# Patient Record
Sex: MALE | Race: WHITE | NOT HISPANIC OR LATINO | Employment: OTHER | URBAN - METROPOLITAN AREA
[De-identification: names, ages, dates, MRNs, and addresses within clinical notes are randomized per-mention and may not be internally consistent; named-entity substitution may affect disease eponyms.]

---

## 2017-04-06 ENCOUNTER — ALLSCRIPTS OFFICE VISIT (OUTPATIENT)
Dept: OTHER | Facility: OTHER | Age: 72
End: 2017-04-06

## 2017-04-06 ENCOUNTER — LAB REQUISITION (OUTPATIENT)
Dept: LAB | Facility: HOSPITAL | Age: 72
End: 2017-04-06
Payer: MEDICARE

## 2017-04-06 DIAGNOSIS — E78.5 HYPERLIPIDEMIA: ICD-10-CM

## 2017-04-06 DIAGNOSIS — I10 ESSENTIAL (PRIMARY) HYPERTENSION: ICD-10-CM

## 2017-04-06 DIAGNOSIS — I48.0 PAROXYSMAL ATRIAL FIBRILLATION (HCC): ICD-10-CM

## 2017-04-06 LAB
ALBUMIN SERPL BCP-MCNC: 3.8 G/DL (ref 3.5–5)
ALP SERPL-CCNC: 92 U/L (ref 46–116)
ALT SERPL W P-5'-P-CCNC: 43 U/L (ref 12–78)
ANION GAP SERPL CALCULATED.3IONS-SCNC: 5 MMOL/L (ref 4–13)
AST SERPL W P-5'-P-CCNC: 24 U/L (ref 5–45)
BILIRUB SERPL-MCNC: 0.8 MG/DL (ref 0.2–1)
BUN SERPL-MCNC: 19 MG/DL (ref 5–25)
CALCIUM SERPL-MCNC: 8.8 MG/DL (ref 8.3–10.1)
CHLORIDE SERPL-SCNC: 106 MMOL/L (ref 100–108)
CHOLEST SERPL-MCNC: 174 MG/DL (ref 50–200)
CO2 SERPL-SCNC: 28 MMOL/L (ref 21–32)
CREAT SERPL-MCNC: 1.12 MG/DL (ref 0.6–1.3)
GFR SERPL CREATININE-BSD FRML MDRD: >60 ML/MIN/1.73SQ M
GLUCOSE P FAST SERPL-MCNC: 91 MG/DL (ref 65–99)
HDLC SERPL-MCNC: 58 MG/DL (ref 40–60)
LDLC SERPL CALC-MCNC: 99 MG/DL (ref 0–100)
POTASSIUM SERPL-SCNC: 4.6 MMOL/L (ref 3.5–5.3)
PROT SERPL-MCNC: 7.1 G/DL (ref 6.4–8.2)
SODIUM SERPL-SCNC: 139 MMOL/L (ref 136–145)
TRIGL SERPL-MCNC: 86 MG/DL
TSH SERPL DL<=0.05 MIU/L-ACNC: 2.49 UIU/ML (ref 0.36–3.74)

## 2017-04-06 PROCEDURE — 84443 ASSAY THYROID STIM HORMONE: CPT | Performed by: FAMILY MEDICINE

## 2017-04-06 PROCEDURE — 80053 COMPREHEN METABOLIC PANEL: CPT | Performed by: FAMILY MEDICINE

## 2017-04-06 PROCEDURE — 80061 LIPID PANEL: CPT | Performed by: FAMILY MEDICINE

## 2017-04-07 ENCOUNTER — GENERIC CONVERSION - ENCOUNTER (OUTPATIENT)
Dept: OTHER | Facility: OTHER | Age: 72
End: 2017-04-07

## 2017-04-28 ENCOUNTER — ALLSCRIPTS OFFICE VISIT (OUTPATIENT)
Dept: OTHER | Facility: OTHER | Age: 72
End: 2017-04-28

## 2017-05-18 ENCOUNTER — ALLSCRIPTS OFFICE VISIT (OUTPATIENT)
Dept: OTHER | Facility: OTHER | Age: 72
End: 2017-05-18

## 2017-05-22 ENCOUNTER — ANESTHESIA EVENT (OUTPATIENT)
Dept: PERIOP | Facility: HOSPITAL | Age: 72
End: 2017-05-22
Payer: MEDICARE

## 2017-05-22 RX ORDER — BUPIVACAINE HYDROCHLORIDE AND EPINEPHRINE 5; 5 MG/ML; UG/ML
25 INJECTION, SOLUTION PERINEURAL ONCE
Status: CANCELLED | OUTPATIENT
Start: 2017-05-23

## 2017-05-23 ENCOUNTER — ANESTHESIA (OUTPATIENT)
Dept: PERIOP | Facility: HOSPITAL | Age: 72
End: 2017-05-23
Payer: MEDICARE

## 2017-05-23 ENCOUNTER — HOSPITAL ENCOUNTER (OUTPATIENT)
Facility: HOSPITAL | Age: 72
Setting detail: OUTPATIENT SURGERY
Discharge: HOME | End: 2017-05-23
Attending: ORTHOPAEDIC SURGERY | Admitting: ORTHOPAEDIC SURGERY
Payer: MEDICARE

## 2017-05-23 VITALS
RESPIRATION RATE: 20 BRPM | HEIGHT: 72 IN | TEMPERATURE: 99.1 F | WEIGHT: 178 LBS | HEART RATE: 69 BPM | BODY MASS INDEX: 24.11 KG/M2 | DIASTOLIC BLOOD PRESSURE: 69 MMHG | OXYGEN SATURATION: 95 % | SYSTOLIC BLOOD PRESSURE: 139 MMHG

## 2017-05-23 PROBLEM — M72.0 DUPUYTREN CONTRACTURE: Status: ACTIVE | Noted: 2017-05-23

## 2017-05-23 RX ORDER — SODIUM CHLORIDE, SODIUM LACTATE, POTASSIUM CHLORIDE, CALCIUM CHLORIDE 600; 310; 30; 20 MG/100ML; MG/100ML; MG/100ML; MG/100ML
125 INJECTION, SOLUTION INTRAVENOUS CONTINUOUS
Status: DISCONTINUED | OUTPATIENT
Start: 2017-05-23 | End: 2017-05-23 | Stop reason: HOSPADM

## 2017-05-23 RX ORDER — HYDROCODONE BITARTRATE AND ACETAMINOPHEN 5; 325 MG/1; MG/1
1 TABLET ORAL EVERY 6 HOURS PRN
Status: DISCONTINUED | OUTPATIENT
Start: 2017-05-23 | End: 2017-05-23 | Stop reason: HOSPADM

## 2017-05-23 RX ORDER — ONDANSETRON 2 MG/ML
4 INJECTION INTRAMUSCULAR; INTRAVENOUS ONCE AS NEEDED
Status: DISCONTINUED | OUTPATIENT
Start: 2017-05-23 | End: 2017-05-23 | Stop reason: HOSPADM

## 2017-05-23 RX ORDER — MIDAZOLAM HYDROCHLORIDE 1 MG/ML
INJECTION INTRAMUSCULAR; INTRAVENOUS AS NEEDED
Status: DISCONTINUED | OUTPATIENT
Start: 2017-05-23 | End: 2017-05-23 | Stop reason: SURG

## 2017-05-23 RX ORDER — LIDOCAINE HYDROCHLORIDE 10 MG/ML
INJECTION, SOLUTION INFILTRATION; PERINEURAL AS NEEDED
Status: DISCONTINUED | OUTPATIENT
Start: 2017-05-23 | End: 2017-05-23 | Stop reason: SURG

## 2017-05-23 RX ORDER — LANOLIN ALCOHOL/MO/W.PET/CERES
1000 CREAM (GRAM) TOPICAL DAILY
COMMUNITY
End: 2022-01-01 | Stop reason: CLARIF

## 2017-05-23 RX ORDER — METOCLOPRAMIDE HYDROCHLORIDE 5 MG/ML
10 INJECTION INTRAMUSCULAR; INTRAVENOUS EVERY 6 HOURS PRN
Status: DISCONTINUED | OUTPATIENT
Start: 2017-05-23 | End: 2017-05-23 | Stop reason: HOSPADM

## 2017-05-23 RX ORDER — CHOLECALCIFEROL (VITAMIN D3) 125 MCG
2000 CAPSULE ORAL DAILY
COMMUNITY
End: 2022-01-01 | Stop reason: CLARIF

## 2017-05-23 RX ORDER — FENTANYL CITRATE 50 UG/ML
INJECTION, SOLUTION INTRAMUSCULAR; INTRAVENOUS AS NEEDED
Status: DISCONTINUED | OUTPATIENT
Start: 2017-05-23 | End: 2017-05-23 | Stop reason: SURG

## 2017-05-23 RX ORDER — PROPOFOL 10 MG/ML
INJECTION, EMULSION INTRAVENOUS AS NEEDED
Status: DISCONTINUED | OUTPATIENT
Start: 2017-05-23 | End: 2017-05-23 | Stop reason: SURG

## 2017-05-23 RX ORDER — CHLORAL HYDRATE 500 MG
1000 CAPSULE ORAL DAILY
COMMUNITY
End: 2022-01-01 | Stop reason: CLARIF

## 2017-05-23 RX ORDER — DUTASTERIDE 0.5 MG/1
0.5 CAPSULE, LIQUID FILLED ORAL
COMMUNITY
End: 2018-04-13 | Stop reason: SDUPTHER

## 2017-05-23 RX ORDER — ONDANSETRON 2 MG/ML
INJECTION INTRAMUSCULAR; INTRAVENOUS AS NEEDED
Status: DISCONTINUED | OUTPATIENT
Start: 2017-05-23 | End: 2017-05-23 | Stop reason: SURG

## 2017-05-23 RX ORDER — EPHEDRINE SULFATE 50 MG/ML
INJECTION, SOLUTION INTRAVENOUS AS NEEDED
Status: DISCONTINUED | OUTPATIENT
Start: 2017-05-23 | End: 2017-05-23 | Stop reason: SURG

## 2017-05-23 RX ORDER — MAGNESIUM HYDROXIDE 1200 MG/15ML
LIQUID ORAL AS NEEDED
Status: DISCONTINUED | OUTPATIENT
Start: 2017-05-23 | End: 2017-05-23 | Stop reason: HOSPADM

## 2017-05-23 RX ORDER — ASPIRIN 81 MG/1
81 TABLET ORAL DAILY
COMMUNITY
End: 2021-01-01 | Stop reason: ALTCHOICE

## 2017-05-23 RX ORDER — HYDROCODONE BITARTRATE AND ACETAMINOPHEN 5; 325 MG/1; MG/1
1 TABLET ORAL EVERY 6 HOURS PRN
Qty: 10 TABLET | Refills: 0 | Status: SHIPPED | OUTPATIENT
Start: 2017-05-23 | End: 2017-06-02

## 2017-05-23 RX ORDER — PROPOFOL 10 MG/ML
INJECTION, EMULSION INTRAVENOUS CONTINUOUS PRN
Status: DISCONTINUED | OUTPATIENT
Start: 2017-05-23 | End: 2017-05-23 | Stop reason: SURG

## 2017-05-23 RX ORDER — LOSARTAN POTASSIUM 25 MG/1
50 TABLET ORAL
COMMUNITY
End: 2018-04-13 | Stop reason: SDUPTHER

## 2017-05-23 RX ORDER — FENTANYL CITRATE/PF 50 MCG/ML
25 SYRINGE (ML) INJECTION
Status: DISCONTINUED | OUTPATIENT
Start: 2017-05-23 | End: 2017-05-23 | Stop reason: HOSPADM

## 2017-05-23 RX ORDER — MULTIVITAMIN
1 TABLET ORAL DAILY
COMMUNITY
End: 2022-01-01 | Stop reason: HOSPADM

## 2017-05-23 RX ORDER — EZETIMIBE AND SIMVASTATIN 10; 20 MG/1; MG/1
1 TABLET ORAL
COMMUNITY
End: 2018-04-15 | Stop reason: ALTCHOICE

## 2017-05-23 RX ADMIN — SODIUM CHLORIDE, SODIUM LACTATE, POTASSIUM CHLORIDE, AND CALCIUM CHLORIDE: .6; .31; .03; .02 INJECTION, SOLUTION INTRAVENOUS at 14:00

## 2017-05-23 RX ADMIN — SODIUM CHLORIDE, SODIUM LACTATE, POTASSIUM CHLORIDE, AND CALCIUM CHLORIDE 125 ML/HR: .6; .31; .03; .02 INJECTION, SOLUTION INTRAVENOUS at 11:52

## 2017-05-23 RX ADMIN — PROPOFOL 100 MCG/KG/MIN: 10 INJECTION, EMULSION INTRAVENOUS at 14:10

## 2017-05-23 RX ADMIN — FENTANYL CITRATE 50 MCG: 50 INJECTION, SOLUTION INTRAMUSCULAR; INTRAVENOUS at 14:29

## 2017-05-23 RX ADMIN — LIDOCAINE HYDROCHLORIDE 50 MG: 10 INJECTION, SOLUTION INFILTRATION; PERINEURAL at 14:10

## 2017-05-23 RX ADMIN — SODIUM CHLORIDE, SODIUM LACTATE, POTASSIUM CHLORIDE, AND CALCIUM CHLORIDE 125 ML/HR: .6; .31; .03; .02 INJECTION, SOLUTION INTRAVENOUS at 16:15

## 2017-05-23 RX ADMIN — PROPOFOL 100 MG: 10 INJECTION, EMULSION INTRAVENOUS at 14:16

## 2017-05-23 RX ADMIN — EPHEDRINE SULFATE 5 MG: 50 INJECTION, SOLUTION INTRAMUSCULAR; INTRAVENOUS; SUBCUTANEOUS at 15:39

## 2017-05-23 RX ADMIN — MIDAZOLAM HYDROCHLORIDE 1 MG: 1 INJECTION, SOLUTION INTRAMUSCULAR; INTRAVENOUS at 14:01

## 2017-05-23 RX ADMIN — MIDAZOLAM HYDROCHLORIDE 1 MG: 1 INJECTION, SOLUTION INTRAMUSCULAR; INTRAVENOUS at 14:09

## 2017-05-23 RX ADMIN — EPHEDRINE SULFATE 10 MG: 50 INJECTION, SOLUTION INTRAMUSCULAR; INTRAVENOUS; SUBCUTANEOUS at 15:08

## 2017-05-23 RX ADMIN — ONDANSETRON 4 MG: 2 INJECTION INTRAMUSCULAR; INTRAVENOUS at 15:05

## 2017-05-23 RX ADMIN — EPHEDRINE SULFATE 5 MG: 50 INJECTION, SOLUTION INTRAMUSCULAR; INTRAVENOUS; SUBCUTANEOUS at 15:28

## 2017-05-23 RX ADMIN — FENTANYL CITRATE 25 MCG: 50 INJECTION, SOLUTION INTRAMUSCULAR; INTRAVENOUS at 15:21

## 2017-05-23 RX ADMIN — FENTANYL CITRATE 25 MCG: 50 INJECTION, SOLUTION INTRAMUSCULAR; INTRAVENOUS at 14:49

## 2017-05-23 RX ADMIN — CEFAZOLIN SODIUM 2000 MG: 2 SOLUTION INTRAVENOUS at 14:10

## 2017-06-02 ENCOUNTER — APPOINTMENT (OUTPATIENT)
Dept: OCCUPATIONAL THERAPY | Facility: HOSPITAL | Age: 72
End: 2017-06-02
Payer: MEDICARE

## 2017-06-02 ENCOUNTER — ALLSCRIPTS OFFICE VISIT (OUTPATIENT)
Dept: OTHER | Facility: OTHER | Age: 72
End: 2017-06-02

## 2017-06-02 DIAGNOSIS — M72.0 PALMAR FASCIAL FIBROMATOSIS: ICD-10-CM

## 2017-06-02 PROCEDURE — L3913 HFO W/O JOINTS CF: HCPCS

## 2017-06-09 ENCOUNTER — APPOINTMENT (OUTPATIENT)
Dept: PHYSICAL THERAPY | Age: 72
End: 2017-06-09
Payer: MEDICARE

## 2017-06-09 ENCOUNTER — GENERIC CONVERSION - ENCOUNTER (OUTPATIENT)
Dept: OTHER | Facility: OTHER | Age: 72
End: 2017-06-09

## 2017-06-09 PROCEDURE — 97140 MANUAL THERAPY 1/> REGIONS: CPT

## 2017-06-09 PROCEDURE — G8990 OTHER PT/OT CURRENT STATUS: HCPCS | Performed by: PHYSICAL THERAPIST

## 2017-06-09 PROCEDURE — G8991 OTHER PT/OT GOAL STATUS: HCPCS | Performed by: PHYSICAL THERAPIST

## 2017-06-09 PROCEDURE — 97162 PT EVAL MOD COMPLEX 30 MIN: CPT

## 2017-06-12 ENCOUNTER — APPOINTMENT (OUTPATIENT)
Dept: PHYSICAL THERAPY | Age: 72
End: 2017-06-12
Payer: MEDICARE

## 2017-06-12 PROCEDURE — 97110 THERAPEUTIC EXERCISES: CPT

## 2017-06-12 PROCEDURE — 97140 MANUAL THERAPY 1/> REGIONS: CPT

## 2017-06-14 ENCOUNTER — APPOINTMENT (OUTPATIENT)
Dept: PHYSICAL THERAPY | Age: 72
End: 2017-06-14
Payer: MEDICARE

## 2017-06-14 PROCEDURE — 97140 MANUAL THERAPY 1/> REGIONS: CPT

## 2017-06-14 PROCEDURE — 97110 THERAPEUTIC EXERCISES: CPT

## 2017-06-16 ENCOUNTER — APPOINTMENT (OUTPATIENT)
Dept: PHYSICAL THERAPY | Age: 72
End: 2017-06-16
Payer: MEDICARE

## 2017-06-16 PROCEDURE — 97140 MANUAL THERAPY 1/> REGIONS: CPT

## 2017-06-16 PROCEDURE — 97110 THERAPEUTIC EXERCISES: CPT

## 2017-06-19 ENCOUNTER — APPOINTMENT (OUTPATIENT)
Dept: PHYSICAL THERAPY | Age: 72
End: 2017-06-19
Payer: MEDICARE

## 2017-06-21 ENCOUNTER — APPOINTMENT (OUTPATIENT)
Dept: PHYSICAL THERAPY | Age: 72
End: 2017-06-21
Payer: MEDICARE

## 2017-06-21 PROCEDURE — 97140 MANUAL THERAPY 1/> REGIONS: CPT

## 2017-06-21 PROCEDURE — 97110 THERAPEUTIC EXERCISES: CPT

## 2017-06-23 ENCOUNTER — APPOINTMENT (OUTPATIENT)
Dept: PHYSICAL THERAPY | Age: 72
End: 2017-06-23
Payer: MEDICARE

## 2017-06-23 PROCEDURE — 97110 THERAPEUTIC EXERCISES: CPT

## 2017-06-23 PROCEDURE — 97140 MANUAL THERAPY 1/> REGIONS: CPT

## 2017-06-26 ENCOUNTER — APPOINTMENT (OUTPATIENT)
Dept: PHYSICAL THERAPY | Age: 72
End: 2017-06-26
Payer: MEDICARE

## 2017-06-26 PROCEDURE — 97140 MANUAL THERAPY 1/> REGIONS: CPT

## 2017-06-26 PROCEDURE — 97110 THERAPEUTIC EXERCISES: CPT

## 2017-06-30 ENCOUNTER — APPOINTMENT (OUTPATIENT)
Dept: PHYSICAL THERAPY | Age: 72
End: 2017-06-30
Payer: MEDICARE

## 2017-06-30 PROCEDURE — 97110 THERAPEUTIC EXERCISES: CPT

## 2017-06-30 PROCEDURE — 97140 MANUAL THERAPY 1/> REGIONS: CPT

## 2017-07-03 ENCOUNTER — APPOINTMENT (OUTPATIENT)
Dept: PHYSICAL THERAPY | Age: 72
End: 2017-07-03
Payer: MEDICARE

## 2017-07-07 ENCOUNTER — APPOINTMENT (OUTPATIENT)
Dept: PHYSICAL THERAPY | Age: 72
End: 2017-07-07
Payer: MEDICARE

## 2017-07-07 PROCEDURE — 97110 THERAPEUTIC EXERCISES: CPT

## 2017-07-07 PROCEDURE — 97140 MANUAL THERAPY 1/> REGIONS: CPT

## 2017-07-07 PROCEDURE — 97140 MANUAL THERAPY 1/> REGIONS: CPT | Performed by: PHYSICAL THERAPIST

## 2017-07-07 PROCEDURE — 97110 THERAPEUTIC EXERCISES: CPT | Performed by: PHYSICAL THERAPIST

## 2017-07-10 ENCOUNTER — APPOINTMENT (OUTPATIENT)
Dept: OCCUPATIONAL THERAPY | Age: 72
End: 2017-07-10
Payer: MEDICARE

## 2017-07-14 ENCOUNTER — APPOINTMENT (OUTPATIENT)
Dept: PHYSICAL THERAPY | Age: 72
End: 2017-07-14
Payer: MEDICARE

## 2017-07-17 ENCOUNTER — APPOINTMENT (OUTPATIENT)
Dept: PHYSICAL THERAPY | Age: 72
End: 2017-07-17
Payer: MEDICARE

## 2017-07-17 PROCEDURE — 97110 THERAPEUTIC EXERCISES: CPT

## 2017-07-17 PROCEDURE — 97140 MANUAL THERAPY 1/> REGIONS: CPT

## 2017-07-21 ENCOUNTER — APPOINTMENT (OUTPATIENT)
Dept: PHYSICAL THERAPY | Age: 72
End: 2017-07-21
Payer: MEDICARE

## 2017-08-01 ENCOUNTER — GENERIC CONVERSION - ENCOUNTER (OUTPATIENT)
Dept: OTHER | Facility: OTHER | Age: 72
End: 2017-08-01

## 2017-08-17 ENCOUNTER — APPOINTMENT (EMERGENCY)
Dept: RADIOLOGY | Facility: HOSPITAL | Age: 72
End: 2017-08-17
Payer: MEDICARE

## 2017-08-17 ENCOUNTER — APPOINTMENT (EMERGENCY)
Dept: CT IMAGING | Facility: HOSPITAL | Age: 72
End: 2017-08-17
Payer: MEDICARE

## 2017-08-17 ENCOUNTER — HOSPITAL ENCOUNTER (EMERGENCY)
Facility: HOSPITAL | Age: 72
Discharge: HOME | End: 2017-08-17
Attending: EMERGENCY MEDICINE | Admitting: EMERGENCY MEDICINE
Payer: MEDICARE

## 2017-08-17 VITALS
WEIGHT: 175 LBS | HEART RATE: 60 BPM | BODY MASS INDEX: 23.73 KG/M2 | SYSTOLIC BLOOD PRESSURE: 138 MMHG | TEMPERATURE: 97.7 F | DIASTOLIC BLOOD PRESSURE: 74 MMHG | RESPIRATION RATE: 16 BRPM | OXYGEN SATURATION: 97 %

## 2017-08-17 DIAGNOSIS — S40.211A: ICD-10-CM

## 2017-08-17 DIAGNOSIS — R55 NEAR SYNCOPE: Primary | ICD-10-CM

## 2017-08-17 DIAGNOSIS — S00.83XA FACIAL CONTUSION, INITIAL ENCOUNTER: ICD-10-CM

## 2017-08-17 DIAGNOSIS — T67.5XXA HEAT EXHAUSTION: ICD-10-CM

## 2017-08-17 DIAGNOSIS — Z23 NEED FOR TDAP VACCINATION: ICD-10-CM

## 2017-08-17 LAB
ANION GAP SERPL CALCULATED.3IONS-SCNC: 6 MMOL/L (ref 4–13)
BASOPHILS # BLD AUTO: 0.02 THOUSANDS/ΜL (ref 0–0.1)
BASOPHILS NFR BLD AUTO: 0 % (ref 0–1)
BUN SERPL-MCNC: 14 MG/DL (ref 5–25)
CALCIUM SERPL-MCNC: 8.7 MG/DL (ref 8.3–10.1)
CHLORIDE SERPL-SCNC: 103 MMOL/L (ref 100–108)
CO2 SERPL-SCNC: 27 MMOL/L (ref 21–32)
CREAT SERPL-MCNC: 1.18 MG/DL (ref 0.6–1.3)
EOSINOPHIL # BLD AUTO: 0.04 THOUSAND/ΜL (ref 0–0.61)
EOSINOPHIL NFR BLD AUTO: 1 % (ref 0–6)
ERYTHROCYTE [DISTWIDTH] IN BLOOD BY AUTOMATED COUNT: 14.4 % (ref 11.6–15.1)
GFR SERPL CREATININE-BSD FRML MDRD: 61 ML/MIN/1.73SQ M
GLUCOSE SERPL-MCNC: 96 MG/DL (ref 65–140)
HCT VFR BLD AUTO: 40.2 % (ref 36.5–49.3)
HGB BLD-MCNC: 14.1 G/DL (ref 12–17)
LYMPHOCYTES # BLD AUTO: 0.93 THOUSANDS/ΜL (ref 0.6–4.47)
LYMPHOCYTES NFR BLD AUTO: 11 % (ref 14–44)
MCH RBC QN AUTO: 30.5 PG (ref 26.8–34.3)
MCHC RBC AUTO-ENTMCNC: 35.1 G/DL (ref 31.4–37.4)
MCV RBC AUTO: 87 FL (ref 82–98)
MONOCYTES # BLD AUTO: 0.77 THOUSAND/ΜL (ref 0.17–1.22)
MONOCYTES NFR BLD AUTO: 9 % (ref 4–12)
NEUTROPHILS # BLD AUTO: 6.57 THOUSANDS/ΜL (ref 1.85–7.62)
NEUTS SEG NFR BLD AUTO: 79 % (ref 43–75)
NRBC BLD AUTO-RTO: 0 /100 WBCS
PLATELET # BLD AUTO: 187 THOUSANDS/UL (ref 149–390)
PMV BLD AUTO: 10.3 FL (ref 8.9–12.7)
POTASSIUM SERPL-SCNC: 4.3 MMOL/L (ref 3.5–5.3)
RBC # BLD AUTO: 4.63 MILLION/UL (ref 3.88–5.62)
SODIUM SERPL-SCNC: 136 MMOL/L (ref 136–145)
SPECIMEN SOURCE: NORMAL
TROPONIN I BLD-MCNC: 0 NG/ML (ref 0–0.08)
WBC # BLD AUTO: 8.33 THOUSAND/UL (ref 4.31–10.16)

## 2017-08-17 PROCEDURE — 36415 COLL VENOUS BLD VENIPUNCTURE: CPT | Performed by: EMERGENCY MEDICINE

## 2017-08-17 PROCEDURE — 84484 ASSAY OF TROPONIN QUANT: CPT

## 2017-08-17 PROCEDURE — 99284 EMERGENCY DEPT VISIT MOD MDM: CPT

## 2017-08-17 PROCEDURE — 80048 BASIC METABOLIC PNL TOTAL CA: CPT | Performed by: EMERGENCY MEDICINE

## 2017-08-17 PROCEDURE — 85025 COMPLETE CBC W/AUTO DIFF WBC: CPT | Performed by: EMERGENCY MEDICINE

## 2017-08-17 PROCEDURE — 90471 IMMUNIZATION ADMIN: CPT

## 2017-08-17 PROCEDURE — 93005 ELECTROCARDIOGRAM TRACING: CPT | Performed by: EMERGENCY MEDICINE

## 2017-08-17 PROCEDURE — 90715 TDAP VACCINE 7 YRS/> IM: CPT | Performed by: EMERGENCY MEDICINE

## 2017-08-17 PROCEDURE — 70450 CT HEAD/BRAIN W/O DYE: CPT

## 2017-08-17 PROCEDURE — 71020 HB CHEST X-RAY 2VW FRONTAL&LATL: CPT

## 2017-08-17 RX ADMIN — TETANUS TOXOID, REDUCED DIPHTHERIA TOXOID AND ACELLULAR PERTUSSIS VACCINE, ADSORBED 0.5 ML: 5; 2.5; 8; 8; 2.5 SUSPENSION INTRAMUSCULAR at 17:35

## 2017-08-18 LAB
ATRIAL RATE: 61 BPM
P AXIS: 67 DEGREES
PR INTERVAL: 196 MS
QRS AXIS: 61 DEGREES
QRSD INTERVAL: 102 MS
QT INTERVAL: 422 MS
QTC INTERVAL: 424 MS
T WAVE AXIS: 59 DEGREES
VENTRICULAR RATE: 61 BPM

## 2017-08-25 ENCOUNTER — ALLSCRIPTS OFFICE VISIT (OUTPATIENT)
Dept: OTHER | Facility: OTHER | Age: 72
End: 2017-08-25

## 2017-10-12 ENCOUNTER — GENERIC CONVERSION - ENCOUNTER (OUTPATIENT)
Dept: OTHER | Facility: OTHER | Age: 72
End: 2017-10-12

## 2017-10-12 ENCOUNTER — LAB REQUISITION (OUTPATIENT)
Dept: LAB | Facility: HOSPITAL | Age: 72
End: 2017-10-12
Payer: MEDICARE

## 2017-10-12 ENCOUNTER — ALLSCRIPTS OFFICE VISIT (OUTPATIENT)
Dept: OTHER | Facility: OTHER | Age: 72
End: 2017-10-12

## 2017-10-12 DIAGNOSIS — E78.5 HYPERLIPIDEMIA: ICD-10-CM

## 2017-10-12 DIAGNOSIS — D64.9 ANEMIA: ICD-10-CM

## 2017-10-12 DIAGNOSIS — Z12.5 ENCOUNTER FOR SCREENING FOR MALIGNANT NEOPLASM OF PROSTATE: ICD-10-CM

## 2017-10-12 DIAGNOSIS — I10 ESSENTIAL (PRIMARY) HYPERTENSION: ICD-10-CM

## 2017-10-12 LAB
ALBUMIN SERPL BCP-MCNC: 3.8 G/DL (ref 3.5–5)
ALP SERPL-CCNC: 76 U/L (ref 46–116)
ALT SERPL W P-5'-P-CCNC: 37 U/L (ref 12–78)
ANION GAP SERPL CALCULATED.3IONS-SCNC: 8 MMOL/L (ref 4–13)
AST SERPL W P-5'-P-CCNC: 24 U/L (ref 5–45)
BASOPHILS # BLD AUTO: 0.05 THOUSANDS/ΜL (ref 0–0.1)
BASOPHILS NFR BLD AUTO: 1 % (ref 0–1)
BILIRUB SERPL-MCNC: 0.76 MG/DL (ref 0.2–1)
BUN SERPL-MCNC: 17 MG/DL (ref 5–25)
CALCIUM SERPL-MCNC: 8.6 MG/DL (ref 8.3–10.1)
CHLORIDE SERPL-SCNC: 106 MMOL/L (ref 100–108)
CHOLEST SERPL-MCNC: 162 MG/DL (ref 50–200)
CO2 SERPL-SCNC: 26 MMOL/L (ref 21–32)
CREAT SERPL-MCNC: 1.05 MG/DL (ref 0.6–1.3)
EOSINOPHIL # BLD AUTO: 0.32 THOUSAND/ΜL (ref 0–0.61)
EOSINOPHIL NFR BLD AUTO: 7 % (ref 0–6)
ERYTHROCYTE [DISTWIDTH] IN BLOOD BY AUTOMATED COUNT: 14.4 % (ref 11.6–15.1)
GFR SERPL CREATININE-BSD FRML MDRD: 70 ML/MIN/1.73SQ M
GLUCOSE P FAST SERPL-MCNC: 87 MG/DL (ref 65–99)
HCT VFR BLD AUTO: 44.2 % (ref 36.5–49.3)
HDLC SERPL-MCNC: 56 MG/DL (ref 40–60)
HGB BLD-MCNC: 15.2 G/DL (ref 12–17)
LDLC SERPL CALC-MCNC: 86 MG/DL (ref 0–100)
LYMPHOCYTES # BLD AUTO: 1.75 THOUSANDS/ΜL (ref 0.6–4.47)
LYMPHOCYTES NFR BLD AUTO: 35 % (ref 14–44)
MCH RBC QN AUTO: 30.2 PG (ref 26.8–34.3)
MCHC RBC AUTO-ENTMCNC: 34.4 G/DL (ref 31.4–37.4)
MCV RBC AUTO: 88 FL (ref 82–98)
MONOCYTES # BLD AUTO: 0.76 THOUSAND/ΜL (ref 0.17–1.22)
MONOCYTES NFR BLD AUTO: 15 % (ref 4–12)
NEUTROPHILS # BLD AUTO: 2.05 THOUSANDS/ΜL (ref 1.85–7.62)
NEUTS SEG NFR BLD AUTO: 42 % (ref 43–75)
NRBC BLD AUTO-RTO: 0 /100 WBCS
PLATELET # BLD AUTO: 221 THOUSANDS/UL (ref 149–390)
PMV BLD AUTO: 11 FL (ref 8.9–12.7)
POTASSIUM SERPL-SCNC: 4.5 MMOL/L (ref 3.5–5.3)
PROT SERPL-MCNC: 7 G/DL (ref 6.4–8.2)
PSA SERPL-MCNC: 0.7 NG/ML (ref 0–4)
RBC # BLD AUTO: 5.03 MILLION/UL (ref 3.88–5.62)
SODIUM SERPL-SCNC: 140 MMOL/L (ref 136–145)
TRIGL SERPL-MCNC: 98 MG/DL
TSH SERPL DL<=0.05 MIU/L-ACNC: 3.13 UIU/ML (ref 0.36–3.74)
WBC # BLD AUTO: 4.94 THOUSAND/UL (ref 4.31–10.16)

## 2017-10-12 PROCEDURE — 80053 COMPREHEN METABOLIC PANEL: CPT | Performed by: FAMILY MEDICINE

## 2017-10-12 PROCEDURE — 85025 COMPLETE CBC W/AUTO DIFF WBC: CPT | Performed by: FAMILY MEDICINE

## 2017-10-12 PROCEDURE — 84443 ASSAY THYROID STIM HORMONE: CPT | Performed by: FAMILY MEDICINE

## 2017-10-12 PROCEDURE — 80061 LIPID PANEL: CPT | Performed by: FAMILY MEDICINE

## 2017-10-12 PROCEDURE — G0103 PSA SCREENING: HCPCS | Performed by: FAMILY MEDICINE

## 2017-10-13 NOTE — PROGRESS NOTES
Assessment  1  Benign essential hypertension (401 1) (I10)   2  Hyperlipidemia (272 4) (E78 5)   3  Dupuytren's disease (728 6) (M72 0)    Plan  Anemia, Benign essential hypertension, Encounter for prostate cancer screening,  Hyperlipidemia    · (1) PSA (SCREEN) (Dx V76 44 Screen for Prostate Cancer); Status:Active; Requested  for:12Oct2017; Anemia, Benign essential hypertension, Hyperlipidemia    · (1) CBC/PLT/DIFF; Status:Active; Requested for:12Oct2017;    · (1) COMPREHENSIVE METABOLIC PANEL; Status:Active; Requested for:12Oct2017;    · (1) LIPID PANEL FASTING W DIRECT LDL REFLEX; Status:Active; Requested  for:12Oct2017;    · (1) TSH; Status:Active; Requested for:12Oct2017;   Need for influenza vaccination    · Fluzone High-Dose 0 5 ML Intramuscular Suspension Prefilled Syringe  PMH: Abdominal pain    · Chlordiazepoxide-Clidinium 5-2 5 MG Oral Capsule; TAKE 1 CAPSULE 3 TIMES  DAILY AS NEEDED    Discussion/Summary    Hyperlipidemia stable well-controlled  Continue losartan and metoprolol  Continue atorvastatin 40 mg  Due for fasting labs today  contractures  Continue follow-up with orthopedic/hand surgeon as needed  vaccine today  Fasting lab work today to include CBC, chem panel, PSA and thyroidup-to-date  Recheck 6 months  Chief Complaint  Patient presents for a med check  Patient is fasting today for blood work  Patient is here today for follow up of chronic conditions described in HPI  History of Present Illness  Patient was seen for routine follow-up of chronic medical problems he has hypertension, hyperlipidemia, and he's being treated by hand surgeon for chronic Dupuytren's contractures in his hands  He's had multiple surgeries with partial improvement he takes atorvastatin for his lipids, losartan and metoprolol for his blood pressure  He's compliant with both those      Review of Systems    Constitutional: No fever or chills, feels well, no tiredness, no recent weight gain or weight loss  Eyes: No complaints of eye pain, no red eyes, no discharge from eyes, no itchy eyes  ENT: no complaints of earache, no hearing loss, no nosebleeds, no nasal discharge, no sore throat, no hoarseness  Cardiovascular: No complaints of slow heart rate, no fast heart rate, no chest pain, no palpitations, no leg claudication, no lower extremity  Respiratory: No complaints of shortness of breath, no wheezing, no cough, no SOB on exertion, no orthopnea or PND  Gastrointestinal: No complaints of abdominal pain, no constipation, no nausea or vomiting, no diarrhea or bloody stools  Genitourinary: No complaints of dysuria, no incontinence, no hesitancy, no nocturia, no genital lesion, no testicular pain  Musculoskeletal: No complaints of arthralgia, no myalgias, no joint swelling or stiffness, no limb pain or swelling-and-as noted in HPI  Integumentary: No complaints of skin rash or skin lesions, no itching, no skin wound, no dry skin  Neurological: No compliants of headache, no confusion, no convulsions, no numbness or tingling, no dizziness or fainting, no limb weakness, no difficulty walking  Psychiatric: Is not suicidal, no sleep disturbances, no anxiety or depression, no change in personality, no emotional problems  Endocrine: No complaints of proptosis, no hot flashes, no muscle weakness, no erectile dysfunction, no deepening of the voice, no feelings of weakness  Hematologic/Lymphatic: No complaints of swollen glands, no swollen glands in the neck, does not bleed easily, no easy bruising  Active Problems  1  Anemia (285 9) (D64 9)   2  Benign essential hypertension (401 1) (I10)   3  Benign hypertrophy of prostate (600 00) (N40 0)   4  Dupuytren's disease (728 6) (M72 0)   5  Hyperlipidemia (272 4) (E78 5)    Past Medical History  1  History of Abdominal pain (789 00) (R10 9)   2  History of Abnormal CT of liver (793 3) (R93 2)   3   History of Acute upper respiratory infection (465 9) (J06 9)   4  History of Anxiety (300 00) (F41 9)   5  History of Depression screen (V79 0) (Z13 89)   6  History of Encounter for screening for other nervous system disorder (V80 09) (Z13 858)   7  History of Encounter for special screening examination for genitourinary disorder (V81 6)   (Z13 89)   8  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)   9  History of Esophagitis   10  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)   11  History of acute gastritis (V12 70) (Z87 19)   12  History of acute sinusitis (V12 69) (Z87 09)   13  History of dehydration (V12 29) (Z86 39)   14  History of esophageal reflux (V12 79) (Z87 19)   15  History of Need for immunization against influenza (V04 81) (Z23)   16  History of Need for immunization against influenza (V04 81) (Z23)   17  History of Need for vaccination for pneumococcus (V03 82) (Z23)   18  History of Paroxysmal atrial fibrillation (427 31) (I48 0)   19  History of Prostate cancer screening (V76 44) (Z12 5)   20  History of Screening for colon cancer (V76 51) (Z12 11)   21  History of Screening for genitourinary condition (V81 6) (Z13 89)   22  History of Screening for other and unspecified genitourinary condition (V81 6) (Z13 89)    The active problems and past medical history were reviewed and updated today  Surgical History  1  History of Lower Back Surgery    Social History   · Never A Smoker    Current Meds   1  Aspirin 81 MG Oral Tablet Chewable; qd;   Therapy: 05QOZ2544 to (Last Rx:13Mar2012)  Requested for: 47ZNY6411 Ordered   2  Atorvastatin Calcium 40 MG Oral Tablet; Take 1 tablet daily; Therapy: 33GZL3344 to (Last Rx:06Apr2017)  Requested for: 06Apr2017 Ordered   3  Dutasteride 0 5 MG Oral Capsule; TAKE 1 CAPSULE Daily; Therapy: 23HVT6313 to (Evaluate:01Apr2018)  Requested for: 62FZM3131; Last   Rx:06Apr2017 Ordered   4  Losartan Potassium 50 MG Oral Tablet; take 0 5 tablet daily;    Therapy: 77Udv1297 to ([phone])  Requested for: 67NDV8799; Last   Rx:06Apr2017 Ordered   5  Metoprolol Tartrate 25 MG Oral Tablet; TAKE 0 5 TABLET Twice daily; Therapy: 38GKX3351 to (Evaluate:01Apr2018)  Requested for: 79OQN4729; Last   Rx:06Apr2017 Ordered   6  Viagra 50 MG Oral Tablet; PRN; Therapy: 71AIV0770 to  Requested for: 75Lkm6720 Recorded    The medication list was reviewed and updated today  Allergies  1  No Known Drug Allergies    Vitals  Vital Signs    Recorded: 41NGU4027 08:24AM   Temperature 96 F, Tympanic   Heart Rate 78   Pulse Quality Normal   Systolic 604, RUE, Sitting   Diastolic 70, RUE, Sitting   BP CUFF SIZE Large   Height 5 ft 10 in   Weight 175 lb 2 oz   BMI Calculated 25 13   BSA Calculated 1 97   O2 Saturation 97, RA     Physical Exam    Constitutional   General appearance: No acute distress, well appearing and well nourished  Pulmonary   Respiratory effort: No increased work of breathing or signs of respiratory distress  Auscultation of lungs: Clear to auscultation, equal breath sounds bilaterally, no wheezes, no rales, no rhonci  Cardiovascular   Auscultation of heart: Normal rate and rhythm, normal S1 and S2, without murmurs  Examination of extremities for edema and/or varicosities: Normal     Lymphatic   Palpation of lymph nodes in neck: No lymphadenopathy      Musculoskeletal   Gait and station: Normal     Psychiatric   Orientation to person, place and time: Normal     Mood and affect: Normal          Signatures   Electronically signed by : Iván Hutson DO; Oct 12 2017  8:57AM EST                       (Author)

## 2018-01-11 NOTE — RESULT NOTES
Verified Results  (1) CBC/PLT/DIFF 12Oct2017 09:02AM Pablito Ruiz Order Number: [id number]     Test Name Result Flag Reference   WBC COUNT 4 94 Thousand/uL  4 31-10 16   RBC COUNT 5 03 Million/uL  3 88-5 62   HEMOGLOBIN 15 2 g/dL  12 0-17 0   HEMATOCRIT 44 2 %  36 5-49 3   MCV 88 fL  82-98   MCH 30 2 pg  26 8-34 3   MCHC 34 4 g/dL  31 4-37 4   RDW 14 4 %  11 6-15 1   MPV 11 0 fL  8 9-12 7   PLATELET COUNT 963 Thousands/uL  149-390   nRBC AUTOMATED 0 /100 WBCs     NEUTROPHILS RELATIVE PERCENT 42 % L 43-75   LYMPHOCYTES RELATIVE PERCENT 35 %  14-44   MONOCYTES RELATIVE PERCENT 15 % H 4-12   EOSINOPHILS RELATIVE PERCENT 7 % H 0-6   BASOPHILS RELATIVE PERCENT 1 %  0-1   NEUTROPHILS ABSOLUTE COUNT 2 05 Thousands/? ??L  1 85-7 62   LYMPHOCYTES ABSOLUTE COUNT 1 75 Thousands/? ??L  0 60-4 47   MONOCYTES ABSOLUTE COUNT 0 76 Thousand/? ??L  0 17-1 22   EOSINOPHILS ABSOLUTE COUNT 0 32 Thousand/? ??L  0 00-0 61   BASOPHILS ABSOLUTE COUNT 0 05 Thousands/? ??L  0 00-0 10     (1) COMPREHENSIVE METABOLIC PANEL 58JCH1545 61:22LV Pablito Ruiz Order Number: [id number]     Test Name Result Flag Reference   SODIUM 140 mmol/L  136-145   POTASSIUM 4 5 mmol/L  3 5-5 3   CHLORIDE 106 mmol/L  100-108   CARBON DIOXIDE 26 mmol/L  21-32   ANION GAP (CALC) 8 mmol/L  4-13   BLOOD UREA NITROGEN 17 mg/dL  5-25   CREATININE 1 05 mg/dL  0 60-1 30   Standardized to IDMS reference method   CALCIUM 8 6 mg/dL  8 3-10 1   BILI, TOTAL 0 76 mg/dL  0 20-1 00   ALK PHOSPHATAS 76 U/L     ALT (SGPT) 37 U/L  12-78   Specimen collection should occur prior to Sulfasalazine and/or Sulfapyridine administration due to the potential for falsely depressed results  AST(SGOT) 24 U/L  5-45   Specimen collection should occur prior to Sulfasalazine administration due to the potential for falsely depressed results     ALBUMIN 3 8 g/dL  3 5-5 0   TOTAL PROTEIN 7 0 g/dL  6 4-8 2   eGFR 70 ml/min/1 73sq m     National Kidney Disease Education Program recommendations are as follows:  GFR calculation is accurate only with a steady state creatinine  Chronic Kidney disease less than 60 ml/min/1 73 sq  meters  Kidney failure less than 15 ml/min/1 73 sq  meters  GLUCOSE FASTING 87 mg/dL  65-99   Specimen collection should occur prior to Sulfasalazine administration due to the potential for falsely depressed results  Specimen collection should occur prior to Sulfapyridine administration due to the potential for falsely elevated results  (1) LIPID PANEL FASTING W DIRECT LDL REFLEX 12Oct2017 09:02AM [place] [place] Order Number: [id number]     Test Name Result Flag Reference   CHOLESTEROL 162 mg/dL     LDL CHOLESTEROL CALCULATED 86 mg/dL  0-100   Triglyceride:        Normal <150 mg/dl   Borderline High 150-199 mg/dl   High 200-499 mg/dl   Very High >499 mg/dl      Cholesterol:       Desirable <200 mg/dl    Borderline High 200-239 mg/dl    High >239 mg/dl      HDL Cholesterol:       High>59 mg/dL    Low <41 mg/dL      HDL Cholesterol:       High>59 mg/dL    Low <41 mg/dL      This screening LDL is a calculated result  It does not have the accuracy of the Direct Measured LDL in the monitoring of patients with hyperlipidemia and/or statin therapy  Direct Measure LDL (SQY312) must be ordered separately in these patients  TRIGLYCERIDES 98 mg/dL  <=150   Specimen collection should occur prior to N-Acetylcysteine or Metamizole administration due to the potential for falsely depressed results  HDL,DIRECT 56 mg/dL  40-60   Specimen collection should occur prior to Metamizole administration due to the potential for falsley depressed results  (1) TSH 12Oct2017 09:02AM [facility] [place] Order Number: [id number]     Test Name Result Flag Reference   TSH 3 130 uIU/mL  0 358-3 740   Patients undergoing fluorescein dye angiography may retain small amounts of fluorescein in the body for 48-72 hours post procedure  Samples containing fluorescein can produce falsely depressed TSH values  If the patient had this procedure,a specimen should be resubmitted post fluorescein clearance  (1) PSA (SCREEN) (Dx V76 44 Screen for Prostate Cancer) 16AUH3541 09:02AM Marv Calvo Order Number: [id number]     Test Name Result Flag Reference   PROSTATE SPECIFIC ANTIGEN 0 7 ng/mL  0 0-4 0   American U[employer] Association Guidelines define biochemical recurrence of prostate cancer as a detectable or rising PSA value post-radical prostatectomy that is greater than or equal to 0 2 ng/mL with a second confirmatory level of greater than or equal to 0 2 ng/mL

## 2018-01-12 VITALS
WEIGHT: 181.13 LBS | DIASTOLIC BLOOD PRESSURE: 78 MMHG | HEART RATE: 78 BPM | SYSTOLIC BLOOD PRESSURE: 156 MMHG | BODY MASS INDEX: 25.93 KG/M2 | HEIGHT: 70 IN

## 2018-01-12 VITALS
HEART RATE: 83 BPM | DIASTOLIC BLOOD PRESSURE: 80 MMHG | HEIGHT: 70 IN | SYSTOLIC BLOOD PRESSURE: 147 MMHG | BODY MASS INDEX: 25.41 KG/M2 | WEIGHT: 177.5 LBS

## 2018-01-13 VITALS
OXYGEN SATURATION: 96 % | TEMPERATURE: 98.4 F | BODY MASS INDEX: 25.54 KG/M2 | SYSTOLIC BLOOD PRESSURE: 118 MMHG | HEART RATE: 66 BPM | HEIGHT: 70 IN | RESPIRATION RATE: 16 BRPM | WEIGHT: 178.38 LBS | DIASTOLIC BLOOD PRESSURE: 78 MMHG

## 2018-01-13 NOTE — RESULT NOTES
Verified Results  (1) OCCULT BLOOD, FECAL IMMUNOCHEMICAL TEST 60ZRA1824 09:10PM Mario Push     Test Name Result Flag Reference   OCCULT BLD, FECAL IMMUNOLOGICAL Negative  Negative   Performed by Fecal Immunochemical Test

## 2018-01-13 NOTE — RESULT NOTES
Verified Results  (1) COMPREHENSIVE METABOLIC PANEL 25SZC4615 19:02KI John Lexie Order Number: [id number]     Test Name Result Flag Reference   GLUCOSE,RANDM 89 mg/dL     If the patient is fasting, the ADA then defines impaired fasting glucose as > 100 mg/dL and diabetes as > or equal to 123 mg/dL  SODIUM 139 mmol/L  136-145   POTASSIUM 4 5 mmol/L  3 5-5 3   CHLORIDE 106 mmol/L  100-108   CARBON DIOXIDE 27 mmol/L  21-32   ANION GAP (CALC) 6 mmol/L  4-13   BLOOD UREA NITROGEN 19 mg/dL  5-25   CREATININE 1 14 mg/dL  0 60-1 30   Standardized to IDMS reference method   CALCIUM 8 8 mg/dL  8 3-10 1   BILI, TOTAL 0 69 mg/dL  0 20-1 00   ALK PHOSPHATAS 64 U/L     ALT (SGPT) 36 U/L  12-78   AST(SGOT) 22 U/L  5-45   ALBUMIN 3 8 g/dL  3 5-5 0   TOTAL PROTEIN 7 0 g/dL  6 4-8 2   eGFR Non-African American      >60 0 ml/min/1 73sq [facility] Disease Education Program recommendations are as follows:  GFR calculation is accurate only with a steady state creatinine  Chronic Kidney disease less than 60 ml/min/1 73 sq  meters  Kidney failure less than 15 ml/min/1 73 sq  meters       (1) CBC/PLT/DIFF 05Zxy4510 11:32AM John Lexie Order Number: [id number]     Test Name Result Flag Reference   WBC COUNT 5 04 Thousand/uL  4 31-10 16   RBC COUNT 5 02 Million/uL  3 88-5 62   HEMOGLOBIN 15 0 g/dL  12 0-17 0   HEMATOCRIT 44 1 %  36 5-49 3   MCV 88 fL  82-98   MCH 29 9 pg  26 8-34 3   MCHC 34 0 g/dL  31 4-37 4   RDW 14 3 %  11 6-15 1   MPV 10 8 fL  8 9-12 7   PLATELET COUNT 931 Thousands/uL  149-390   nRBC AUTOMATED 0 /100 WBCs     NEUTROPHILS RELATIVE PERCENT 47 %  43-75   LYMPHOCYTES RELATIVE PERCENT 29 %  14-44   MONOCYTES RELATIVE PERCENT 18 % H 4-12   EOSINOPHILS RELATIVE PERCENT 5 %  0-6   BASOPHILS RELATIVE PERCENT 1 %  0-1   NEUTROPHILS ABSOLUTE COUNT 2 32 Thousands/?L  1 85-7 62   LYMPHOCYTES ABSOLUTE COUNT 1 47 Thousands/?L  0 60-4 47   MONOCYTES ABSOLUTE COUNT 0 91 Thousand/?L  0 17-1 22   EOSINOPHILS ABSOLUTE COUNT 0 27 Thousand/?L  0 00-0 61   BASOPHILS ABSOLUTE COUNT 0 06 Thousands/?L  0 00-0 10   - Patient Instructions: This bloodwork is non-fasting  Please drink two glasses of water morning of bloodwork  (1) LIPID PANEL FASTING W DIRECT LDL REFLEX 29Sep2016 11:32AM UN[facility] Order Number: [id number]     Test Name Result Flag Reference   CHOLESTEROL 173 mg/dL     LDL CHOLESTEROL CALCULATED 97 mg/dL  0-100   - Patient Instructions: This is a fasting blood test  Water, black tea or black coffee only after 9:00pm the night before test   Drink 2 glasses of water the morning of test       Triglyceride:         Normal              <150 mg/dl       Borderline High    150-199 mg/dl       High               200-499 mg/dl       Very High          >499 mg/dl  Cholesterol:         Desirable        <200 mg/dl      Borderline High  200-239 mg/dl      High             >239 mg/dl  HDL Cholesterol:        High    >59 mg/dL      Low     <41 mg/dL  LDL Cholesterol:        Optimal          <100 mg/dl        Near Optimal     100-129 mg/dl        Above Optimal          Borderline High   130-159 mg/dl          High              160-189 mg/dl          Very High        >189 mg/dl  LDL CALCULATED:    This screening LDL is a calculated result  It does not have the accuracy of the Direct Measured LDL in the monitoring of patients with hyperlipidemia and/or statin therapy  Direct Measure LDL (XOI436) must be ordered separately in these patients  TRIGLYCERIDES 98 mg/dL  <=150   Specimen collection should occur prior to N-Acetylcysteine or Metamizole administration due to the potential for falsely depressed results  HDL,DIRECT 56 mg/dL  40-60   Specimen collection should occur prior to Metamizole administration due to the potential for falsely depressed results       (1) TSH 29Sep2016 11:32AM [facility] Order Number: [id number]     Test Name Result Flag Reference   TSH 1 870 uIU/mL  0 358-3 740   - Patient Instructions: This bloodwork is non-fasting  Please drink two glasses of water morning of bloodwork  Patients undergoing fluorescein dye angiography may retain small amounts of fluorescein in the body for 48-72 hours post procedure  Samples containing fluorescein can produce falsely depressed TSH values  If the patient had this procedure,a specimen should be resubmitted post fluorescein clearance

## 2018-01-14 VITALS
TEMPERATURE: 98.5 F | DIASTOLIC BLOOD PRESSURE: 70 MMHG | HEIGHT: 70 IN | OXYGEN SATURATION: 96 % | HEART RATE: 69 BPM | RESPIRATION RATE: 16 BRPM | BODY MASS INDEX: 25.23 KG/M2 | SYSTOLIC BLOOD PRESSURE: 104 MMHG | WEIGHT: 176.25 LBS

## 2018-01-14 VITALS
DIASTOLIC BLOOD PRESSURE: 79 MMHG | WEIGHT: 177 LBS | BODY MASS INDEX: 25.34 KG/M2 | HEART RATE: 66 BPM | SYSTOLIC BLOOD PRESSURE: 127 MMHG | HEIGHT: 70 IN

## 2018-01-14 VITALS
WEIGHT: 175.13 LBS | DIASTOLIC BLOOD PRESSURE: 70 MMHG | OXYGEN SATURATION: 97 % | HEIGHT: 70 IN | SYSTOLIC BLOOD PRESSURE: 124 MMHG | BODY MASS INDEX: 25.07 KG/M2 | TEMPERATURE: 96 F | HEART RATE: 78 BPM

## 2018-01-15 NOTE — RESULT NOTES
Verified Results  (1) COMPREHENSIVE METABOLIC PANEL [id number] 34:54WJ Johnathan Spencer     Test Name Result Flag Reference   SODIUM 139 mmol/L  136-145   POTASSIUM 4 6 mmol/L  3 5-5 3   CHLORIDE 106 mmol/L  100-108   CARBON DIOXIDE 28 mmol/L  21-32   ANION GAP (CALC) 5 mmol/L  4-13   BLOOD UREA NITROGEN 19 mg/dL  5-25   CREATININE 1 12 mg/dL  0 60-1 30   Standardized to IDMS reference method   CALCIUM 8 8 mg/dL  8 3-10 1   BILI, TOTAL 0 80 mg/dL  0 20-1 00   ALK PHOSPHATAS 92 U/L     ALT (SGPT) 43 U/L  12-78   AST(SGOT) 24 U/L  5-45   ALBUMIN 3 8 g/dL  3 5-5 0   TOTAL PROTEIN 7 1 g/dL  6 4-8 2   eGFR Non-African American      >60 0 ml/min/1 73sq m   This is a fasting blood test  Water,black tea or black  coffee only after 9:00pm the night before test  Drink 2 glasses of water the morning of test This bloodwork is non-fasting  Please drink two glasses of water morning of bloodwork  National Kidney Disease Education Program recommendations are as follows:  GFR calculation is accurate only with a steady state creatinine  Chronic Kidney disease less than 60 ml/min/1 73 sq  meters  Kidney failure less than 15 ml/min/1 73 sq  meters  GLUCOSE FASTING 91 mg/dL  65-99     (1) TSH [id number] 03:03PM Johnathan Spencer     Test Name Result Flag Reference   TSH 2 490 uIU/mL  0 358-3 740   This bloodwork is non-fasting  Please drink two glasses of water morning of  bloodwork  This is a fasting blood test  Water,black tea or black  coffee only after 9:00pm the night before test  Drink 2 glasses of water the morning of test This bloodwork is non-fasting  Please drink two glasses of water morning of bloodwork  Patients undergoing fluorescein dye angiography may retain small amounts of fluorescein in the body for 48-72 hours post procedure  Samples containing fluorescein can produce falsely depressed TSH values  If the patient had this procedure,a specimen should be resubmitted post fluorescein clearance       (1) LIPID PANEL FASTING W DIRECT LDL REFLEX [id number] 03:03PM Julio César Mulligan     Test Name Result Flag Reference   CHOLESTEROL 174 mg/dL     LDL CHOLESTEROL CALCULATED 99 mg/dL  0-100   This is a fasting blood test  Water,black tea or black  coffee only after 9:00pm the night before test  Drink 2 glasses of water the morning of test     This is a fasting blood test  Water,black tea or black  coffee only after 9:00pm the night before test  Drink 2 glasses of water the morning of test This bloodwork is non-fasting  Please drink two glasses of water morning of bloodwork  Triglyceride:         Normal              <150 mg/dl       Borderline High    150-199 mg/dl       High               200-499 mg/dl       Very High          >499 mg/dl  Cholesterol:         Desirable        <200 mg/dl      Borderline High  200-239 mg/dl      High             >239 mg/dl  HDL Cholesterol:        High    >59 mg/dL      Low     <41 mg/dL  LDL Cholesterol:        Optimal          <100 mg/dl        Near Optimal     100-129 mg/dl        Above Optimal          Borderline High   130-159 mg/dl          High              160-189 mg/dl          Very High        >189 mg/dl  LDL CALCULATED:    This screening LDL is a calculated result  It does not have the accuracy of the Direct Measured LDL in the monitoring of patients with hyperlipidemia and/or statin therapy  Direct Measure LDL (DYA357) must be ordered separately in these patients  TRIGLYCERIDES 86 mg/dL  <=150   Specimen collection should occur prior to N-Acetylcysteine or Metamizole administration due to the potential for falsely depressed results  HDL,DIRECT 58 mg/dL  40-60   Specimen collection should occur prior to Metamizole administration due to the potential for falsely depressed results

## 2018-01-16 NOTE — RESULT NOTES
Verified Results  (1) CBC/PLT/DIFF 15Apr2016 09:42AM Ephraim Elise Order Number: [id number]    TW Order Number: [id number]     Test Name Result Flag Reference   WBC COUNT 4 87 Thousand/uL  4 31-10 16   RBC COUNT 5 20 Million/uL  3 88-5 62   HEMOGLOBIN 15 3 g/dL  12 0-17 0   HEMATOCRIT 44 6 %  36 5-49 3   MCV 86 fL  82-98   MCH 29 4 pg  26 8-34 3   MCHC 34 3 g/dL  31 4-37 4   RDW 14 5 %  11 6-15 1   MPV 10 4 fL  8 9-12 7   PLATELET COUNT 887 Thousands/uL  149-390   nRBC AUTOMATED 0 /100 WBCs     NEUTROPHILS RELATIVE PERCENT 46 %  43-75   LYMPHOCYTES RELATIVE PERCENT 32 %  14-44   MONOCYTES RELATIVE PERCENT 16 % H 4-12   EOSINOPHILS RELATIVE PERCENT 5 %  0-6   BASOPHILS RELATIVE PERCENT 1 %  0-1   NEUTROPHILS ABSOLUTE COUNT 2 24 Thousands/µL  1 85-7 62   LYMPHOCYTES ABSOLUTE COUNT 1 56 Thousands/µL  0 60-4 47   MONOCYTES ABSOLUTE COUNT 0 77 Thousand/µL  0 17-1 22   EOSINOPHILS ABSOLUTE COUNT 0 26 Thousand/µL  0 00-0 61   BASOPHILS ABSOLUTE COUNT 0 04 Thousands/µL  0 00-0 10     (1) COMPREHENSIVE METABOLIC PANEL [id number] 25:69BC Ephraim Elise Order Number: [id number]    TW Order Number: [id number]  National Kidney Disease Education Program recommendations are as follows:  GFR calculation is accurate only with a steady state creatinine  Chronic Kidney disease less than 60 ml/min/1 73 sq  meters  Kidney failure less than 15 ml/min/1 73 sq  meters  Test Name Result Flag Reference   GLUCOSE,RANDM 93 mg/dL     If the patient is fasting, the ADA then defines impaired fasting glucose as > 100 mg/dL and diabetes as > or equal to 123 mg/dL     SODIUM 141 mmol/L  136-145   POTASSIUM 4 4 mmol/L  3 5-5 3   CHLORIDE 107 mmol/L  100-108   CARBON DIOXIDE 28 mmol/L  21-32   ANION GAP (CALC) 6 mmol/L  4-13   BLOOD UREA NITROGEN 17 mg/dL  5-25   CREATININE 1 16 mg/dL  0 60-1 30   Standardized to IDMS reference method   CALCIUM 8 9 mg/dL  8 3-10 1   BILI, TOTAL 0 75 mg/dL  0 20-1 00   ALK PHOSPHATAS 63 U/L    ALT (SGPT) 35 U/L  12-78   AST(SGOT) 21 U/L  5-45   ALBUMIN 3 9 g/dL  3 5-5 0   TOTAL PROTEIN 7 4 g/dL  6 4-8 2   eGFR Non-African American      >60 0 ml/min/1 73sq m     (1) LIPID PANEL, FASTING 15Apr2016 09:42AM Bernadette Jaimes Order Number: [id number]     Order Number: [id number]  Triglyceride:         Normal              <150 mg/dl       Borderline High    150-199 mg/dl       High               200-499 mg/dl       Very High          >499 mg/dl  Cholesterol:         Desirable        <200 mg/dl      Borderline High  200-239 mg/dl      High             >239 mg/dl  HDL Cholesterol:        High    >59 mg/dL      Low     <41 mg/dL  LDL CALCULATED:    This screening LDL is a calculated result  It does not have the accuracy of the Direct Measured LDL in the monitoring of patients with hyperlipidemia and/or statin therapy  Direct Measure LDL (TEO146) must be ordered separately in these patients  Test Name Result Flag Reference   CHOLESTEROL 169 mg/dL     HDL,DIRECT 60 mg/dL  40-60   E550   LDL CHOLESTEROL CALCULATED 88 mg/dL  0-100   TRIGLYCERIDES 107 mg/dL  <=150   Specimen collection should occur prior to N-Acetylcysteine or Metamizole administration due to the potential for falsely depressed results       (1) PSA (SCREEN) (Dx V76 44 Screen for Prostate Cancer) 15Apr2016 09:42AM Bernadette Jaimes Order Number: [id number]    TW Order Number: [id number]     Test Name Result Flag Reference   PROSTATE SPECIFIC ANTIGEN 0 9 ng/mL  0 0-4 0     (1) TSH WITH FT4 REFLEX 15Apr2016 09:42AM Bernadette Jaimes Order Number: [id number]    TW Order Number: [id number]     Test Name Result Flag Reference   TSH 3 010 uIU/mL  0 358-3 740

## 2018-04-10 RX ORDER — SILDENAFIL 50 MG/1
TABLET, FILM COATED ORAL AS NEEDED
COMMUNITY
Start: 2012-03-13 | End: 2020-12-31 | Stop reason: HOSPADM

## 2018-04-10 RX ORDER — ATORVASTATIN CALCIUM 40 MG/1
1 TABLET, FILM COATED ORAL DAILY
COMMUNITY
Start: 2016-10-15 | End: 2018-04-13 | Stop reason: SDUPTHER

## 2018-04-10 RX ORDER — CHLORDIAZEPOXIDE HYDROCHLORIDE AND CLIDINIUM BROMIDE 5; 2.5 MG/1; MG/1
1 CAPSULE ORAL 3 TIMES DAILY PRN
COMMUNITY
Start: 2014-08-14 | End: 2019-03-20 | Stop reason: SDUPTHER

## 2018-04-13 ENCOUNTER — OFFICE VISIT (OUTPATIENT)
Dept: FAMILY MEDICINE CLINIC | Facility: CLINIC | Age: 73
End: 2018-04-13
Payer: MEDICARE

## 2018-04-13 VITALS
BODY MASS INDEX: 23.73 KG/M2 | TEMPERATURE: 97.8 F | HEIGHT: 72 IN | RESPIRATION RATE: 18 BRPM | OXYGEN SATURATION: 97 % | HEART RATE: 69 BPM | DIASTOLIC BLOOD PRESSURE: 82 MMHG | SYSTOLIC BLOOD PRESSURE: 132 MMHG | WEIGHT: 175.2 LBS

## 2018-04-13 DIAGNOSIS — I10 BENIGN ESSENTIAL HYPERTENSION: ICD-10-CM

## 2018-04-13 DIAGNOSIS — E78.2 MIXED HYPERLIPIDEMIA: ICD-10-CM

## 2018-04-13 DIAGNOSIS — Z00.00 MEDICARE ANNUAL WELLNESS VISIT, SUBSEQUENT: Primary | ICD-10-CM

## 2018-04-13 DIAGNOSIS — M72.0 DUPUYTREN'S DISEASE: ICD-10-CM

## 2018-04-13 DIAGNOSIS — N40.1 BENIGN PROSTATIC HYPERPLASIA WITH LOWER URINARY TRACT SYMPTOMS, SYMPTOM DETAILS UNSPECIFIED: ICD-10-CM

## 2018-04-13 LAB
ALBUMIN SERPL BCP-MCNC: 3.9 G/DL (ref 3.5–5)
ALP SERPL-CCNC: 74 U/L (ref 46–116)
ALT SERPL W P-5'-P-CCNC: 30 U/L (ref 12–78)
ANION GAP SERPL CALCULATED.3IONS-SCNC: 5 MMOL/L (ref 4–13)
AST SERPL W P-5'-P-CCNC: 22 U/L (ref 5–45)
BILIRUB SERPL-MCNC: 0.96 MG/DL (ref 0.2–1)
BUN SERPL-MCNC: 18 MG/DL (ref 5–25)
CALCIUM SERPL-MCNC: 9 MG/DL
CHLORIDE SERPL-SCNC: 107 MMOL/L (ref 100–108)
CHOLEST SERPL-MCNC: 153 MG/DL (ref 50–200)
CO2 SERPL-SCNC: 29 MMOL/L (ref 21–32)
CREAT SERPL-MCNC: 1.09 MG/DL (ref 0.6–1.3)
GFR SERPL CREATININE-BSD FRML MDRD: 67 ML/MIN/1.73SQ M
GLUCOSE P FAST SERPL-MCNC: 83 MG/DL (ref 65–99)
HDLC SERPL-MCNC: 53 MG/DL (ref 40–60)
LDLC SERPL CALC-MCNC: 81 MG/DL (ref 0–100)
POTASSIUM SERPL-SCNC: 4.4 MMOL/L (ref 3.5–5.3)
PROT SERPL-MCNC: 6.9 G/DL (ref 6.4–8.2)
SODIUM SERPL-SCNC: 141 MMOL/L (ref 136–145)
TRIGL SERPL-MCNC: 96 MG/DL
TSH SERPL DL<=0.05 MIU/L-ACNC: 2.25 UIU/ML (ref 0.36–3.74)

## 2018-04-13 PROCEDURE — 80053 COMPREHEN METABOLIC PANEL: CPT | Performed by: FAMILY MEDICINE

## 2018-04-13 PROCEDURE — 36415 COLL VENOUS BLD VENIPUNCTURE: CPT | Performed by: FAMILY MEDICINE

## 2018-04-13 PROCEDURE — 80061 LIPID PANEL: CPT | Performed by: FAMILY MEDICINE

## 2018-04-13 PROCEDURE — G0439 PPPS, SUBSEQ VISIT: HCPCS | Performed by: FAMILY MEDICINE

## 2018-04-13 PROCEDURE — 99214 OFFICE O/P EST MOD 30 MIN: CPT | Performed by: FAMILY MEDICINE

## 2018-04-13 PROCEDURE — 84443 ASSAY THYROID STIM HORMONE: CPT | Performed by: FAMILY MEDICINE

## 2018-04-13 RX ORDER — ATORVASTATIN CALCIUM 40 MG/1
40 TABLET, FILM COATED ORAL DAILY
Qty: 90 TABLET | Refills: 1 | Status: SHIPPED | OUTPATIENT
Start: 2018-04-13 | End: 2018-05-05 | Stop reason: SDUPTHER

## 2018-04-13 RX ORDER — LOSARTAN POTASSIUM 25 MG/1
50 TABLET ORAL
Qty: 90 TABLET | Refills: 1 | Status: SHIPPED | OUTPATIENT
Start: 2018-04-13 | End: 2018-05-05 | Stop reason: SDUPTHER

## 2018-04-13 RX ORDER — DUTASTERIDE 0.5 MG/1
0.5 CAPSULE, LIQUID FILLED ORAL
Qty: 90 CAPSULE | Refills: 1 | Status: SHIPPED | OUTPATIENT
Start: 2018-04-13 | End: 2018-05-05 | Stop reason: SDUPTHER

## 2018-04-15 NOTE — ASSESSMENT & PLAN NOTE
Stable with some disability from contracture deformity  Patient has seen Orthopedics and will see as needed in the future  Has been told that there is limited options at this point for improving his functionality

## 2018-04-15 NOTE — PROGRESS NOTES
Assessment/Plan:    Hyperlipidemia  Hyperlipidemia stable on atorvastatin  Continue current dosage and recheck fasting lab work today  Benign prostatic hyperplasia  Symptoms fairly well controlled Avodart  Minimal nocturia and frequency  Continue with current dosage    Dupuytren's disease  Stable with some disability from contracture deformity  Patient has seen Orthopedics and will see as needed in the future  Has been told that there is limited options at this point for improving his functionality  Benign essential hypertension  Well controlled on current antihypertensive regimen  Continue losartan and metoprolol  Diagnoses and all orders for this visit:    Medicare annual wellness visit, subsequent    Benign essential hypertension  -     losartan (COZAAR) 25 mg tablet; Take 2 tablets (50 mg total) by mouth daily at bedtime  -     metoprolol tartrate (LOPRESSOR) 25 mg tablet; Take 0 5 tablets (12 5 mg total) by mouth 2 (two) times a day  -     Comprehensive metabolic panel    Benign prostatic hyperplasia with lower urinary tract symptoms, symptom details unspecified  -     dutasteride (AVODART) 0 5 mg capsule; Take 1 capsule (0 5 mg total) by mouth daily at bedtime    Mixed hyperlipidemia  -     atorvastatin (LIPITOR) 40 mg tablet; Take 1 tablet (40 mg total) by mouth daily  -     Lipid Panel with Direct LDL reflex  -     TSH, 3rd generation    Other orders  -     sildenafil (VIAGRA) 50 MG tablet; Take by mouth  -     chlordiazepoxide-clidinium (LIBRAX) 5-2 5 mg per capsule; Take 1 capsule by mouth 3 (three) times a day as needed  -     Discontinue: atorvastatin (LIPITOR) 40 mg tablet; Take 1 tablet by mouth daily          Subjective:      Patient ID: Kristy Bradford is a 74 y o  male  Patient was seen for routine follow-up of chronic medical problems  He is followed for treatment of hypertension, BPH, hyperlipidemia    He also has chronic do Prince contractures of his hands for which she has been treated by Orthopedics/Hand surgery  He takes atorvastatin for his lipids along with fish oil  He takes Avodart for his BPH, losartan and metoprolol for his blood pressure  He also does take Librax for occasional IBS symptoms which are mild intermittent  He has no new complaints today and feels generally well  The following portions of the patient's history were reviewed and updated as appropriate: allergies, current medications, past family history, past medical history, past social history, past surgical history and problem list     Review of Systems   Constitutional: Negative  Respiratory: Negative  Cardiovascular: Negative  Gastrointestinal: Negative  Genitourinary: Negative  Musculoskeletal: Negative  Psychiatric/Behavioral: Negative  Objective:      /82   Pulse 69   Temp 97 8 °F (36 6 °C) (Tympanic)   Resp 18   Ht 5' 11 65" (1 82 m)   Wt 79 5 kg (175 lb 3 2 oz)   SpO2 97%   BMI 23 99 kg/m²          Physical Exam   Constitutional: He is oriented to person, place, and time  He appears well-developed and well-nourished  No distress  HENT:   Head: Normocephalic and atraumatic  Eyes: Conjunctivae are normal  Pupils are equal, round, and reactive to light  Right eye exhibits no discharge  Neck: Normal range of motion  No thyromegaly present  Cardiovascular: Normal rate and regular rhythm  Pulmonary/Chest: Effort normal and breath sounds normal  No respiratory distress  Lymphadenopathy:     He has no cervical adenopathy  Neurological: He is alert and oriented to person, place, and time  Skin: Skin is warm and dry  He is not diaphoretic  Psychiatric: He has a normal mood and affect  His behavior is normal  Judgment and thought content normal    Nursing note and vitals reviewed

## 2018-04-15 NOTE — ASSESSMENT & PLAN NOTE
Symptoms fairly well controlled Avodart  Minimal nocturia and frequency    Continue with current dosage

## 2018-04-15 NOTE — PROGRESS NOTES
HPI:  Saige Briscoe is a 74 y o  male here for his Subsequent Wellness Visit  Patient Active Problem List   Diagnosis    Dupuytren's disease    Anemia    Benign essential hypertension    Benign prostatic hyperplasia    Hyperlipidemia     Past Medical History:   Diagnosis Date    Abnormal CT of liver     last assessed: 07/21/14    Anxiety     resolved: 07/30/15    BPH (benign prostatic hypertrophy)     Dupuytren contracture     both hands    Erectile dysfunction of non-organic origin     last assessed: 11/27/12    Esophageal reflux     last assessed: 11/11/14    Esophagitis 03/13/2012    Familial hypercholesteremia     last assessed: 06/11/13    Hyperlipidemia     Hypertension     Paroxysmal atrial fibrillation ([facility] [address] )     last assessed: 04/06/17     Past Surgical History:   Procedure Laterality Date    BACK SURGERY      Lower    HAND CONTRACTURE RELEASE Left 5/23/2017    Procedure: Left hand percutaneous fasciotomy of palm adductor space x 2; index finger PIP joint; ring finger PIP joint; small finger MCP joint and PIP joint  ; splint application;  Surgeon: Debby Nino MD;  Location: [facility];  Service:     HAND SURGERY Bilateral      History reviewed  No pertinent family history    History   Smoking Status    Never Smoker   Smokeless Tobacco    Never Used     History   Alcohol Use No      History   Drug Use No     /82   Pulse 69   Temp 97 8 °F (36 6 °C) (Tympanic)   Resp 18   Ht 5' 11 65" (1 82 m)   Wt 79 5 kg (175 lb 3 2 oz)   SpO2 97%   BMI 23 99 kg/m²       Current Outpatient Prescriptions   Medication Sig Dispense Refill    aspirin (ECOTRIN LOW STRENGTH) 81 mg EC tablet Take 81 mg by mouth daily      atorvastatin (LIPITOR) 40 mg tablet Take 1 tablet (40 mg total) by mouth daily 90 tablet 1    chlordiazepoxide-clidinium (LIBRAX) 5-2 5 mg per capsule Take 1 capsule by mouth 3 (three) times a day as needed      Cholecalciferol (VITAMIN D3) 2000 units TABS Take 2,000 Units by mouth daily      cyanocobalamin (VITAMIN B-12) 1,000 mcg tablet Take 1,000 mcg by mouth daily      dutasteride (AVODART) 0 5 mg capsule Take 1 capsule (0 5 mg total) by mouth daily at bedtime 90 capsule 1    losartan (COZAAR) 25 mg tablet Take 2 tablets (50 mg total) by mouth daily at bedtime 90 tablet 1    metoprolol tartrate (LOPRESSOR) 25 mg tablet Take 0 5 tablets (12 5 mg total) by mouth 2 (two) times a day 180 tablet 1    Multiple Vitamin (MULTIVITAMIN) tablet Take 1 tablet by mouth daily      Omega-3 Fatty Acids (FISH OIL) 1,000 mg Take 1,000 mg by mouth daily      ezetimibe-simvastatin (VYTORIN) 10-20 mg per tablet Take 1 tablet by mouth daily at bedtime      sildenafil (VIAGRA) 50 MG tablet Take by mouth       No current facility-administered medications for this visit        No Known Allergies  Immunization History   Administered Date(s) Administered    Influenza 09/16/2012, 10/17/2015, 10/12/2017    Influenza Split High Dose Preservative Free IM 10/17/2015, 09/29/2016, 10/12/2017    Influenza TIV (IM) 01/01/2011, 09/11/2012, 10/12/2013, 09/04/2014    Pneumococcal Conjugate 13-Valent 07/30/2015    Pneumococcal Polysaccharide PPV23 01/01/2008    Tdap 08/17/2017       Patient Care Team:  Ashley Jean DO as PCP - General      Medicare Screening Tests and Risk Assessments:  AWV Clinical     ISAR:   Previous hospitalizations?:  No       Once in a Lifetime Medicare Screening:   EKG performed?:  No    AAA screening performed? (if performed, please add date to Health Maintenance):  No       Medicare Screening Tests and Risk Assessment:   AAA Risk Assessment    None Indicated:  Yes    Osteoporosis Risk Assessment    None indicated:  Yes    HIV Risk Assessment    None indicated:  Yes        Drug and Alcohol Use:   Tobacco use    Cigarettes:  never smoker    Tobacco use duration    Tobacco Cessation Readiness    Alcohol use    Alcohol use:  occasional use    Alcohol Treatment Readiness   Illicit Drug Use    Drug use:  never    Drug type:  no sedative use       Diet & Exercise:   Diet   What is your diet?:  Regular   How many servings a day of the following:   Exercise    Do you currently exercise?:  yes    Frequency:  daily       Cognitive Impairment Screening:   Anxiety screenings preformed:  Yes    Depression screening preformed:  Yes Depression screen score:  0    PHQ-9 Depression scale score:  0   Depression screening results:  negative for symptoms   Cognitive Impairment Screening    Do you have difficulty learning or retaining new information?:  No Do you have difficulty handling new tasks?:  No   Do you have difficulty with reasoning?:  No Do you have difficulty with spatial ability and orientation?:  No   Do you have difficulty with language?:  No Do you have difficulty with behavior?:  No       Functional Ability/Level of Safety:   Hearing    Hearing difficulties:  No Bilateral:  normal   Left:  normal Right:  normal   Hearing Impairment Assessment    Hearing status:  No impairment   Do your family members ever complain that you turn on the radio or T V  too loudly?:  No Do you find that other people have to repeat themselves when talking to you?:  No   Do you have difficulty hearing while talking on the phone?:  No Has anyone ever told you that you are speaking too loudly when talking with them?:  No   Do you have trouble hearing the doorbell or phone ringing?:  No Do you have difficulty hearing such that you feel frustrated talking to people?:  No   Do you feel sad because you cannot hear well?:  No Do you feel inconvienced due to your hearing problem?:  No   Do you think you would be a happier person if you could hear better?:  No Would you be willing to go for a hearing aid fitting if suggested?:  No   Current Activities    Status:  unlimited ADL's, unlimited driving, unlimited IADL's, unlimited social activities   Help needed with the folllowing:    Using the phone:  No Transportation:  No   Shopping:  No Preparing Meals:  No   Doing Housework:  No Doing Laundry:  No   Managing Medications:  No Managing Money:  No   ADL    Feeding:  Independant   Oral hygiene and Facial grooming:  Independant   Bathing:  Independant   Upper Body Dressing:  Independant   Lower Body Dressing:  Independant   Fall Risk   Have you fallen in the last 12 months?:  No Are you unsteady on your feet?:  No   Injury History   Polypharmacy:  No Antidepressant Use:  No   Sedative Use:  No Antihypertensive Use:  No   Previous Fall:  No Alcohol Use:  No   Deconditioning:  No Visual Impairment:  No   Cogitive Impairment:  No Mmobility Impairment:  No   Postural Hypotension:  No Urinary Incontinence:  No       Home Safety:   Are there hazards in your environment?:  No   If you fell, would you need help to get back up from the ground?:  No    Do you feel unsteady when walking?:  No    Do you have handrails and grab-bars in the home?:  No    Home Safety Risk Factors   Unfamilar with surroundings:  No Uneven floors:  No   Stairs or handrail saftey risk:  No Loose rugs:  No   Household clutter:  No Poor household lighting:  No   No grab bars in bathroom:  No Further evaluation needed:  No       Advanced Directives:   Advanced Directives    Living Will:  No Durable POA for healthcare:  No   Advanced directive:  No    Patient's End of Life Decisions        Urinary Incontinence:       Glaucoma:            Provider Screening    No data filed        No exam data present  Reviewed Updated [facility] Prior Wellness Visits:   Last Medicare wellness visit information was reviewed, patient interviewed , no change since last AWVyes  Last Medicare wellness visit information was reviewed, patient interviewed and updates made to the record today yes    Assessment and Plan:  1  Benign essential hypertension  losartan (COZAAR) 25 mg tablet    metoprolol tartrate (LOPRESSOR) 25 mg tablet    Comprehensive metabolic panel   2   Benign prostatic hyperplasia with lower urinary tract symptoms, symptom details unspecified  dutasteride (AVODART) 0 5 mg capsule   3   Mixed hyperlipidemia  atorvastatin (LIPITOR) 40 mg tablet    Lipid Panel with Direct LDL reflex    TSH, 3rd generation       Health Maintenance Due   Topic Date Due    COLONOSCOPY  1945

## 2018-05-04 DIAGNOSIS — E78.2 MIXED HYPERLIPIDEMIA: ICD-10-CM

## 2018-05-04 DIAGNOSIS — N40.1 BENIGN PROSTATIC HYPERPLASIA WITH LOWER URINARY TRACT SYMPTOMS, SYMPTOM DETAILS UNSPECIFIED: ICD-10-CM

## 2018-05-04 DIAGNOSIS — I10 BENIGN ESSENTIAL HYPERTENSION: ICD-10-CM

## 2018-05-05 RX ORDER — ATORVASTATIN CALCIUM 40 MG/1
40 TABLET, FILM COATED ORAL DAILY
Qty: 90 TABLET | Refills: 1 | Status: SHIPPED | OUTPATIENT
Start: 2018-05-05 | End: 2018-08-24 | Stop reason: SDUPTHER

## 2018-05-05 RX ORDER — LOSARTAN POTASSIUM 25 MG/1
TABLET ORAL
Qty: 90 TABLET | Refills: 1 | Status: SHIPPED | OUTPATIENT
Start: 2018-05-05 | End: 2018-08-24 | Stop reason: SDUPTHER

## 2018-05-05 RX ORDER — DUTASTERIDE 0.5 MG/1
0.5 CAPSULE, LIQUID FILLED ORAL
Qty: 90 CAPSULE | Refills: 1 | Status: SHIPPED | OUTPATIENT
Start: 2018-05-05 | End: 2018-08-24 | Stop reason: SDUPTHER

## 2018-08-24 ENCOUNTER — OFFICE VISIT (OUTPATIENT)
Dept: FAMILY MEDICINE CLINIC | Facility: CLINIC | Age: 73
End: 2018-08-24
Payer: MEDICARE

## 2018-08-24 VITALS
WEIGHT: 177 LBS | BODY MASS INDEX: 26.22 KG/M2 | HEIGHT: 69 IN | DIASTOLIC BLOOD PRESSURE: 70 MMHG | HEART RATE: 75 BPM | RESPIRATION RATE: 16 BRPM | OXYGEN SATURATION: 97 % | TEMPERATURE: 97.9 F | SYSTOLIC BLOOD PRESSURE: 120 MMHG

## 2018-08-24 DIAGNOSIS — N40.1 BENIGN PROSTATIC HYPERPLASIA WITH LOWER URINARY TRACT SYMPTOMS, SYMPTOM DETAILS UNSPECIFIED: ICD-10-CM

## 2018-08-24 DIAGNOSIS — E78.2 MIXED HYPERLIPIDEMIA: ICD-10-CM

## 2018-08-24 DIAGNOSIS — I10 BENIGN ESSENTIAL HYPERTENSION: ICD-10-CM

## 2018-08-24 DIAGNOSIS — Z01.818 PREOPERATIVE CLEARANCE: Primary | ICD-10-CM

## 2018-08-24 DIAGNOSIS — H53.9 VISUAL DISTURBANCE: ICD-10-CM

## 2018-08-24 PROCEDURE — 93000 ELECTROCARDIOGRAM COMPLETE: CPT | Performed by: FAMILY MEDICINE

## 2018-08-24 PROCEDURE — 99213 OFFICE O/P EST LOW 20 MIN: CPT | Performed by: FAMILY MEDICINE

## 2018-08-24 RX ORDER — DUTASTERIDE 0.5 MG/1
0.5 CAPSULE, LIQUID FILLED ORAL
Qty: 90 CAPSULE | Refills: 1 | Status: SHIPPED | OUTPATIENT
Start: 2018-08-24 | End: 2019-03-20 | Stop reason: SDUPTHER

## 2018-08-24 RX ORDER — ATORVASTATIN CALCIUM 40 MG/1
40 TABLET, FILM COATED ORAL DAILY
Qty: 90 TABLET | Refills: 1 | Status: SHIPPED | OUTPATIENT
Start: 2018-08-24 | End: 2019-03-20 | Stop reason: SDUPTHER

## 2018-08-24 RX ORDER — LOSARTAN POTASSIUM 25 MG/1
TABLET ORAL
Qty: 90 TABLET | Refills: 1 | Status: SHIPPED | OUTPATIENT
Start: 2018-08-24 | End: 2019-03-20 | Stop reason: SDUPTHER

## 2018-08-24 NOTE — PROGRESS NOTES
Assessment/Plan:     patient presents for preop clearance for surgery to PET repair a "wrinkle" in his eye which is causing a visual disturbance  His chronic medical problems are stable including blood pressure and hyperlipidemia  EKG done in the office today shows PVCs which patient has had chronically for years  This does not represent an acute change  He is medically stable and at low risk for the proposed procedure  He is medically cleared to proceed  Diagnoses and all orders for this visit:    Preoperative clearance  -     POCT ECG    Benign essential hypertension  -     losartan (COZAAR) 25 mg tablet; TAKE ONE TABLET DAILY  -     metoprolol tartrate (LOPRESSOR) 25 mg tablet; Take 0 5 tablets (12 5 mg total) by mouth 2 (two) times a day    Mixed hyperlipidemia  -     atorvastatin (LIPITOR) 40 mg tablet; Take 1 tablet (40 mg total) by mouth daily    Benign prostatic hyperplasia with lower urinary tract symptoms, symptom details unspecified  -     dutasteride (AVODART) 0 5 mg capsule; Take 1 capsule (0 5 mg total) by mouth daily at bedtime          Subjective:      Patient ID: Mary Lou Stallworth is a 74 y o  male  Mary Lou Stallworth  74 y o   male    SURGEON:  Zen    SURGERY/PROCEDURE:  Repair of "wrinkle" in eye  DATE OF SURGERY: 9/6    PRIOR ANESTHESIA:yes    COMPLICATION: no    BLEEDING PROBLEM: no    PERTINENT PMH:  Hyperlipidemia, hypertension    EXERCISE CAPACITY:   CAN WALK 4 BLOCKS AND OR CLIMB 2 FLIGHTS: Yes    HOME LIVING SITUATION SAFE AND SECURE: Yes      TOBACCO: no     ETOH: yes     ILLEGAL DRUGS: no     patient presents for preop clearance for an eye surgery to repair a "wrinkle" in his eye which is causing distorted vision  His surgery scheduled for 9/6  He feels well  He is active physically  He has no complaints  He is compliant with his medications for his chronic medical problems which are stable    Most recent blood work approximately 4 months ago including chemistry panel cholesterol and thyroid was normal         The following portions of the patient's history were reviewed and updated as appropriate: allergies, current medications, past family history, past medical history, past social history, past surgical history and problem list     Review of Systems   Constitutional: Negative  Eyes: Positive for visual disturbance  Respiratory: Negative  Cardiovascular: Negative  Gastrointestinal: Negative  Genitourinary: Negative  Musculoskeletal: Negative  Psychiatric/Behavioral: Negative  Objective:      /70 (BP Location: Left arm, Patient Position: Sitting, Cuff Size: Adult)   Pulse 75   Temp 97 9 °F (36 6 °C)   Resp 16   Ht 5' 8 9" (1 75 m)   Wt 80 3 kg (177 lb)   SpO2 97%   BMI 26 22 kg/m²          Physical Exam   Constitutional: He is oriented to person, place, and time  He appears well-developed and well-nourished  No distress  HENT:   Head: Normocephalic and atraumatic  Eyes: Conjunctivae are normal  Pupils are equal, round, and reactive to light  Right eye exhibits no discharge  Neck: Normal range of motion  No thyromegaly present  Cardiovascular: Normal rate and regular rhythm  Pulmonary/Chest: Effort normal and breath sounds normal  No respiratory distress  Lymphadenopathy:     He has no cervical adenopathy  Neurological: He is alert and oriented to person, place, and time  Skin: Skin is warm and dry  He is not diaphoretic  Psychiatric: He has a normal mood and affect  His behavior is normal  Judgment and thought content normal    Nursing note and vitals reviewed

## 2018-09-24 DIAGNOSIS — R11.0 NAUSEA: Primary | ICD-10-CM

## 2018-09-26 RX ORDER — ONDANSETRON 4 MG/1
4 TABLET, FILM COATED ORAL EVERY 8 HOURS PRN
Qty: 20 TABLET | Refills: 0 | Status: SHIPPED | OUTPATIENT
Start: 2018-09-26 | End: 2019-03-20 | Stop reason: SDUPTHER

## 2019-03-20 ENCOUNTER — OFFICE VISIT (OUTPATIENT)
Dept: FAMILY MEDICINE CLINIC | Facility: CLINIC | Age: 74
End: 2019-03-20
Payer: MEDICARE

## 2019-03-20 VITALS
RESPIRATION RATE: 16 BRPM | DIASTOLIC BLOOD PRESSURE: 68 MMHG | TEMPERATURE: 102 F | SYSTOLIC BLOOD PRESSURE: 118 MMHG | HEART RATE: 86 BPM | OXYGEN SATURATION: 91 % | BODY MASS INDEX: 26.52 KG/M2 | WEIGHT: 179.06 LBS

## 2019-03-20 DIAGNOSIS — J06.9 UPPER RESPIRATORY TRACT INFECTION, UNSPECIFIED TYPE: ICD-10-CM

## 2019-03-20 DIAGNOSIS — R11.0 NAUSEA: ICD-10-CM

## 2019-03-20 DIAGNOSIS — E78.2 MIXED HYPERLIPIDEMIA: ICD-10-CM

## 2019-03-20 DIAGNOSIS — R10.9 ABDOMINAL PAIN, UNSPECIFIED ABDOMINAL LOCATION: ICD-10-CM

## 2019-03-20 DIAGNOSIS — J11.1 INFLUENZA: Primary | ICD-10-CM

## 2019-03-20 DIAGNOSIS — I10 BENIGN ESSENTIAL HYPERTENSION: ICD-10-CM

## 2019-03-20 DIAGNOSIS — N40.1 BENIGN PROSTATIC HYPERPLASIA WITH LOWER URINARY TRACT SYMPTOMS, SYMPTOM DETAILS UNSPECIFIED: ICD-10-CM

## 2019-03-20 PROCEDURE — 99213 OFFICE O/P EST LOW 20 MIN: CPT | Performed by: FAMILY MEDICINE

## 2019-03-20 RX ORDER — AMOXICILLIN AND CLAVULANATE POTASSIUM 875; 125 MG/1; MG/1
1 TABLET, FILM COATED ORAL EVERY 12 HOURS SCHEDULED
Qty: 20 TABLET | Refills: 0 | Status: SHIPPED | OUTPATIENT
Start: 2019-03-20 | End: 2019-03-30

## 2019-03-20 RX ORDER — OSELTAMIVIR PHOSPHATE 75 MG/1
75 CAPSULE ORAL EVERY 12 HOURS SCHEDULED
Qty: 10 CAPSULE | Refills: 0 | Status: SHIPPED | OUTPATIENT
Start: 2019-03-20 | End: 2019-03-25

## 2019-03-20 RX ORDER — ONDANSETRON 4 MG/1
4 TABLET, FILM COATED ORAL EVERY 8 HOURS PRN
Qty: 20 TABLET | Refills: 2 | Status: SHIPPED | OUTPATIENT
Start: 2019-03-20 | End: 2019-10-25 | Stop reason: SDUPTHER

## 2019-03-20 RX ORDER — ATORVASTATIN CALCIUM 40 MG/1
40 TABLET, FILM COATED ORAL DAILY
Qty: 90 TABLET | Refills: 1 | Status: SHIPPED | OUTPATIENT
Start: 2019-03-20 | End: 2019-10-25 | Stop reason: SDUPTHER

## 2019-03-20 RX ORDER — CHLORDIAZEPOXIDE HYDROCHLORIDE AND CLIDINIUM BROMIDE 5; 2.5 MG/1; MG/1
1 CAPSULE ORAL 3 TIMES DAILY PRN
Qty: 90 CAPSULE | Refills: 1 | Status: SHIPPED | OUTPATIENT
Start: 2019-03-20 | End: 2020-05-20 | Stop reason: SDUPTHER

## 2019-03-20 RX ORDER — DUTASTERIDE 0.5 MG/1
0.5 CAPSULE, LIQUID FILLED ORAL
Qty: 90 CAPSULE | Refills: 1 | Status: SHIPPED | OUTPATIENT
Start: 2019-03-20 | End: 2019-10-25 | Stop reason: SDUPTHER

## 2019-03-20 RX ORDER — LOSARTAN POTASSIUM 25 MG/1
TABLET ORAL
Qty: 90 TABLET | Refills: 1 | Status: SHIPPED | OUTPATIENT
Start: 2019-03-20 | End: 2019-10-25 | Stop reason: SDUPTHER

## 2019-03-20 RX ORDER — ONDANSETRON 4 MG/1
4 TABLET, FILM COATED ORAL EVERY 8 HOURS PRN
Qty: 20 TABLET | Refills: 0 | Status: SHIPPED | OUTPATIENT
Start: 2019-03-20 | End: 2019-03-20 | Stop reason: SDUPTHER

## 2019-03-20 RX ORDER — PROMETHAZINE HYDROCHLORIDE AND CODEINE PHOSPHATE 6.25; 1 MG/5ML; MG/5ML
5 SYRUP ORAL EVERY 4 HOURS PRN
Qty: 120 ML | Refills: 0 | Status: SHIPPED | OUTPATIENT
Start: 2019-03-20 | End: 2020-04-10 | Stop reason: ALTCHOICE

## 2019-03-20 NOTE — PROGRESS NOTES
[facility]      NAME: Nelida Stein  AGE: 74 y o  SEX: male  : 1945   MRN: [id number]    DATE: 3/20/2019  TIME: 2:09 PM    Assessment and Plan     Problem List Items Addressed This Visit     Benign essential hypertension    Relevant Medications    losartan (COZAAR) 25 mg tablet    metoprolol tartrate (LOPRESSOR) 25 mg tablet    Benign prostatic hyperplasia    Relevant Medications    dutasteride (AVODART) 0 5 mg capsule    Hyperlipidemia    Relevant Medications    atorvastatin (LIPITOR) 40 mg tablet      Other Visit Diagnoses     Influenza    -  Primary    Relevant Medications    oseltamivir (TAMIFLU) 75 mg capsule    Nausea        Relevant Medications    ondansetron (ZOFRAN) 4 mg tablet    Abdominal pain, unspecified abdominal location        Relevant Medications    chlordiazepoxide-clidinium (LIBRAX) 5-2 5 mg per capsule    Upper respiratory tract infection, unspecified type        Relevant Medications    amoxicillin-clavulanate (AUGMENTIN) 875-125 mg per tablet    promethazine-codeine (PHENERGAN WITH CODEINE) 6 25-10 mg/5 mL syrup        Patient with upper respiratory symptoms consistent with influenza or bronchitis  Will treat empirically for both with Tamiflu and antibiotic  Cough suppressant given for symptom management  Return to office in:  P r n  Chief Complaint     Chief Complaint   Patient presents with    Cold Like Symptoms     fever(100 6),cough,congestion,chills,bodyache and PND x yesterday  Has taken OTC medication with some relief       History of Present Illness     24 hour history of cough congestion fever and aches  Cough is productive of dark sputum  Mild shortness of breath  T-max of 102° in the office today  Patient did have a flu vaccine this year  Taking over-the-counter medications without improvement in symptoms        The following portions of the patient's history were reviewed and updated as appropriate: allergies, current medications, past family history, past medical history, past social history, past surgical history and problem list     Review of Systems   Review of Systems   Constitutional: Positive for chills, fatigue and fever  HENT: Positive for congestion, postnasal drip, rhinorrhea, sinus pressure, sneezing and sore throat  Respiratory: Positive for cough  Active Problem List     Patient Active Problem List   Diagnosis    Dupuytren's disease    Anemia    Benign essential hypertension    Benign prostatic hyperplasia    Hyperlipidemia       Objective   /68 (BP Location: Left arm, Patient Position: Sitting, Cuff Size: Adult)   Pulse 86   Temp (!) 102 °F (38 9 °C) (Tympanic)   Resp 16   Wt 81 2 kg (179 lb 1 oz)   SpO2 91%   BMI 26 52 kg/m²     Physical Exam   Constitutional: He is oriented to person, place, and time  He appears well-developed and well-nourished  No distress  HENT:   Head: Normocephalic and atraumatic  Injected pharynx with postnasal drainage   Eyes: Pupils are equal, round, and reactive to light  Conjunctivae are normal  Right eye exhibits no discharge  Neck: Normal range of motion  No thyromegaly present  Cardiovascular: Normal rate and regular rhythm  Pulmonary/Chest: Effort normal  No respiratory distress  Course breath sounds   Lymphadenopathy:     He has no cervical adenopathy  Neurological: He is alert and oriented to person, place, and time  Skin: Skin is warm and dry  He is not diaphoretic  Psychiatric: He has a normal mood and affect  His behavior is normal  Judgment and thought content normal    Nursing note and vitals reviewed          Current Medications     Current Outpatient Medications:     aspirin (ECOTRIN LOW STRENGTH) 81 mg EC tablet, Take 81 mg by mouth daily, Disp: , Rfl:     atorvastatin (LIPITOR) 40 mg tablet, Take 1 tablet (40 mg total) by mouth daily, Disp: 90 tablet, Rfl: 1    chlordiazepoxide-clidinium (LIBRAX) 5-2 5 mg per capsule, Take 1 capsule by mouth 3 (three) times a day as needed (Abdominal Pain), Disp: 90 capsule, Rfl: 1    Cholecalciferol (VITAMIN D3) 2000 units TABS, Take 2,000 Units by mouth daily, Disp: , Rfl:     cyanocobalamin (VITAMIN B-12) 1,000 mcg tablet, Take 1,000 mcg by mouth daily, Disp: , Rfl:     dutasteride (AVODART) 0 5 mg capsule, Take 1 capsule (0 5 mg total) by mouth daily at bedtime, Disp: 90 capsule, Rfl: 1    losartan (COZAAR) 25 mg tablet, TAKE ONE TABLET DAILY, Disp: 90 tablet, Rfl: 1    metoprolol tartrate (LOPRESSOR) 25 mg tablet, Take 0 5 tablets (12 5 mg total) by mouth 2 (two) times a day, Disp: 180 tablet, Rfl: 1    Multiple Vitamin (MULTIVITAMIN) tablet, Take 1 tablet by mouth daily, Disp: , Rfl:     Omega-3 Fatty Acids (FISH OIL) 1,000 mg, Take 1,000 mg by mouth daily, Disp: , Rfl:     ondansetron (ZOFRAN) 4 mg tablet, Take 1 tablet (4 mg total) by mouth every 8 (eight) hours as needed for nausea or vomiting, Disp: 20 tablet, Rfl: 2    sildenafil (VIAGRA) 50 MG tablet, Take by mouth as needed , Disp: , Rfl:     amoxicillin-clavulanate (AUGMENTIN) 875-125 mg per tablet, Take 1 tablet by mouth every 12 (twelve) hours for 10 days, Disp: 20 tablet, Rfl: 0    oseltamivir (TAMIFLU) 75 mg capsule, Take 1 capsule (75 mg total) by mouth every 12 (twelve) hours for 5 days, Disp: 10 capsule, Rfl: 0    promethazine-codeine (PHENERGAN WITH CODEINE) 6 25-10 mg/5 mL syrup, Take 5 mL by mouth every 4 (four) hours as needed for cough, Disp: 120 mL, Rfl: 0    Health Maintenance     Health Maintenance   Topic Date Due    CRC Screening: Colonoscopy  1945    BMI: Followup Plan  08/25/1961    Pneumococcal PPSV23/PCV13 65+ Years / Low and Medium Risk (2 of 2 - PPSV23) 07/30/2016    Fall Risk  04/13/2019    Depression Screening PHQ  04/13/2019    Medicare Annual Wellness Visit (AWV)  04/13/2019    BMI: Adult  03/20/2020    DTaP,Tdap,and Td Vaccines (2 - Td) 08/17/2027    INFLUENZA VACCINE  Completed    HEPATITIS B VACCINES  Aged Out     Immunization History   Administered Date(s) Administered    INFLUENZA 09/16/2012, 10/17/2015, 10/12/2017    Influenza Split High Dose Preservative Free IM 10/17/2015, 09/29/2016, 10/12/2017, 10/01/2018    Influenza TIV (IM) 01/01/2011, 09/11/2012, 10/12/2013, 09/04/2014    Pneumococcal Conjugate 13-Valent 07/30/2015    Pneumococcal Polysaccharide PPV23 01/01/2008    Tdap 08/17/2017       Celsa Bryant DO  [facility]

## 2019-08-06 ENCOUNTER — TELEPHONE (OUTPATIENT)
Dept: FAMILY MEDICINE CLINIC | Facility: CLINIC | Age: 74
End: 2019-08-06

## 2019-08-06 NOTE — TELEPHONE ENCOUNTER
We can just pull tell the patient that it was denied by his insurance  There are other medications that might be covered but they are a different type of medication than this 1

## 2019-08-06 NOTE — TELEPHONE ENCOUNTER
I spoke to pts wife, pt has already paid for the medication  He may call back when he needs another refill if he still has issues with it

## 2019-08-06 NOTE — TELEPHONE ENCOUNTER
THIS MED WAS DENIED  LETTER IS SCANNED INTO MEDIA    I'M % SURE WHAT IT IS SAYING OTHER IT IS PRT OF THE [address]   I DID NOT NOTIFY PT     IS THERE ANYTHING YOU WANT ME TO TELL PT?

## 2019-10-25 ENCOUNTER — APPOINTMENT (OUTPATIENT)
Dept: LAB | Facility: CLINIC | Age: 74
End: 2019-10-25
Payer: MEDICARE

## 2019-10-25 ENCOUNTER — OFFICE VISIT (OUTPATIENT)
Dept: FAMILY MEDICINE CLINIC | Facility: CLINIC | Age: 74
End: 2019-10-25
Payer: MEDICARE

## 2019-10-25 VITALS
OXYGEN SATURATION: 98 % | HEIGHT: 72 IN | TEMPERATURE: 98 F | HEART RATE: 66 BPM | RESPIRATION RATE: 16 BRPM | BODY MASS INDEX: 23.84 KG/M2 | SYSTOLIC BLOOD PRESSURE: 120 MMHG | DIASTOLIC BLOOD PRESSURE: 60 MMHG | WEIGHT: 176 LBS

## 2019-10-25 DIAGNOSIS — Z12.5 SCREENING FOR PROSTATE CANCER: ICD-10-CM

## 2019-10-25 DIAGNOSIS — I48.0 PAROXYSMAL ATRIAL FIBRILLATION (HCC): ICD-10-CM

## 2019-10-25 DIAGNOSIS — E78.2 MIXED HYPERLIPIDEMIA: ICD-10-CM

## 2019-10-25 DIAGNOSIS — I10 BENIGN ESSENTIAL HYPERTENSION: ICD-10-CM

## 2019-10-25 DIAGNOSIS — Z23 NEED FOR VACCINATION: Primary | ICD-10-CM

## 2019-10-25 DIAGNOSIS — R11.0 NAUSEA: ICD-10-CM

## 2019-10-25 DIAGNOSIS — R10.9 ABDOMINAL PAIN, UNSPECIFIED ABDOMINAL LOCATION: ICD-10-CM

## 2019-10-25 DIAGNOSIS — N40.1 BENIGN PROSTATIC HYPERPLASIA WITH LOWER URINARY TRACT SYMPTOMS, SYMPTOM DETAILS UNSPECIFIED: ICD-10-CM

## 2019-10-25 LAB
ALBUMIN SERPL BCP-MCNC: 3.8 G/DL (ref 3.5–5)
ALP SERPL-CCNC: 88 U/L (ref 46–116)
ALT SERPL W P-5'-P-CCNC: 22 U/L (ref 12–78)
ANION GAP SERPL CALCULATED.3IONS-SCNC: 4 MMOL/L (ref 4–13)
AST SERPL W P-5'-P-CCNC: 17 U/L (ref 5–45)
BILIRUB SERPL-MCNC: 0.43 MG/DL (ref 0.2–1)
BUN SERPL-MCNC: 17 MG/DL (ref 5–25)
CALCIUM SERPL-MCNC: 8.8 MG/DL (ref 8.3–10.1)
CHLORIDE SERPL-SCNC: 110 MMOL/L (ref 100–108)
CO2 SERPL-SCNC: 26 MMOL/L (ref 21–32)
CREAT SERPL-MCNC: 1.12 MG/DL (ref 0.6–1.3)
GFR SERPL CREATININE-BSD FRML MDRD: 63 ML/MIN/1.73SQ M
GLUCOSE P FAST SERPL-MCNC: 88 MG/DL (ref 65–99)
POTASSIUM SERPL-SCNC: 4.1 MMOL/L (ref 3.5–5.3)
PROT SERPL-MCNC: 7.2 G/DL (ref 6.4–8.2)
PSA SERPL-MCNC: 1.1 NG/ML (ref 0–4)
SODIUM SERPL-SCNC: 140 MMOL/L (ref 136–145)
TSH SERPL DL<=0.05 MIU/L-ACNC: 2.55 UIU/ML (ref 0.36–3.74)

## 2019-10-25 PROCEDURE — 80053 COMPREHEN METABOLIC PANEL: CPT

## 2019-10-25 PROCEDURE — 84443 ASSAY THYROID STIM HORMONE: CPT

## 2019-10-25 PROCEDURE — 90662 IIV NO PRSV INCREASED AG IM: CPT | Performed by: FAMILY MEDICINE

## 2019-10-25 PROCEDURE — 36415 COLL VENOUS BLD VENIPUNCTURE: CPT

## 2019-10-25 PROCEDURE — 99214 OFFICE O/P EST MOD 30 MIN: CPT | Performed by: FAMILY MEDICINE

## 2019-10-25 PROCEDURE — G0008 ADMIN INFLUENZA VIRUS VAC: HCPCS | Performed by: FAMILY MEDICINE

## 2019-10-25 PROCEDURE — G0103 PSA SCREENING: HCPCS

## 2019-10-25 RX ORDER — ATORVASTATIN CALCIUM 40 MG/1
40 TABLET, FILM COATED ORAL DAILY
Qty: 90 TABLET | Refills: 1 | Status: SHIPPED | OUTPATIENT
Start: 2019-10-25 | End: 2020-04-10 | Stop reason: SDUPTHER

## 2019-10-25 RX ORDER — DUTASTERIDE 0.5 MG/1
0.5 CAPSULE, LIQUID FILLED ORAL
Qty: 90 CAPSULE | Refills: 1 | Status: SHIPPED | OUTPATIENT
Start: 2019-10-25 | End: 2020-04-10 | Stop reason: SDUPTHER

## 2019-10-25 RX ORDER — ONDANSETRON 4 MG/1
4 TABLET, FILM COATED ORAL EVERY 8 HOURS PRN
Qty: 20 TABLET | Refills: 2 | Status: SHIPPED | OUTPATIENT
Start: 2019-10-25 | End: 2020-10-16 | Stop reason: SDUPTHER

## 2019-10-25 RX ORDER — LOSARTAN POTASSIUM 25 MG/1
TABLET ORAL
Qty: 90 TABLET | Refills: 1 | Status: SHIPPED | OUTPATIENT
Start: 2019-10-25 | End: 2020-04-10 | Stop reason: SDUPTHER

## 2019-10-25 RX ORDER — CHLORDIAZEPOXIDE HYDROCHLORIDE AND CLIDINIUM BROMIDE 5; 2.5 MG/1; MG/1
1 CAPSULE ORAL 3 TIMES DAILY PRN
Qty: 90 CAPSULE | Refills: 1 | Status: CANCELLED | OUTPATIENT
Start: 2019-10-25

## 2019-10-25 NOTE — PROGRESS NOTES
[facility]      NAME: Mala Chapman  AGE: 66 y o  SEX: male  : 1945   MRN: [id number]    DATE: 10/25/2019  TIME: 10:05 AM    Assessment and Plan     Problem List Items Addressed This Visit     Benign essential hypertension     Blood pressure well controlled  Continue losartan and metoprolol  Relevant Medications    losartan (COZAAR) 25 mg tablet    metoprolol tartrate (LOPRESSOR) 25 mg tablet    Benign prostatic hyperplasia     Symptoms stable on Avodart  Continue current dosage  Relevant Medications    dutasteride (AVODART) 0 5 mg capsule    Hyperlipidemia     Lipids controlled on atorvastatin  LDL less than 100  Relevant Medications    atorvastatin (LIPITOR) 40 mg tablet    Other Relevant Orders    Comprehensive metabolic panel    TSH, 3rd generation with Free T4 reflex    Paroxysmal atrial fibrillation (HCC)    Relevant Medications    metoprolol tartrate (LOPRESSOR) 25 mg tablet      Other Visit Diagnoses     Need for vaccination    -  Primary    Relevant Orders    influenza vaccine, [id number], high-dose, PF 0 5 mL (FLUZONE HIGH-DOSE) (Completed)    Abdominal pain, unspecified abdominal location        Nausea        Relevant Medications    ondansetron (ZOFRAN) 4 mg tablet    Screening for prostate cancer        Relevant Orders    PSA, Total Screen              Return to office in:  6 months, annual wellness visit    Chief Complaint     Chief Complaint   Patient presents with    Follow-up     6 months check up       History of Present Illness     Patient was seen for routine follow-up chronic medical problems  He is being treated for hypertension, BPH, hyperlipidemia  He takes atorvastatin for his lipids, Avodart for his prostate, losartan and metoprolol for his blood pressure  He does have some GI issues from time to time for which she takes Librax and Zofran  He has no new concerns at this time is feeling well        The following portions of the patient's history were reviewed and updated as appropriate: allergies, current medications, past family history, past medical history, past social history, past surgical history and problem list     Review of Systems   Review of Systems   Constitutional: Negative  Respiratory: Negative  Cardiovascular: Negative  Gastrointestinal: Negative  Genitourinary: Negative  Musculoskeletal: Negative  Psychiatric/Behavioral: Negative  Active Problem List     Patient Active Problem List   Diagnosis    Dupuytren's disease    Anemia    Benign essential hypertension    Benign prostatic hyperplasia    Hyperlipidemia    Paroxysmal atrial fibrillation (HCC)       Objective   /60 (BP Location: Left arm, Patient Position: Sitting, Cuff Size: Adult)   Pulse 66   Temp 98 °F (36 7 °C) (Tympanic)   Resp 16   Ht 6' (1 829 m)   Wt 79 8 kg (176 lb)   SpO2 98%   BMI 23 87 kg/m²     Physical Exam   Constitutional: He is oriented to person, place, and time  He appears well-developed and well-nourished  No distress  HENT:   Head: Normocephalic and atraumatic  Eyes: Pupils are equal, round, and reactive to light  Conjunctivae are normal  Right eye exhibits no discharge  Neck: Normal range of motion  No thyromegaly present  Cardiovascular: Normal rate and regular rhythm  Pulmonary/Chest: Effort normal and breath sounds normal  No respiratory distress  Lymphadenopathy:     He has no cervical adenopathy  Neurological: He is alert and oriented to person, place, and time  Skin: Skin is warm and dry  He is not diaphoretic  Psychiatric: He has a normal mood and affect  His behavior is normal  Judgment and thought content normal    Nursing note and vitals reviewed          Current Medications     Current Outpatient Medications:     aspirin (ECOTRIN LOW STRENGTH) 81 mg EC tablet, Take 81 mg by mouth daily, Disp: , Rfl:     atorvastatin (LIPITOR) 40 mg tablet, Take 1 tablet (40 mg total) by mouth daily, Disp: 90 tablet, Rfl: 1    chlordiazepoxide-clidinium (LIBRAX) 5-2 5 mg per capsule, Take 1 capsule by mouth 3 (three) times a day as needed (Abdominal Pain), Disp: 90 capsule, Rfl: 1    Cholecalciferol (VITAMIN D3) 2000 units TABS, Take 2,000 Units by mouth daily, Disp: , Rfl:     cyanocobalamin (VITAMIN B-12) 1,000 mcg tablet, Take 1,000 mcg by mouth daily, Disp: , Rfl:     dutasteride (AVODART) 0 5 mg capsule, Take 1 capsule (0 5 mg total) by mouth daily at bedtime, Disp: 90 capsule, Rfl: 1    losartan (COZAAR) 25 mg tablet, TAKE ONE TABLET DAILY, Disp: 90 tablet, Rfl: 1    metoprolol tartrate (LOPRESSOR) 25 mg tablet, Take 0 5 tablets (12 5 mg total) by mouth 2 (two) times a day, Disp: 180 tablet, Rfl: 1    Multiple Vitamin (MULTIVITAMIN) tablet, Take 1 tablet by mouth daily, Disp: , Rfl:     Omega-3 Fatty Acids (FISH OIL) 1,000 mg, Take 1,000 mg by mouth daily, Disp: , Rfl:     ondansetron (ZOFRAN) 4 mg tablet, Take 1 tablet (4 mg total) by mouth every 8 (eight) hours as needed for nausea or vomiting, Disp: 20 tablet, Rfl: 2    sildenafil (VIAGRA) 50 MG tablet, Take by mouth as needed , Disp: , Rfl:     promethazine-codeine (PHENERGAN WITH CODEINE) 6 25-10 mg/5 mL syrup, Take 5 mL by mouth every 4 (four) hours as needed for cough (Patient not taking: Reported on 10/25/2019), Disp: 120 mL, Rfl: 0    Health Maintenance     Health Maintenance   Topic Date Due    CRC Screening: Colonoscopy  1945    Pneumococcal Vaccine: 65+ Years (2 of 2 - PPSV23) 07/30/2016    Medicare Annual Wellness Visit (AWV)  04/13/2019    Fall Risk  10/25/2020    Depression Screening PHQ  10/25/2020    BMI: Adult  10/25/2020    DTaP,Tdap,and Td Vaccines (2 - Td) 08/17/2027    INFLUENZA VACCINE  Completed    Pneumococcal Vaccine: Pediatrics (0 to 5 Years) and At-Risk Patients (6 to 59 Years)  Aged Out    HEPATITIS B VACCINES  Aged [employer] History   Administered Date(s) Administered   [facility] INFLUENZA 09/16/2012, 10/17/2015, 10/12/2017    Influenza Split High Dose Preservative Free IM 10/17/2015, 09/29/2016, 10/12/2017, 10/01/2018    Influenza TIV (IM) 01/01/2011, 09/11/2012, 10/12/2013, 09/04/2014    Influenza, high dose seasonal 0 5 mL 10/25/2019    Pneumococcal Conjugate 13-Valent 07/30/2015    Pneumococcal Polysaccharide PPV23 01/01/2008    Tdap 08/17/2017       Rosemarie Hart DO  [facility]

## 2020-04-10 ENCOUNTER — TELEMEDICINE (OUTPATIENT)
Dept: FAMILY MEDICINE CLINIC | Facility: CLINIC | Age: 75
End: 2020-04-10
Payer: MEDICARE

## 2020-04-10 ENCOUNTER — TELEPHONE (OUTPATIENT)
Dept: FAMILY MEDICINE CLINIC | Facility: CLINIC | Age: 75
End: 2020-04-10

## 2020-04-10 VITALS — DIASTOLIC BLOOD PRESSURE: 66 MMHG | SYSTOLIC BLOOD PRESSURE: 112 MMHG | TEMPERATURE: 97.6 F | HEART RATE: 76 BPM

## 2020-04-10 DIAGNOSIS — E78.2 MIXED HYPERLIPIDEMIA: ICD-10-CM

## 2020-04-10 DIAGNOSIS — Z12.5 SCREENING FOR PROSTATE CANCER: ICD-10-CM

## 2020-04-10 DIAGNOSIS — N40.1 BENIGN PROSTATIC HYPERPLASIA WITH LOWER URINARY TRACT SYMPTOMS, SYMPTOM DETAILS UNSPECIFIED: ICD-10-CM

## 2020-04-10 DIAGNOSIS — E78.2 MIXED HYPERLIPIDEMIA: Primary | ICD-10-CM

## 2020-04-10 DIAGNOSIS — R06.00 DYSPNEA ON EXERTION: Primary | ICD-10-CM

## 2020-04-10 DIAGNOSIS — R07.9 CHEST PAIN, UNSPECIFIED TYPE: ICD-10-CM

## 2020-04-10 DIAGNOSIS — I10 BENIGN ESSENTIAL HYPERTENSION: ICD-10-CM

## 2020-04-10 PROCEDURE — 99214 OFFICE O/P EST MOD 30 MIN: CPT | Performed by: FAMILY MEDICINE

## 2020-04-10 RX ORDER — LOSARTAN POTASSIUM 25 MG/1
TABLET ORAL
Qty: 90 TABLET | Refills: 1 | Status: SHIPPED | OUTPATIENT
Start: 2020-04-10 | End: 2020-10-16 | Stop reason: SDUPTHER

## 2020-04-10 RX ORDER — ATORVASTATIN CALCIUM 40 MG/1
40 TABLET, FILM COATED ORAL DAILY
Qty: 90 TABLET | Refills: 1 | Status: SHIPPED | OUTPATIENT
Start: 2020-04-10 | End: 2020-10-16 | Stop reason: SDUPTHER

## 2020-04-10 RX ORDER — DUTASTERIDE 0.5 MG/1
0.5 CAPSULE, LIQUID FILLED ORAL
Qty: 90 CAPSULE | Refills: 1 | Status: SHIPPED | OUTPATIENT
Start: 2020-04-10 | End: 2020-10-16 | Stop reason: SDUPTHER

## 2020-05-20 ENCOUNTER — HOSPITAL ENCOUNTER (OUTPATIENT)
Dept: NON INVASIVE DIAGNOSTICS | Facility: CLINIC | Age: 75
Discharge: HOME | End: 2020-05-20
Payer: MEDICARE

## 2020-05-20 ENCOUNTER — APPOINTMENT (OUTPATIENT)
Dept: NON INVASIVE DIAGNOSTICS | Facility: CLINIC | Age: 75
End: 2020-05-20
Payer: MEDICARE

## 2020-05-20 DIAGNOSIS — R06.00 DYSPNEA ON EXERTION: ICD-10-CM

## 2020-05-20 DIAGNOSIS — R10.9 ABDOMINAL PAIN, UNSPECIFIED ABDOMINAL LOCATION: ICD-10-CM

## 2020-05-20 DIAGNOSIS — R07.9 CHEST PAIN, UNSPECIFIED TYPE: ICD-10-CM

## 2020-05-20 PROCEDURE — 93306 TTE W/DOPPLER COMPLETE: CPT

## 2020-05-20 PROCEDURE — 93017 CV STRESS TEST TRACING ONLY: CPT

## 2020-05-20 RX ORDER — CHLORDIAZEPOXIDE HYDROCHLORIDE AND CLIDINIUM BROMIDE 5; 2.5 MG/1; MG/1
1 CAPSULE ORAL 3 TIMES DAILY PRN
Qty: 90 CAPSULE | Refills: 1 | Status: SHIPPED | OUTPATIENT
Start: 2020-05-20 | End: 2021-04-23 | Stop reason: SDUPTHER

## 2020-05-21 LAB
CHEST PAIN STATEMENT: NORMAL
MAX DIASTOLIC BP: 64 MMHG
MAX HEART RATE: 141 BPM
MAX PREDICTED HEART RATE: 144 BPM
MAX. SYSTOLIC BP: 130 MMHG
PROTOCOL NAME: NORMAL
TARGET HR FORMULA: NORMAL
TEST INDICATION: NORMAL
TIME IN EXERCISE PHASE: NORMAL

## 2020-05-23 ENCOUNTER — HOSPITAL ENCOUNTER (OUTPATIENT)
Dept: CT IMAGING | Facility: HOSPITAL | Age: 75
Discharge: HOME | End: 2020-05-23
Payer: MEDICARE

## 2020-05-23 DIAGNOSIS — R10.9 ABDOMINAL PAIN, UNSPECIFIED ABDOMINAL LOCATION: ICD-10-CM

## 2020-05-23 PROCEDURE — 71250 CT THORAX DX C-: CPT

## 2020-05-26 DIAGNOSIS — R06.00 DYSPNEA ON EXERTION: Primary | ICD-10-CM

## 2020-05-26 DIAGNOSIS — R91.8 LUNG NODULES: ICD-10-CM

## 2020-05-28 ENCOUNTER — TELEPHONE (OUTPATIENT)
Dept: PULMONOLOGY | Facility: CLINIC | Age: 75
End: 2020-05-28

## 2020-05-29 ENCOUNTER — OFFICE VISIT (OUTPATIENT)
Dept: PULMONOLOGY | Facility: CLINIC | Age: 75
End: 2020-05-29
Payer: MEDICARE

## 2020-05-29 VITALS
TEMPERATURE: 97.9 F | HEIGHT: 72 IN | WEIGHT: 161.6 LBS | SYSTOLIC BLOOD PRESSURE: 110 MMHG | HEART RATE: 89 BPM | BODY MASS INDEX: 21.89 KG/M2 | OXYGEN SATURATION: 97 % | DIASTOLIC BLOOD PRESSURE: 60 MMHG

## 2020-05-29 DIAGNOSIS — R06.00 DYSPNEA ON EXERTION: ICD-10-CM

## 2020-05-29 DIAGNOSIS — R91.8 LUNG NODULES: ICD-10-CM

## 2020-05-29 PROCEDURE — 99204 OFFICE O/P NEW MOD 45 MIN: CPT | Performed by: INTERNAL MEDICINE

## 2020-05-29 RX ORDER — ALBUTEROL SULFATE 90 UG/1
2 AEROSOL, METERED RESPIRATORY (INHALATION) EVERY 4 HOURS PRN
Qty: 1 INHALER | Refills: 6 | Status: SHIPPED | OUTPATIENT
Start: 2020-05-29 | End: 2020-12-09 | Stop reason: ALTCHOICE

## 2020-06-16 ENCOUNTER — TELEPHONE (OUTPATIENT)
Dept: FAMILY MEDICINE CLINIC | Facility: CLINIC | Age: 75
End: 2020-06-16

## 2020-06-17 DIAGNOSIS — Z13.0 SCREENING FOR IRON DEFICIENCY ANEMIA: Primary | ICD-10-CM

## 2020-06-19 ENCOUNTER — APPOINTMENT (OUTPATIENT)
Dept: LAB | Facility: CLINIC | Age: 75
End: 2020-06-19
Payer: MEDICARE

## 2020-06-19 DIAGNOSIS — Z12.5 SCREENING FOR PROSTATE CANCER: ICD-10-CM

## 2020-06-19 DIAGNOSIS — E78.2 MIXED HYPERLIPIDEMIA: ICD-10-CM

## 2020-06-19 DIAGNOSIS — Z13.0 SCREENING FOR IRON DEFICIENCY ANEMIA: ICD-10-CM

## 2020-06-19 LAB
ALBUMIN SERPL BCP-MCNC: 2.6 G/DL (ref 3.5–5)
ALP SERPL-CCNC: 340 U/L (ref 46–116)
ALT SERPL W P-5'-P-CCNC: 74 U/L (ref 12–78)
ANION GAP SERPL CALCULATED.3IONS-SCNC: 8 MMOL/L (ref 4–13)
AST SERPL W P-5'-P-CCNC: 45 U/L (ref 5–45)
BASOPHILS # BLD AUTO: 0.05 THOUSANDS/ΜL (ref 0–0.1)
BASOPHILS NFR BLD AUTO: 1 % (ref 0–1)
BILIRUB SERPL-MCNC: 0.49 MG/DL (ref 0.2–1)
BUN SERPL-MCNC: 14 MG/DL (ref 5–25)
CALCIUM SERPL-MCNC: 8.7 MG/DL (ref 8.3–10.1)
CHLORIDE SERPL-SCNC: 107 MMOL/L (ref 100–108)
CO2 SERPL-SCNC: 25 MMOL/L (ref 21–32)
CREAT SERPL-MCNC: 1.02 MG/DL (ref 0.6–1.3)
EOSINOPHIL # BLD AUTO: 0.18 THOUSAND/ΜL (ref 0–0.61)
EOSINOPHIL NFR BLD AUTO: 3 % (ref 0–6)
ERYTHROCYTE [DISTWIDTH] IN BLOOD BY AUTOMATED COUNT: 21.6 % (ref 11.6–15.1)
GFR SERPL CREATININE-BSD FRML MDRD: 71 ML/MIN/1.73SQ M
GLUCOSE P FAST SERPL-MCNC: 83 MG/DL (ref 65–99)
HCT VFR BLD AUTO: 28.2 % (ref 36.5–49.3)
HGB BLD-MCNC: 8.1 G/DL (ref 12–17)
IMM GRANULOCYTES # BLD AUTO: 0.05 THOUSAND/UL (ref 0–0.2)
IMM GRANULOCYTES NFR BLD AUTO: 1 % (ref 0–2)
LYMPHOCYTES # BLD AUTO: 1.64 THOUSANDS/ΜL (ref 0.6–4.47)
LYMPHOCYTES NFR BLD AUTO: 24 % (ref 14–44)
MCH RBC QN AUTO: 21.3 PG (ref 26.8–34.3)
MCHC RBC AUTO-ENTMCNC: 28.7 G/DL (ref 31.4–37.4)
MCV RBC AUTO: 74 FL (ref 82–98)
MONOCYTES # BLD AUTO: 0.74 THOUSAND/ΜL (ref 0.17–1.22)
MONOCYTES NFR BLD AUTO: 11 % (ref 4–12)
NEUTROPHILS # BLD AUTO: 4.21 THOUSANDS/ΜL (ref 1.85–7.62)
NEUTS SEG NFR BLD AUTO: 60 % (ref 43–75)
NRBC BLD AUTO-RTO: 0 /100 WBCS
PLATELET # BLD AUTO: 317 THOUSANDS/UL (ref 149–390)
PMV BLD AUTO: 10.5 FL (ref 8.9–12.7)
POTASSIUM SERPL-SCNC: 4.1 MMOL/L (ref 3.5–5.3)
PROT SERPL-MCNC: 7.1 G/DL (ref 6.4–8.2)
PSA SERPL-MCNC: 1.2 NG/ML (ref 0–4)
RBC # BLD AUTO: 3.8 MILLION/UL (ref 3.88–5.62)
SODIUM SERPL-SCNC: 140 MMOL/L (ref 136–145)
TSH SERPL DL<=0.05 MIU/L-ACNC: 3.27 UIU/ML (ref 0.36–3.74)
WBC # BLD AUTO: 6.87 THOUSAND/UL (ref 4.31–10.16)

## 2020-06-19 PROCEDURE — 84443 ASSAY THYROID STIM HORMONE: CPT

## 2020-06-19 PROCEDURE — 80053 COMPREHEN METABOLIC PANEL: CPT

## 2020-06-19 PROCEDURE — G0103 PSA SCREENING: HCPCS

## 2020-06-19 PROCEDURE — 80061 LIPID PANEL: CPT

## 2020-06-19 PROCEDURE — 36415 COLL VENOUS BLD VENIPUNCTURE: CPT

## 2020-06-19 PROCEDURE — 85025 COMPLETE CBC W/AUTO DIFF WBC: CPT

## 2020-06-21 DIAGNOSIS — D50.9 IRON DEFICIENCY ANEMIA, UNSPECIFIED IRON DEFICIENCY ANEMIA TYPE: Primary | ICD-10-CM

## 2020-06-21 DIAGNOSIS — R74.8 ELEVATED ALKALINE PHOSPHATASE LEVEL: ICD-10-CM

## 2020-06-23 LAB
CHOLEST SERPL-MCNC: 110 MG/DL (ref 50–200)
HDLC SERPL-MCNC: 35 MG/DL
LDLC SERPL CALC-MCNC: 60 MG/DL (ref 0–100)
TRIGL SERPL-MCNC: 76 MG/DL

## 2020-06-24 ENCOUNTER — TELEMEDICINE (OUTPATIENT)
Dept: GASTROENTEROLOGY | Facility: MEDICAL CENTER | Age: 75
End: 2020-06-24
Payer: MEDICARE

## 2020-06-24 DIAGNOSIS — D50.9 IRON DEFICIENCY ANEMIA, UNSPECIFIED IRON DEFICIENCY ANEMIA TYPE: ICD-10-CM

## 2020-06-24 DIAGNOSIS — R74.8 ELEVATED ALKALINE PHOSPHATASE LEVEL: ICD-10-CM

## 2020-06-24 DIAGNOSIS — R11.0 NAUSEA: ICD-10-CM

## 2020-06-24 DIAGNOSIS — K76.9 LIVER LESION: ICD-10-CM

## 2020-06-24 DIAGNOSIS — K21.9 GASTROESOPHAGEAL REFLUX DISEASE WITHOUT ESOPHAGITIS: Primary | ICD-10-CM

## 2020-06-24 PROCEDURE — 99204 OFFICE O/P NEW MOD 45 MIN: CPT | Performed by: INTERNAL MEDICINE

## 2020-06-24 NOTE — H&P (VIEW-ONLY)
Virtual Regular Visit  1  Iron deficiency anemia, unspecified iron deficiency anemia type  - Iron Panel (Includes Ferritin, Iron Sat%, Iron, and TIBC); Future  - Vitamin B12; Future  - Folate; Future  - CBC and differential; Future  - Haptoglobin; Future  - Protime-INR; Future  - Colonoscopy; Future  - EGD; Future  - PAT Covid Screening; Future  - polyethylene glycol (GOLYTELY) 4000 mL solution; Take 4,000 mL by mouth once for 1 dose  Dispense: 4000 mL; Refill: 0    2  Elevated alkaline phosphatase level    - Alkaline phosphatase, isoenzymes; Future  - Gamma GT; Future    3  Gastroesophageal reflux disease without esophagitis  4  Nausea  5  Liver lesion      Patient is a 75-year-old male with new onset of microcytic anemia with hemoglobin approximately 8  He reports no overt signs of bleeding  He has had progressive symptomatic anemia over the last 6-8 months  Hemoglobins within normal limits in 2017  Reports no NSAID use  He is taking daily aspirin  He was recently also started on iron  Review of lab showed elevation of alk-phos  He also has history of longstanding acid reflux disease, worsening nausea and previous history of subcentimeter liver lesion   -agree with right upper quadrant ultrasound  -further evaluation for elevated alk-phos with fractionation of alk-phos and GGT  -EGD and colonoscopy planned urgently  -discuss taking appropriate amount of hydration to avoid arrhythmia which happened in the past in 2014  -colonoscopy was over 15 years ago  -last EGD was 6 years ago within normal limits with hiatal hernia and gastritis    I am concern for colon lesion since it has been several years since his last colonoscopy and with microcytic indices          Assessment/Plan:    Problem List Items Addressed This Visit        Digestive    Esophageal reflux - Primary       Other    Anemia    Relevant Medications    polyethylene glycol (GOLYTELY) 4000 mL solution    Other Relevant Orders    Iron Panel (Includes Ferritin, Iron Sat%, Iron, and TIBC)    Vitamin B12    Folate    CBC and differential    Haptoglobin    Protime-INR    Colonoscopy    EGD    PAT Covid Screening    Nausea    Elevated alkaline phosphatase level    Relevant Orders    Alkaline phosphatase, isoenzymes    Gamma GT    Liver lesion            Reason for visit is   Chief Complaint   Patient presents with    Virtual Regular Visit        Encounter provider Mary Lou Landry MD    Provider located at [address] [address]      Recent Visits  No visits were found meeting these conditions  Showing recent visits within past 7 days and meeting all other requirements     Today's Visits  Date Type Provider Dept   06/24/20 Caroline Bryant MD [facility]   Showing today's visits and meeting all other requirements     Future Appointments  No visits were found meeting these conditions  Showing future appointments within next 150 days and meeting all other requirements        The patient was identified by name and date of birth  Malka Schmidt was informed that this is a telemedicine visit and that the visit is being conducted through [employer] and patient was informed that this is not a secure, HIPAA-complaint platform  He agrees to proceed     My office door was closed  No one else was in the room  He acknowledged consent and understanding of privacy and security of the video platform  The patient has agreed to participate and understands they can discontinue the visit at any time  Patient is aware this is a billable service  Subjective  Malka Schmidt is a 66 y o  male with presentation of new onset of iron deficiency anemia associated with decreased exercise tolerance, elevated alk phos of approximately 300, history of acid reflux disease, worsening nausea and previous history of liver lesion    He had extensive evaluation and rule out cardiac cause with echocardiogram and recent stresses were all within normal limits  Hemoglobins approximately 8 from baseline of normal from 2017  He reports no signs of overt bleeding  Denies any NSAID use  Last colonoscopy was over 15 years ago  Last endoscopy was 6 years ago which was reported to be within normal limits with mild gastritis and small hiatal hernia  Previous imaging study  in 2014 identified 2 small indeterminate hepatic lesions  MRI was recommended at that time unclear patient never had this performed  He had 1 episode of nonbilious vomiting last week which has not resolved  He also states she has decreased appetite on review of system  HPI     Past Medical History:   Diagnosis Date    Abnormal CT of liver     last assessed: 07/21/14    Anxiety     resolved: 07/30/15    BPH (benign prostatic hypertrophy)     Dupuytren contracture     both hands    Erectile dysfunction of non-organic origin     last assessed: 11/27/12    Esophageal reflux     last assessed: 11/11/14    Esophagitis 03/13/2012    Familial hypercholesteremia     last assessed: 06/11/13    Hyperlipidemia     Hypertension     Paroxysmal atrial fibrillation ([address] )     last assessed: 04/06/17       Past Surgical History:   Procedure Laterality Date    BACK SURGERY      Lower    HAND CONTRACTURE RELEASE Left 5/23/2017    Procedure: Left hand percutaneous fasciotomy of palm adductor space x 2; index finger PIP joint; ring finger PIP joint; small finger MCP joint and PIP joint  ; splint application;  Surgeon: Elba Bruner MD;  Location: [facility];  Service:     HAND SURGERY Bilateral        Current Outpatient Medications   Medication Sig Dispense Refill    albuterol (Ventolin HFA) 90 mcg/act inhaler Inhale 2 puffs every 4 (four) hours as needed for wheezing 1 Inhaler 6    aspirin (ECOTRIN LOW STRENGTH) 81 mg EC tablet Take 81 mg by mouth daily      atorvastatin (LIPITOR) 40 mg tablet Take 1 tablet (40 mg total) by mouth daily 90 tablet 1    chlordiazepoxide-clidinium (LIBRAX) 5-2 5 mg per capsule Take 1 capsule by mouth 3 (three) times a day as needed (Abdominal Pain) 90 capsule 1    Cholecalciferol (VITAMIN D3) 2000 units TABS Take 2,000 Units by mouth daily      cyanocobalamin (VITAMIN B-12) 1,000 mcg tablet Take 1,000 mcg by mouth daily      dutasteride (AVODART) 0 5 mg capsule Take 1 capsule (0 5 mg total) by mouth daily at bedtime (Patient not taking: Reported on 5/29/2020) 90 capsule 1    losartan (COZAAR) 25 mg tablet TAKE ONE TABLET DAILY 90 tablet 1    metoprolol tartrate (LOPRESSOR) 25 mg tablet Take 0 5 tablets (12 5 mg total) by mouth 2 (two) times a day 180 tablet 1    Multiple Vitamin (MULTIVITAMIN) tablet Take 1 tablet by mouth daily      Omega-3 Fatty Acids (FISH OIL) 1,000 mg Take 1,000 mg by mouth daily      ondansetron (ZOFRAN) 4 mg tablet Take 1 tablet (4 mg total) by mouth every 8 (eight) hours as needed for nausea or vomiting 20 tablet 2    polyethylene glycol (GOLYTELY) 4000 mL solution Take 4,000 mL by mouth once for 1 dose 4000 mL 0    sildenafil (VIAGRA) 50 MG tablet Take by mouth as needed        No current facility-administered medications for this visit  No Known Allergies    Review of Systems    Video Exam    There were no vitals filed for this visit  REVIEW OF SYSTEMS:    CONSTITUTIONAL: Denies any fever, chills, rigors, and weight loss  HEENT: No earache or tinnitus  Denies hearing loss or visual disturbances  CARDIOVASCULAR: No chest pain or palpitations  RESPIRATORY: Denies any cough, hemoptysis, shortness of breath or dyspnea on exertion  GASTROINTESTINAL: As noted in the History of Present Illness  GENITOURINARY: No problems with urination  Denies any hematuria or dysuria  NEUROLOGIC: No dizziness or vertigo, denies headaches  MUSCULOSKELETAL: Denies any muscle or joint pain  SKIN: Denies skin rashes or itching     ENDOCRINE: Denies excessive thirst  Denies intolerance to heat or cold  PSYCHOSOCIAL: Denies depression or anxiety  Denies any recent memory loss  PHYSICAL EXAMINATION:  Appearance and vitals taken from home devices    General Appearance:   Alert, cooperative, no distress   HEENT:  Normocephalic, atraumatic, anicteric  Neck supple, symmetrical, trachea midline  Lungs:   Equal chest rise and unlabored breathing, normal effort, no coughing  Cardiovascular:   No visualized JVD  Abdomen:   No abdominal distension  Skin:   No jaundice, rashes, or lesions  Musculoskeletal:   Normal range of motion visualized  Psych:  Normal affect and normal insight  Neuro:  Alert and appropriate  As a result of this visit, I have not referred the patient for further respiratory evaluation  I spent 40 minutes directly with the patient during this visit      Kasey Lefty acknowledges that he has consented to an online visit or consultation  He understands that the online visit is based solely on information provided by him, and that, in the absence of a face-to-face physical evaluation by the physician, the diagnosis he receives is both limited and provisional in terms of accuracy and completeness  This is not intended to replace a full medical face-to-face evaluation by the physician  Jameson Carrion understands and accepts these terms

## 2020-06-25 ENCOUNTER — HOSPITAL ENCOUNTER (OUTPATIENT)
Dept: ULTRASOUND IMAGING | Facility: HOSPITAL | Age: 75
Discharge: HOME | End: 2020-06-25
Payer: MEDICARE

## 2020-06-25 ENCOUNTER — APPOINTMENT (OUTPATIENT)
Dept: LAB | Facility: HOSPITAL | Age: 75
End: 2020-06-25
Attending: INTERNAL MEDICINE
Payer: MEDICARE

## 2020-06-25 ENCOUNTER — PREP FOR PROCEDURE (OUTPATIENT)
Dept: GASTROENTEROLOGY | Facility: MEDICAL CENTER | Age: 75
End: 2020-06-25

## 2020-06-25 DIAGNOSIS — D50.9 IRON DEFICIENCY ANEMIA, UNSPECIFIED IRON DEFICIENCY ANEMIA TYPE: ICD-10-CM

## 2020-06-25 DIAGNOSIS — R74.8 ELEVATED ALKALINE PHOSPHATASE LEVEL: ICD-10-CM

## 2020-06-25 DIAGNOSIS — Z20.822 ENCOUNTER FOR LABORATORY TESTING FOR COVID-19 VIRUS: Primary | ICD-10-CM

## 2020-06-25 LAB
BASOPHILS # BLD AUTO: 0.03 THOUSANDS/ΜL (ref 0–0.1)
BASOPHILS NFR BLD AUTO: 0 % (ref 0–1)
EOSINOPHIL # BLD AUTO: 0.13 THOUSAND/ΜL (ref 0–0.61)
EOSINOPHIL NFR BLD AUTO: 2 % (ref 0–6)
ERYTHROCYTE [DISTWIDTH] IN BLOOD BY AUTOMATED COUNT: 21.4 % (ref 11.6–15.1)
FERRITIN SERPL-MCNC: 50 NG/ML (ref 8–388)
FOLATE SERPL-MCNC: >20 NG/ML (ref 3.1–17.5)
GGT SERPL-CCNC: 166 U/L (ref 5–85)
HCT VFR BLD AUTO: 33.3 % (ref 36.5–49.3)
HGB BLD-MCNC: 9.6 G/DL (ref 12–17)
IMM GRANULOCYTES # BLD AUTO: 0.04 THOUSAND/UL (ref 0–0.2)
IMM GRANULOCYTES NFR BLD AUTO: 1 % (ref 0–2)
INR PPP: 1 (ref 0.84–1.19)
IRON SATN MFR SERPL: 43 %
IRON SERPL-MCNC: 167 UG/DL (ref 65–175)
LYMPHOCYTES # BLD AUTO: 1.32 THOUSANDS/ΜL (ref 0.6–4.47)
LYMPHOCYTES NFR BLD AUTO: 16 % (ref 14–44)
MCH RBC QN AUTO: 21.6 PG (ref 26.8–34.3)
MCHC RBC AUTO-ENTMCNC: 28.8 G/DL (ref 31.4–37.4)
MCV RBC AUTO: 75 FL (ref 82–98)
MONOCYTES # BLD AUTO: 0.85 THOUSAND/ΜL (ref 0.17–1.22)
MONOCYTES NFR BLD AUTO: 11 % (ref 4–12)
NEUTROPHILS # BLD AUTO: 5.75 THOUSANDS/ΜL (ref 1.85–7.62)
NEUTS SEG NFR BLD AUTO: 70 % (ref 43–75)
NRBC BLD AUTO-RTO: 0 /100 WBCS
PLATELET # BLD AUTO: 525 THOUSANDS/UL (ref 149–390)
PMV BLD AUTO: 10.2 FL (ref 8.9–12.7)
PROTHROMBIN TIME: 13.3 SECONDS (ref 11.6–14.5)
RBC # BLD AUTO: 4.44 MILLION/UL (ref 3.88–5.62)
TIBC SERPL-MCNC: 388 UG/DL (ref 250–450)
VIT B12 SERPL-MCNC: 2760 PG/ML (ref 100–900)
WBC # BLD AUTO: 8.12 THOUSAND/UL (ref 4.31–10.16)

## 2020-06-25 PROCEDURE — 36415 COLL VENOUS BLD VENIPUNCTURE: CPT

## 2020-06-25 PROCEDURE — 76700 US EXAM ABDOM COMPLETE: CPT

## 2020-06-25 PROCEDURE — 85610 PROTHROMBIN TIME: CPT

## 2020-06-25 PROCEDURE — 83550 IRON BINDING TEST: CPT

## 2020-06-25 PROCEDURE — 84075 ASSAY ALKALINE PHOSPHATASE: CPT

## 2020-06-25 PROCEDURE — U0003 INFECTIOUS AGENT DETECTION BY NUCLEIC ACID (DNA OR RNA); SEVERE ACUTE RESPIRATORY SYNDROME CORONAVIRUS 2 (SARS-COV-2) (CORONAVIRUS DISEASE [COVID-19]), AMPLIFIED PROBE TECHNIQUE, MAKING USE OF HIGH THROUGHPUT TECHNOLOGIES AS DESCRIBED BY CMS-2020-01-R: HCPCS

## 2020-06-25 PROCEDURE — 82728 ASSAY OF FERRITIN: CPT

## 2020-06-25 PROCEDURE — 84080 ASSAY ALKALINE PHOSPHATASES: CPT

## 2020-06-25 PROCEDURE — 82977 ASSAY OF GGT: CPT

## 2020-06-25 PROCEDURE — 82607 VITAMIN B-12: CPT

## 2020-06-25 PROCEDURE — 83010 ASSAY OF HAPTOGLOBIN QUANT: CPT

## 2020-06-25 PROCEDURE — 85025 COMPLETE CBC W/AUTO DIFF WBC: CPT

## 2020-06-25 PROCEDURE — 82746 ASSAY OF FOLIC ACID SERUM: CPT

## 2020-06-25 PROCEDURE — 83540 ASSAY OF IRON: CPT

## 2020-06-26 LAB — HAPTOGLOB SERPL-MCNC: 550 MG/DL (ref 34–355)

## 2020-06-27 LAB
ALP BONE CFR SERPL: 16 % (ref 12–68)
ALP INTEST CFR SERPL: 0 % (ref 0–18)
ALP LIVER CFR SERPL: 84 % (ref 13–88)
ALP SERPL-CCNC: 222 IU/L (ref 39–117)
SARS-COV-2 RNA SPEC QL NAA+PROBE: NOT DETECTED

## 2020-06-29 ENCOUNTER — TELEPHONE (OUTPATIENT)
Dept: FAMILY MEDICINE CLINIC | Facility: CLINIC | Age: 75
End: 2020-06-29

## 2020-06-30 RX ORDER — FERROUS SULFATE 325(65) MG
325 TABLET ORAL
COMMUNITY
End: 2022-01-01 | Stop reason: CLARIF

## 2020-06-30 NOTE — PRE-PROCEDURE INSTRUCTIONS
Pre-Surgery Instructions:   Medication Instructions    albuterol (Ventolin HFA) 90 mcg/act inhaler Instructed patient per Anesthesia Guidelines   losartan (COZAAR) 25 mg tablet Instructed patient per Anesthesia Guidelines   metoprolol tartrate (LOPRESSOR) 25 mg tablet Instructed patient per Anesthesia Guidelines

## 2020-07-02 ENCOUNTER — ANESTHESIA EVENT (OUTPATIENT)
Dept: GASTROENTEROLOGY | Facility: HOSPITAL | Age: 75
End: 2020-07-02

## 2020-07-02 DIAGNOSIS — B34.9 VIRAL DISEASE: ICD-10-CM

## 2020-07-02 PROCEDURE — U0003 INFECTIOUS AGENT DETECTION BY NUCLEIC ACID (DNA OR RNA); SEVERE ACUTE RESPIRATORY SYNDROME CORONAVIRUS 2 (SARS-COV-2) (CORONAVIRUS DISEASE [COVID-19]), AMPLIFIED PROBE TECHNIQUE, MAKING USE OF HIGH THROUGHPUT TECHNOLOGIES AS DESCRIBED BY CMS-2020-01-R: HCPCS | Performed by: RADIOLOGY

## 2020-07-02 NOTE — ANESTHESIA PREPROCEDURE EVALUATION
Review of Systems/Medical History  Patient summary reviewed  Chart reviewed  No history of anesthetic complications     Cardiovascular  EKG reviewed, Exercise tolerance (METS): >4,  Hyperlipidemia, Hypertension controlled, Dysrhythmias (PAF  no anti coag no antiplat) , atrial fibrillation,   Comment: ECHO gr1 diastolic dysfuction EF  55    No wall motion  No valve  No pulm hypertension,  Pulmonary  Not a smoker , Shortness of breath, No sleep apnea ,        GI/Hepatic  Negative GI/hepatic ROS          Prostatic disorder, benign prostatic hyperplasia       Endo/Other    No obesity    GYN       Hematology  Anemia anemia of chronic disease,     Musculoskeletal    Arthritis     Neurology  Negative neurology ROS      Psychology   Negative psychology ROS              Physical Exam    Airway    Mallampati score: II         Dental       Cardiovascular  Cardiovascular exam normal    Pulmonary  Pulmonary exam normal     Other Findings  Fixed remaining teeth      Anesthesia Plan  ASA Score- 2     Anesthesia Type- IV sedation with anesthesia with ASA Monitors  Additional Monitors:   Airway Plan:         Plan Factors-Patient not instructed to abstain from smoking on day of procedure  Patient did not smoke on day of surgery  Induction- intravenous  Postoperative Plan-     Informed Consent- Anesthetic plan and risks discussed with patient and spouse  I personally reviewed this patient with the CRNA  Discussed and agreed on the Anesthesia Plan with the CRNA  Jess Trujillo

## 2020-07-06 ENCOUNTER — ANESTHESIA (OUTPATIENT)
Dept: GASTROENTEROLOGY | Facility: HOSPITAL | Age: 75
End: 2020-07-06

## 2020-07-06 ENCOUNTER — HOSPITAL ENCOUNTER (OUTPATIENT)
Dept: GASTROENTEROLOGY | Facility: HOSPITAL | Age: 75
Setting detail: OUTPATIENT SURGERY
Discharge: HOME | End: 2020-07-06
Attending: INTERNAL MEDICINE
Payer: MEDICARE

## 2020-07-06 VITALS
OXYGEN SATURATION: 94 % | BODY MASS INDEX: 21.81 KG/M2 | HEIGHT: 72 IN | DIASTOLIC BLOOD PRESSURE: 58 MMHG | WEIGHT: 161 LBS | HEART RATE: 59 BPM | SYSTOLIC BLOOD PRESSURE: 128 MMHG | TEMPERATURE: 97.2 F | RESPIRATION RATE: 18 BRPM

## 2020-07-06 DIAGNOSIS — K21.00 GASTROESOPHAGEAL REFLUX DISEASE WITH ESOPHAGITIS: ICD-10-CM

## 2020-07-06 DIAGNOSIS — D50.9 IRON DEFICIENCY ANEMIA, UNSPECIFIED IRON DEFICIENCY ANEMIA TYPE: ICD-10-CM

## 2020-07-06 DIAGNOSIS — B34.9 VIRAL DISEASE: Primary | ICD-10-CM

## 2020-07-06 LAB — SARS-COV-2 RNA RESP QL NAA+PROBE: NEGATIVE

## 2020-07-06 PROCEDURE — 88313 SPECIAL STAINS GROUP 2: CPT | Performed by: PATHOLOGY

## 2020-07-06 PROCEDURE — 43251 EGD REMOVE LESION SNARE: CPT | Performed by: INTERNAL MEDICINE

## 2020-07-06 PROCEDURE — 87635 SARS-COV-2 COVID-19 AMP PRB: CPT | Performed by: INTERNAL MEDICINE

## 2020-07-06 PROCEDURE — 1123F ACP DISCUSS/DSCN MKR DOCD: CPT | Performed by: INTERNAL MEDICINE

## 2020-07-06 PROCEDURE — 88342 IMHCHEM/IMCYTCHM 1ST ANTB: CPT | Performed by: PATHOLOGY

## 2020-07-06 PROCEDURE — 45385 COLONOSCOPY W/LESION REMOVAL: CPT | Performed by: INTERNAL MEDICINE

## 2020-07-06 PROCEDURE — 45390 COLONOSCOPY W/RESECTION: CPT | Performed by: INTERNAL MEDICINE

## 2020-07-06 PROCEDURE — 43239 EGD BIOPSY SINGLE/MULTIPLE: CPT | Performed by: INTERNAL MEDICINE

## 2020-07-06 PROCEDURE — 81261 IGH GENE REARRANGE AMP METH: CPT | Performed by: PATHOLOGY

## 2020-07-06 PROCEDURE — 88305 TISSUE EXAM BY PATHOLOGIST: CPT | Performed by: PATHOLOGY

## 2020-07-06 PROCEDURE — NC001 PR NO CHARGE: Performed by: INTERNAL MEDICINE

## 2020-07-06 PROCEDURE — 88341 IMHCHEM/IMCYTCHM EA ADD ANTB: CPT | Performed by: PATHOLOGY

## 2020-07-06 RX ORDER — LIDOCAINE HYDROCHLORIDE 10 MG/ML
INJECTION, SOLUTION EPIDURAL; INFILTRATION; INTRACAUDAL; PERINEURAL AS NEEDED
Status: DISCONTINUED | OUTPATIENT
Start: 2020-07-06 | End: 2020-07-06

## 2020-07-06 RX ORDER — SODIUM CHLORIDE 9 MG/ML
125 INJECTION, SOLUTION INTRAVENOUS CONTINUOUS
Status: DISCONTINUED | OUTPATIENT
Start: 2020-07-06 | End: 2020-07-10 | Stop reason: HOSPADM

## 2020-07-06 RX ORDER — SODIUM CHLORIDE 9 MG/ML
INJECTION, SOLUTION INTRAVENOUS CONTINUOUS PRN
Status: DISCONTINUED | OUTPATIENT
Start: 2020-07-06 | End: 2020-07-06 | Stop reason: SURG

## 2020-07-06 RX ORDER — ESOMEPRAZOLE MAGNESIUM 40 MG/1
40 CAPSULE, DELAYED RELEASE ORAL DAILY
Qty: 30 CAPSULE | Refills: 2 | Status: SHIPPED | OUTPATIENT
Start: 2020-07-06 | End: 2020-07-06 | Stop reason: SDUPTHER

## 2020-07-06 RX ORDER — PROPOFOL 10 MG/ML
INJECTION, EMULSION INTRAVENOUS AS NEEDED
Status: DISCONTINUED | OUTPATIENT
Start: 2020-07-06 | End: 2020-07-06 | Stop reason: SURG

## 2020-07-06 RX ORDER — EPHEDRINE SULFATE 50 MG/ML
INJECTION INTRAVENOUS AS NEEDED
Status: DISCONTINUED | OUTPATIENT
Start: 2020-07-06 | End: 2020-07-06 | Stop reason: SURG

## 2020-07-06 RX ORDER — ESOMEPRAZOLE MAGNESIUM 40 MG/1
40 CAPSULE, DELAYED RELEASE ORAL DAILY
Qty: 30 CAPSULE | Refills: 2 | Status: SHIPPED | OUTPATIENT
Start: 2020-07-06 | End: 2021-01-01 | Stop reason: ALTCHOICE

## 2020-07-06 RX ADMIN — PROPOFOL 50 MG: 10 INJECTION, EMULSION INTRAVENOUS at 09:33

## 2020-07-06 RX ADMIN — EPHEDRINE SULFATE 10 MG: 50 INJECTION, SOLUTION INTRAVENOUS at 09:34

## 2020-07-06 RX ADMIN — PROPOFOL 50 MG: 10 INJECTION, EMULSION INTRAVENOUS at 09:29

## 2020-07-06 RX ADMIN — PROPOFOL 50 MG: 10 INJECTION, EMULSION INTRAVENOUS at 10:09

## 2020-07-06 RX ADMIN — PROPOFOL 50 MG: 10 INJECTION, EMULSION INTRAVENOUS at 09:41

## 2020-07-06 RX ADMIN — PROPOFOL 50 MG: 10 INJECTION, EMULSION INTRAVENOUS at 09:44

## 2020-07-06 RX ADMIN — PROPOFOL 50 MG: 10 INJECTION, EMULSION INTRAVENOUS at 09:16

## 2020-07-06 RX ADMIN — PROPOFOL 50 MG: 10 INJECTION, EMULSION INTRAVENOUS at 09:51

## 2020-07-06 RX ADMIN — SODIUM CHLORIDE 125 ML/HR: 0.9 INJECTION, SOLUTION INTRAVENOUS at 07:34

## 2020-07-06 RX ADMIN — PROPOFOL 50 MG: 10 INJECTION, EMULSION INTRAVENOUS at 10:12

## 2020-07-06 RX ADMIN — SODIUM CHLORIDE: 0.9 INJECTION, SOLUTION INTRAVENOUS at 08:18

## 2020-07-06 RX ADMIN — PROPOFOL 50 MG: 10 INJECTION, EMULSION INTRAVENOUS at 09:11

## 2020-07-06 RX ADMIN — PROPOFOL 50 MG: 10 INJECTION, EMULSION INTRAVENOUS at 10:07

## 2020-07-06 RX ADMIN — PROPOFOL 50 MG: 10 INJECTION, EMULSION INTRAVENOUS at 09:12

## 2020-07-06 RX ADMIN — EPHEDRINE SULFATE 10 MG: 50 INJECTION, SOLUTION INTRAVENOUS at 10:01

## 2020-07-06 RX ADMIN — PROPOFOL 50 MG: 10 INJECTION, EMULSION INTRAVENOUS at 09:36

## 2020-07-06 RX ADMIN — PROPOFOL 50 MG: 10 INJECTION, EMULSION INTRAVENOUS at 09:19

## 2020-07-06 RX ADMIN — SIMETHICONE 40 MG: 20 SUSPENSION/ DROPS ORAL at 09:26

## 2020-07-06 RX ADMIN — PROPOFOL 50 MG: 10 INJECTION, EMULSION INTRAVENOUS at 09:38

## 2020-07-06 RX ADMIN — PROPOFOL 50 MG: 10 INJECTION, EMULSION INTRAVENOUS at 10:16

## 2020-07-06 NOTE — DISCHARGE INSTRUCTIONS
Upper Endoscopy   WHAT YOU NEED TO KNOW:   An upper endoscopy is also called an upper gastrointestinal (GI) endoscopy, or an esophagogastroduodenoscopy (EGD)  You may feel bloated, gassy, or have some abdominal discomfort after your procedure  Your throat may be sore for 24 to 36 hours  You may burp or pass gas from air that is still inside your body  DISCHARGE INSTRUCTIONS:   Call 911 if:   · You have sudden chest pain or trouble breathing  Seek care immediately if:   · You feel dizzy or faint  · You have trouble swallowing  · You have severe throat pain  · Your bowel movements are very dark or black  · Your abdomen is hard and firm and you have severe pain  · You vomit blood  Contact your healthcare provider if:   · You feel full or bloated and cannot burp or pass gas  · You have not had a bowel movement for 3 days after your procedure  · You have neck pain  · You have a fever or chills  · You have nausea or are vomiting  · You have a rash or hives  · You have questions or concerns about your endoscopy  Relieve a sore throat:  Suck on throat lozenges or crushed ice  Gargle with a small amount of warm salt water  Mix 1 teaspoon of salt and 1 cup of warm water to make salt water  Relieve gas and discomfort from bloating:  Lie on your right side with a heating pad on your abdomen  Take short walks to help pass gas  Eat small meals until bloating is relieved  Rest after your procedure:  Do not drive or make important decisions until the day after your procedure  Return to your normal activity as directed  You can usually return to work the day after your procedure  Follow up with your healthcare provider as directed:  Write down your questions so you remember to ask them during your visits  © 2017 [employer] Information is for End User's use only and may not be sold, redistributed or otherwise used for commercial purposes   All illustrations and images included in CareNotes® are the copyrighted property of [employer]  or [employer]ah[employer] Petit  The above information is an  only  It is not intended as medical advice for individual conditions or treatments  Talk to your doctor, nurse or pharmacist before following any medical regimen to see if it is safe and effective for you  Colonoscopy   AMBULATORY CARE:   What you need to know about a colonoscopy:  A colonoscopy is a procedure to examine the inside of your colon (intestine) with a scope  A scope is a flexible tube with a small light and camera on the end  Polyps or tissue growths may be removed during your colonoscopy  What you need to do the week before your colonoscopy: You will need to stop taking medicines that contain aspirin or iron for 7 days before your colonoscopy  If you take anticoagulants, such as warfarin, ask when you should stop taking it  Make plans for someone to drive you home after your procedure  How to prepare for your colonoscopy: Your healthcare provider will have you prepare your bowels before your procedure  Your bowels will need to be empty before your procedure to allow him to clearly see your colon  You will need to do the following the day before your procedure:  · Have only clear liquids  for the entire day before your colonoscopy  Clear liquid diet includes clear fruit juices and broths, clear flavored gelatin, and hard candy  It also includes coffee, tea, carbonated beverages, and clear sports drinks  · Follow your bowel prep as directed  There are many different preparations that can be given before a colonoscopy  Some are given over 2 hours and others over 6 hours  Some are given earlier in the afternoon the day before the colonoscopy  Others are given the day before and then the morning of the colonoscopy  With any bowel prep, stay close to the bathroom  This liquid will cause your bowels to move frequently      · An enema  may be needed  Your healthcare provider may tell you to use an enema to help clean out your bowels  · Do not eat or drink anything after midnight  This will help prevent problems that can happen if you vomit while under anesthesia  What will happen during your colonoscopy:   · You will be given medicine to help you relax  You will lie on your left side and raise one or both knees toward your chest  Your healthcare provider will examine your anus and use a finger to check your rectum  You may need another enema if your bowel is not empty  The scope will be lubricated and gently placed into your anus  It will then be passed through your rectum and into your colon  Water or air will be put into your colon to help clean or expand it  This is done so your healthcare provider can see your colon clearly  · Tissue samples may be taken from the walls of your bowel and sent to a lab for tests  If you have a polyp, your healthcare provider will pass a wire loop through the scope and use it to hold the polyp  The polyp is then burned or cut off the wall of your colon  Removed polyps are sent to a lab for tests  Pictures of your colon may be taken during the procedure  The scope will be removed when the procedure is done  What will happen after your colonoscopy:   · Rest after your procedure  You may feel bloated, have some gas and abdominal discomfort  You may need to lie on your right side with a heating pad on your abdomen  You may need to take short walks to help move the gas out  Eat small meals, if you feel bloated  Do not drive or make important decisions until the day after your procedure  · You may have polyps removed  Do not take aspirin or go on long car trips for 7 days after your procedure  Ask your healthcare provider about any other limits after your procedure  Risks of a colonoscopy: You may have pain or bleeding after the scope or polyps are removed   You may also have a slow heartbeat, decreased blood pressure, or increased sweating  Your colon may tear due to the increased pressure from the scope and other instruments  This may cause bowel contents to leak out of your colon and into your abdomen  If this happens, you will need to stay in the hospital and have surgery on your colon  Seek care immediately if:   · You have a large amount of bright red blood in your bowel movements  · Your abdomen is hard and firm and you have severe pain  · You have sudden trouble breathing  Contact your healthcare provider if:   · You develop a rash or hives  · You have a fever within 24 hours of your procedure  · You have not had a bowel movement for 3 days after your procedure  · You have questions or concerns about your condition or care  Activity:   · Do not lift, strain, or run  for 3 days after your procedure  · Rest after your procedure  You have been given medicine to relax you  Do not  drive or make important decisions until the day after your procedure  Return to your normal activity as directed  · Relieve gas and discomfort from bloating  by lying on your right side with a heating pad on your abdomen  You may need to take short walks to help the gas move out  Eat small meals until bloating is relieved  If you had polyps removed: For 7 days after your procedure:  · Do not  take aspirin  · Do not  go on long car rides  Help prevent constipation:   · Eat a variety of healthy foods  Healthy foods include fruit, vegetables, whole-grain breads, low-fat dairy products, beans, lean meat, and fish  Ask if you need to be on a special diet  Your healthcare provider may recommend that you eat high-fiber foods such as cooked beans  Fiber helps you have regular bowel movements  · Drink liquids as directed  Adults should drink between 9 and 13 eight-ounce cups of liquid every day  Ask what amount is best for you  For most people, good liquids to drink are water, juice, and milk       · Exercise as directed  Talk to your healthcare provider about the best exercise plan for you  Exercise can help prevent constipation, decrease your blood pressure and improve your health  Follow up with your healthcare provider as directed:  Write down your questions so you remember to ask them during your visits  © 2017 [employer] Information is for End User's use only and may not be sold, redistributed or otherwise used for commercial purposes  All illustrations and images included in CareNotes® are the copyrighted property of [employer]  or [employer]ahid Petit  The above information is an  only  It is not intended as medical advice for individual conditions or treatments  Talk to your doctor, nurse or pharmacist before following any medical regimen to see if it is safe and effective for you

## 2020-07-06 NOTE — NURSING NOTE
Received from GI procedure at 1038  Alert  Denies pain  Passing flatus  Respirations easy and non labored  IV fluids continue  Call bell in reach

## 2020-07-06 NOTE — NURSING NOTE
Tolerated toast and drink  Denies pain  Discharge instructions given  Dr María Yepez spoke with the patient and his family

## 2020-07-06 NOTE — INTERVAL H&P NOTE
H&P reviewed  After examining the patient I find no changes in the patients condition since the H&P had been written      Vitals:    07/06/20 0729   BP: 141/73   Pulse: 74   Resp: 18   Temp: 97 5 °F (36 4 °C)   SpO2: 97%

## 2020-07-08 ENCOUNTER — TELEPHONE (OUTPATIENT)
Dept: FAMILY MEDICINE CLINIC | Facility: CLINIC | Age: 75
End: 2020-07-08

## 2020-07-08 NOTE — TELEPHONE ENCOUNTER
I reviewed the reports from the upper and lower scopes  Biopsies were taken during both procedures and Dr Yajaira Talley will follow-up when results are available on them  I would still keep appointment with Pulmonary Medicine

## 2020-07-08 NOTE — TELEPHONE ENCOUNTER
Pt called to f/u w/ CRC & EGD done 6/24/20 had virtual visit w/ Rubens Holly 7/6/20 ov in chart  Wanted us to review results as well he has upcoming Pulmonology for PFT w/ 6 min walk 7/15/20  Wanted to verify if he should keep appt still please advise

## 2020-07-09 LAB — SARS-COV-2 RNA SPEC QL NAA+PROBE: NOT DETECTED

## 2020-07-13 ENCOUNTER — TELEPHONE (OUTPATIENT)
Dept: GASTROENTEROLOGY | Facility: CLINIC | Age: 75
End: 2020-07-13

## 2020-07-13 NOTE — TELEPHONE ENCOUNTER
I spoke with the patient and reviewed his biopsy results  He is complaining of some shortness of breath and occasional dizziness, he is to see pulmonology tomorrow  I would recommend following up as his last blood count has increased  I did tell him to call us if his symptoms persisted and we can repeat his CBC  Please schedule office visit within the next 1-2 months    Charmayne Duster

## 2020-07-13 NOTE — TELEPHONE ENCOUNTER
Patients GI provider:  Dr Marlyne Paget    Number to return call: [phone]    Reason for call: Pt calling wanting to know if Dr Marlyne Paget knows what is causing his anemia since he had the egd/colon procedure completed   Pt was also looking for his results    Scheduled procedure/appointment date if applicable: NA

## 2020-07-15 ENCOUNTER — HOSPITAL ENCOUNTER (OUTPATIENT)
Dept: PULMONOLOGY | Facility: HOSPITAL | Age: 75
Discharge: HOME | End: 2020-07-15
Attending: INTERNAL MEDICINE
Payer: MEDICARE

## 2020-07-15 DIAGNOSIS — R06.00 DYSPNEA ON EXERTION: ICD-10-CM

## 2020-07-15 PROCEDURE — 94726 PLETHYSMOGRAPHY LUNG VOLUMES: CPT

## 2020-07-15 PROCEDURE — 94729 DIFFUSING CAPACITY: CPT

## 2020-07-15 PROCEDURE — 94761 N-INVAS EAR/PLS OXIMETRY MLT: CPT

## 2020-07-15 PROCEDURE — 94618 PULMONARY STRESS TESTING: CPT | Performed by: INTERNAL MEDICINE

## 2020-07-15 PROCEDURE — 94010 BREATHING CAPACITY TEST: CPT

## 2020-07-15 PROCEDURE — 94010 BREATHING CAPACITY TEST: CPT | Performed by: INTERNAL MEDICINE

## 2020-07-15 PROCEDURE — 94729 DIFFUSING CAPACITY: CPT | Performed by: INTERNAL MEDICINE

## 2020-07-15 PROCEDURE — 94726 PLETHYSMOGRAPHY LUNG VOLUMES: CPT | Performed by: INTERNAL MEDICINE

## 2020-07-29 ENCOUNTER — TELEPHONE (OUTPATIENT)
Dept: GASTROENTEROLOGY | Facility: MEDICAL CENTER | Age: 75
End: 2020-07-29

## 2020-07-29 NOTE — TELEPHONE ENCOUNTER
----- Message from Colt Juan MD sent at 7/27/2020 12:50 PM EDT -----  Please call patient :  Biopsies were unremarkable  Inflammation was seen in the small bowel along with the polyp  This was removed and was benign but precancerous  I would recommend repeating the endoscopy in 3-6 months  I will also recommend doing a endoscopic ultrasound at the same time  Colon polyps were benign but precancerous  Repeat colonoscopy in the next 3-6 months  Please schedule these together    Thank you

## 2020-07-29 NOTE — TELEPHONE ENCOUNTER
Spoke to pt aware of results and recommendations, he would like to have all 3 procedures done in 3 months (endoscopic ultrasound) and would like a different prep

## 2020-08-03 NOTE — RESULT ENCOUNTER NOTE
Result reviewed  Low hemoglobin and DLCO  Had EGD with healing ulcer and now on iron supplement  Improving dyspnea   Will follow with me after CT for nodules in November

## 2020-08-17 DIAGNOSIS — D12.6 TUBULAR ADENOMA OF COLON: ICD-10-CM

## 2020-08-17 DIAGNOSIS — K25.9 GASTRIC ULCER WITHOUT HEMORRHAGE OR PERFORATION, UNSPECIFIED CHRONICITY: ICD-10-CM

## 2020-08-17 DIAGNOSIS — R19.8 PROMINENT AMPULLA OF VATER: Primary | ICD-10-CM

## 2020-08-17 DIAGNOSIS — D13.30 TUBULAR ADENOMA OF SMALL INTESTINE: ICD-10-CM

## 2020-08-17 NOTE — TELEPHONE ENCOUNTER
So for repeat EGD - gastric ulcer & TA small intestine  Colonoscopy TA colon  EUS - prominent ampulla  All diagnoses entered    [facility]

## 2020-08-18 ENCOUNTER — TELEPHONE (OUTPATIENT)
Dept: GASTROENTEROLOGY | Facility: CLINIC | Age: 75
End: 2020-08-18

## 2020-08-18 ENCOUNTER — PREP FOR PROCEDURE (OUTPATIENT)
Dept: GASTROENTEROLOGY | Facility: CLINIC | Age: 75
End: 2020-08-18

## 2020-08-18 DIAGNOSIS — D12.6 TUBULAR ADENOMA OF COLON: ICD-10-CM

## 2020-08-18 DIAGNOSIS — R19.8 PROMINENT AMPULLA OF VATER: ICD-10-CM

## 2020-08-18 DIAGNOSIS — K25.9 GASTRIC ULCER, UNSPECIFIED CHRONICITY, UNSPECIFIED WHETHER GASTRIC ULCER HEMORRHAGE OR PERFORATION PRESENT: ICD-10-CM

## 2020-08-18 DIAGNOSIS — D13.30 TUBULAR ADENOMA OF SMALL INTESTINE: Primary | ICD-10-CM

## 2020-08-18 DIAGNOSIS — D12.6 TUBULAR ADENOMA OF COLON: Primary | ICD-10-CM

## 2020-08-18 NOTE — TELEPHONE ENCOUNTER
EGD/EUS/COLON scheduled on 10/27/20 with Dr Marroquin at [facility] gave patient verbal instructions/mailed  Prep-Suprep

## 2020-10-01 ENCOUNTER — TELEPHONE (OUTPATIENT)
Dept: FAMILY MEDICINE CLINIC | Facility: CLINIC | Age: 75
End: 2020-10-01

## 2020-10-01 DIAGNOSIS — D50.9 IRON DEFICIENCY ANEMIA, UNSPECIFIED IRON DEFICIENCY ANEMIA TYPE: Primary | ICD-10-CM

## 2020-10-01 DIAGNOSIS — R74.8 ELEVATED ALKALINE PHOSPHATASE LEVEL: ICD-10-CM

## 2020-10-05 ENCOUNTER — APPOINTMENT (OUTPATIENT)
Dept: LAB | Facility: CLINIC | Age: 75
End: 2020-10-05
Payer: MEDICARE

## 2020-10-05 DIAGNOSIS — D50.9 IRON DEFICIENCY ANEMIA, UNSPECIFIED IRON DEFICIENCY ANEMIA TYPE: ICD-10-CM

## 2020-10-05 DIAGNOSIS — R74.8 ELEVATED ALKALINE PHOSPHATASE LEVEL: ICD-10-CM

## 2020-10-05 LAB
ALBUMIN SERPL BCP-MCNC: 3.1 G/DL (ref 3.5–5)
ALP SERPL-CCNC: 108 U/L (ref 46–116)
ALT SERPL W P-5'-P-CCNC: 20 U/L (ref 12–78)
ANION GAP SERPL CALCULATED.3IONS-SCNC: 5 MMOL/L (ref 4–13)
AST SERPL W P-5'-P-CCNC: 15 U/L (ref 5–45)
BASOPHILS # BLD AUTO: 0.07 THOUSANDS/ΜL (ref 0–0.1)
BASOPHILS NFR BLD AUTO: 1 % (ref 0–1)
BILIRUB SERPL-MCNC: 0.31 MG/DL (ref 0.2–1)
BUN SERPL-MCNC: 14 MG/DL (ref 5–25)
CALCIUM ALBUM COR SERPL-MCNC: 9.8 MG/DL (ref 8.3–10.1)
CALCIUM SERPL-MCNC: 9.1 MG/DL (ref 8.3–10.1)
CHLORIDE SERPL-SCNC: 108 MMOL/L (ref 100–108)
CO2 SERPL-SCNC: 27 MMOL/L (ref 21–32)
CREAT SERPL-MCNC: 1.18 MG/DL (ref 0.6–1.3)
EOSINOPHIL # BLD AUTO: 0.36 THOUSAND/ΜL (ref 0–0.61)
EOSINOPHIL NFR BLD AUTO: 5 % (ref 0–6)
ERYTHROCYTE [DISTWIDTH] IN BLOOD BY AUTOMATED COUNT: 15.9 % (ref 11.6–15.1)
GFR SERPL CREATININE-BSD FRML MDRD: 59 ML/MIN/1.73SQ M
GLUCOSE SERPL-MCNC: 82 MG/DL (ref 65–140)
HCT VFR BLD AUTO: 38.4 % (ref 36.5–49.3)
HGB BLD-MCNC: 11.6 G/DL (ref 12–17)
IMM GRANULOCYTES # BLD AUTO: 0.02 THOUSAND/UL (ref 0–0.2)
IMM GRANULOCYTES NFR BLD AUTO: 0 % (ref 0–2)
LYMPHOCYTES # BLD AUTO: 1.37 THOUSANDS/ΜL (ref 0.6–4.47)
LYMPHOCYTES NFR BLD AUTO: 20 % (ref 14–44)
MCH RBC QN AUTO: 27.4 PG (ref 26.8–34.3)
MCHC RBC AUTO-ENTMCNC: 30.2 G/DL (ref 31.4–37.4)
MCV RBC AUTO: 91 FL (ref 82–98)
MONOCYTES # BLD AUTO: 1 THOUSAND/ΜL (ref 0.17–1.22)
MONOCYTES NFR BLD AUTO: 14 % (ref 4–12)
NEUTROPHILS # BLD AUTO: 4.11 THOUSANDS/ΜL (ref 1.85–7.62)
NEUTS SEG NFR BLD AUTO: 60 % (ref 43–75)
NRBC BLD AUTO-RTO: 0 /100 WBCS
PLATELET # BLD AUTO: 265 THOUSANDS/UL (ref 149–390)
PMV BLD AUTO: 11 FL (ref 8.9–12.7)
POTASSIUM SERPL-SCNC: 3.9 MMOL/L (ref 3.5–5.3)
PROT SERPL-MCNC: 7.1 G/DL (ref 6.4–8.2)
RBC # BLD AUTO: 4.24 MILLION/UL (ref 3.88–5.62)
SODIUM SERPL-SCNC: 140 MMOL/L (ref 136–145)
WBC # BLD AUTO: 6.93 THOUSAND/UL (ref 4.31–10.16)

## 2020-10-05 PROCEDURE — 85025 COMPLETE CBC W/AUTO DIFF WBC: CPT

## 2020-10-05 PROCEDURE — 36415 COLL VENOUS BLD VENIPUNCTURE: CPT

## 2020-10-05 PROCEDURE — 80053 COMPREHEN METABOLIC PANEL: CPT

## 2020-10-08 ENCOUNTER — TELEPHONE (OUTPATIENT)
Dept: FAMILY MEDICINE CLINIC | Facility: CLINIC | Age: 75
End: 2020-10-08

## 2020-10-16 ENCOUNTER — OFFICE VISIT (OUTPATIENT)
Dept: FAMILY MEDICINE CLINIC | Facility: CLINIC | Age: 75
End: 2020-10-16
Payer: MEDICARE

## 2020-10-16 VITALS
HEART RATE: 99 BPM | RESPIRATION RATE: 16 BRPM | DIASTOLIC BLOOD PRESSURE: 70 MMHG | TEMPERATURE: 97.6 F | SYSTOLIC BLOOD PRESSURE: 120 MMHG | WEIGHT: 158 LBS | HEIGHT: 72 IN | BODY MASS INDEX: 21.4 KG/M2

## 2020-10-16 DIAGNOSIS — Z23 NEED FOR VACCINATION: ICD-10-CM

## 2020-10-16 DIAGNOSIS — R11.0 NAUSEA: ICD-10-CM

## 2020-10-16 DIAGNOSIS — I48.0 PAROXYSMAL ATRIAL FIBRILLATION (HCC): ICD-10-CM

## 2020-10-16 DIAGNOSIS — D50.9 IRON DEFICIENCY ANEMIA, UNSPECIFIED IRON DEFICIENCY ANEMIA TYPE: ICD-10-CM

## 2020-10-16 DIAGNOSIS — D12.6 TUBULAR ADENOMA OF COLON: ICD-10-CM

## 2020-10-16 DIAGNOSIS — I10 BENIGN ESSENTIAL HYPERTENSION: ICD-10-CM

## 2020-10-16 DIAGNOSIS — Z00.00 MEDICARE ANNUAL WELLNESS VISIT, SUBSEQUENT: Primary | ICD-10-CM

## 2020-10-16 DIAGNOSIS — N40.1 BENIGN PROSTATIC HYPERPLASIA WITH LOWER URINARY TRACT SYMPTOMS, SYMPTOM DETAILS UNSPECIFIED: ICD-10-CM

## 2020-10-16 DIAGNOSIS — E78.2 MIXED HYPERLIPIDEMIA: ICD-10-CM

## 2020-10-16 PROCEDURE — 90662 IIV NO PRSV INCREASED AG IM: CPT | Performed by: FAMILY MEDICINE

## 2020-10-16 PROCEDURE — 99214 OFFICE O/P EST MOD 30 MIN: CPT | Performed by: FAMILY MEDICINE

## 2020-10-16 PROCEDURE — 90732 PPSV23 VACC 2 YRS+ SUBQ/IM: CPT | Performed by: FAMILY MEDICINE

## 2020-10-16 PROCEDURE — G0439 PPPS, SUBSEQ VISIT: HCPCS | Performed by: FAMILY MEDICINE

## 2020-10-16 PROCEDURE — G0009 ADMIN PNEUMOCOCCAL VACCINE: HCPCS | Performed by: FAMILY MEDICINE

## 2020-10-16 PROCEDURE — G0008 ADMIN INFLUENZA VIRUS VAC: HCPCS | Performed by: FAMILY MEDICINE

## 2020-10-16 RX ORDER — ONDANSETRON 4 MG/1
4 TABLET, FILM COATED ORAL EVERY 8 HOURS PRN
Qty: 20 TABLET | Refills: 2 | Status: SHIPPED | OUTPATIENT
Start: 2020-10-16 | End: 2021-04-23 | Stop reason: SDUPTHER

## 2020-10-16 RX ORDER — ATORVASTATIN CALCIUM 40 MG/1
40 TABLET, FILM COATED ORAL DAILY
Qty: 90 TABLET | Refills: 1 | Status: SHIPPED | OUTPATIENT
Start: 2020-10-16 | End: 2021-04-23 | Stop reason: SDUPTHER

## 2020-10-16 RX ORDER — LOSARTAN POTASSIUM 25 MG/1
TABLET ORAL
Qty: 90 TABLET | Refills: 1 | Status: SHIPPED | OUTPATIENT
Start: 2020-10-16 | End: 2021-04-23 | Stop reason: SDUPTHER

## 2020-10-16 RX ORDER — DUTASTERIDE 0.5 MG/1
0.5 CAPSULE, LIQUID FILLED ORAL
Qty: 90 CAPSULE | Refills: 1 | Status: SHIPPED | OUTPATIENT
Start: 2020-10-16 | End: 2021-04-23 | Stop reason: SDUPTHER

## 2020-10-26 ENCOUNTER — ANESTHESIA EVENT (OUTPATIENT)
Dept: GASTROENTEROLOGY | Facility: HOSPITAL | Age: 75
End: 2020-10-26

## 2020-10-26 RX ORDER — SODIUM CHLORIDE, SODIUM LACTATE, POTASSIUM CHLORIDE, CALCIUM CHLORIDE 600; 310; 30; 20 MG/100ML; MG/100ML; MG/100ML; MG/100ML
125 INJECTION, SOLUTION INTRAVENOUS CONTINUOUS
Status: CANCELLED | OUTPATIENT
Start: 2020-10-26

## 2020-10-26 RX ORDER — LIDOCAINE HYDROCHLORIDE 10 MG/ML
0.5 INJECTION, SOLUTION EPIDURAL; INFILTRATION; INTRACAUDAL; PERINEURAL ONCE AS NEEDED
Status: CANCELLED | OUTPATIENT
Start: 2020-10-26

## 2020-10-27 ENCOUNTER — ANESTHESIA (OUTPATIENT)
Dept: GASTROENTEROLOGY | Facility: HOSPITAL | Age: 75
End: 2020-10-27

## 2020-10-27 ENCOUNTER — HOSPITAL ENCOUNTER (OUTPATIENT)
Dept: GASTROENTEROLOGY | Facility: HOSPITAL | Age: 75
Setting detail: OUTPATIENT SURGERY
Discharge: HOME | End: 2020-10-27
Attending: INTERNAL MEDICINE
Payer: MEDICARE

## 2020-10-27 VITALS
TEMPERATURE: 98 F | BODY MASS INDEX: 20.99 KG/M2 | RESPIRATION RATE: 18 BRPM | SYSTOLIC BLOOD PRESSURE: 127 MMHG | HEIGHT: 72 IN | OXYGEN SATURATION: 95 % | DIASTOLIC BLOOD PRESSURE: 57 MMHG | WEIGHT: 155 LBS | HEART RATE: 60 BPM

## 2020-10-27 VITALS — HEART RATE: 61 BPM

## 2020-10-27 DIAGNOSIS — K25.9 GASTRIC ULCER, UNSPECIFIED CHRONICITY, UNSPECIFIED WHETHER GASTRIC ULCER HEMORRHAGE OR PERFORATION PRESENT: ICD-10-CM

## 2020-10-27 DIAGNOSIS — D13.30 TUBULAR ADENOMA OF SMALL INTESTINE: ICD-10-CM

## 2020-10-27 DIAGNOSIS — D12.6 TUBULAR ADENOMA OF COLON: ICD-10-CM

## 2020-10-27 DIAGNOSIS — R19.8 PROMINENT AMPULLA OF VATER: ICD-10-CM

## 2020-10-27 PROCEDURE — 88342 IMHCHEM/IMCYTCHM 1ST ANTB: CPT | Performed by: PATHOLOGY

## 2020-10-27 PROCEDURE — 88305 TISSUE EXAM BY PATHOLOGIST: CPT | Performed by: PATHOLOGY

## 2020-10-27 PROCEDURE — 88341 IMHCHEM/IMCYTCHM EA ADD ANTB: CPT | Performed by: PATHOLOGY

## 2020-10-27 PROCEDURE — NC001 PR NO CHARGE: Performed by: INTERNAL MEDICINE

## 2020-10-27 PROCEDURE — 43237 ENDOSCOPIC US EXAM ESOPH: CPT | Performed by: INTERNAL MEDICINE

## 2020-10-27 PROCEDURE — 88368 INSITU HYBRIDIZATION MANUAL: CPT | Performed by: PATHOLOGY

## 2020-10-27 PROCEDURE — 45385 COLONOSCOPY W/LESION REMOVAL: CPT | Performed by: INTERNAL MEDICINE

## 2020-10-27 PROCEDURE — 45390 COLONOSCOPY W/RESECTION: CPT | Performed by: INTERNAL MEDICINE

## 2020-10-27 PROCEDURE — 43239 EGD BIOPSY SINGLE/MULTIPLE: CPT | Performed by: INTERNAL MEDICINE

## 2020-10-27 RX ORDER — SODIUM CHLORIDE 9 MG/ML
INJECTION, SOLUTION INTRAVENOUS CONTINUOUS PRN
Status: DISCONTINUED | OUTPATIENT
Start: 2020-10-27 | End: 2020-10-27

## 2020-10-27 RX ORDER — PROPOFOL 10 MG/ML
INJECTION, EMULSION INTRAVENOUS AS NEEDED
Status: DISCONTINUED | OUTPATIENT
Start: 2020-10-27 | End: 2020-10-27

## 2020-10-27 RX ORDER — PROPOFOL 10 MG/ML
INJECTION, EMULSION INTRAVENOUS CONTINUOUS PRN
Status: DISCONTINUED | OUTPATIENT
Start: 2020-10-27 | End: 2020-10-27

## 2020-10-27 RX ADMIN — PROPOFOL 30 MG: 10 INJECTION, EMULSION INTRAVENOUS at 10:47

## 2020-10-27 RX ADMIN — PROPOFOL: 10 INJECTION, EMULSION INTRAVENOUS at 12:14

## 2020-10-27 RX ADMIN — PROPOFOL 40 MCG/KG/MIN: 10 INJECTION, EMULSION INTRAVENOUS at 10:44

## 2020-10-27 RX ADMIN — PROPOFOL 50 MG: 10 INJECTION, EMULSION INTRAVENOUS at 10:42

## 2020-10-27 RX ADMIN — SODIUM CHLORIDE: 0.9 INJECTION, SOLUTION INTRAVENOUS at 10:35

## 2020-10-27 RX ADMIN — PHENYLEPHRINE HYDROCHLORIDE 40 MCG/MIN: 10 INJECTION INTRAVENOUS at 10:41

## 2020-10-27 RX ADMIN — PROPOFOL: 10 INJECTION, EMULSION INTRAVENOUS at 11:02

## 2020-10-27 RX ADMIN — SODIUM CHLORIDE: 0.9 INJECTION, SOLUTION INTRAVENOUS at 11:21

## 2020-10-27 RX ADMIN — PROPOFOL: 10 INJECTION, EMULSION INTRAVENOUS at 11:27

## 2020-10-27 RX ADMIN — PROPOFOL 30 MG: 10 INJECTION, EMULSION INTRAVENOUS at 10:43

## 2020-11-16 ENCOUNTER — HOSPITAL ENCOUNTER (OUTPATIENT)
Dept: CT IMAGING | Facility: HOSPITAL | Age: 75
Discharge: HOME | End: 2020-11-16
Attending: INTERNAL MEDICINE
Payer: MEDICARE

## 2020-11-16 DIAGNOSIS — R91.8 LUNG NODULES: ICD-10-CM

## 2020-11-16 PROCEDURE — 71250 CT THORAX DX C-: CPT

## 2020-11-18 ENCOUNTER — TELEPHONE (OUTPATIENT)
Dept: PULMONOLOGY | Facility: CLINIC | Age: 75
End: 2020-11-18

## 2020-11-19 ENCOUNTER — OFFICE VISIT (OUTPATIENT)
Dept: PULMONOLOGY | Facility: CLINIC | Age: 75
End: 2020-11-19
Payer: MEDICARE

## 2020-11-19 VITALS
SYSTOLIC BLOOD PRESSURE: 112 MMHG | WEIGHT: 160 LBS | TEMPERATURE: 96.9 F | DIASTOLIC BLOOD PRESSURE: 60 MMHG | OXYGEN SATURATION: 95 % | RESPIRATION RATE: 18 BRPM | HEIGHT: 72 IN | BODY MASS INDEX: 21.67 KG/M2 | HEART RATE: 105 BPM

## 2020-11-19 DIAGNOSIS — R91.8 LUNG NODULES: Primary | ICD-10-CM

## 2020-11-19 PROBLEM — C88.4 MALT (MUCOSA ASSOCIATED LYMPHOID TISSUE) (HCC): Status: ACTIVE | Noted: 2020-11-19

## 2020-11-19 PROBLEM — R06.00 DYSPNEA ON EXERTION: Status: RESOLVED | Noted: 2020-05-29 | Resolved: 2020-11-19

## 2020-11-19 PROCEDURE — 99214 OFFICE O/P EST MOD 30 MIN: CPT | Performed by: INTERNAL MEDICINE

## 2020-11-20 ENCOUNTER — PREP FOR PROCEDURE (OUTPATIENT)
Dept: INTERVENTIONAL RADIOLOGY/VASCULAR | Facility: CLINIC | Age: 75
End: 2020-11-20

## 2020-11-20 ENCOUNTER — TELEMEDICINE (OUTPATIENT)
Dept: GASTROENTEROLOGY | Facility: MEDICAL CENTER | Age: 75
End: 2020-11-20
Payer: MEDICARE

## 2020-11-20 DIAGNOSIS — D13.30 TUBULAR ADENOMA OF SMALL INTESTINE: ICD-10-CM

## 2020-11-20 DIAGNOSIS — C88.4 MALT (MUCOSA ASSOCIATED LYMPHOID TISSUE) (HCC): Primary | ICD-10-CM

## 2020-11-20 DIAGNOSIS — R19.8 PROMINENT AMPULLA OF VATER: ICD-10-CM

## 2020-11-20 DIAGNOSIS — R91.8 LUNG NODULES: Primary | ICD-10-CM

## 2020-11-20 DIAGNOSIS — D12.6 TUBULAR ADENOMA OF COLON: ICD-10-CM

## 2020-11-20 DIAGNOSIS — R10.84 GENERALIZED ABDOMINAL PAIN: ICD-10-CM

## 2020-11-20 DIAGNOSIS — C16.2 MALIGNANT NEOPLASM OF BODY OF STOMACH (HCC): ICD-10-CM

## 2020-11-20 PROCEDURE — 99215 OFFICE O/P EST HI 40 MIN: CPT | Performed by: INTERNAL MEDICINE

## 2020-11-20 RX ORDER — SODIUM CHLORIDE 9 MG/ML
75 INJECTION, SOLUTION INTRAVENOUS CONTINUOUS
Status: CANCELLED | OUTPATIENT
Start: 2020-11-20

## 2020-11-20 RX ORDER — DICYCLOMINE HCL 20 MG
20 TABLET ORAL EVERY 6 HOURS PRN
Qty: 30 TABLET | Refills: 0 | Status: SHIPPED | OUTPATIENT
Start: 2020-11-20 | End: 2020-12-09 | Stop reason: ALTCHOICE

## 2020-11-23 ENCOUNTER — TELEPHONE (OUTPATIENT)
Dept: HEMATOLOGY ONCOLOGY | Facility: CLINIC | Age: 75
End: 2020-11-23

## 2020-11-23 ENCOUNTER — LAB (OUTPATIENT)
Dept: LAB | Facility: CLINIC | Age: 75
End: 2020-11-23
Payer: MEDICARE

## 2020-11-23 DIAGNOSIS — D50.9 IRON DEFICIENCY ANEMIA, UNSPECIFIED IRON DEFICIENCY ANEMIA TYPE: ICD-10-CM

## 2020-11-23 DIAGNOSIS — C88.4 MALT (MUCOSA ASSOCIATED LYMPHOID TISSUE) (HCC): ICD-10-CM

## 2020-11-23 LAB
BASOPHILS # BLD AUTO: 0.07 THOUSANDS/ΜL (ref 0–0.1)
BASOPHILS NFR BLD AUTO: 1 % (ref 0–1)
EOSINOPHIL # BLD AUTO: 0.35 THOUSAND/ΜL (ref 0–0.61)
EOSINOPHIL NFR BLD AUTO: 4 % (ref 0–6)
ERYTHROCYTE [DISTWIDTH] IN BLOOD BY AUTOMATED COUNT: 15.7 % (ref 11.6–15.1)
HCT VFR BLD AUTO: 38.2 % (ref 36.5–49.3)
HGB BLD-MCNC: 11.5 G/DL (ref 12–17)
IMM GRANULOCYTES # BLD AUTO: 0.03 THOUSAND/UL (ref 0–0.2)
IMM GRANULOCYTES NFR BLD AUTO: 0 % (ref 0–2)
LYMPHOCYTES # BLD AUTO: 1.69 THOUSANDS/ΜL (ref 0.6–4.47)
LYMPHOCYTES NFR BLD AUTO: 21 % (ref 14–44)
MCH RBC QN AUTO: 26.7 PG (ref 26.8–34.3)
MCHC RBC AUTO-ENTMCNC: 30.1 G/DL (ref 31.4–37.4)
MCV RBC AUTO: 89 FL (ref 82–98)
MONOCYTES # BLD AUTO: 0.98 THOUSAND/ΜL (ref 0.17–1.22)
MONOCYTES NFR BLD AUTO: 12 % (ref 4–12)
NEUTROPHILS # BLD AUTO: 4.79 THOUSANDS/ΜL (ref 1.85–7.62)
NEUTS SEG NFR BLD AUTO: 62 % (ref 43–75)
NRBC BLD AUTO-RTO: 0 /100 WBCS
PLATELET # BLD AUTO: 309 THOUSANDS/UL (ref 149–390)
PMV BLD AUTO: 10.9 FL (ref 8.9–12.7)
RBC # BLD AUTO: 4.31 MILLION/UL (ref 3.88–5.62)
WBC # BLD AUTO: 7.91 THOUSAND/UL (ref 4.31–10.16)

## 2020-11-23 PROCEDURE — 85025 COMPLETE CBC W/AUTO DIFF WBC: CPT

## 2020-11-23 PROCEDURE — 36415 COLL VENOUS BLD VENIPUNCTURE: CPT

## 2020-11-23 PROCEDURE — 86677 HELICOBACTER PYLORI ANTIBODY: CPT

## 2020-11-24 ENCOUNTER — TELEPHONE (OUTPATIENT)
Dept: GASTROENTEROLOGY | Facility: MEDICAL CENTER | Age: 75
End: 2020-11-24

## 2020-11-24 ENCOUNTER — APPOINTMENT (OUTPATIENT)
Dept: LAB | Facility: HOSPITAL | Age: 75
End: 2020-11-24
Payer: MEDICARE

## 2020-11-24 LAB
H PYLORI IGG SER IA-ACNC: 0.39 INDEX VALUE (ref 0–0.79)
H PYLORI IGM SER-ACNC: <9 UNITS (ref 0–8.9)

## 2020-11-24 PROCEDURE — 87338 HPYLORI STOOL AG IA: CPT

## 2020-11-26 LAB — H PYLORI AG STL QL IA: NEGATIVE

## 2020-11-30 ENCOUNTER — TELEPHONE (OUTPATIENT)
Dept: GASTROENTEROLOGY | Facility: MEDICAL CENTER | Age: 75
End: 2020-11-30

## 2020-11-30 DIAGNOSIS — C88.4 MALT (MUCOSA ASSOCIATED LYMPHOID TISSUE) (HCC): Primary | ICD-10-CM

## 2020-11-30 RX ORDER — AMOXICILLIN 500 MG/1
1000 TABLET, FILM COATED ORAL 2 TIMES DAILY
Qty: 56 TABLET | Refills: 0 | Status: SHIPPED | OUTPATIENT
Start: 2020-11-30 | End: 2020-12-14

## 2020-11-30 RX ORDER — CLARITHROMYCIN 500 MG/1
500 TABLET, COATED ORAL EVERY 12 HOURS SCHEDULED
Qty: 28 TABLET | Refills: 0 | Status: SHIPPED | OUTPATIENT
Start: 2020-11-30 | End: 2020-12-14

## 2020-11-30 RX ORDER — ESOMEPRAZOLE MAGNESIUM 40 MG/1
40 CAPSULE, DELAYED RELEASE ORAL
Qty: 28 CAPSULE | Refills: 0 | Status: SHIPPED | OUTPATIENT
Start: 2020-11-30 | End: 2021-01-01 | Stop reason: ALTCHOICE

## 2020-12-05 ENCOUNTER — HOSPITAL ENCOUNTER (OUTPATIENT)
Dept: CT IMAGING | Facility: HOSPITAL | Age: 75
Discharge: HOME | End: 2020-12-05
Attending: INTERNAL MEDICINE
Payer: MEDICARE

## 2020-12-05 DIAGNOSIS — C88.4 MALT (MUCOSA ASSOCIATED LYMPHOID TISSUE) (HCC): ICD-10-CM

## 2020-12-05 PROCEDURE — 74177 CT ABD & PELVIS W/CONTRAST: CPT

## 2020-12-05 PROCEDURE — G1004 CDSM NDSC: HCPCS

## 2020-12-05 RX ADMIN — IOHEXOL 50 ML: 240 INJECTION, SOLUTION INTRATHECAL; INTRAVASCULAR; INTRAVENOUS; ORAL at 14:26

## 2020-12-05 RX ADMIN — IOHEXOL 100 ML: 350 INJECTION, SOLUTION INTRAVENOUS at 14:26

## 2020-12-09 ENCOUNTER — HOSPITAL ENCOUNTER (OUTPATIENT)
Dept: CT IMAGING | Facility: HOSPITAL | Age: 75
Discharge: HOME | End: 2020-12-09
Attending: RADIOLOGY | Admitting: RADIOLOGY
Payer: MEDICARE

## 2020-12-09 VITALS
DIASTOLIC BLOOD PRESSURE: 65 MMHG | OXYGEN SATURATION: 98 % | TEMPERATURE: 98.4 F | WEIGHT: 160 LBS | SYSTOLIC BLOOD PRESSURE: 131 MMHG | RESPIRATION RATE: 18 BRPM | HEIGHT: 72 IN | HEART RATE: 80 BPM | BODY MASS INDEX: 21.67 KG/M2

## 2020-12-09 DIAGNOSIS — R91.8 LUNG NODULES: ICD-10-CM

## 2020-12-09 LAB
BASOPHILS # BLD AUTO: 0.06 THOUSANDS/ΜL (ref 0–0.1)
BASOPHILS NFR BLD AUTO: 1 % (ref 0–1)
EOSINOPHIL # BLD AUTO: 0.23 THOUSAND/ΜL (ref 0–0.61)
EOSINOPHIL NFR BLD AUTO: 4 % (ref 0–6)
ERYTHROCYTE [DISTWIDTH] IN BLOOD BY AUTOMATED COUNT: 15.7 % (ref 11.6–15.1)
HCT VFR BLD AUTO: 32.9 % (ref 36.5–49.3)
HGB BLD-MCNC: 10.1 G/DL (ref 12–17)
IMM GRANULOCYTES # BLD AUTO: 0.02 THOUSAND/UL (ref 0–0.2)
IMM GRANULOCYTES NFR BLD AUTO: 0 % (ref 0–2)
INR PPP: 1.01 (ref 0.84–1.19)
LYMPHOCYTES # BLD AUTO: 0.97 THOUSANDS/ΜL (ref 0.6–4.47)
LYMPHOCYTES NFR BLD AUTO: 16 % (ref 14–44)
MCH RBC QN AUTO: 26.4 PG (ref 26.8–34.3)
MCHC RBC AUTO-ENTMCNC: 30.7 G/DL (ref 31.4–37.4)
MCV RBC AUTO: 86 FL (ref 82–98)
MONOCYTES # BLD AUTO: 0.7 THOUSAND/ΜL (ref 0.17–1.22)
MONOCYTES NFR BLD AUTO: 11 % (ref 4–12)
NEUTROPHILS # BLD AUTO: 4.14 THOUSANDS/ΜL (ref 1.85–7.62)
NEUTS SEG NFR BLD AUTO: 68 % (ref 43–75)
NRBC BLD AUTO-RTO: 0 /100 WBCS
PLATELET # BLD AUTO: 237 THOUSANDS/UL (ref 149–390)
PMV BLD AUTO: 9.9 FL (ref 8.9–12.7)
PROTHROMBIN TIME: 13.1 SECONDS (ref 11.6–14.5)
RBC # BLD AUTO: 3.82 MILLION/UL (ref 3.88–5.62)
WBC # BLD AUTO: 6.12 THOUSAND/UL (ref 4.31–10.16)

## 2020-12-09 PROCEDURE — 85610 PROTHROMBIN TIME: CPT | Performed by: RADIOLOGY

## 2020-12-09 PROCEDURE — 88333 PATH CONSLTJ SURG CYTO XM 1: CPT | Performed by: PATHOLOGY

## 2020-12-09 PROCEDURE — 88342 IMHCHEM/IMCYTCHM 1ST ANTB: CPT | Performed by: PATHOLOGY

## 2020-12-09 PROCEDURE — 88363 XM ARCHIVE TISSUE MOLEC ANAL: CPT | Performed by: PATHOLOGY

## 2020-12-09 PROCEDURE — 32405 PR BIOPSY LUNG/MEDIASTINUM PERCUTANEOUS NEEDLE: CPT | Performed by: RADIOLOGY

## 2020-12-09 PROCEDURE — 77012 CT SCAN FOR NEEDLE BIOPSY: CPT | Performed by: RADIOLOGY

## 2020-12-09 PROCEDURE — 85025 COMPLETE CBC W/AUTO DIFF WBC: CPT | Performed by: RADIOLOGY

## 2020-12-09 PROCEDURE — 88341 IMHCHEM/IMCYTCHM EA ADD ANTB: CPT | Performed by: PATHOLOGY

## 2020-12-09 PROCEDURE — 88305 TISSUE EXAM BY PATHOLOGIST: CPT | Performed by: PATHOLOGY

## 2020-12-09 PROCEDURE — 99152 MOD SED SAME PHYS/QHP 5/>YRS: CPT | Performed by: RADIOLOGY

## 2020-12-09 RX ORDER — SODIUM CHLORIDE 9 MG/ML
75 INJECTION, SOLUTION INTRAVENOUS CONTINUOUS
Status: DISCONTINUED | OUTPATIENT
Start: 2020-12-09 | End: 2020-12-10 | Stop reason: HOSPADM

## 2020-12-09 RX ORDER — FENTANYL CITRATE 50 UG/ML
INJECTION, SOLUTION INTRAMUSCULAR; INTRAVENOUS CODE/TRAUMA/SEDATION MEDICATION
Status: COMPLETED | OUTPATIENT
Start: 2020-12-09 | End: 2020-12-09

## 2020-12-09 RX ORDER — LIDOCAINE WITH 8.4% SOD BICARB 0.9%(10ML)
SYRINGE (ML) INJECTION CODE/TRAUMA/SEDATION MEDICATION
Status: COMPLETED | OUTPATIENT
Start: 2020-12-09 | End: 2020-12-09

## 2020-12-09 RX ORDER — MIDAZOLAM HYDROCHLORIDE 2 MG/2ML
INJECTION, SOLUTION INTRAMUSCULAR; INTRAVENOUS CODE/TRAUMA/SEDATION MEDICATION
Status: COMPLETED | OUTPATIENT
Start: 2020-12-09 | End: 2020-12-09

## 2020-12-09 RX ADMIN — SODIUM CHLORIDE 75 ML/HR: 0.9 INJECTION, SOLUTION INTRAVENOUS at 08:30

## 2020-12-09 RX ADMIN — FENTANYL CITRATE 50 MCG: 50 INJECTION INTRAMUSCULAR; INTRAVENOUS at 10:04

## 2020-12-09 RX ADMIN — MIDAZOLAM 1 MG: 1 INJECTION INTRAMUSCULAR; INTRAVENOUS at 10:04

## 2020-12-09 RX ADMIN — MIDAZOLAM 1 MG: 1 INJECTION INTRAMUSCULAR; INTRAVENOUS at 10:08

## 2020-12-09 RX ADMIN — Medication 10 ML: at 10:12

## 2020-12-09 RX ADMIN — FENTANYL CITRATE 50 MCG: 50 INJECTION INTRAMUSCULAR; INTRAVENOUS at 10:09

## 2020-12-14 ENCOUNTER — TELEPHONE (OUTPATIENT)
Dept: GASTROENTEROLOGY | Facility: AMBULARY SURGERY CENTER | Age: 75
End: 2020-12-14

## 2020-12-14 DIAGNOSIS — R10.84 GENERALIZED ABDOMINAL PAIN: Primary | ICD-10-CM

## 2020-12-15 ENCOUNTER — TELEPHONE (OUTPATIENT)
Dept: PULMONOLOGY | Facility: CLINIC | Age: 75
End: 2020-12-15

## 2020-12-15 RX ORDER — DICYCLOMINE HCL 20 MG
20 TABLET ORAL EVERY 6 HOURS
Qty: 90 TABLET | Refills: 3 | Status: SHIPPED | OUTPATIENT
Start: 2020-12-15 | End: 2022-01-01 | Stop reason: SDUPTHER

## 2020-12-17 ENCOUNTER — DOCUMENTATION (OUTPATIENT)
Dept: HEMATOLOGY ONCOLOGY | Facility: CLINIC | Age: 75
End: 2020-12-17

## 2020-12-17 ENCOUNTER — PATIENT OUTREACH (OUTPATIENT)
Dept: HEMATOLOGY ONCOLOGY | Facility: CLINIC | Age: 75
End: 2020-12-17

## 2020-12-17 ENCOUNTER — TELEMEDICINE (OUTPATIENT)
Dept: GASTROENTEROLOGY | Facility: MEDICAL CENTER | Age: 75
End: 2020-12-17
Payer: MEDICARE

## 2020-12-17 DIAGNOSIS — C17.9 SMALL BOWEL CANCER (HCC): Primary | ICD-10-CM

## 2020-12-17 DIAGNOSIS — K63.89 MESENTERIC MASS: Primary | ICD-10-CM

## 2020-12-17 DIAGNOSIS — D12.6 TUBULAR ADENOMA OF COLON: Primary | ICD-10-CM

## 2020-12-17 DIAGNOSIS — C17.9 SMALL BOWEL CANCER (HCC): ICD-10-CM

## 2020-12-17 DIAGNOSIS — D13.30 TUBULAR ADENOMA OF SMALL INTESTINE: ICD-10-CM

## 2020-12-17 DIAGNOSIS — C88.4 MALT (MUCOSA ASSOCIATED LYMPHOID TISSUE) (HCC): ICD-10-CM

## 2020-12-17 PROCEDURE — 99215 OFFICE O/P EST HI 40 MIN: CPT | Performed by: INTERNAL MEDICINE

## 2020-12-17 RX ORDER — SODIUM CHLORIDE 9 MG/ML
75 INJECTION, SOLUTION INTRAVENOUS CONTINUOUS
Status: CANCELLED | OUTPATIENT
Start: 2020-12-17

## 2020-12-18 ENCOUNTER — TELEPHONE (OUTPATIENT)
Dept: SURGICAL ONCOLOGY | Facility: CLINIC | Age: 75
End: 2020-12-18

## 2020-12-23 ENCOUNTER — TELEPHONE (OUTPATIENT)
Dept: RADIOLOGY | Facility: HOSPITAL | Age: 75
End: 2020-12-23

## 2020-12-23 RX ORDER — CEFAZOLIN SODIUM 1 G/50ML
1000 SOLUTION INTRAVENOUS ONCE
Status: CANCELLED | OUTPATIENT
Start: 2020-12-23 | End: 2020-12-23

## 2020-12-24 ENCOUNTER — CONSULT (OUTPATIENT)
Dept: HEMATOLOGY ONCOLOGY | Facility: CLINIC | Age: 75
End: 2020-12-24
Payer: MEDICARE

## 2020-12-24 VITALS
OXYGEN SATURATION: 99 % | HEART RATE: 56 BPM | RESPIRATION RATE: 18 BRPM | TEMPERATURE: 97.7 F | WEIGHT: 155 LBS | BODY MASS INDEX: 20.99 KG/M2 | SYSTOLIC BLOOD PRESSURE: 112 MMHG | DIASTOLIC BLOOD PRESSURE: 66 MMHG | HEIGHT: 72 IN

## 2020-12-24 DIAGNOSIS — C88.4 MALT (MUCOSA ASSOCIATED LYMPHOID TISSUE) (HCC): Primary | ICD-10-CM

## 2020-12-24 DIAGNOSIS — D70.1 CHEMOTHERAPY INDUCED NEUTROPENIA (HCC): ICD-10-CM

## 2020-12-24 DIAGNOSIS — C17.9 SMALL BOWEL CANCER (HCC): ICD-10-CM

## 2020-12-24 DIAGNOSIS — T45.1X5A CHEMOTHERAPY INDUCED NEUTROPENIA (HCC): ICD-10-CM

## 2020-12-24 DIAGNOSIS — C17.9 SMALL BOWEL CANCER (HCC): Primary | ICD-10-CM

## 2020-12-24 DIAGNOSIS — C78.00 MALIGNANT NEOPLASM METASTATIC TO LUNG, UNSPECIFIED LATERALITY (HCC): ICD-10-CM

## 2020-12-24 PROCEDURE — 99204 OFFICE O/P NEW MOD 45 MIN: CPT | Performed by: INTERNAL MEDICINE

## 2020-12-25 PROBLEM — C78.00 LUNG METASTASIS (HCC): Status: ACTIVE | Noted: 2020-12-25

## 2020-12-25 NOTE — H&P (VIEW-ONLY)
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  HPI: Мария Frye is a 66y o  year old male  Patient is here with his family member  Last year he started to have shortness of breath with exertion and was found to have iron deficiency anemia and was treated with oral iron and symptoms improved some  He also had cardiac workup for shortness of breath and he states there was no problem with his heart  This year is status shortness of breath again and weight loss  He had CT scans and that showed bilateral lung nodules, thickening of wall of jejunum and mesenteric mass/lymphadenopathy and additional omental masses  On 10/27/2020 patient had EGD and colonoscopy and 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  Negative H pylori and in spite of that patient was given antibiotic therapy against H pylori because of MALT lymphoma  On 12/09/2020 he had needle biopsy of left upper lung nodule and that came back adenocarcinoma more consistent with colorectal cancer  Colonoscopy had shown polyps but no cancer  Patient will be going for needle biopsy of mesenteric mass and also Port-A-Cath in preparation for chemotherapy  He has some weakness and tiredness, exertional dyspnea and weight loss  He states weight has stabilized now  He has some discomfort in the right subcostal area near the epigastrium     ROS:   Reviewed 13 systems: See symptoms in HPI  Presently no other neurological, cardiac, pulmonary, GI and  symptoms other than mentioned in HPI  No other symptoms like fever, chills, bleeding, bone pains, skin rash,  arthritic symptoms,   numbness, claudication and gait problem  No frequent infections  Not unusually sensitive to heat or cold  No swelling of the ankles  No swollen glands  Patient is anxious  Historical Information   Past Medical History:   Diagnosis Date    Abnormal CT of liver     last assessed: 14    Anemia     iron def 2020 hgb 9 6    BPH (benign prostatic hypertrophy)     Dupuytren contracture     both hands    Erectile dysfunction of non-organic origin     last assessed: 12    Esophageal reflux     last assessed: 14    Esophagitis 2012    Familial hypercholesteremia     last assessed: 13    Hyperlipidemia     Hypertension     Paroxysmal atrial fibrillation ([address] )     last assessed: 17 post colonoscopy    Shortness of breath     exertional due to anemia     Past Surgical History:   Procedure Laterality Date    BACK SURGERY      Lower    COLONOSCOPY      HAND CONTRACTURE RELEASE Left 2017    Procedure: Left hand percutaneous fasciotomy of palm adductor space x 2; index finger PIP joint; ring finger PIP joint; small finger MCP joint and PIP joint  ; splint application;  Surgeon: Vicki Ridley MD;  Location:  MAIN OR;  Service:     HAND SURGERY Bilateral     IR BIOPSY LUNG  2020     Social History   Social History     Substance and Sexual Activity   Alcohol Use No     Social History     Substance and Sexual Activity   Drug Use No     Social History     Tobacco Use   Smoking Status Former Smoker    Packs/day: 0 50    Years: 3 00    Pack years: 1 50    Types: Cigarettes    Start date:     Quit date:     Years since quittin 0   Smokeless Tobacco Never Used     Family History:   Family History   Problem Relation Age of Onset    No Known Problems Mother         natural causes    Heart disease Father     Heart attack Brother          Current Outpatient Medications:     aspirin (ECOTRIN LOW STRENGTH) 81 mg EC tablet, Take 81 mg by mouth daily, Disp: , Rfl:     atorvastatin (LIPITOR) 40 mg tablet, Take 1 tablet (40 mg total) by mouth daily, Disp: 90 tablet, Rfl: 1    chlordiazepoxide-clidinium (LIBRAX) 5-2 5 mg per capsule, Take 1 capsule by mouth 3 (three) times a day as needed (Abdominal Pain), Disp: 90 capsule, Rfl: 1    Cholecalciferol (VITAMIN D3) 2000 units TABS, Take 2,000 Units by mouth daily, Disp: , Rfl:     cyanocobalamin (VITAMIN B-12) 1,000 mcg tablet, Take 1,000 mcg by mouth daily, Disp: , Rfl:     dicyclomine (BENTYL) 20 mg tablet, Take 1 tablet (20 mg total) by mouth every 6 (six) hours, Disp: 90 tablet, Rfl: 3    dutasteride (AVODART) 0 5 mg capsule, Take 1 capsule (0 5 mg total) by mouth daily at bedtime, Disp: 90 capsule, Rfl: 1    ferrous sulfate 325 (65 Fe) mg tablet, Take 325 mg by mouth 2 (two) times a day with meals, Disp: , Rfl:     losartan (COZAAR) 25 mg tablet, TAKE ONE TABLET DAILY, Disp: 90 tablet, Rfl: 1    metoprolol tartrate (LOPRESSOR) 25 mg tablet, Take 0 5 tablets (12 5 mg total) by mouth 2 (two) times a day, Disp: 180 tablet, Rfl: 1    Multiple Vitamin (MULTIVITAMIN) tablet, Take 1 tablet by mouth daily, Disp: , Rfl:     Omega-3 Fatty Acids (FISH OIL) 1,000 mg, Take 1,000 mg by mouth daily, Disp: , Rfl:     ondansetron (ZOFRAN) 4 mg tablet, Take 1 tablet (4 mg total) by mouth every 8 (eight) hours as needed for nausea or vomiting, Disp: 20 tablet, Rfl: 2    sildenafil (VIAGRA) 50 MG tablet, Take by mouth as needed , Disp: , Rfl:     esomeprazole (NexIUM) 40 MG capsule, Take 1 capsule (40 mg total) by mouth daily (Patient not taking: Reported on 12/24/2020), Disp: 30 capsule, Rfl: 2    esomeprazole (NexIUM) 40 MG capsule, Take 1 capsule (40 mg total) by mouth 2 (two) times a day before meals for 14 days (Patient not taking: Reported on 12/24/2020), Disp: 28 capsule, Rfl: 0    Allergies   Allergen Reactions    Other      Liquid lidocaine     @ ROS@  Physical Exam:  Vitals:    12/24/20 0929   BP: 112/66   Pulse: 56   Resp: 18   Temp: 97 7 °F (36 5 °C)   TempSrc: Temporal   SpO2: 99%   Weight: 70 3 kg (155 lb)   Height: 6' (1 829 m)     Alert, oriented, not in distress, no icterus, no oral thrush, no palpable neck mass, clear lung fields, regular heart rate, abdomen  soft and non tender, no palpable abdominal mass, no ascites, no edema of ankles, no calf tenderness, no focal neurological deficit, no skin rash, no palpable lymphadenopathy, good arterial pulses, no clubbing  Patient is anxious  Performance status 1  Lab Results: I have reviewed all pertinent labs    LABS:  Results for orders placed or performed during the hospital encounter of 12/09/20   Protime-INR   Result Value Ref Range    Protime 13 1 11 6 - 14 5 seconds    INR 1 01 0 84 - 1 19   CBC and differential   Result Value Ref Range    WBC 6 12 4 31 - 10 16 Thousand/uL    RBC 3 82 (L) 3 88 - 5 62 Million/uL    Hemoglobin 10 1 (L) 12 0 - 17 0 g/dL    Hematocrit 32 9 (L) 36 5 - 49 3 %    MCV 86 82 - 98 fL    MCH 26 4 (L) 26 8 - 34 3 pg    MCHC 30 7 (L) 31 4 - 37 4 g/dL    RDW 15 7 (H) 11 6 - 15 1 %    MPV 9 9 8 9 - 12 7 fL    Platelets 696 421 - 972 Thousands/uL    nRBC 0 /100 WBCs    Neutrophils Relative 68 43 - 75 %    Immat GRANS % 0 0 - 2 %    Lymphocytes Relative 16 14 - 44 %    Monocytes Relative 11 4 - 12 %    Eosinophils Relative 4 0 - 6 %    Basophils Relative 1 0 - 1 %    Neutrophils Absolute 4 14 1 85 - 7 62 Thousands/µL    Immature Grans Absolute 0 02 0 00 - 0 20 Thousand/uL    Lymphocytes Absolute 0 97 0 60 - 4 47 Thousands/µL    Monocytes Absolute 0 70 0 17 - 1 22 Thousand/µL    Eosinophils Absolute 0 23 0 00 - 0 61 Thousand/µL    Basophils Absolute 0 06 0 00 - 0 10 Thousands/µL   Tissue Exam   Result Value Ref Range    Case Report       Surgical Pathology Report                         Case: [id number]                                   Authorizing Provider:  Baron Alan MD       Collected:           12/09/2020 1014              Ordering Location:     [facility]        Received:            12/09/2020 1044                                     [facility] Scan Pathologist:           Annetta Linder MD                                                         Intraop:               Annetta Linder MD                                                         Specimen:    Lung, Left Upper Lobe                                                                      Addendum       RESULTS OF IMMUNOHISTOCHEMICAL ANALYSIS OF ADENOCARCINOMA CELLS FOR MISMATCH REPAIR PROTEIN LOSS    INTERPRETATION: NO LOSS OF NUCLEAR EXPRESSION OF MMR PROTEINS: LOW PROBABILITY OF MSI-H  Note: Background non-neoplastic tissue and/or internal control with intact nuclear expression  RESULTS:  Antibody          Clone               Description                           Results  MLH1               M1                  Mismatch repair protein       Intact nuclear expression  MSH2              [id number]      Mismatch repair protein       Intact nuclear expression  MSH6              40                    Mismatch repair protein       Intact nuclear expression  PMS2              [id number]         Mismatch repair protein       Intact nuclear expression     Comment:  A negative control and a positive control for each antibody have been reviewed and accepted  [facility] Specimen ID: [id number], Evaluator:  SAMIR Linder MD  These tests were developed and their performance characteristics determined by [facility]  They may not be cleared or approved by the [place]  Food and Drug Administration  The FDA determined that such clearance or approval is not necessary  These tests are used for clinical purposes  They should not be regarded as investigational or for research  This laboratory has been approved by [facility], designated as a high-complexity laboratory and is qualified to perform these tests  Comments: Patients whose tumors demonstrate lack of expression of one or more DNA mismatch repair proteins might be at risk for Douglas Syndrome   This cancer susceptibility syndrome greatly increases the risk of synchronous and/or metachronous cancers in the affected patients and their family members  Normal expression of all proteins does not completely rule out familial cancer predisposition  The [employer] 90 Program Task Forces recommends that all patients with lack of expression of one or more DNA mismatch repair proteins and those with concerning personal or family history should undergo thorough evaluation, counseling and possibly genetic testing  Final Diagnosis       A  Left lung, upper lobe, image-guided core needle biopsy:  - Adenocarcinoma, immunophenotypically more consistent with metastatic colorectal adenocarcinoma - see Note  Preliminary Diagnosis       A  Left lung, upper lobe, image-guided core needle biopsy:  - Adenocarcinoma - see Note  Note       Note to preliminary report:  routine H&E sections demonstrate malignant glandular profiles in a desmoplastic stroma, indicating the present of adenocarcinoma  Intradepartmental consultation concurs with the diagnosis of adenocarcinoma  The morphologic of malignant glands suggests an enteric type of adenocarcinoma  Since imaging studies suggest multiple such tumors, immunohistochemical stains are undertaken in an attempt to indicate tumor site of origin and will be described in the final report  Note to final report:  immunohistochemical stains demonstrate following tumor cell immunophenotype:   * Positive: SATB2 & CDX2 (each diffusely strong nuclear), CK19, CK20 &  (each diffusely strong cytoplasmic membrane)   * p53 expression appears to be wild type and DPC-4 expression is retained  * Negative: CK7, PAX8, napsin A, NKX3 1, p40  Composite morphologic & immunophenotypic findings support the diagnosis of adenocarcinoma, immunophenotypically more consistent with metastatic colorectal adenocarcinoma    SATB2 is a biomarker for colorectal cancer, 85% of all colorectal carcinoma patients are positive for SATB2 and other cancer types rarely display SATB2 expression  Final report is faxed by Dr Juan Painter to Dr Jillian Le @ 09:05 AM, 12/11/2020  Additional Information       All reported additional testing was performed with appropriately reactive controls  These tests were developed and their performance characteristics determined by [facility] or appropriate performing facility, though some tests may be performed on tissues which have not been validated for performance characteristics (such as staining performed on alcohol exposed cell blocks and decalcified tissues)  Results should be interpreted with caution and in the context of the patients clinical condition  These tests may not be cleared or approved by the [place]  Food and Drug Administration, though the FDA has determined that such clearance or approval is not necessary  These tests are used for clinical purposes and they should not be regarded as investigational or for research  This laboratory has been approved by CLIA 88, designated as a high-complexity laboratory and is qualified to perform these tests         Intraoperative Consultation          A  TPDx A: Positive for epithelioid neoplasm  TPDx A discussed by Dr Juan Painter with Dr Isabella Em @ 1:00 PM, 12/09/2020  Gross Description          A  The specimen is received in formalin, labeled with the patient's name and hospital number, and is designated "left upper lobe lung  The specimen consists of 4 tan friable soft tissue cores measuring less than 0 1-0 1 cm in diameter and ranging from 0 1-0 5 cm in length  The specimen is entirely submitted between sponges, 4 cassettes  Note: due to the size and consistency of specimen, the tissue may not survive histologic processing      Note: The estimated total formalin fixation time based upon information provided by the submitting clinician and the standard processing schedule is 9 ghassan Collierdeepthi Shobha            Routine Stomach   Tissue Exam: Result Notes   Sharon Lau MD   11/30/2020  5:18 PM EST      Discussed with patient               Related Result Highlights           Tissue Exam  Final result 10/27/2020   Final Diagnosis   A  Ampulla of vater, biopsy:  - Duodenal mucosa with "nodular duodenitis", prominent gastric mucin cell metaplasia      Immunohistochemistry for p53 and Ki-67 are performed to help in the assessment of this case      B  Stomach Fundus (biopsies):  - Mucosa associated lymphoid tissue (MALT) lymphoma involving oxyntic mucosa  See note section for Dr Javier June report  - Negative for intestinal metaplasia, dysplasia or carcinoma  - Immunohistochemistry for Helicobacter pylori is negative      Immunohistochemical stains performed with appropriate controls show a mixture CD20 positive B cells (dominant) and  CD3 positive T cells which co-express Cd43 and CD5  Bcl-1,  and bcl-6 are negative; Bcl-2 shows positivity in T and B cells  CD10 highlight scattered inflammatory cells  IGH B-cell Gene Rearrangement FISH shows negative for IGH (14q32) gene rearrangement      C  Cecal polyp, polypectomy:  - Fragments of tubular adenoma  - No high grade dysplasia and no evidence of malignancy      D  Cecal polyp, polypectomy:  - Tubular adenoma  - No high grade dysplasia and no evidence of malignancy      E  Ascending colon polyp x 4, polypectomy:  - Fragments of tubular adenoma  - No high grade dysplasia and no evidence of malignancy      F  Transverse colon polyp, polypectomy:  - Tubular adenoma  - No high grade dysplasia and no evidence of malignancy      The H & E slides and immunohistochemistry slides for part A and B were sent to Dr Javier June MD, pathology department at [facility], for her expert opinion and review, and the above reflects her diagnosis   Her letter with diagnosis reads as below      Dr Sharon Lau is notified of the diagnosis via [employer] on 11/18/2020   Imaging Studies: I have personally reviewed pertinent reports  IMPRESSION:     Multiple pulmonary nodules, increasing in size for a period since a CT from 5/23/2020  THESE ARE BEST CONSIDERED METASTASES  I recommend further evaluation with either whole-body PET/CT or, if feasible, CT guided biopsy of one of the larger nodules            I personally discussed this study with HAMIDA MEIER on 11/18/2020 at 12:15 PM                  Workstation performed: [id number]      Imaging    CT chest without contrast (Order: [id number]) - 11/16/2020  IMPRESSION:     There is a short segment of jejunal bowel wall thickening, with adjacent mesenteric adenopathy, highly suspicious for malignancy (series 4 images 35 through 41 )  Both 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 pathology, and small bowel   adenocarcinoma are possible      There are additional large omental masses likely metastatic from the small bowel tumor (series 4 images 20 through 37 )  These would be amenable to percutaneous biopsy      Again seen are multiple pulmonary nodules for which please refer to recent chest CT  These are also likely metastatic disease      The study was marked in EPIC for significant notification            Workstation performed: [id number]      Imaging    CT abdomen pelvis w contrast (Order: [id number]) - 12/5/2020    Pathology, and Other Studies: I have personally reviewed pertinent reports  Assessment and Plan:   Ishmael Yip is a 66y o  year old male  Patient is here with his family member  Last year he started to have shortness of breath with exertion and was found to have iron deficiency anemia and was treated with oral iron and symptoms improved some  He also had cardiac workup for shortness of breath and he states there was no problem with his heart  This year is status shortness of breath again and weight loss    He had CT scans and that showed bilateral lung nodules, thickening of wall of jejunum and mesenteric mass/lymphadenopathy and additional omental masses  On 10/27/2020 patient had EGD and colonoscopy and 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  Negative H pylori and in spite of that patient was given antibiotic therapy against H pylori because of MALT lymphoma  On 12/09/2020 he had needle biopsy of left upper lung nodule and that came back adenocarcinoma more consistent with colorectal cancer  Colonoscopy had shown polyps but no cancer  Patient will be going for needle biopsy of mesenteric mass and also Port-A-Cath in preparation for chemotherapy  He has some weakness and tiredness, exertional dyspnea and weight loss  He states weight has stabilized now  He has some discomfort in the right subcostal area near the epigastrium     Physical examination and test results are as recorded and discussed in very much detail  Discussed patient characteristics  Discussed cancer characteristics  Discussed histological diagnosis of 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 and adenocarcinoma metastatic to lung and primary could be in the colorectal area and in his case primary could be in the small bowel with masses in the mesentery and omentum  As mentioned above patient is going for biopsy of mesenteric/omental mass  He will be considered for FOLFOX chemotherapy plus Avastin or Erbitux depending upon molecular markers and specimen will be sent to [facility] probably biopsy specimen that is going to be obtained from mesentery/omentum     Discussed all this with patient and his family member in very much detail  Questions answered  Given information on FOLFOX in the printed form and verbally for informed consent  For MALT lymphoma patient already had antibiotics and that could be under surveillance by EGDs or he could have Rituxan  That could be decided later  Explained that too    Discussed the importance of eating healthy foods, activities as tolerated and health screening tests     Goal will be systemic therapy, FOLFOX plus as above for prolongation of survival   Patient is capable of self-care  Discussed precautions against coronavirus  All discussed in very much detail  Questions answered  1  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[facility] [address] )      2  Malignant neoplasm metastatic to lung, unspecified laterality ([facility] [address] )      3  MALT (mucosa associated lymphoid tissue)- Gastric ([facility] [address] )    - Ambulatory referral to Hematology / Oncology    Lung metastases  Suspected primary in small bowel  MALT lymphoma in stomach  Chemotherapy FOLFOX plus  Follow-up in the office  Patient will be started chemotherapy after result of biopsy from mesentery/omentum    Patient voiced understanding and agreement in the discussion  Counseling / Coordination of Care    Greater than 50% of total time was spent with the patient and / or family counseling and / or coordination of care

## 2020-12-29 DIAGNOSIS — C17.9 SMALL BOWEL CANCER (HCC): Primary | ICD-10-CM

## 2020-12-30 ENCOUNTER — TELEPHONE (OUTPATIENT)
Dept: INPATIENT UNIT | Facility: HOSPITAL | Age: 75
End: 2020-12-30

## 2020-12-31 ENCOUNTER — HOSPITAL ENCOUNTER (OUTPATIENT)
Dept: RADIOLOGY | Facility: HOSPITAL | Age: 75
Discharge: HOME | End: 2020-12-31
Attending: RADIOLOGY
Payer: MEDICARE

## 2020-12-31 ENCOUNTER — HOSPITAL ENCOUNTER (OUTPATIENT)
Dept: RADIOLOGY | Facility: HOSPITAL | Age: 75
Discharge: HOME | End: 2020-12-31
Attending: INTERNAL MEDICINE
Payer: MEDICARE

## 2020-12-31 VITALS
DIASTOLIC BLOOD PRESSURE: 54 MMHG | RESPIRATION RATE: 17 BRPM | HEART RATE: 70 BPM | SYSTOLIC BLOOD PRESSURE: 94 MMHG | OXYGEN SATURATION: 100 %

## 2020-12-31 VITALS
OXYGEN SATURATION: 98 % | SYSTOLIC BLOOD PRESSURE: 100 MMHG | RESPIRATION RATE: 18 BRPM | HEART RATE: 71 BPM | HEIGHT: 72 IN | DIASTOLIC BLOOD PRESSURE: 70 MMHG | BODY MASS INDEX: 20.99 KG/M2 | TEMPERATURE: 97.6 F | WEIGHT: 155 LBS

## 2020-12-31 DIAGNOSIS — K63.89 MESENTERIC MASS: ICD-10-CM

## 2020-12-31 DIAGNOSIS — C17.9 CANCER OF SMALL INTESTINE (HCC): ICD-10-CM

## 2020-12-31 PROCEDURE — 76937 US GUIDE VASCULAR ACCESS: CPT

## 2020-12-31 PROCEDURE — 99152 MOD SED SAME PHYS/QHP 5/>YRS: CPT | Performed by: RADIOLOGY

## 2020-12-31 PROCEDURE — 88342 IMHCHEM/IMCYTCHM 1ST ANTB: CPT | Performed by: PATHOLOGY

## 2020-12-31 PROCEDURE — C1788 PORT, INDWELLING, IMP: HCPCS

## 2020-12-31 PROCEDURE — 49180 BIOPSY ABDOMINAL MASS: CPT | Performed by: RADIOLOGY

## 2020-12-31 PROCEDURE — 76937 US GUIDE VASCULAR ACCESS: CPT | Performed by: RADIOLOGY

## 2020-12-31 PROCEDURE — 77012 CT SCAN FOR NEEDLE BIOPSY: CPT

## 2020-12-31 PROCEDURE — 88305 TISSUE EXAM BY PATHOLOGIST: CPT | Performed by: PATHOLOGY

## 2020-12-31 PROCEDURE — 99152 MOD SED SAME PHYS/QHP 5/>YRS: CPT

## 2020-12-31 PROCEDURE — 88341 IMHCHEM/IMCYTCHM EA ADD ANTB: CPT | Performed by: PATHOLOGY

## 2020-12-31 PROCEDURE — 99153 MOD SED SAME PHYS/QHP EA: CPT

## 2020-12-31 PROCEDURE — 49180 BIOPSY ABDOMINAL MASS: CPT

## 2020-12-31 PROCEDURE — 36561 INSERT TUNNELED CV CATH: CPT

## 2020-12-31 PROCEDURE — 36561 INSERT TUNNELED CV CATH: CPT | Performed by: RADIOLOGY

## 2020-12-31 PROCEDURE — 77001 FLUOROGUIDE FOR VEIN DEVICE: CPT | Performed by: RADIOLOGY

## 2020-12-31 PROCEDURE — 77012 CT SCAN FOR NEEDLE BIOPSY: CPT | Performed by: RADIOLOGY

## 2020-12-31 RX ORDER — FENTANYL CITRATE 50 UG/ML
INJECTION, SOLUTION INTRAMUSCULAR; INTRAVENOUS CODE/TRAUMA/SEDATION MEDICATION
Status: COMPLETED | OUTPATIENT
Start: 2020-12-31 | End: 2020-12-31

## 2020-12-31 RX ORDER — FLUOROURACIL 50 MG/ML
800 INJECTION, SOLUTION INTRAVENOUS ONCE
Status: CANCELLED | OUTPATIENT
Start: 2021-01-11

## 2020-12-31 RX ORDER — SODIUM CHLORIDE 9 MG/ML
20 INJECTION, SOLUTION INTRAVENOUS ONCE AS NEEDED
Status: CANCELLED | OUTPATIENT
Start: 2021-01-11

## 2020-12-31 RX ORDER — SODIUM CHLORIDE 9 MG/ML
75 INJECTION, SOLUTION INTRAVENOUS CONTINUOUS
Status: DISCONTINUED | OUTPATIENT
Start: 2020-12-31 | End: 2021-01-01 | Stop reason: HOSPADM

## 2020-12-31 RX ORDER — MIDAZOLAM HYDROCHLORIDE 2 MG/2ML
INJECTION, SOLUTION INTRAMUSCULAR; INTRAVENOUS CODE/TRAUMA/SEDATION MEDICATION
Status: COMPLETED | OUTPATIENT
Start: 2020-12-31 | End: 2020-12-31

## 2020-12-31 RX ORDER — CEFAZOLIN SODIUM 2 G/50ML
SOLUTION INTRAVENOUS
Status: COMPLETED | OUTPATIENT
Start: 2020-12-31 | End: 2020-12-31

## 2020-12-31 RX ORDER — DEXTROSE MONOHYDRATE 50 MG/ML
20 INJECTION, SOLUTION INTRAVENOUS ONCE
Status: CANCELLED | OUTPATIENT
Start: 2021-01-11

## 2020-12-31 RX ORDER — CEFAZOLIN SODIUM 1 G/50ML
1000 SOLUTION INTRAVENOUS ONCE
Status: DISCONTINUED | OUTPATIENT
Start: 2020-12-31 | End: 2021-01-01 | Stop reason: HOSPADM

## 2020-12-31 RX ADMIN — MIDAZOLAM 1 MG: 1 INJECTION INTRAMUSCULAR; INTRAVENOUS at 09:04

## 2020-12-31 RX ADMIN — MIDAZOLAM 2 MG: 1 INJECTION INTRAMUSCULAR; INTRAVENOUS at 08:52

## 2020-12-31 RX ADMIN — FENTANYL CITRATE 50 MCG: 50 INJECTION INTRAMUSCULAR; INTRAVENOUS at 09:36

## 2020-12-31 RX ADMIN — SODIUM CHLORIDE 75 ML/HR: 0.9 INJECTION, SOLUTION INTRAVENOUS at 07:40

## 2020-12-31 RX ADMIN — FENTANYL CITRATE 50 MCG: 50 INJECTION INTRAMUSCULAR; INTRAVENOUS at 09:04

## 2020-12-31 RX ADMIN — FENTANYL CITRATE 50 MCG: 50 INJECTION INTRAMUSCULAR; INTRAVENOUS at 08:52

## 2020-12-31 RX ADMIN — CEFAZOLIN SODIUM 2000 MG: 2 SOLUTION INTRAVENOUS at 08:41

## 2020-12-31 RX ADMIN — MIDAZOLAM 2 MG: 1 INJECTION INTRAMUSCULAR; INTRAVENOUS at 09:36

## 2020-12-31 NOTE — DISCHARGE INSTRUCTIONS
POST BIOPSY    Care after your procedure:    1  Limit your activities for 24 hours after your biopsy  2  No driving day of biopsy  3  Return to your normal diet  Small sips of flat soda will help with mild nausea  4  Remove band-aid or dressing 24 hours after procedure  Contact Interventional Radiology at [phone] Danial PATIENTS: Contact Interventional Radiology at [phone]) Divine Coombs PATIENTS: Contact Interventional Radiology at [phone]) if:    1  Difficulty breathing, nausea or vomiting  2  Chills or fever above 101 degrees F      3  Pain at biopsy site not relieved by medication  4  Develop any redness, swelling, heat, unusual drainage, heavy bruising or bleeding from biopsy site  Moderate Sedation   WHAT YOU NEED TO KNOW:   Moderate sedation, or conscious sedation, is medicine used during procedures to help you feel relaxed and calm  You will be awake and able to follow directions without anxiety or pain  You will remember little to none of the procedure  You may feel tired, weak, or unsteady on your feet after you get sedation  You may also have trouble concentrating or short-term memory loss  These symptoms should go away in 24 hours or less  DISCHARGE INSTRUCTIONS:   Call 911 or have someone else call for any of the following:   · You have sudden trouble breathing  · You cannot be woken  Seek care immediately if:   · You have a severe headache or dizziness  · Your heart is beating faster than usual   Contact your healthcare provider if:   · You have a fever  · You have nausea or are vomiting for more than 8 hours after the procedure  · Your skin is itchy, swollen, or you have a rash  · You have questions or concerns about your condition or care  Self-care:   · Have someone stay with you for 24 hours  This person can drive you to errands and help you do things around the house  This person can also watch for problems        · Rest and do quiet activities for 24 hours  Do not exercise, ride a bike, or play sports  Stand up slowly to prevent dizziness and falls  Take short walks around the house with another person  Slowly return to your usual activities the next day  · Do not drive or use dangerous machines or tools for 24 hours  You may injure yourself or others  Examples include a lawnmower, saw, or drill  Do not return to work for 24 hours if you use dangerous machines or tools for work  · Do not make important decisions for 24 hours  For example, do not sign important papers or invest money  · Drink liquids as directed  Liquids help flush the sedation medicine out of your body  Ask how much liquid to drink each day and which liquids are best for you  · Eat small, frequent meals to prevent nausea and vomiting  Start with clear liquids such as juice or broth  If you do not vomit after clear liquids, you can eat your usual foods  · Do not drink alcohol or take medicines that make you drowsy  This includes medicines that help you sleep and anxiety medicines  Ask your healthcare provider if it is safe for you to take pain medicine  Follow up with your healthcare provider as directed: Write down your questions so you remember to ask them during your visits  © 2017 [employer] Information is for End User's use only and may not be sold, redistributed or otherwise used for commercial purposes  All illustrations and images included in CareNotes® are the copyrighted property of [employer]  or Nahid Petit  The above information is an  only  It is not intended as medical advice for individual conditions or treatments  Talk to your doctor, nurse or pharmacist before following any medical regimen to see if it is safe and effective for you

## 2020-12-31 NOTE — INTERVAL H&P NOTE
H&P reviewed  After examining the patient, I find no changed to the H&P since it had been written  /69   Pulse 69   Temp (!) 97 3 °F (36 3 °C) (Tympanic)   Resp 18   Ht 6' (1 829 m)   Wt 70 3 kg (155 lb)   SpO2 99%   BMI 21 02 kg/m²     Patient re-evaluated   Accept as history and physical     Carl Zaman, DO/December 31, 2020/8:41 AM

## 2020-12-31 NOTE — DISCHARGE INSTRUCTIONS
Implanted Venous Access Port     WHAT YOU NEED TO KNOW:   An implanted venous access port is a device used to give treatments and take blood  It may also be called a central venous access device (CVAD)  The port is a small container that is placed under your skin, usually in your upper chest  The port is attached to a catheter that enters a large vein  DISCHARGE INSTRUCTIONS:   Resume your normal diet  Small sips of flat soda will help with mild nausea  Prevent an infection:   · Wash your hands often  Use soap and water  Clean your hands before and after you care for your port  Remind everyone who cares for your port to wash their hands  · Check your skin for infection every day  Look for redness, swelling, or fluid oozing from the port site  Care for your port:   1  You may shower beginning 48 hours after procedure  2  Change dressing if it becomes wet  3  Remove dressing after 24 hours  Leave glue in place  4  It is normal for some bruising to occur  5  Use Tylenol for pain  6  Limit use of arm on the side that your port was placed  Lift nothing heavier than 5 pounds for 1 week, and then gradually increase activity as tolerated  7  DO NOT apply ointment, lotion or cream to port site until incision is healed  Allow glue to fall off  DO NOT attempt to peel glue from skin even it it begins to flake  8, After the port incision is healed you may swim, bathe  Notify the Interventional Radiologist if you have any of the followin  Fever above 101 F    2  Increased redness or swelling after 1st day  3  Increased pain after 1st day  4  Any sign of infection (drainage from port site, skin separation, hot to touch)  5  Persistent nausea or vomiting  Contact Interventional Radiology at [phone] [facility] PATIENTS: Contact Interventional Radiology at [phone]) ([address]: Contact Interventional Radiology at [phone])       POST BIOPSY    Care after your procedure:    1  Limit your activities for 24 hours after your biopsy  2  No driving day of biopsy  3  Return to your normal diet  Small sips of flat soda will help with mild nausea  4  Remove band-aid or dressing 24 hours after procedure  Contact Interventional Radiology at [phone] [facility] PATIENTS: Contact Interventional Radiology at [phone]) [facility] PATIENTS: Contact Interventional Radiology at [phone]) if:    1  Difficulty breathing, nausea or vomiting  2  Chills or fever above 101 degrees F      3  Pain at biopsy site not relieved by medication  4  Develop any redness, swelling, heat, unusual drainage, heavy bruising or bleeding from biopsy site

## 2020-12-31 NOTE — SEDATION DOCUMENTATION
Right Chest Port placed by Dr Naomie Sanchez without complications  VSS  Exofin dressing Clean dry and intact  Will now take to CT scan for Omental mass biopsy

## 2020-12-31 NOTE — SEDATION DOCUMENTATION
Omental Mass Biopsy completed by Dr Jose Carlos Raygoza without complications  VSS  Denies pain or nausea at this time  Discharge to [facility] report to [facility] RN

## 2020-12-31 NOTE — BRIEF OP NOTE (RAD/CATH)
Port Placement and Omental Mass Biopsy Procedure Note    PATIENT NAME: Harshal Blanton  : 1945  MRN: [id number]     Pre-op Diagnosis:   1  Mesenteric mass      Post-op Diagnosis:   1  Mesenteric mass        Surgeon:   Janna Almanzar DO  Assistants:     No qualified resident was available  Estimated Blood Loss: None  Findings:   1  Right chest wall port placed via R IJ with tip in RA  2  Upper anterior omental mass biopsy  Specimens: Multiple 18G core samples       Complications:  None    Anesthesia: conscious sedation and local    Janna Almanzar DO     Date: 2020  Time: 9:53 AM

## 2021-01-01 ENCOUNTER — HOSPITAL ENCOUNTER (OUTPATIENT)
Dept: INFUSION CENTER | Facility: CLINIC | Age: 76
Discharge: HOME | End: 2021-09-29
Payer: MEDICARE

## 2021-01-01 ENCOUNTER — APPOINTMENT (OUTPATIENT)
Dept: LAB | Facility: CLINIC | Age: 76
End: 2021-01-01
Payer: MEDICARE

## 2021-01-01 ENCOUNTER — HOSPITAL ENCOUNTER (OUTPATIENT)
Dept: INFUSION CENTER | Facility: CLINIC | Age: 76
Discharge: HOME | End: 2021-12-23

## 2021-01-01 ENCOUNTER — HOSPITAL ENCOUNTER (OUTPATIENT)
Dept: CT IMAGING | Facility: HOSPITAL | Age: 76
Discharge: HOME | End: 2021-07-08
Attending: INTERNAL MEDICINE
Payer: MEDICARE

## 2021-01-01 ENCOUNTER — HOSPITAL ENCOUNTER (OUTPATIENT)
Dept: INFUSION CENTER | Facility: HOSPITAL | Age: 76
Discharge: HOME | End: 2021-12-21
Payer: MEDICARE

## 2021-01-01 ENCOUNTER — HOSPITAL ENCOUNTER (OUTPATIENT)
Dept: INFUSION CENTER | Facility: CLINIC | Age: 76
Discharge: HOME | End: 2021-08-09
Payer: MEDICARE

## 2021-01-01 ENCOUNTER — HOSPITAL ENCOUNTER (OUTPATIENT)
Dept: INFUSION CENTER | Facility: CLINIC | Age: 76
Discharge: HOME | End: 2021-11-03
Payer: MEDICARE

## 2021-01-01 ENCOUNTER — HOSPITAL ENCOUNTER (OUTPATIENT)
Dept: INFUSION CENTER | Facility: CLINIC | Age: 76
End: 2021-01-01

## 2021-01-01 ENCOUNTER — TELEPHONE (OUTPATIENT)
Dept: HEMATOLOGY ONCOLOGY | Facility: CLINIC | Age: 76
End: 2021-01-01

## 2021-01-01 ENCOUNTER — HOSPITAL ENCOUNTER (OUTPATIENT)
Dept: INFUSION CENTER | Facility: CLINIC | Age: 76
Discharge: HOME | End: 2021-09-22

## 2021-01-01 ENCOUNTER — HOSPITAL ENCOUNTER (OUTPATIENT)
Dept: INFUSION CENTER | Facility: CLINIC | Age: 76
Discharge: HOME | End: 2021-09-20
Payer: MEDICARE

## 2021-01-01 ENCOUNTER — HOSPITAL ENCOUNTER (OUTPATIENT)
Dept: INFUSION CENTER | Facility: CLINIC | Age: 76
Discharge: HOME | End: 2021-11-15
Payer: MEDICARE

## 2021-01-01 ENCOUNTER — HOSPITAL ENCOUNTER (OUTPATIENT)
Dept: INFUSION CENTER | Facility: CLINIC | Age: 76
Discharge: HOME | End: 2021-05-10
Payer: MEDICARE

## 2021-01-01 ENCOUNTER — TELEPHONE (OUTPATIENT)
Dept: SURGICAL ONCOLOGY | Facility: CLINIC | Age: 76
End: 2021-01-01

## 2021-01-01 ENCOUNTER — HOSPITAL ENCOUNTER (OUTPATIENT)
Dept: INFUSION CENTER | Facility: CLINIC | Age: 76
Discharge: HOME | End: 2021-11-01
Payer: MEDICARE

## 2021-01-01 ENCOUNTER — HOSPITAL ENCOUNTER (OUTPATIENT)
Dept: INFUSION CENTER | Facility: CLINIC | Age: 76
Discharge: HOME | End: 2021-06-16

## 2021-01-01 ENCOUNTER — HOSPITAL ENCOUNTER (OUTPATIENT)
Dept: INFUSION CENTER | Facility: CLINIC | Age: 76
Discharge: HOME | End: 2021-11-29

## 2021-01-01 ENCOUNTER — HOSPITAL ENCOUNTER (OUTPATIENT)
Dept: INFUSION CENTER | Facility: CLINIC | Age: 76
Discharge: HOME | End: 2021-09-07
Payer: MEDICARE

## 2021-01-01 ENCOUNTER — HOSPITAL ENCOUNTER (OUTPATIENT)
Dept: INFUSION CENTER | Facility: CLINIC | Age: 76
Discharge: HOME | End: 2021-07-26
Payer: MEDICARE

## 2021-01-01 ENCOUNTER — HOSPITAL ENCOUNTER (OUTPATIENT)
Dept: INFUSION CENTER | Facility: CLINIC | Age: 76
Discharge: HOME | End: 2021-08-11

## 2021-01-01 ENCOUNTER — OFFICE VISIT (OUTPATIENT)
Dept: HEMATOLOGY ONCOLOGY | Facility: CLINIC | Age: 76
End: 2021-01-01
Payer: MEDICARE

## 2021-01-01 ENCOUNTER — APPOINTMENT (OUTPATIENT)
Dept: LAB | Facility: MEDICAL CENTER | Age: 76
End: 2021-01-01
Payer: MEDICARE

## 2021-01-01 ENCOUNTER — HOSPITAL ENCOUNTER (OUTPATIENT)
Dept: INFUSION CENTER | Facility: HOSPITAL | Age: 76
Discharge: HOME | End: 2021-12-08
Payer: MEDICARE

## 2021-01-01 ENCOUNTER — HOSPITAL ENCOUNTER (OUTPATIENT)
Dept: INFUSION CENTER | Facility: CLINIC | Age: 76
Discharge: HOME | End: 2021-06-30

## 2021-01-01 ENCOUNTER — HOSPITAL ENCOUNTER (OUTPATIENT)
Dept: INFUSION CENTER | Facility: CLINIC | Age: 76
Discharge: HOME | End: 2021-11-17
Payer: MEDICARE

## 2021-01-01 ENCOUNTER — HOSPITAL ENCOUNTER (OUTPATIENT)
Dept: INFUSION CENTER | Facility: CLINIC | Age: 76
Discharge: HOME | End: 2021-06-21
Payer: MEDICARE

## 2021-01-01 ENCOUNTER — OFFICE VISIT (OUTPATIENT)
Dept: FAMILY MEDICINE CLINIC | Facility: CLINIC | Age: 76
End: 2021-01-01
Payer: MEDICARE

## 2021-01-01 ENCOUNTER — HOSPITAL ENCOUNTER (OUTPATIENT)
Dept: INFUSION CENTER | Facility: CLINIC | Age: 76
Discharge: HOME | End: 2021-08-23
Payer: MEDICARE

## 2021-01-01 ENCOUNTER — HOSPITAL ENCOUNTER (OUTPATIENT)
Dept: INFUSION CENTER | Facility: CLINIC | Age: 76
Discharge: HOME | End: 2021-10-12
Payer: MEDICARE

## 2021-01-01 ENCOUNTER — HOSPITAL ENCOUNTER (OUTPATIENT)
Dept: INFUSION CENTER | Facility: CLINIC | Age: 76
Discharge: HOME | End: 2021-08-25

## 2021-01-01 ENCOUNTER — HOSPITAL ENCOUNTER (OUTPATIENT)
Dept: INFUSION CENTER | Facility: CLINIC | Age: 76
Discharge: HOME | End: 2021-06-03

## 2021-01-01 ENCOUNTER — HOSPITAL ENCOUNTER (OUTPATIENT)
Dept: CT IMAGING | Facility: HOSPITAL | Age: 76
Discharge: HOME | End: 2021-10-14
Attending: INTERNAL MEDICINE
Payer: MEDICARE

## 2021-01-01 ENCOUNTER — HOSPITAL ENCOUNTER (OUTPATIENT)
Dept: INFUSION CENTER | Facility: CLINIC | Age: 76
Discharge: HOME | End: 2021-05-24
Payer: MEDICARE

## 2021-01-01 ENCOUNTER — HOSPITAL ENCOUNTER (OUTPATIENT)
Dept: INFUSION CENTER | Facility: CLINIC | Age: 76
Discharge: HOME | End: 2021-08-31
Payer: MEDICARE

## 2021-01-01 ENCOUNTER — TELEPHONE (OUTPATIENT)
Dept: OTHER | Facility: OTHER | Age: 76
End: 2021-01-01

## 2021-01-01 ENCOUNTER — HOSPITAL ENCOUNTER (OUTPATIENT)
Dept: INFUSION CENTER | Facility: CLINIC | Age: 76
Discharge: HOME | End: 2021-07-06
Payer: MEDICARE

## 2021-01-01 ENCOUNTER — TELEPHONE (OUTPATIENT)
Dept: OTHER | Facility: HOSPITAL | Age: 76
End: 2021-01-01

## 2021-01-01 ENCOUNTER — DOCUMENTATION (OUTPATIENT)
Dept: HEMATOLOGY ONCOLOGY | Facility: CLINIC | Age: 76
End: 2021-01-01

## 2021-01-01 ENCOUNTER — HOSPITAL ENCOUNTER (OUTPATIENT)
Dept: INFUSION CENTER | Facility: CLINIC | Age: 76
Discharge: HOME | End: 2021-05-17
Payer: MEDICARE

## 2021-01-01 ENCOUNTER — HOSPITAL ENCOUNTER (OUTPATIENT)
Dept: INFUSION CENTER | Facility: CLINIC | Age: 76
Discharge: HOME | End: 2021-06-08
Payer: MEDICARE

## 2021-01-01 ENCOUNTER — HOSPITAL ENCOUNTER (OUTPATIENT)
Dept: INFUSION CENTER | Facility: CLINIC | Age: 76
Discharge: HOME | End: 2021-12-15
Payer: MEDICARE

## 2021-01-01 ENCOUNTER — HOSPITAL ENCOUNTER (OUTPATIENT)
Dept: INFUSION CENTER | Facility: CLINIC | Age: 76
Discharge: HOME | End: 2021-10-26
Payer: MEDICARE

## 2021-01-01 ENCOUNTER — HOSPITAL ENCOUNTER (OUTPATIENT)
Dept: INFUSION CENTER | Facility: CLINIC | Age: 76
Discharge: HOME | End: 2021-10-20
Payer: MEDICARE

## 2021-01-01 ENCOUNTER — HOSPITAL ENCOUNTER (OUTPATIENT)
Dept: INFUSION CENTER | Facility: CLINIC | Age: 76
Discharge: HOME | End: 2021-06-28
Payer: MEDICARE

## 2021-01-01 ENCOUNTER — HOSPITAL ENCOUNTER (OUTPATIENT)
Dept: INFUSION CENTER | Facility: CLINIC | Age: 76
Discharge: HOME | End: 2021-06-01
Payer: MEDICARE

## 2021-01-01 ENCOUNTER — HOSPITAL ENCOUNTER (OUTPATIENT)
Dept: INFUSION CENTER | Facility: CLINIC | Age: 76
Discharge: HOME | End: 2021-10-18
Payer: MEDICARE

## 2021-01-01 ENCOUNTER — HOSPITAL ENCOUNTER (OUTPATIENT)
Dept: INFUSION CENTER | Facility: CLINIC | Age: 76
Discharge: HOME | End: 2021-10-06

## 2021-01-01 ENCOUNTER — HOSPITAL ENCOUNTER (OUTPATIENT)
Dept: INFUSION CENTER | Facility: CLINIC | Age: 76
Discharge: HOME | End: 2021-08-17
Payer: MEDICARE

## 2021-01-01 ENCOUNTER — HOSPITAL ENCOUNTER (OUTPATIENT)
Dept: INFUSION CENTER | Facility: CLINIC | Age: 76
Discharge: HOME | End: 2021-12-16
Payer: MEDICARE

## 2021-01-01 ENCOUNTER — HOSPITAL ENCOUNTER (OUTPATIENT)
Dept: INFUSION CENTER | Facility: HOSPITAL | Age: 76
Discharge: HOME | End: 2021-12-10

## 2021-01-01 ENCOUNTER — HOSPITAL ENCOUNTER (OUTPATIENT)
Dept: INFUSION CENTER | Facility: CLINIC | Age: 76
Discharge: HOME | End: 2021-10-04
Payer: MEDICARE

## 2021-01-01 ENCOUNTER — HOSPITAL ENCOUNTER (OUTPATIENT)
Dept: INFUSION CENTER | Facility: CLINIC | Age: 76
Discharge: HOME | End: 2021-07-28

## 2021-01-01 ENCOUNTER — HOSPITAL ENCOUNTER (OUTPATIENT)
Dept: INFUSION CENTER | Facility: CLINIC | Age: 76
Discharge: HOME | End: 2021-06-14
Payer: MEDICARE

## 2021-01-01 ENCOUNTER — TELEPHONE (OUTPATIENT)
Dept: HEMATOLOGY ONCOLOGY | Facility: HOSPITAL | Age: 76
End: 2021-01-01

## 2021-01-01 ENCOUNTER — HOSPITAL ENCOUNTER (OUTPATIENT)
Dept: INFUSION CENTER | Facility: CLINIC | Age: 76
Discharge: HOME | End: 2021-05-19

## 2021-01-01 ENCOUNTER — HOSPITAL ENCOUNTER (OUTPATIENT)
Dept: INFUSION CENTER | Facility: CLINIC | Age: 76
Discharge: HOME | End: 2021-08-03
Payer: MEDICARE

## 2021-01-01 VITALS
BODY MASS INDEX: 22.96 KG/M2 | WEIGHT: 154.98 LBS | DIASTOLIC BLOOD PRESSURE: 73 MMHG | HEART RATE: 101 BPM | SYSTOLIC BLOOD PRESSURE: 129 MMHG | HEIGHT: 69 IN | RESPIRATION RATE: 18 BRPM | TEMPERATURE: 96.9 F

## 2021-01-01 VITALS
SYSTOLIC BLOOD PRESSURE: 124 MMHG | RESPIRATION RATE: 18 BRPM | DIASTOLIC BLOOD PRESSURE: 76 MMHG | HEART RATE: 84 BPM | TEMPERATURE: 99.4 F

## 2021-01-01 VITALS
WEIGHT: 147.38 LBS | HEIGHT: 69 IN | TEMPERATURE: 96.5 F | RESPIRATION RATE: 20 BRPM | BODY MASS INDEX: 21.83 KG/M2 | SYSTOLIC BLOOD PRESSURE: 107 MMHG | DIASTOLIC BLOOD PRESSURE: 70 MMHG | HEART RATE: 102 BPM

## 2021-01-01 VITALS
DIASTOLIC BLOOD PRESSURE: 77 MMHG | RESPIRATION RATE: 18 BRPM | TEMPERATURE: 97.5 F | HEIGHT: 69 IN | HEART RATE: 82 BPM | BODY MASS INDEX: 22.86 KG/M2 | SYSTOLIC BLOOD PRESSURE: 128 MMHG | WEIGHT: 154.32 LBS

## 2021-01-01 VITALS
HEIGHT: 69 IN | BODY MASS INDEX: 20.64 KG/M2 | TEMPERATURE: 98.2 F | WEIGHT: 139.33 LBS | OXYGEN SATURATION: 94 % | HEART RATE: 94 BPM | DIASTOLIC BLOOD PRESSURE: 69 MMHG | SYSTOLIC BLOOD PRESSURE: 118 MMHG | RESPIRATION RATE: 20 BRPM

## 2021-01-01 VITALS
BODY MASS INDEX: 21.42 KG/M2 | TEMPERATURE: 97.1 F | WEIGHT: 144.62 LBS | HEIGHT: 69 IN | SYSTOLIC BLOOD PRESSURE: 119 MMHG | HEART RATE: 100 BPM | RESPIRATION RATE: 18 BRPM | OXYGEN SATURATION: 96 % | DIASTOLIC BLOOD PRESSURE: 57 MMHG

## 2021-01-01 VITALS — TEMPERATURE: 96.6 F

## 2021-01-01 VITALS
HEART RATE: 88 BPM | BODY MASS INDEX: 22.69 KG/M2 | DIASTOLIC BLOOD PRESSURE: 70 MMHG | SYSTOLIC BLOOD PRESSURE: 104 MMHG | WEIGHT: 153.2 LBS | HEIGHT: 69 IN | OXYGEN SATURATION: 97 % | TEMPERATURE: 97.4 F

## 2021-01-01 VITALS — TEMPERATURE: 98.9 F

## 2021-01-01 VITALS
DIASTOLIC BLOOD PRESSURE: 65 MMHG | TEMPERATURE: 97.6 F | HEART RATE: 88 BPM | SYSTOLIC BLOOD PRESSURE: 107 MMHG | RESPIRATION RATE: 18 BRPM

## 2021-01-01 VITALS
DIASTOLIC BLOOD PRESSURE: 78 MMHG | RESPIRATION RATE: 18 BRPM | HEART RATE: 92 BPM | SYSTOLIC BLOOD PRESSURE: 137 MMHG | TEMPERATURE: 99.1 F

## 2021-01-01 VITALS
DIASTOLIC BLOOD PRESSURE: 76 MMHG | RESPIRATION RATE: 18 BRPM | SYSTOLIC BLOOD PRESSURE: 132 MMHG | HEIGHT: 69 IN | WEIGHT: 149.91 LBS | TEMPERATURE: 96.7 F | BODY MASS INDEX: 22.2 KG/M2 | HEART RATE: 99 BPM

## 2021-01-01 VITALS
SYSTOLIC BLOOD PRESSURE: 125 MMHG | RESPIRATION RATE: 18 BRPM | DIASTOLIC BLOOD PRESSURE: 73 MMHG | TEMPERATURE: 96.9 F | HEART RATE: 105 BPM

## 2021-01-01 VITALS
HEIGHT: 69 IN | RESPIRATION RATE: 18 BRPM | BODY MASS INDEX: 21.48 KG/M2 | WEIGHT: 145 LBS | SYSTOLIC BLOOD PRESSURE: 106 MMHG | TEMPERATURE: 97.6 F | OXYGEN SATURATION: 95 % | HEART RATE: 92 BPM | DIASTOLIC BLOOD PRESSURE: 60 MMHG

## 2021-01-01 VITALS
WEIGHT: 149.25 LBS | DIASTOLIC BLOOD PRESSURE: 68 MMHG | TEMPERATURE: 97.3 F | RESPIRATION RATE: 18 BRPM | BODY MASS INDEX: 22.11 KG/M2 | SYSTOLIC BLOOD PRESSURE: 116 MMHG | HEIGHT: 69 IN | HEART RATE: 96 BPM

## 2021-01-01 VITALS
HEART RATE: 68 BPM | RESPIRATION RATE: 18 BRPM | SYSTOLIC BLOOD PRESSURE: 127 MMHG | TEMPERATURE: 97.7 F | DIASTOLIC BLOOD PRESSURE: 72 MMHG

## 2021-01-01 VITALS
DIASTOLIC BLOOD PRESSURE: 76 MMHG | BODY MASS INDEX: 21.91 KG/M2 | SYSTOLIC BLOOD PRESSURE: 117 MMHG | RESPIRATION RATE: 18 BRPM | WEIGHT: 147.93 LBS | HEIGHT: 69 IN | HEART RATE: 96 BPM | TEMPERATURE: 97.2 F

## 2021-01-01 VITALS
HEIGHT: 69 IN | HEART RATE: 91 BPM | BODY MASS INDEX: 23.05 KG/M2 | WEIGHT: 155.65 LBS | TEMPERATURE: 97.2 F | RESPIRATION RATE: 18 BRPM | DIASTOLIC BLOOD PRESSURE: 70 MMHG | SYSTOLIC BLOOD PRESSURE: 147 MMHG

## 2021-01-01 VITALS
TEMPERATURE: 98.7 F | HEART RATE: 100 BPM | BODY MASS INDEX: 21.42 KG/M2 | RESPIRATION RATE: 18 BRPM | DIASTOLIC BLOOD PRESSURE: 73 MMHG | SYSTOLIC BLOOD PRESSURE: 113 MMHG | WEIGHT: 146.28 LBS

## 2021-01-01 VITALS
HEART RATE: 117 BPM | DIASTOLIC BLOOD PRESSURE: 80 MMHG | RESPIRATION RATE: 17 BRPM | HEIGHT: 69 IN | WEIGHT: 146 LBS | SYSTOLIC BLOOD PRESSURE: 120 MMHG | OXYGEN SATURATION: 97 % | TEMPERATURE: 97 F | BODY MASS INDEX: 21.62 KG/M2

## 2021-01-01 VITALS
HEIGHT: 69 IN | SYSTOLIC BLOOD PRESSURE: 124 MMHG | HEART RATE: 112 BPM | WEIGHT: 156.31 LBS | TEMPERATURE: 96.3 F | BODY MASS INDEX: 23.15 KG/M2 | DIASTOLIC BLOOD PRESSURE: 72 MMHG | RESPIRATION RATE: 18 BRPM

## 2021-01-01 VITALS
DIASTOLIC BLOOD PRESSURE: 70 MMHG | TEMPERATURE: 97.3 F | OXYGEN SATURATION: 97 % | SYSTOLIC BLOOD PRESSURE: 120 MMHG | BODY MASS INDEX: 21.77 KG/M2 | HEIGHT: 69 IN | WEIGHT: 147 LBS | RESPIRATION RATE: 18 BRPM | HEART RATE: 77 BPM

## 2021-01-01 VITALS
TEMPERATURE: 98 F | SYSTOLIC BLOOD PRESSURE: 126 MMHG | WEIGHT: 153.6 LBS | DIASTOLIC BLOOD PRESSURE: 60 MMHG | HEIGHT: 69 IN | HEART RATE: 105 BPM | RESPIRATION RATE: 18 BRPM | BODY MASS INDEX: 22.75 KG/M2

## 2021-01-01 VITALS
BODY MASS INDEX: 22.79 KG/M2 | TEMPERATURE: 97 F | DIASTOLIC BLOOD PRESSURE: 80 MMHG | HEART RATE: 93 BPM | HEIGHT: 69 IN | SYSTOLIC BLOOD PRESSURE: 136 MMHG | RESPIRATION RATE: 18 BRPM | WEIGHT: 153.88 LBS

## 2021-01-01 VITALS — TEMPERATURE: 98.6 F

## 2021-01-01 VITALS
RESPIRATION RATE: 18 BRPM | SYSTOLIC BLOOD PRESSURE: 107 MMHG | DIASTOLIC BLOOD PRESSURE: 71 MMHG | BODY MASS INDEX: 22.07 KG/M2 | WEIGHT: 149.03 LBS | TEMPERATURE: 97.9 F | HEIGHT: 69 IN | HEART RATE: 86 BPM

## 2021-01-01 VITALS
SYSTOLIC BLOOD PRESSURE: 106 MMHG | DIASTOLIC BLOOD PRESSURE: 69 MMHG | HEART RATE: 84 BPM | TEMPERATURE: 97.3 F | RESPIRATION RATE: 18 BRPM

## 2021-01-01 VITALS
RESPIRATION RATE: 18 BRPM | SYSTOLIC BLOOD PRESSURE: 150 MMHG | HEIGHT: 69 IN | DIASTOLIC BLOOD PRESSURE: 88 MMHG | HEART RATE: 116 BPM | WEIGHT: 155.42 LBS | TEMPERATURE: 97.6 F | BODY MASS INDEX: 23.02 KG/M2

## 2021-01-01 VITALS
DIASTOLIC BLOOD PRESSURE: 74 MMHG | WEIGHT: 152.89 LBS | SYSTOLIC BLOOD PRESSURE: 110 MMHG | HEART RATE: 111 BPM | RESPIRATION RATE: 18 BRPM | BODY MASS INDEX: 22.64 KG/M2 | HEIGHT: 69 IN | TEMPERATURE: 97.1 F

## 2021-01-01 VITALS
DIASTOLIC BLOOD PRESSURE: 69 MMHG | HEART RATE: 76 BPM | TEMPERATURE: 99.3 F | RESPIRATION RATE: 18 BRPM | SYSTOLIC BLOOD PRESSURE: 108 MMHG

## 2021-01-01 VITALS
SYSTOLIC BLOOD PRESSURE: 135 MMHG | RESPIRATION RATE: 18 BRPM | HEART RATE: 90 BPM | DIASTOLIC BLOOD PRESSURE: 79 MMHG | TEMPERATURE: 98 F

## 2021-01-01 VITALS
SYSTOLIC BLOOD PRESSURE: 110 MMHG | OXYGEN SATURATION: 97 % | TEMPERATURE: 96.2 F | DIASTOLIC BLOOD PRESSURE: 63 MMHG | RESPIRATION RATE: 18 BRPM | HEART RATE: 69 BPM

## 2021-01-01 VITALS
TEMPERATURE: 97.3 F | SYSTOLIC BLOOD PRESSURE: 126 MMHG | HEART RATE: 90 BPM | RESPIRATION RATE: 18 BRPM | DIASTOLIC BLOOD PRESSURE: 82 MMHG

## 2021-01-01 VITALS
DIASTOLIC BLOOD PRESSURE: 84 MMHG | SYSTOLIC BLOOD PRESSURE: 138 MMHG | RESPIRATION RATE: 18 BRPM | TEMPERATURE: 98.2 F | HEART RATE: 78 BPM

## 2021-01-01 VITALS — TEMPERATURE: 96.9 F

## 2021-01-01 VITALS — TEMPERATURE: 97.3 F

## 2021-01-01 VITALS — TEMPERATURE: 97.8 F

## 2021-01-01 VITALS
RESPIRATION RATE: 16 BRPM | SYSTOLIC BLOOD PRESSURE: 128 MMHG | TEMPERATURE: 97.4 F | DIASTOLIC BLOOD PRESSURE: 72 MMHG | HEART RATE: 97 BPM

## 2021-01-01 VITALS — TEMPERATURE: 98 F

## 2021-01-01 VITALS
DIASTOLIC BLOOD PRESSURE: 65 MMHG | TEMPERATURE: 97.5 F | RESPIRATION RATE: 18 BRPM | HEART RATE: 73 BPM | SYSTOLIC BLOOD PRESSURE: 116 MMHG

## 2021-01-01 VITALS — TEMPERATURE: 98.5 F

## 2021-01-01 VITALS — TEMPERATURE: 96.8 F

## 2021-01-01 VITALS — TEMPERATURE: 97.9 F

## 2021-01-01 DIAGNOSIS — R06.02 SHORTNESS OF BREATH: ICD-10-CM

## 2021-01-01 DIAGNOSIS — C17.9 SMALL BOWEL CANCER (HCC): Primary | ICD-10-CM

## 2021-01-01 DIAGNOSIS — D70.1 CHEMOTHERAPY INDUCED NEUTROPENIA (HCC): ICD-10-CM

## 2021-01-01 DIAGNOSIS — C17.9 SMALL BOWEL CANCER (HCC): ICD-10-CM

## 2021-01-01 DIAGNOSIS — D70.1 CHEMOTHERAPY INDUCED NEUTROPENIA (HCC): Primary | ICD-10-CM

## 2021-01-01 DIAGNOSIS — T45.1X5A CHEMOTHERAPY INDUCED NEUTROPENIA (HCC): Primary | ICD-10-CM

## 2021-01-01 DIAGNOSIS — C78.6 OMENTAL METASTASIS (HCC): ICD-10-CM

## 2021-01-01 DIAGNOSIS — C78.00 MALIGNANT NEOPLASM METASTATIC TO LUNG, UNSPECIFIED LATERALITY (HCC): ICD-10-CM

## 2021-01-01 DIAGNOSIS — I10 BENIGN ESSENTIAL HYPERTENSION: ICD-10-CM

## 2021-01-01 DIAGNOSIS — T45.1X5A CHEMOTHERAPY INDUCED NEUTROPENIA (HCC): ICD-10-CM

## 2021-01-01 DIAGNOSIS — C78.00 MALIGNANT NEOPLASM METASTATIC TO LUNG, UNSPECIFIED LATERALITY (HCC): Primary | ICD-10-CM

## 2021-01-01 DIAGNOSIS — C78.6 MALIGNANT NEOPLASM METASTATIC TO PERITONEUM (HCC): ICD-10-CM

## 2021-01-01 DIAGNOSIS — N40.1 BENIGN PROSTATIC HYPERPLASIA WITH LOWER URINARY TRACT SYMPTOMS, SYMPTOM DETAILS UNSPECIFIED: ICD-10-CM

## 2021-01-01 DIAGNOSIS — Z00.00 MEDICARE ANNUAL WELLNESS VISIT, SUBSEQUENT: Primary | ICD-10-CM

## 2021-01-01 DIAGNOSIS — R80.9 PROTEINURIA, UNSPECIFIED TYPE: ICD-10-CM

## 2021-01-01 DIAGNOSIS — R11.0 CHEMOTHERAPY-INDUCED NAUSEA: ICD-10-CM

## 2021-01-01 DIAGNOSIS — Z45.2 ENCOUNTER FOR CARE RELATED TO VASCULAR ACCESS PORT: ICD-10-CM

## 2021-01-01 DIAGNOSIS — R53.0 NEOPLASTIC MALIGNANT RELATED FATIGUE: ICD-10-CM

## 2021-01-01 DIAGNOSIS — N30.00 ACUTE CYSTITIS WITHOUT HEMATURIA: ICD-10-CM

## 2021-01-01 DIAGNOSIS — E78.2 MIXED HYPERLIPIDEMIA: ICD-10-CM

## 2021-01-01 DIAGNOSIS — C88.4 MALT (MUCOSA ASSOCIATED LYMPHOID TISSUE) (HCC): ICD-10-CM

## 2021-01-01 DIAGNOSIS — M72.0 DUPUYTREN'S DISEASE: ICD-10-CM

## 2021-01-01 DIAGNOSIS — C78.6 MALIGNANT NEOPLASM METASTATIC TO PERITONEUM (HCC): Primary | ICD-10-CM

## 2021-01-01 DIAGNOSIS — C17.9 MALIGNANT NEOPLASM OF SMALL INTESTINE (HCC): ICD-10-CM

## 2021-01-01 DIAGNOSIS — R11.0 NAUSEA: ICD-10-CM

## 2021-01-01 DIAGNOSIS — N30.00 ACUTE CYSTITIS WITHOUT HEMATURIA: Primary | ICD-10-CM

## 2021-01-01 DIAGNOSIS — Z79.899 DRUG THERAPY: Primary | ICD-10-CM

## 2021-01-01 DIAGNOSIS — T45.1X5A CHEMOTHERAPY-INDUCED NAUSEA: ICD-10-CM

## 2021-01-01 LAB
ALBUMIN SERPL BCP-MCNC: 2.6 G/DL (ref 3.5–5)
ALBUMIN SERPL BCP-MCNC: 2.7 G/DL (ref 3.5–5)
ALBUMIN SERPL BCP-MCNC: 2.7 G/DL (ref 3.5–5)
ALBUMIN SERPL BCP-MCNC: 2.8 G/DL (ref 3.5–5)
ALBUMIN SERPL BCP-MCNC: 2.9 G/DL (ref 3.5–5)
ALP SERPL-CCNC: 154 U/L (ref 46–116)
ALP SERPL-CCNC: 193 U/L (ref 46–116)
ALP SERPL-CCNC: 246 U/L (ref 46–116)
ALP SERPL-CCNC: 247 U/L (ref 46–116)
ALP SERPL-CCNC: 273 U/L (ref 46–116)
ALP SERPL-CCNC: 281 U/L (ref 46–116)
ALP SERPL-CCNC: 289 U/L (ref 46–116)
ALT SERPL W P-5'-P-CCNC: 25 U/L (ref 12–78)
ALT SERPL W P-5'-P-CCNC: 29 U/L (ref 12–78)
ALT SERPL W P-5'-P-CCNC: 31 U/L (ref 12–78)
ALT SERPL W P-5'-P-CCNC: 35 U/L (ref 12–78)
ALT SERPL W P-5'-P-CCNC: 44 U/L (ref 12–78)
ALT SERPL W P-5'-P-CCNC: 44 U/L (ref 12–78)
ALT SERPL W P-5'-P-CCNC: 52 U/L (ref 12–78)
ANION GAP SERPL CALCULATED.3IONS-SCNC: 4 MMOL/L (ref 4–13)
ANION GAP SERPL CALCULATED.3IONS-SCNC: 5 MMOL/L (ref 4–13)
ANION GAP SERPL CALCULATED.3IONS-SCNC: 6 MMOL/L (ref 4–13)
ANION GAP SERPL CALCULATED.3IONS-SCNC: 7 MMOL/L (ref 4–13)
ANION GAP SERPL CALCULATED.3IONS-SCNC: 8 MMOL/L (ref 4–13)
ANISOCYTOSIS BLD QL SMEAR: PRESENT
ANISOCYTOSIS BLD QL SMEAR: PRESENT
ARTIFACT: PRESENT
AST SERPL W P-5'-P-CCNC: 24 U/L (ref 5–45)
AST SERPL W P-5'-P-CCNC: 28 U/L (ref 5–45)
AST SERPL W P-5'-P-CCNC: 29 U/L (ref 5–45)
AST SERPL W P-5'-P-CCNC: 33 U/L (ref 5–45)
AST SERPL W P-5'-P-CCNC: 34 U/L (ref 5–45)
AST SERPL W P-5'-P-CCNC: 34 U/L (ref 5–45)
AST SERPL W P-5'-P-CCNC: 36 U/L (ref 5–45)
BACTERIA UR CULT: NORMAL
BACTERIA UR QL AUTO: ABNORMAL /HPF
BASOPHILS # BLD AUTO: 0.04 THOUSANDS/ΜL (ref 0–0.1)
BASOPHILS # BLD AUTO: 0.04 THOUSANDS/ΜL (ref 0–0.1)
BASOPHILS # BLD AUTO: 0.06 THOUSANDS/ΜL (ref 0–0.1)
BASOPHILS # BLD AUTO: 0.12 THOUSANDS/ΜL (ref 0–0.1)
BASOPHILS # BLD MANUAL: 0 THOUSAND/UL (ref 0–0.1)
BASOPHILS # BLD MANUAL: 0.02 THOUSAND/UL (ref 0–0.1)
BASOPHILS NFR BLD AUTO: 0 % (ref 0–1)
BASOPHILS NFR BLD AUTO: 0 % (ref 0–1)
BASOPHILS NFR BLD AUTO: 1 % (ref 0–1)
BASOPHILS NFR BLD AUTO: 1 % (ref 0–1)
BASOPHILS NFR MAR MANUAL: 0 % (ref 0–1)
BASOPHILS NFR MAR MANUAL: 1 % (ref 0–1)
BILIRUB SERPL-MCNC: 0.36 MG/DL (ref 0.2–1)
BILIRUB SERPL-MCNC: 0.51 MG/DL (ref 0.2–1)
BILIRUB SERPL-MCNC: 0.56 MG/DL (ref 0.2–1)
BILIRUB SERPL-MCNC: 0.57 MG/DL (ref 0.2–1)
BILIRUB SERPL-MCNC: 0.58 MG/DL (ref 0.2–1)
BILIRUB SERPL-MCNC: 0.7 MG/DL (ref 0.2–1)
BILIRUB SERPL-MCNC: 0.89 MG/DL (ref 0.2–1)
BILIRUB UR QL STRIP: NEGATIVE
BUN SERPL-MCNC: 11 MG/DL (ref 5–25)
BUN SERPL-MCNC: 13 MG/DL (ref 5–25)
BUN SERPL-MCNC: 14 MG/DL (ref 5–25)
BUN SERPL-MCNC: 14 MG/DL (ref 5–25)
BUN SERPL-MCNC: 15 MG/DL (ref 5–25)
BURR CELLS BLD QL SMEAR: PRESENT
CALCIUM ALBUM COR SERPL-MCNC: 10 MG/DL (ref 8.3–10.1)
CALCIUM ALBUM COR SERPL-MCNC: 10.2 MG/DL (ref 8.3–10.1)
CALCIUM ALBUM COR SERPL-MCNC: 9.6 MG/DL (ref 8.3–10.1)
CALCIUM ALBUM COR SERPL-MCNC: 9.8 MG/DL (ref 8.3–10.1)
CALCIUM ALBUM COR SERPL-MCNC: 9.9 MG/DL (ref 8.3–10.1)
CALCIUM SERPL-MCNC: 8.5 MG/DL (ref 8.3–10.1)
CALCIUM SERPL-MCNC: 8.8 MG/DL (ref 8.3–10.1)
CALCIUM SERPL-MCNC: 8.9 MG/DL (ref 8.3–10.1)
CALCIUM SERPL-MCNC: 8.9 MG/DL (ref 8.3–10.1)
CALCIUM SERPL-MCNC: 9 MG/DL (ref 8.3–10.1)
CALCIUM SERPL-MCNC: 9 MG/DL (ref 8.3–10.1)
CALCIUM SERPL-MCNC: 9.2 MG/DL (ref 8.3–10.1)
CAOX CRY URNS QL MICRO: ABNORMAL /HPF
CAOX CRY URNS QL MICRO: ABNORMAL /HPF
CHLORIDE SERPL-SCNC: 106 MMOL/L (ref 100–108)
CHLORIDE SERPL-SCNC: 107 MMOL/L (ref 100–108)
CHLORIDE SERPL-SCNC: 108 MMOL/L (ref 100–108)
CHLORIDE SERPL-SCNC: 108 MMOL/L (ref 100–108)
CHLORIDE SERPL-SCNC: 110 MMOL/L (ref 100–108)
CLARITY UR: ABNORMAL
CLARITY UR: ABNORMAL
CLARITY UR: CLEAR
CO2 SERPL-SCNC: 24 MMOL/L (ref 21–32)
CO2 SERPL-SCNC: 25 MMOL/L (ref 21–32)
CO2 SERPL-SCNC: 26 MMOL/L (ref 21–32)
CO2 SERPL-SCNC: 26 MMOL/L (ref 21–32)
CO2 SERPL-SCNC: 27 MMOL/L (ref 21–32)
COLOR UR: YELLOW
CREAT SERPL-MCNC: 0.98 MG/DL (ref 0.6–1.3)
CREAT SERPL-MCNC: 0.99 MG/DL (ref 0.6–1.3)
CREAT SERPL-MCNC: 0.99 MG/DL (ref 0.6–1.3)
CREAT SERPL-MCNC: 1 MG/DL (ref 0.6–1.3)
CREAT SERPL-MCNC: 1.06 MG/DL (ref 0.6–1.3)
CREAT SERPL-MCNC: 1.12 MG/DL (ref 0.6–1.3)
CREAT SERPL-MCNC: 1.17 MG/DL (ref 0.6–1.3)
DACRYOCYTES BLD QL SMEAR: PRESENT
EOSINOPHIL # BLD AUTO: 0.09 THOUSAND/ΜL (ref 0–0.61)
EOSINOPHIL # BLD AUTO: 0.28 THOUSAND/ΜL (ref 0–0.61)
EOSINOPHIL # BLD MANUAL: 0 THOUSAND/UL (ref 0–0.4)
EOSINOPHIL # BLD MANUAL: 0.09 THOUSAND/UL (ref 0–0.4)
EOSINOPHIL NFR BLD AUTO: 1 % (ref 0–6)
EOSINOPHIL NFR BLD AUTO: 2 % (ref 0–6)
EOSINOPHIL NFR BLD MANUAL: 0 % (ref 0–6)
EOSINOPHIL NFR BLD MANUAL: 5 % (ref 0–6)
ERYTHROCYTE [DISTWIDTH] IN BLOOD BY AUTOMATED COUNT: 15.9 % (ref 11.6–15.1)
ERYTHROCYTE [DISTWIDTH] IN BLOOD BY AUTOMATED COUNT: 15.9 % (ref 11.6–15.1)
ERYTHROCYTE [DISTWIDTH] IN BLOOD BY AUTOMATED COUNT: 16.3 % (ref 11.6–15.1)
ERYTHROCYTE [DISTWIDTH] IN BLOOD BY AUTOMATED COUNT: 16.5 % (ref 11.6–15.1)
ERYTHROCYTE [DISTWIDTH] IN BLOOD BY AUTOMATED COUNT: 17.2 % (ref 11.6–15.1)
ERYTHROCYTE [DISTWIDTH] IN BLOOD BY AUTOMATED COUNT: 17.7 % (ref 11.6–15.1)
GFR SERPL CREATININE-BSD FRML MDRD: 59 ML/MIN/1.73SQ M
GFR SERPL CREATININE-BSD FRML MDRD: 63 ML/MIN/1.73SQ M
GFR SERPL CREATININE-BSD FRML MDRD: 67 ML/MIN/1.73SQ M
GFR SERPL CREATININE-BSD FRML MDRD: 72 ML/MIN/1.73SQ M
GFR SERPL CREATININE-BSD FRML MDRD: 74 ML/MIN/1.73SQ M
GLUCOSE P FAST SERPL-MCNC: 83 MG/DL (ref 65–99)
GLUCOSE P FAST SERPL-MCNC: 83 MG/DL (ref 65–99)
GLUCOSE P FAST SERPL-MCNC: 84 MG/DL (ref 65–99)
GLUCOSE P FAST SERPL-MCNC: 85 MG/DL (ref 65–99)
GLUCOSE P FAST SERPL-MCNC: 87 MG/DL (ref 65–99)
GLUCOSE P FAST SERPL-MCNC: 88 MG/DL (ref 65–99)
GLUCOSE P FAST SERPL-MCNC: 88 MG/DL (ref 65–99)
GLUCOSE UR STRIP-MCNC: NEGATIVE MG/DL
HCT VFR BLD AUTO: 32.2 % (ref 36.5–49.3)
HCT VFR BLD AUTO: 35.5 % (ref 36.5–49.3)
HCT VFR BLD AUTO: 37.5 % (ref 36.5–49.3)
HCT VFR BLD AUTO: 37.7 % (ref 36.5–49.3)
HCT VFR BLD AUTO: 37.8 % (ref 36.5–49.3)
HCT VFR BLD AUTO: 39.4 % (ref 36.5–49.3)
HGB BLD-MCNC: 10.2 G/DL (ref 12–17)
HGB BLD-MCNC: 11.1 G/DL (ref 12–17)
HGB BLD-MCNC: 11.7 G/DL (ref 12–17)
HGB BLD-MCNC: 11.8 G/DL (ref 12–17)
HGB BLD-MCNC: 11.8 G/DL (ref 12–17)
HGB BLD-MCNC: 12.4 G/DL (ref 12–17)
HGB UR QL STRIP.AUTO: ABNORMAL
HGB UR QL STRIP.AUTO: NEGATIVE
HGB UR QL STRIP.AUTO: NEGATIVE
HYALINE CASTS #/AREA URNS LPF: ABNORMAL /LPF
IMM GRANULOCYTES # BLD AUTO: 0.02 THOUSAND/UL (ref 0–0.2)
IMM GRANULOCYTES # BLD AUTO: 0.03 THOUSAND/UL (ref 0–0.2)
IMM GRANULOCYTES # BLD AUTO: 0.08 THOUSAND/UL (ref 0–0.2)
IMM GRANULOCYTES # BLD AUTO: 0.19 THOUSAND/UL (ref 0–0.2)
IMM GRANULOCYTES NFR BLD AUTO: 0 % (ref 0–2)
IMM GRANULOCYTES NFR BLD AUTO: 2 % (ref 0–2)
KETONES UR STRIP-MCNC: NEGATIVE MG/DL
LEUKOCYTE ESTERASE UR QL STRIP: ABNORMAL
LEUKOCYTE ESTERASE UR QL STRIP: ABNORMAL
LEUKOCYTE ESTERASE UR QL STRIP: NEGATIVE
LYMPHOCYTES # BLD AUTO: 0.87 THOUSAND/UL (ref 0.6–4.47)
LYMPHOCYTES # BLD AUTO: 1.16 THOUSAND/UL (ref 0.6–4.47)
LYMPHOCYTES # BLD AUTO: 2.02 THOUSANDS/ΜL (ref 0.6–4.47)
LYMPHOCYTES # BLD AUTO: 2.04 THOUSANDS/ΜL (ref 0.6–4.47)
LYMPHOCYTES # BLD AUTO: 2.51 THOUSANDS/ΜL (ref 0.6–4.47)
LYMPHOCYTES # BLD AUTO: 2.58 THOUSANDS/ΜL (ref 0.6–4.47)
LYMPHOCYTES # BLD AUTO: 3 % (ref 14–44)
LYMPHOCYTES # BLD AUTO: 65 % (ref 14–44)
LYMPHOCYTES NFR BLD AUTO: 14 % (ref 14–44)
LYMPHOCYTES NFR BLD AUTO: 20 % (ref 14–44)
LYMPHOCYTES NFR BLD AUTO: 22 % (ref 14–44)
LYMPHOCYTES NFR BLD AUTO: 23 % (ref 14–44)
MCH RBC QN AUTO: 30.8 PG (ref 26.8–34.3)
MCH RBC QN AUTO: 31.1 PG (ref 26.8–34.3)
MCH RBC QN AUTO: 31.3 PG (ref 26.8–34.3)
MCH RBC QN AUTO: 32.1 PG (ref 26.8–34.3)
MCH RBC QN AUTO: 32.9 PG (ref 26.8–34.3)
MCH RBC QN AUTO: 33.1 PG (ref 26.8–34.3)
MCHC RBC AUTO-ENTMCNC: 31 G/DL (ref 31.4–37.4)
MCHC RBC AUTO-ENTMCNC: 31.2 G/DL (ref 31.4–37.4)
MCHC RBC AUTO-ENTMCNC: 31.3 G/DL (ref 31.4–37.4)
MCHC RBC AUTO-ENTMCNC: 31.5 G/DL (ref 31.4–37.4)
MCHC RBC AUTO-ENTMCNC: 31.5 G/DL (ref 31.4–37.4)
MCHC RBC AUTO-ENTMCNC: 31.7 G/DL (ref 31.4–37.4)
MCV RBC AUTO: 101 FL (ref 82–98)
MCV RBC AUTO: 102 FL (ref 82–98)
MCV RBC AUTO: 105 FL (ref 82–98)
MCV RBC AUTO: 106 FL (ref 82–98)
MCV RBC AUTO: 97 FL (ref 82–98)
MCV RBC AUTO: 99 FL (ref 82–98)
MONOCYTES # BLD AUTO: 0 THOUSAND/UL (ref 0–1.22)
MONOCYTES # BLD AUTO: 0.14 THOUSAND/UL (ref 0–1.22)
MONOCYTES # BLD AUTO: 1.11 THOUSAND/ΜL (ref 0.17–1.22)
MONOCYTES # BLD AUTO: 1.15 THOUSAND/ΜL (ref 0.17–1.22)
MONOCYTES # BLD AUTO: 1.24 THOUSAND/ΜL (ref 0.17–1.22)
MONOCYTES # BLD AUTO: 1.87 THOUSAND/ΜL (ref 0.17–1.22)
MONOCYTES NFR BLD AUTO: 13 % (ref 4–12)
MONOCYTES NFR BLD AUTO: 14 % (ref 4–12)
MONOCYTES NFR BLD AUTO: 15 % (ref 4–12)
MONOCYTES NFR BLD AUTO: 6 % (ref 4–12)
MONOCYTES NFR BLD: 0 % (ref 4–12)
MONOCYTES NFR BLD: 8 % (ref 4–12)
MUCOUS THREADS UR QL AUTO: ABNORMAL
MUCOUS THREADS UR QL AUTO: ABNORMAL
NEUTROPHILS # BLD AUTO: 14.22 THOUSANDS/ΜL (ref 1.85–7.62)
NEUTROPHILS # BLD AUTO: 5.37 THOUSANDS/ΜL (ref 1.85–7.62)
NEUTROPHILS # BLD AUTO: 5.72 THOUSANDS/ΜL (ref 1.85–7.62)
NEUTROPHILS # BLD AUTO: 7.65 THOUSANDS/ΜL (ref 1.85–7.62)
NEUTROPHILS # BLD MANUAL: 0.32 THOUSAND/UL (ref 1.85–7.62)
NEUTROPHILS # BLD MANUAL: 27.54 THOUSAND/UL (ref 1.85–7.62)
NEUTS BAND NFR BLD MANUAL: 5 % (ref 0–8)
NEUTS BAND NFR BLD MANUAL: 7 % (ref 0–8)
NEUTS SEG NFR BLD AUTO: 13 % (ref 43–75)
NEUTS SEG NFR BLD AUTO: 60 % (ref 43–75)
NEUTS SEG NFR BLD AUTO: 62 % (ref 43–75)
NEUTS SEG NFR BLD AUTO: 63 % (ref 43–75)
NEUTS SEG NFR BLD AUTO: 79 % (ref 43–75)
NEUTS SEG NFR BLD AUTO: 88 % (ref 43–75)
NITRITE UR QL STRIP: NEGATIVE
NON-SQ EPI CELLS URNS QL MICRO: ABNORMAL /HPF
NRBC BLD AUTO-RTO: 0 /100 WBCS
OVALOCYTES BLD QL SMEAR: PRESENT
PH UR STRIP.AUTO: 6 [PH]
PH UR STRIP.AUTO: 6.5 [PH]
PH UR STRIP.AUTO: 6.5 [PH]
PLATELET # BLD AUTO: 118 THOUSANDS/UL (ref 149–390)
PLATELET # BLD AUTO: 137 THOUSANDS/UL (ref 149–390)
PLATELET # BLD AUTO: 149 THOUSANDS/UL (ref 149–390)
PLATELET # BLD AUTO: 154 THOUSANDS/UL (ref 149–390)
PLATELET # BLD AUTO: 379 THOUSANDS/UL (ref 149–390)
PLATELET # BLD AUTO: 98 THOUSANDS/UL (ref 149–390)
PLATELET BLD QL SMEAR: ABNORMAL
PLATELET BLD QL SMEAR: ABNORMAL
PMV BLD AUTO: 10.5 FL (ref 8.9–12.7)
PMV BLD AUTO: 10.7 FL (ref 8.9–12.7)
PMV BLD AUTO: 10.7 FL (ref 8.9–12.7)
PMV BLD AUTO: 10.9 FL (ref 8.9–12.7)
PMV BLD AUTO: 11.4 FL (ref 8.9–12.7)
PMV BLD AUTO: 11.8 FL (ref 8.9–12.7)
POIKILOCYTOSIS BLD QL SMEAR: PRESENT
POIKILOCYTOSIS BLD QL SMEAR: PRESENT
POLYCHROMASIA BLD QL SMEAR: PRESENT
POTASSIUM SERPL-SCNC: 3.6 MMOL/L (ref 3.5–5.3)
POTASSIUM SERPL-SCNC: 3.7 MMOL/L (ref 3.5–5.3)
POTASSIUM SERPL-SCNC: 3.7 MMOL/L (ref 3.5–5.3)
POTASSIUM SERPL-SCNC: 3.8 MMOL/L (ref 3.5–5.3)
POTASSIUM SERPL-SCNC: 3.9 MMOL/L (ref 3.5–5.3)
PROT 24H UR-MCNC: 315 MG/24 HRS (ref 40–150)
PROT SERPL-MCNC: 6.2 G/DL (ref 6.4–8.2)
PROT SERPL-MCNC: 6.4 G/DL (ref 6.4–8.2)
PROT SERPL-MCNC: 6.5 G/DL (ref 6.4–8.2)
PROT SERPL-MCNC: 6.6 G/DL (ref 6.4–8.2)
PROT SERPL-MCNC: 7 G/DL (ref 6.4–8.2)
PROT UR STRIP-MCNC: ABNORMAL MG/DL
RBC # BLD AUTO: 3.31 MILLION/UL (ref 3.88–5.62)
RBC # BLD AUTO: 3.56 MILLION/UL (ref 3.88–5.62)
RBC # BLD AUTO: 3.57 MILLION/UL (ref 3.88–5.62)
RBC # BLD AUTO: 3.59 MILLION/UL (ref 3.88–5.62)
RBC # BLD AUTO: 3.74 MILLION/UL (ref 3.88–5.62)
RBC # BLD AUTO: 3.86 MILLION/UL (ref 3.88–5.62)
RBC #/AREA URNS AUTO: ABNORMAL /HPF
RBC MORPH BLD: PRESENT
SODIUM SERPL-SCNC: 138 MMOL/L (ref 136–145)
SODIUM SERPL-SCNC: 139 MMOL/L (ref 136–145)
SODIUM SERPL-SCNC: 140 MMOL/L (ref 136–145)
SODIUM SERPL-SCNC: 140 MMOL/L (ref 136–145)
SODIUM SERPL-SCNC: 142 MMOL/L (ref 136–145)
SP GR UR STRIP.AUTO: 1.02 (ref 1–1.03)
SPECIMEN VOL UR: 700 ML
UROBILINOGEN UR QL STRIP.AUTO: 0.2 E.U./DL
VARIANT LYMPHS # BLD AUTO: 2 %
VARIANT LYMPHS # BLD AUTO: 3 %
WBC # BLD AUTO: 1.78 THOUSAND/UL (ref 4.31–10.16)
WBC # BLD AUTO: 12.69 THOUSAND/UL (ref 4.31–10.16)
WBC # BLD AUTO: 18.11 THOUSAND/UL (ref 4.31–10.16)
WBC # BLD AUTO: 28.99 THOUSAND/UL (ref 4.31–10.16)
WBC # BLD AUTO: 8.65 THOUSAND/UL (ref 4.31–10.16)
WBC # BLD AUTO: 9.16 THOUSAND/UL (ref 4.31–10.16)
WBC #/AREA URNS AUTO: ABNORMAL /HPF

## 2021-01-01 PROCEDURE — 96368 THER/DIAG CONCURRENT INF: CPT

## 2021-01-01 PROCEDURE — 96372 THER/PROPH/DIAG INJ SC/IM: CPT

## 2021-01-01 PROCEDURE — 96415 CHEMO IV INFUSION ADDL HR: CPT

## 2021-01-01 PROCEDURE — 80053 COMPREHEN METABOLIC PANEL: CPT

## 2021-01-01 PROCEDURE — 85007 BL SMEAR W/DIFF WBC COUNT: CPT

## 2021-01-01 PROCEDURE — 96366 THER/PROPH/DIAG IV INF ADDON: CPT

## 2021-01-01 PROCEDURE — 96367 TX/PROPH/DG ADDL SEQ IV INF: CPT

## 2021-01-01 PROCEDURE — G0498 CHEMO EXTEND IV INFUS W/PUMP: HCPCS

## 2021-01-01 PROCEDURE — 96411 CHEMO IV PUSH ADDL DRUG: CPT

## 2021-01-01 PROCEDURE — 71260 CT THORAX DX C+: CPT

## 2021-01-01 PROCEDURE — 96413 CHEMO IV INFUSION 1 HR: CPT

## 2021-01-01 PROCEDURE — G1004 CDSM NDSC: HCPCS

## 2021-01-01 PROCEDURE — 74177 CT ABD & PELVIS W/CONTRAST: CPT

## 2021-01-01 PROCEDURE — G0439 PPPS, SUBSEQ VISIT: HCPCS | Performed by: FAMILY MEDICINE

## 2021-01-01 PROCEDURE — 81001 URINALYSIS AUTO W/SCOPE: CPT

## 2021-01-01 PROCEDURE — 85025 COMPLETE CBC W/AUTO DIFF WBC: CPT

## 2021-01-01 PROCEDURE — 96417 CHEMO IV INFUS EACH ADDL SEQ: CPT

## 2021-01-01 PROCEDURE — 85027 COMPLETE CBC AUTOMATED: CPT

## 2021-01-01 PROCEDURE — 36415 COLL VENOUS BLD VENIPUNCTURE: CPT

## 2021-01-01 PROCEDURE — 99215 OFFICE O/P EST HI 40 MIN: CPT | Performed by: PHYSICIAN ASSISTANT

## 2021-01-01 PROCEDURE — 99214 OFFICE O/P EST MOD 30 MIN: CPT | Performed by: INTERNAL MEDICINE

## 2021-01-01 PROCEDURE — 87086 URINE CULTURE/COLONY COUNT: CPT

## 2021-01-01 PROCEDURE — 99214 OFFICE O/P EST MOD 30 MIN: CPT | Performed by: PHYSICIAN ASSISTANT

## 2021-01-01 PROCEDURE — 96375 TX/PRO/DX INJ NEW DRUG ADDON: CPT

## 2021-01-01 PROCEDURE — 84156 ASSAY OF PROTEIN URINE: CPT

## 2021-01-01 PROCEDURE — 99214 OFFICE O/P EST MOD 30 MIN: CPT | Performed by: FAMILY MEDICINE

## 2021-01-01 PROCEDURE — 1124F ACP DISCUSS-NO DSCNMKR DOCD: CPT | Performed by: PHYSICIAN ASSISTANT

## 2021-01-01 PROCEDURE — 96523 IRRIG DRUG DELIVERY DEVICE: CPT

## 2021-01-01 RX ORDER — FLUOROURACIL 50 MG/ML
600 INJECTION, SOLUTION INTRAVENOUS ONCE
Status: COMPLETED | OUTPATIENT
Start: 2021-01-01 | End: 2021-01-01

## 2021-01-01 RX ORDER — DEXTROSE MONOHYDRATE 50 MG/ML
20 INJECTION, SOLUTION INTRAVENOUS ONCE
Status: CANCELLED | OUTPATIENT
Start: 2021-01-01

## 2021-01-01 RX ORDER — SODIUM CHLORIDE 9 MG/ML
20 INJECTION, SOLUTION INTRAVENOUS ONCE AS NEEDED
Status: DISCONTINUED | OUTPATIENT
Start: 2021-01-01 | End: 2021-01-01 | Stop reason: HOSPADM

## 2021-01-01 RX ORDER — DEXTROSE MONOHYDRATE 50 MG/ML
20 INJECTION, SOLUTION INTRAVENOUS ONCE
Status: DISCONTINUED | OUTPATIENT
Start: 2021-01-01 | End: 2021-01-01 | Stop reason: HOSPADM

## 2021-01-01 RX ORDER — SODIUM CHLORIDE 9 MG/ML
20 INJECTION, SOLUTION INTRAVENOUS ONCE
Status: COMPLETED | OUTPATIENT
Start: 2021-01-01 | End: 2021-01-01

## 2021-01-01 RX ORDER — PREDNISONE 10 MG/1
10 TABLET ORAL DAILY
Qty: 30 TABLET | Refills: 0 | Status: SHIPPED | OUTPATIENT
Start: 2021-01-01 | End: 2021-01-01 | Stop reason: SDUPTHER

## 2021-01-01 RX ORDER — SODIUM CHLORIDE 9 MG/ML
20 INJECTION, SOLUTION INTRAVENOUS ONCE
Status: CANCELLED | OUTPATIENT
Start: 2021-01-01

## 2021-01-01 RX ORDER — ATROPINE SULFATE 1 MG/ML
0.25 INJECTION, SOLUTION INTRAMUSCULAR; INTRAVENOUS; SUBCUTANEOUS ONCE AS NEEDED
Status: DISCONTINUED | OUTPATIENT
Start: 2021-01-01 | End: 2021-01-01 | Stop reason: HOSPADM

## 2021-01-01 RX ORDER — FLUOROURACIL 50 MG/ML
600 INJECTION, SOLUTION INTRAVENOUS ONCE
Status: CANCELLED | OUTPATIENT
Start: 2021-01-01

## 2021-01-01 RX ORDER — DEXTROSE MONOHYDRATE 50 MG/ML
20 INJECTION, SOLUTION INTRAVENOUS ONCE
Status: COMPLETED | OUTPATIENT
Start: 2021-01-01 | End: 2021-01-01

## 2021-01-01 RX ORDER — SODIUM CHLORIDE 9 MG/ML
20 INJECTION, SOLUTION INTRAVENOUS ONCE AS NEEDED
Status: CANCELLED | OUTPATIENT
Start: 2021-01-01

## 2021-01-01 RX ORDER — PREDNISONE 10 MG/1
10 TABLET ORAL DAILY
Qty: 30 TABLET | Refills: 2 | Status: SHIPPED | OUTPATIENT
Start: 2021-01-01 | End: 2022-01-01 | Stop reason: SDUPTHER

## 2021-01-01 RX ORDER — ONDANSETRON 4 MG/1
4 TABLET, FILM COATED ORAL EVERY 8 HOURS PRN
Qty: 20 TABLET | Refills: 2 | Status: SHIPPED | OUTPATIENT
Start: 2021-01-01 | End: 2022-01-01 | Stop reason: HOSPADM

## 2021-01-01 RX ORDER — FLUOROURACIL 50 MG/ML
300 INJECTION, SOLUTION INTRAVENOUS ONCE
Status: COMPLETED | OUTPATIENT
Start: 2021-01-01 | End: 2021-01-01

## 2021-01-01 RX ORDER — ATROPINE SULFATE 1 MG/ML
0.25 INJECTION, SOLUTION INTRAMUSCULAR; INTRAVENOUS; SUBCUTANEOUS ONCE
Status: CANCELLED | OUTPATIENT
Start: 2021-01-01

## 2021-01-01 RX ORDER — FLUOROURACIL 50 MG/ML
300 INJECTION, SOLUTION INTRAVENOUS ONCE
Status: CANCELLED | OUTPATIENT
Start: 2022-01-01

## 2021-01-01 RX ORDER — FLUOROURACIL 50 MG/ML
300 INJECTION, SOLUTION INTRAVENOUS ONCE
Status: CANCELLED | OUTPATIENT
Start: 2021-01-01

## 2021-01-01 RX ORDER — ATROPINE SULFATE 1 MG/ML
0.25 INJECTION, SOLUTION INTRAMUSCULAR; INTRAVENOUS; SUBCUTANEOUS ONCE
Status: COMPLETED | OUTPATIENT
Start: 2021-01-01 | End: 2021-01-01

## 2021-01-01 RX ORDER — LEVOFLOXACIN 500 MG/1
500 TABLET, FILM COATED ORAL EVERY 24 HOURS
Qty: 7 TABLET | Refills: 0 | Status: SHIPPED | OUTPATIENT
Start: 2021-01-01 | End: 2021-01-01

## 2021-01-01 RX ORDER — ATROPINE SULFATE 1 MG/ML
0.25 INJECTION, SOLUTION INTRAMUSCULAR; INTRAVENOUS; SUBCUTANEOUS ONCE AS NEEDED
Status: CANCELLED | OUTPATIENT
Start: 2021-01-01

## 2021-01-01 RX ORDER — SODIUM CHLORIDE 9 MG/ML
20 INJECTION, SOLUTION INTRAVENOUS ONCE
Status: CANCELLED | OUTPATIENT
Start: 2022-01-01

## 2021-01-01 RX ORDER — ATROPINE SULFATE 1 MG/ML
0.25 INJECTION, SOLUTION INTRAMUSCULAR; INTRAVENOUS; SUBCUTANEOUS ONCE AS NEEDED
Status: CANCELLED | OUTPATIENT
Start: 2022-01-01

## 2021-01-01 RX ORDER — FLUOROURACIL 50 MG/ML
700 INJECTION, SOLUTION INTRAVENOUS ONCE
Status: CANCELLED | OUTPATIENT
Start: 2021-01-01

## 2021-01-01 RX ORDER — LOSARTAN POTASSIUM 25 MG/1
TABLET ORAL
Qty: 90 TABLET | Refills: 1 | Status: SHIPPED | OUTPATIENT
Start: 2021-01-01 | End: 2022-01-01 | Stop reason: HOSPADM

## 2021-01-01 RX ORDER — ATORVASTATIN CALCIUM 40 MG/1
40 TABLET, FILM COATED ORAL DAILY
Qty: 90 TABLET | Refills: 1 | Status: SHIPPED | OUTPATIENT
Start: 2021-01-01 | End: 2022-01-01 | Stop reason: HOSPADM

## 2021-01-01 RX ORDER — DUTASTERIDE 0.5 MG/1
0.5 CAPSULE, LIQUID FILLED ORAL
Qty: 90 CAPSULE | Refills: 1 | Status: SHIPPED | OUTPATIENT
Start: 2021-01-01 | End: 2022-01-01 | Stop reason: HOSPADM

## 2021-01-01 RX ADMIN — TBO-FILGRASTIM 300 MCG: 300 INJECTION, SOLUTION SUBCUTANEOUS at 12:38

## 2021-01-01 RX ADMIN — FLUOROURACIL 600 MG: 50 INJECTION, SOLUTION INTRAVENOUS at 12:00

## 2021-01-01 RX ADMIN — SODIUM CHLORIDE 20 ML/HR: 0.9 INJECTION, SOLUTION INTRAVENOUS at 09:28

## 2021-01-01 RX ADMIN — OXALIPLATIN 124.15 MG: 5 INJECTION, SOLUTION INTRAVENOUS at 11:42

## 2021-01-01 RX ADMIN — TBO-FILGRASTIM 300 MCG: 300 INJECTION, SOLUTION SUBCUTANEOUS at 11:37

## 2021-01-01 RX ADMIN — FLUOROURACIL 545 MG: 50 INJECTION, SOLUTION INTRAVENOUS at 12:24

## 2021-01-01 RX ADMIN — DEXAMETHASONE SODIUM PHOSPHATE: 10 INJECTION, SOLUTION INTRAMUSCULAR; INTRAVENOUS at 08:50

## 2021-01-01 RX ADMIN — BEVACIZUMAB 352.5 MG: 400 INJECTION, SOLUTION INTRAVENOUS at 09:25

## 2021-01-01 RX ADMIN — DEXTROSE 20 ML/HR: 5 SOLUTION INTRAVENOUS at 10:54

## 2021-01-01 RX ADMIN — SODIUM CHLORIDE 20 ML/HR: 0.9 INJECTION, SOLUTION INTRAVENOUS at 09:08

## 2021-01-01 RX ADMIN — LEUCOVORIN CALCIUM 600 MG: 200 INJECTION, POWDER, LYOPHILIZED, FOR SUSPENSION INTRAMUSCULAR; INTRAVENOUS at 10:08

## 2021-01-01 RX ADMIN — IRINOTECAN HYDROCHLORIDE 326 MG: 20 INJECTION, SOLUTION INTRAVENOUS at 10:59

## 2021-01-01 RX ADMIN — DEXTROSE 20 ML/HR: 5 SOLUTION INTRAVENOUS at 11:17

## 2021-01-01 RX ADMIN — LEUCOVORIN CALCIUM 600 MG: 200 INJECTION, POWDER, LYOPHILIZED, FOR SUSPENSION INTRAMUSCULAR; INTRAVENOUS at 11:42

## 2021-01-01 RX ADMIN — FLUOROURACIL 600 MG: 50 INJECTION, SOLUTION INTRAVENOUS at 13:06

## 2021-01-01 RX ADMIN — FLUOROURACIL 600 MG: 50 INJECTION, SOLUTION INTRAVENOUS at 13:04

## 2021-01-01 RX ADMIN — TBO-FILGRASTIM 300 MCG: 300 INJECTION, SOLUTION SUBCUTANEOUS at 13:19

## 2021-01-01 RX ADMIN — SODIUM CHLORIDE 20 ML/HR: 0.9 INJECTION, SOLUTION INTRAVENOUS at 08:30

## 2021-01-01 RX ADMIN — TBO-FILGRASTIM 300 MCG: 300 INJECTION, SOLUTION SUBCUTANEOUS at 08:11

## 2021-01-01 RX ADMIN — DEXAMETHASONE SODIUM PHOSPHATE: 10 INJECTION, SOLUTION INTRAMUSCULAR; INTRAVENOUS at 09:28

## 2021-01-01 RX ADMIN — DEXAMETHASONE SODIUM PHOSPHATE: 10 INJECTION, SOLUTION INTRAMUSCULAR; INTRAVENOUS at 09:03

## 2021-01-01 RX ADMIN — LEUCOVORIN CALCIUM 600 MG: 200 INJECTION, POWDER, LYOPHILIZED, FOR SUSPENSION INTRAMUSCULAR; INTRAVENOUS at 10:56

## 2021-01-01 RX ADMIN — LEUCOVORIN CALCIUM 600 MG: 200 INJECTION, POWDER, LYOPHILIZED, FOR SUSPENSION INTRAMUSCULAR; INTRAVENOUS at 12:01

## 2021-01-01 RX ADMIN — DEXAMETHASONE SODIUM PHOSPHATE: 10 INJECTION, SOLUTION INTRAMUSCULAR; INTRAVENOUS at 09:53

## 2021-01-01 RX ADMIN — IOHEXOL 100 ML: 350 INJECTION, SOLUTION INTRAVENOUS at 08:34

## 2021-01-01 RX ADMIN — DEXAMETHASONE SODIUM PHOSPHATE: 10 INJECTION, SOLUTION INTRAMUSCULAR; INTRAVENOUS at 09:54

## 2021-01-01 RX ADMIN — TBO-FILGRASTIM 300 MCG: 300 INJECTION, SOLUTION SUBCUTANEOUS at 13:21

## 2021-01-01 RX ADMIN — DEXAMETHASONE SODIUM PHOSPHATE: 10 INJECTION INTRAMUSCULAR; INTRAVENOUS at 12:34

## 2021-01-01 RX ADMIN — BEVACIZUMAB 352.5 MG: 400 INJECTION, SOLUTION INTRAVENOUS at 10:00

## 2021-01-01 RX ADMIN — SODIUM CHLORIDE 20 ML/HR: 0.9 INJECTION, SOLUTION INTRAVENOUS at 08:50

## 2021-01-01 RX ADMIN — BEVACIZUMAB 352.5 MG: 400 INJECTION, SOLUTION INTRAVENOUS at 09:32

## 2021-01-01 RX ADMIN — BEVACIZUMAB 352.5 MG: 400 INJECTION, SOLUTION INTRAVENOUS at 10:31

## 2021-01-01 RX ADMIN — FLUOROURACIL 545 MG: 50 INJECTION, SOLUTION INTRAVENOUS at 16:17

## 2021-01-01 RX ADMIN — DEXAMETHASONE SODIUM PHOSPHATE: 10 INJECTION, SOLUTION INTRAMUSCULAR; INTRAVENOUS at 09:11

## 2021-01-01 RX ADMIN — PEGFILGRASTIM 6 MG: KIT SUBCUTANEOUS at 11:11

## 2021-01-01 RX ADMIN — DEXTROSE 20 ML/HR: 5 SOLUTION INTRAVENOUS at 11:30

## 2021-01-01 RX ADMIN — TBO-FILGRASTIM 300 MCG: 300 INJECTION, SOLUTION SUBCUTANEOUS at 11:00

## 2021-01-01 RX ADMIN — IRINOTECAN HYDROCHLORIDE 340 MG: 20 INJECTION, SOLUTION INTRAVENOUS at 10:41

## 2021-01-01 RX ADMIN — TBO-FILGRASTIM 300 MCG: 300 INJECTION, SOLUTION SUBCUTANEOUS at 10:02

## 2021-01-01 RX ADMIN — FLUOROURACIL 600 MG: 50 INJECTION, SOLUTION INTRAVENOUS at 13:49

## 2021-01-01 RX ADMIN — FLUOROURACIL 600 MG: 50 INJECTION, SOLUTION INTRAVENOUS at 13:25

## 2021-01-01 RX ADMIN — DEXTROSE 20 ML/HR: 5 SOLUTION INTRAVENOUS at 11:29

## 2021-01-01 RX ADMIN — BEVACIZUMAB 352.5 MG: 400 INJECTION, SOLUTION INTRAVENOUS at 11:03

## 2021-01-01 RX ADMIN — DEXAMETHASONE SODIUM PHOSPHATE: 10 INJECTION, SOLUTION INTRAMUSCULAR; INTRAVENOUS at 08:43

## 2021-01-01 RX ADMIN — FLUOROURACIL 600 MG: 50 INJECTION, SOLUTION INTRAVENOUS at 12:12

## 2021-01-01 RX ADMIN — PEGFILGRASTIM 6 MG: KIT SUBCUTANEOUS at 09:53

## 2021-01-01 RX ADMIN — BEVACIZUMAB 352.5 MG: 400 INJECTION, SOLUTION INTRAVENOUS at 10:43

## 2021-01-01 RX ADMIN — SODIUM CHLORIDE 20 ML/HR: 0.9 INJECTION, SOLUTION INTRAVENOUS at 09:54

## 2021-01-01 RX ADMIN — DEXAMETHASONE SODIUM PHOSPHATE: 10 INJECTION, SOLUTION INTRAMUSCULAR; INTRAVENOUS at 09:08

## 2021-01-01 RX ADMIN — DEXAMETHASONE SODIUM PHOSPHATE: 10 INJECTION, SOLUTION INTRAMUSCULAR; INTRAVENOUS at 10:00

## 2021-01-01 RX ADMIN — DEXAMETHASONE SODIUM PHOSPHATE: 10 INJECTION, SOLUTION INTRAMUSCULAR; INTRAVENOUS at 10:40

## 2021-01-01 RX ADMIN — LEUCOVORIN CALCIUM 543 MG: 100 INJECTION, POWDER, LYOPHILIZED, FOR SUSPENSION INTRAMUSCULAR; INTRAVENOUS at 10:16

## 2021-01-01 RX ADMIN — TBO-FILGRASTIM 300 MCG: 300 INJECTION, SOLUTION SUBCUTANEOUS at 14:35

## 2021-01-01 RX ADMIN — OXALIPLATIN 124.15 MG: 5 INJECTION, SOLUTION INTRAVENOUS at 11:17

## 2021-01-01 RX ADMIN — LEUCOVORIN CALCIUM 600 MG: 200 INJECTION, POWDER, LYOPHILIZED, FOR SUSPENSION INTRAMUSCULAR; INTRAVENOUS at 10:18

## 2021-01-01 RX ADMIN — BEVACIZUMAB 330 MG: 400 INJECTION, SOLUTION INTRAVENOUS at 08:56

## 2021-01-01 RX ADMIN — FLUOROURACIL 600 MG: 50 INJECTION, SOLUTION INTRAVENOUS at 13:45

## 2021-01-01 RX ADMIN — SODIUM CHLORIDE 20 ML/HR: 0.9 INJECTION, SOLUTION INTRAVENOUS at 08:43

## 2021-01-01 RX ADMIN — FLUOROURACIL 600 MG: 50 INJECTION, SOLUTION INTRAVENOUS at 13:42

## 2021-01-01 RX ADMIN — SODIUM CHLORIDE 20 ML/HR: 0.9 INJECTION, SOLUTION INTRAVENOUS at 10:00

## 2021-01-01 RX ADMIN — TBO-FILGRASTIM 300 MCG: 300 INJECTION, SOLUTION SUBCUTANEOUS at 10:17

## 2021-01-01 RX ADMIN — LEUCOVORIN CALCIUM 543 MG: 100 INJECTION, POWDER, LYOPHILIZED, FOR SUSPENSION INTRAMUSCULAR; INTRAVENOUS at 10:41

## 2021-01-01 RX ADMIN — BEVACIZUMAB 352.5 MG: 400 INJECTION, SOLUTION INTRAVENOUS at 10:19

## 2021-01-01 RX ADMIN — TBO-FILGRASTIM 300 MCG: 300 INJECTION, SOLUTION SUBCUTANEOUS at 09:04

## 2021-01-01 RX ADMIN — DEXTROSE 20 ML/HR: 5 SOLUTION INTRAVENOUS at 11:42

## 2021-01-01 RX ADMIN — LEUCOVORIN CALCIUM 600 MG: 200 INJECTION, POWDER, LYOPHILIZED, FOR SOLUTION INTRAMUSCULAR; INTRAVENOUS at 11:33

## 2021-01-01 RX ADMIN — LEUCOVORIN CALCIUM 543 MG: 200 INJECTION, POWDER, LYOPHILIZED, FOR SUSPENSION INTRAMUSCULAR; INTRAVENOUS at 14:35

## 2021-01-01 RX ADMIN — IOHEXOL 100 ML: 350 INJECTION, SOLUTION INTRAVENOUS at 16:14

## 2021-01-01 RX ADMIN — IRINOTECAN HYDROCHLORIDE 340 MG: 20 INJECTION, SOLUTION INTRAVENOUS at 10:16

## 2021-01-01 RX ADMIN — DEXAMETHASONE SODIUM PHOSPHATE: 10 INJECTION, SOLUTION INTRAMUSCULAR; INTRAVENOUS at 09:22

## 2021-01-01 RX ADMIN — LEUCOVORIN CALCIUM 600 MG: 200 INJECTION, POWDER, LYOPHILIZED, FOR SUSPENSION INTRAMUSCULAR; INTRAVENOUS at 10:53

## 2021-01-01 RX ADMIN — DEXAMETHASONE SODIUM PHOSPHATE: 10 INJECTION INTRAMUSCULAR; INTRAVENOUS at 09:39

## 2021-01-01 RX ADMIN — LEUCOVORIN CALCIUM 600 MG: 200 INJECTION, POWDER, LYOPHILIZED, FOR SUSPENSION INTRAMUSCULAR; INTRAVENOUS at 11:17

## 2021-01-01 RX ADMIN — TBO-FILGRASTIM 300 MCG: 300 INJECTION, SOLUTION SUBCUTANEOUS at 09:39

## 2021-01-01 RX ADMIN — BEVACIZUMAB 330 MG: 400 INJECTION, SOLUTION INTRAVENOUS at 13:43

## 2021-01-01 RX ADMIN — BEVACIZUMAB 352.5 MG: 400 INJECTION, SOLUTION INTRAVENOUS at 09:36

## 2021-01-01 RX ADMIN — FLUOROURACIL 600 MG: 50 INJECTION, SOLUTION INTRAVENOUS at 12:55

## 2021-01-01 RX ADMIN — SODIUM CHLORIDE 20 ML/HR: 0.9 INJECTION, SOLUTION INTRAVENOUS at 09:11

## 2021-01-01 RX ADMIN — SODIUM CHLORIDE 20 ML/HR: 0.9 INJECTION, SOLUTION INTRAVENOUS at 10:39

## 2021-01-01 RX ADMIN — FLUOROURACIL 600 MG: 50 INJECTION, SOLUTION INTRAVENOUS at 12:15

## 2021-01-01 RX ADMIN — SODIUM CHLORIDE 20 ML/HR: 0.9 INJECTION, SOLUTION INTRAVENOUS at 09:47

## 2021-01-01 RX ADMIN — SODIUM CHLORIDE 20 ML/HR: 0.9 INJECTION, SOLUTION INTRAVENOUS at 08:40

## 2021-01-01 RX ADMIN — FLUOROURACIL 545 MG: 50 INJECTION, SOLUTION INTRAVENOUS at 12:37

## 2021-01-01 RX ADMIN — FLUOROURACIL 545 MG: 50 INJECTION, SOLUTION INTRAVENOUS at 11:49

## 2021-01-01 RX ADMIN — SODIUM CHLORIDE 20 ML/HR: 0.9 INJECTION, SOLUTION INTRAVENOUS at 09:39

## 2021-01-01 RX ADMIN — ATROPINE SULFATE 0.25 MG: 1 INJECTION, SOLUTION INTRAMUSCULAR; INTRAVENOUS; SUBCUTANEOUS at 10:09

## 2021-01-01 RX ADMIN — OXALIPLATIN 124.15 MG: 5 INJECTION, SOLUTION INTRAVENOUS at 10:58

## 2021-01-01 RX ADMIN — IRINOTECAN HYDROCHLORIDE 340 MG: 20 INJECTION, SOLUTION INTRAVENOUS at 14:35

## 2021-01-01 RX ADMIN — BEVACIZUMAB 330 MG: 400 INJECTION, SOLUTION INTRAVENOUS at 10:15

## 2021-01-01 RX ADMIN — TBO-FILGRASTIM 300 MCG: 300 INJECTION, SOLUTION SUBCUTANEOUS at 09:52

## 2021-01-01 RX ADMIN — OXALIPLATIN 124.15 MG: 5 INJECTION, SOLUTION INTRAVENOUS at 11:33

## 2021-01-01 RX ADMIN — BEVACIZUMAB 330 MG: 400 INJECTION, SOLUTION INTRAVENOUS at 09:57

## 2021-01-01 RX ADMIN — BEVACIZUMAB 352.5 MG: 400 INJECTION, SOLUTION INTRAVENOUS at 09:27

## 2021-01-01 RX ADMIN — TBO-FILGRASTIM 300 MCG: 300 INJECTION, SOLUTION SUBCUTANEOUS at 09:58

## 2021-01-01 RX ADMIN — DEXAMETHASONE SODIUM PHOSPHATE: 10 INJECTION, SOLUTION INTRAMUSCULAR; INTRAVENOUS at 08:32

## 2021-01-01 RX ADMIN — ATROPINE SULFATE 0.25 MG: 1 INJECTION, SOLUTION INTRAMUSCULAR; INTRAVENOUS; SUBCUTANEOUS at 10:37

## 2021-01-01 RX ADMIN — LEUCOVORIN CALCIUM 600 MG: 200 INJECTION, POWDER, LYOPHILIZED, FOR SUSPENSION INTRAMUSCULAR; INTRAVENOUS at 10:17

## 2021-01-01 RX ADMIN — LEUCOVORIN CALCIUM 543 MG: 100 INJECTION, POWDER, LYOPHILIZED, FOR SUSPENSION INTRAMUSCULAR; INTRAVENOUS at 10:59

## 2021-01-01 RX ADMIN — SODIUM CHLORIDE 20 ML/HR: 0.9 INJECTION, SOLUTION INTRAVENOUS at 12:32

## 2021-01-01 RX ADMIN — SODIUM CHLORIDE 20 ML/HR: 0.9 INJECTION, SOLUTION INTRAVENOUS at 09:00

## 2021-01-01 RX ADMIN — BEVACIZUMAB 352.5 MG: 400 INJECTION, SOLUTION INTRAVENOUS at 10:04

## 2021-01-04 LAB — SCAN RESULT: NORMAL

## 2021-01-05 ENCOUNTER — TELEPHONE (OUTPATIENT)
Dept: SURGICAL ONCOLOGY | Facility: CLINIC | Age: 76
End: 2021-01-05

## 2021-01-05 ENCOUNTER — TELEPHONE (OUTPATIENT)
Dept: HEMATOLOGY ONCOLOGY | Facility: CLINIC | Age: 76
End: 2021-01-05

## 2021-01-05 NOTE — TELEPHONE ENCOUNTER
Dr Tam called to report biopsy for patient has been completed   Dr Tam reports patient does show he is positive for colon cancer   Elizabeth Naidu  Report is in epic

## 2021-01-05 NOTE — TELEPHONE ENCOUNTER
New Patient GI Form   Patient Details:  [facility]  1945  [id number]     Background Information:  [address] starts by opening a telephone encounter and gathering the following information   Who is calling to schedule and relationship?  self   To which speciality is the referral?  Surgical Oncology   Reason for Visit? new   Tumor Type?  colon ca   Is there a confimed diagnosis from biopsy/tissue reviewed by Pathology? Yes   Date of Tissue Diagnosis  (If done outside of [facility] please obtain report and slides)  (If no tissue diagnosis, please stop and discuss with Navigator prior to scheduling)  12/31   Is patient aware of the diagnosis? Yes   Has Imaging been completed? Yes   If YES, where was the imaging done? (If outside [facility] obtain records)  [facility]   Have any endoscopies been done (colonoscopy, EGD, EUS)  Yes   If YES where were they performed? (If outside of [facility] obtain the records)  [facility]   Has blood work been done? Yes   If YES, where was the blood work done? (If outside of [facility] obtain records)  [facility]   Is there a personal history of cancer? (If YES please list type)  No   Is there a family history of cancer? (If YES please list type)  No   Scheduling Information:   Preferred [facility]   Are there any days the patient cannot be seen? na   Miscellaneous: Received email from MADHU Keith to schedule pt with Dr Phil Sharif on Thursday 1/7  Appointment was confirmed with pt  After completing the above information, please route to finance, nurse navigation and clinical trials for review

## 2021-01-06 ENCOUNTER — APPOINTMENT (OUTPATIENT)
Dept: LAB | Facility: CLINIC | Age: 76
End: 2021-01-06
Payer: MEDICARE

## 2021-01-06 DIAGNOSIS — C17.9 SMALL BOWEL CANCER (HCC): ICD-10-CM

## 2021-01-06 PROBLEM — Z45.2 ENCOUNTER FOR CARE RELATED TO VASCULAR ACCESS PORT: Status: ACTIVE | Noted: 2021-01-06

## 2021-01-06 LAB
ALBUMIN SERPL BCP-MCNC: 3.1 G/DL (ref 3.5–5)
ALP SERPL-CCNC: 114 U/L (ref 46–116)
ALT SERPL W P-5'-P-CCNC: 19 U/L (ref 12–78)
ANION GAP SERPL CALCULATED.3IONS-SCNC: 4 MMOL/L (ref 4–13)
AST SERPL W P-5'-P-CCNC: 23 U/L (ref 5–45)
BASOPHILS # BLD AUTO: 0.07 THOUSANDS/ΜL (ref 0–0.1)
BASOPHILS NFR BLD AUTO: 1 % (ref 0–1)
BILIRUB SERPL-MCNC: 0.32 MG/DL (ref 0.2–1)
BUN SERPL-MCNC: 18 MG/DL (ref 5–25)
CALCIUM ALBUM COR SERPL-MCNC: 9.9 MG/DL (ref 8.3–10.1)
CALCIUM SERPL-MCNC: 9.2 MG/DL (ref 8.3–10.1)
CHLORIDE SERPL-SCNC: 107 MMOL/L (ref 100–108)
CO2 SERPL-SCNC: 29 MMOL/L (ref 21–32)
CREAT SERPL-MCNC: 1.15 MG/DL (ref 0.6–1.3)
EOSINOPHIL # BLD AUTO: 0.25 THOUSAND/ΜL (ref 0–0.61)
EOSINOPHIL NFR BLD AUTO: 4 % (ref 0–6)
ERYTHROCYTE [DISTWIDTH] IN BLOOD BY AUTOMATED COUNT: 16.5 % (ref 11.6–15.1)
GFR SERPL CREATININE-BSD FRML MDRD: 61 ML/MIN/1.73SQ M
GLUCOSE P FAST SERPL-MCNC: 86 MG/DL (ref 65–99)
HCT VFR BLD AUTO: 38.4 % (ref 36.5–49.3)
HGB BLD-MCNC: 11.6 G/DL (ref 12–17)
IMM GRANULOCYTES # BLD AUTO: 0.03 THOUSAND/UL (ref 0–0.2)
IMM GRANULOCYTES NFR BLD AUTO: 1 % (ref 0–2)
LYMPHOCYTES # BLD AUTO: 1.5 THOUSANDS/ΜL (ref 0.6–4.47)
LYMPHOCYTES NFR BLD AUTO: 24 % (ref 14–44)
MCH RBC QN AUTO: 26.2 PG (ref 26.8–34.3)
MCHC RBC AUTO-ENTMCNC: 30.2 G/DL (ref 31.4–37.4)
MCV RBC AUTO: 87 FL (ref 82–98)
MONOCYTES # BLD AUTO: 0.81 THOUSAND/ΜL (ref 0.17–1.22)
MONOCYTES NFR BLD AUTO: 13 % (ref 4–12)
NEUTROPHILS # BLD AUTO: 3.66 THOUSANDS/ΜL (ref 1.85–7.62)
NEUTS SEG NFR BLD AUTO: 57 % (ref 43–75)
NRBC BLD AUTO-RTO: 0 /100 WBCS
PLATELET # BLD AUTO: 239 THOUSANDS/UL (ref 149–390)
PMV BLD AUTO: 10.9 FL (ref 8.9–12.7)
POTASSIUM SERPL-SCNC: 4.2 MMOL/L (ref 3.5–5.3)
PROT SERPL-MCNC: 7 G/DL (ref 6.4–8.2)
RBC # BLD AUTO: 4.43 MILLION/UL (ref 3.88–5.62)
SODIUM SERPL-SCNC: 140 MMOL/L (ref 136–145)
WBC # BLD AUTO: 6.32 THOUSAND/UL (ref 4.31–10.16)

## 2021-01-06 PROCEDURE — 85025 COMPLETE CBC W/AUTO DIFF WBC: CPT

## 2021-01-06 PROCEDURE — 80053 COMPREHEN METABOLIC PANEL: CPT

## 2021-01-06 PROCEDURE — 36415 COLL VENOUS BLD VENIPUNCTURE: CPT

## 2021-01-07 ENCOUNTER — OFFICE VISIT (OUTPATIENT)
Dept: HEMATOLOGY ONCOLOGY | Facility: CLINIC | Age: 76
End: 2021-01-07
Payer: MEDICARE

## 2021-01-07 ENCOUNTER — TELEPHONE (OUTPATIENT)
Dept: HEMATOLOGY ONCOLOGY | Facility: CLINIC | Age: 76
End: 2021-01-07

## 2021-01-07 DIAGNOSIS — C78.00 MALIGNANT NEOPLASM METASTATIC TO LUNG, UNSPECIFIED LATERALITY (HCC): ICD-10-CM

## 2021-01-07 DIAGNOSIS — D70.1 CHEMOTHERAPY INDUCED NEUTROPENIA (HCC): ICD-10-CM

## 2021-01-07 DIAGNOSIS — C17.9 SMALL BOWEL CANCER (HCC): Primary | ICD-10-CM

## 2021-01-07 DIAGNOSIS — C88.4 MALT (MUCOSA ASSOCIATED LYMPHOID TISSUE) (HCC): ICD-10-CM

## 2021-01-07 DIAGNOSIS — T45.1X5A CHEMOTHERAPY INDUCED NEUTROPENIA (HCC): ICD-10-CM

## 2021-01-07 DIAGNOSIS — T45.1X5A CHEMOTHERAPY-INDUCED NAUSEA: ICD-10-CM

## 2021-01-07 DIAGNOSIS — R11.0 CHEMOTHERAPY-INDUCED NAUSEA: ICD-10-CM

## 2021-01-07 DIAGNOSIS — C78.6 MALIGNANT NEOPLASM METASTATIC TO PERITONEUM (HCC): ICD-10-CM

## 2021-01-07 PROBLEM — C80.1: Status: ACTIVE | Noted: 2021-01-07

## 2021-01-07 PROCEDURE — 99214 OFFICE O/P EST MOD 30 MIN: CPT | Performed by: INTERNAL MEDICINE

## 2021-01-07 NOTE — PROGRESS NOTES
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  He has multiple lung lesions bilaterally  Biopsy was taken from lung lesion and omentum  All 4 MMR proteins were intact and less likely to be MSI high  Specimen from lung biopsy has been will be sent to [facility] and if MSI high could have Keytruda otherwise chemotherapy  He wants to proceed with chemotherapy and not wait for [facility]  We discussed FOLFOX chemotherapy and to that forth drug could be added depending upon results from [place] family and [facility] [place]     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  Patient received antibiotic therapy for H pylori  Patient has Port-A-Cath  Last year he started to have shortness of breath with exertion and was found to have iron deficiency anemia and was treated with oral iron and symptoms improved some  He also had cardiac workup for shortness of breath and he states there was no problem with his heart  This year  shortness of breath again and weight loss  He had CT scans and that showed bilateral lung nodules, thickening of wall of jejunum and mesenteric mass/lymphadenopathy and additional omental masses  On 10/27/2020 patient had EGD and colonoscopy and 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  Negative H pylori and in spite of that patient was given antibiotic therapy against H pylori because of MALT lymphoma  On 12/09/2020 he had needle biopsy of left upper lung nodule and that came back adenocarcinoma more consistent with colorectal cancer  Colonoscopy had shown polyps but no cancer  Patient had needle biopsy of omental mass and that also showed adenocarcinoma      He has some weakness and tiredness, exertional dyspnea and weight loss  He states weight has stabilized now    He has some discomfort in the right subcostal area near the epigastrium                       Current Outpatient Medications:     aspirin (ECOTRIN LOW STRENGTH) 81 mg EC tablet, Take 81 mg by mouth daily, Disp: , Rfl:     atorvastatin (LIPITOR) 40 mg tablet, Take 1 tablet (40 mg total) by mouth daily, Disp: 90 tablet, Rfl: 1    chlordiazepoxide-clidinium (LIBRAX) 5-2 5 mg per capsule, Take 1 capsule by mouth 3 (three) times a day as needed (Abdominal Pain), Disp: 90 capsule, Rfl: 1    Cholecalciferol (VITAMIN D3) 2000 units TABS, Take 2,000 Units by mouth daily, Disp: , Rfl:     cyanocobalamin (VITAMIN B-12) 1,000 mcg tablet, Take 1,000 mcg by mouth daily, Disp: , Rfl:     dicyclomine (BENTYL) 20 mg tablet, Take 1 tablet (20 mg total) by mouth every 6 (six) hours, Disp: 90 tablet, Rfl: 3    dutasteride (AVODART) 0 5 mg capsule, Take 1 capsule (0 5 mg total) by mouth daily at bedtime, Disp: 90 capsule, Rfl: 1    esomeprazole (NexIUM) 40 MG capsule, Take 1 capsule (40 mg total) by mouth daily (Patient not taking: Reported on 12/24/2020), Disp: 30 capsule, Rfl: 2    esomeprazole (NexIUM) 40 MG capsule, Take 1 capsule (40 mg total) by mouth 2 (two) times a day before meals for 14 days (Patient not taking: Reported on 12/24/2020), Disp: 28 capsule, Rfl: 0    ferrous sulfate 325 (65 Fe) mg tablet, Take 325 mg by mouth 2 (two) times a day with meals, Disp: , Rfl:     [START ON 1/11/2021] fluorouracil 4,585 mg in CADD infusion pump, Infuse 4,585 mg (1,200 mg/m2/day x 1 91 m2 (Treatment plan recorded BSA)) into a venous catheter over 46 hours for 2 days, Disp: 1 Device, Rfl: 0    losartan (COZAAR) 25 mg tablet, TAKE ONE TABLET DAILY, Disp: 90 tablet, Rfl: 1    metoprolol tartrate (LOPRESSOR) 25 mg tablet, Take 0 5 tablets (12 5 mg total) by mouth 2 (two) times a day, Disp: 180 tablet, Rfl: 1    Multiple Vitamin (MULTIVITAMIN) tablet, Take 1 tablet by mouth daily, Disp: , Rfl:     Omega-3 Fatty Acids (FISH OIL) 1,000 mg, Take 1,000 mg by mouth daily, Disp: , Rfl:     ondansetron (ZOFRAN) 4 mg tablet, Take 1 tablet (4 mg total) by mouth every 8 (eight) hours as needed for nausea or vomiting, Disp: 20 tablet, Rfl: 2    Allergies   Allergen Reactions    Other Other (See Comments)     Liquid lidocaine - couldn't swallow, panicked       Oncology History   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[facility] [address] )   12/17/2020 Initial Diagnosis    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[facility] [address] )     1/11/2021 -  Chemotherapy    fluorouracil (ADRUCIL) injection 800 mg, 765 mg, Intravenous, Once, 0 of 12 cycles  leucovorin 800 mg in dextrose 5 % 250 mL IVPB, 764 mg, Intravenous, Once, 0 of 12 cycles  oxaliplatin (ELOXATIN) 162 35 mg in dextrose 5 % 250 mL chemo infusion, 85 mg/m2 = 162 35 mg, Intravenous, Once, 0 of 12 cycles         ROS:  01/07/21 Reviewed 13 systems: See symptoms in HPI  Presently no other neurological, cardiac, pulmonary, GI and  symptoms other than listed in HPI     Other symptoms are in HPI  No other symptoms like  fever, chills, bleeding, bone pains, skin rash,  numbness,  claudication and gait problem  No frequent infections  Not unusually sensitive to heat or cold  No swelling of the ankles  No swollen glands  Patient is anxious  There were no vitals taken for this visit  Physical Exam:  Alert, oriented, not in distress, no icterus, no oral thrush, no palpable neck mass, clear lung fields, regular heart rate, abdomen  soft and non tender, slight fullness in the upper abdomen on the right side of epigastrium, no ascites, no edema of ankles, no calf tenderness, no focal neurological deficit, no skin rash, no palpable lymphadenopathy, good arterial pulses, no clubbing  Patient is anxious  Performance status 1      IMAGING:      LABS:  Results for orders placed or performed during the hospital encounter of 12/31/20   Leukemia/Lymphoma flow cytometry   Result Value Ref Range    Scan Result SEE WRITTEN REPORT    Tissue Exam   Result Value Ref Range    Case Report       Surgical Pathology Report                         Case: [id number] Authorizing Provider:  Shayy Hopper MD        Collected:           12/31/2020 0947              Ordering Location:     [address] [place] [address]      Received:            12/31/2020 [facility] CAT Scan                                                            Pathologist:           Alem Newell MD                                                                Specimen:    Omentum, omental mass biopsy                                                               Final Diagnosis       A  Omental mass (biopsy):     - Rare atypical glandular cells in a background of mucin, compatible with the patient's known metastatic intestinal adenocarcinoma  Comment:  - The recent diagnosis of intestinal adenocarcinoma involving the left lung is noted ([id number])  - Marked thickening of the jejunum is noted on imaging studies, representing the likely site of origin  Preliminary Diagnosis       A  Omental mass (biopsy):     - Rare atypical cells in a background of abundant mucin, pending additional studies  Microscopic Description       - Flow cytometric analysis cancelled due to lack of viable cells  - Immunohistochemical studies (with appropriate controls) demonstrate:     - Positive: CK20, CDX2, SATB2  Note       - Portion of CT report from 11/20/2020, "There is a short segment of jejunal bowel wall thickening, with adjacent mesenteric adenopathy, highly suspicious for malignancy  Both 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 pathology, and small bowel adenocarcinoma are possible  There are additional large omental masses likely metastatic from the small bowel tumor "  - Intradepartmental consultation concurs with the diagnosis  Rosibel Room from Dr Juan C Aguilar office notified via phone at 9:57 am on 01/05/2020  Additional Information       All reported additional testing was performed with appropriately reactive controls    These tests were developed and their performance characteristics determined by [facility] or appropriate performing facility, though some tests may be performed on tissues which have not been validated for performance characteristics (such as staining performed on alcohol exposed cell blocks and decalcified tissues)  Results should be interpreted with caution and in the context of the patients clinical condition  These tests may not be cleared or approved by the [place]  Food and Drug Administration, though the FDA has determined that such clearance or approval is not necessary  These tests are used for clinical purposes and they should not be regarded as investigational or for research  This laboratory has been approved by CLIA 88, designated as a high-complexity laboratory and is qualified to perform these tests  Interpretation performed at [facility], [address] [place] [address]  Jordi Sauceda Intraoperative Consultation          TPDxA:  Atypical cells present  Defer to cores  Flow cytometry taken as well  Alejandra Mustafa spoke with Dr Mary Lou Delacruz on 12/31/2020  Interpretation performed at [facility], [address] [place] [address]  Gross Description          A  The specimen is received in formalin, labeled with the patient's name and hospital number, and is designated "omental mass biopsy  The specimen consists of 6 tan-white soft tissue cores each measuring less than 0 1 cm in diameter with length ranging from 0 5-1 cm  Entirely submitted  Six cassettes  Between sponges    Note: The estimated total formalin fixation time based upon information provided by the submitting clinician and the standard processing schedule is under 72 hours    Amanda           Labs, Imaging, & Other studies:   All pertinent labs and imaging studies were personally reviewed    Lab Results   Component Value Date     07/30/2015    K 4 2 01/06/2021     01/06/2021    CO2 29 01/06/2021    ANIONGAP 8 07/30/2015 BUN 18 01/06/2021    CREATININE 1 15 01/06/2021    GLUCOSE 89 07/30/2015    GLUF 86 01/06/2021    CALCIUM 9 2 01/06/2021    CORRECTEDCA 9 9 01/06/2021    AST 23 01/06/2021    ALT 19 01/06/2021    ALKPHOS 114 01/06/2021    PROT 6 8 07/30/2015    BILITOT 0 59 07/30/2015    EGFR 61 01/06/2021     Lab Results   Component Value Date    WBC 6 32 01/06/2021    HGB 11 6 (L) 01/06/2021    HCT 38 4 01/06/2021    MCV 87 01/06/2021     01/06/2021       Reviewed and discussed with patient  Assessment and plan:  Patient is here with his family member  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  He has multiple lung lesions bilaterally  Biopsy was taken from lung lesion and omentum  All 4 MMR proteins were intact and less likely to be MSI high  Specimen from lung biopsy has been will be sent to [facility] and if MSI high could have Keytruda otherwise chemotherapy  He wants to proceed with chemotherapy and not wait for [facility]  We discussed FOLFOX chemotherapy and to that forth drug could be added depending upon results from [facility] [place] [facility] family and [facility] [place]     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  Patient received antibiotic therapy for H pylori  Patient has Port-A-Cath  Last year he started to have shortness of breath with exertion and was found to have iron deficiency anemia and was treated with oral iron and symptoms improved some  He also had cardiac workup for shortness of breath and he states there was no problem with his heart  This year  shortness of breath again and weight loss  He had CT scans and that showed bilateral lung nodules, thickening of wall of jejunum and mesenteric mass/lymphadenopathy and additional omental masses  On 10/27/2020 patient had EGD and colonoscopy and 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    Negative H pylori and in spite of that patient was given antibiotic therapy against H pylori because of MALT lymphoma  On 12/09/2020 he had needle biopsy of left upper lung nodule and that came back adenocarcinoma more consistent with colorectal cancer  Colonoscopy had shown polyps but no cancer  Patient had needle biopsy of omental mass and that also showed adenocarcinoma      He has some weakness and tiredness, exertional dyspnea and weight loss  He states weight has stabilized now  He has some discomfort in the right subcostal area near the epigastrium     Physical examination and test results are as recorded and discussed in very much detail  Patient has signed informed consent for FOLFOX and for blood transfusion after receiving printed and verbal information on FOLFOX and blood transfusion  He is also to get started on systemic therapy on 01/11/21  Specimen from lung biopsy is being sent to [facility]  Management of MALT lymphoma and that could be R[place] is on hold for now  All discussed in detail  Questions answered  Discussed the importance of eating healthy foods, activities as tolerated and health screening tests     Goal will be systemic therapy, FOLFOX plus as above for prolongation of survival   Patient is capable of self-care  Discussed precautions against coronavirus  1  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[facility] [address] )    - CBC and differential; Standing  - Comprehensive metabolic panel; Standing  - CBC and differential  - Comprehensive metabolic panel    2  Malignant neoplasm metastatic to lung, unspecified laterality ([address] )      3  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 ([address] )    4  Chemotherapy in induced neutropenia    5  Chemotherapy induced nausea     6  MALT lymphoma of the stomach          FOLFOX chemotherapy  Any additional changes could be made after results from [facility]  Specimen of lung biopsy to [facility]  Patient has signed informed consent for FOLFOX and for blood transfusion  Chemotherapy will start next week  Blood counts and chemistry every 2 weeks prior to chemotherapy    Follow-up in 4 weeks      Patient voiced understanding and agreement in the discussion  Counseling / Coordination of Care   Greater than 50% of total time was spent with the patient and / or family counseling and / or coordination of care

## 2021-01-07 NOTE — PATIENT INSTRUCTIONS
FOLFOX chemotherapy  Any additional changes could be made after results from [facility]  Specimen of lung biopsy to [facility]  Patient has signed informed consent for FOLFOX and for blood transfusion  Chemotherapy will start next week  Blood counts and chemistry every 2 weeks prior to chemotherapy    Follow-up in 4 weeks

## 2021-01-07 NOTE — TELEPHONE ENCOUNTER
Pt called in this morning stating his appt with Dr Finn Mason was cancelled as it was not necessary  Pt would still like to consult with Dr Finn Mason as he has questions he would like to discuss with her  He would like to come in this morning if at all possible   stated he was told there were still openings available

## 2021-01-08 ENCOUNTER — PATIENT OUTREACH (OUTPATIENT)
Dept: HEMATOLOGY ONCOLOGY | Facility: CLINIC | Age: 76
End: 2021-01-08

## 2021-01-08 ENCOUNTER — TELEPHONE (OUTPATIENT)
Dept: INFUSION CENTER | Facility: CLINIC | Age: 76
End: 2021-01-08

## 2021-01-08 NOTE — PROGRESS NOTES
Attempted to reach patient today to review treatment plan, assess for any questions or concerns  Left a voicemail with my contact information and encouraged him to call back with questions or concerns

## 2021-01-11 ENCOUNTER — TELEPHONE (OUTPATIENT)
Dept: HEMATOLOGY ONCOLOGY | Facility: CLINIC | Age: 76
End: 2021-01-11

## 2021-01-11 ENCOUNTER — HOSPITAL ENCOUNTER (OUTPATIENT)
Dept: INFUSION CENTER | Facility: CLINIC | Age: 76
Discharge: HOME | End: 2021-01-11
Payer: MEDICARE

## 2021-01-11 VITALS
BODY MASS INDEX: 21.62 KG/M2 | SYSTOLIC BLOOD PRESSURE: 106 MMHG | DIASTOLIC BLOOD PRESSURE: 72 MMHG | WEIGHT: 151.01 LBS | RESPIRATION RATE: 16 BRPM | HEIGHT: 70 IN | TEMPERATURE: 97.3 F | HEART RATE: 80 BPM

## 2021-01-11 DIAGNOSIS — C17.9 SMALL BOWEL CANCER (HCC): Primary | ICD-10-CM

## 2021-01-11 PROCEDURE — G0498 CHEMO EXTEND IV INFUS W/PUMP: HCPCS

## 2021-01-11 PROCEDURE — 96415 CHEMO IV INFUSION ADDL HR: CPT

## 2021-01-11 PROCEDURE — 96413 CHEMO IV INFUSION 1 HR: CPT

## 2021-01-11 PROCEDURE — 96367 TX/PROPH/DG ADDL SEQ IV INF: CPT

## 2021-01-11 PROCEDURE — 96368 THER/DIAG CONCURRENT INF: CPT

## 2021-01-11 PROCEDURE — 96411 CHEMO IV PUSH ADDL DRUG: CPT

## 2021-01-11 RX ORDER — SODIUM CHLORIDE 9 MG/ML
20 INJECTION, SOLUTION INTRAVENOUS ONCE AS NEEDED
Status: DISCONTINUED | OUTPATIENT
Start: 2021-01-11 | End: 2021-01-14 | Stop reason: HOSPADM

## 2021-01-11 RX ORDER — DEXTROSE MONOHYDRATE 50 MG/ML
20 INJECTION, SOLUTION INTRAVENOUS ONCE
Status: COMPLETED | OUTPATIENT
Start: 2021-01-11 | End: 2021-01-11

## 2021-01-11 RX ORDER — FLUOROURACIL 50 MG/ML
800 INJECTION, SOLUTION INTRAVENOUS ONCE
Status: COMPLETED | OUTPATIENT
Start: 2021-01-11 | End: 2021-01-11

## 2021-01-11 RX ADMIN — DEXAMETHASONE SODIUM PHOSPHATE: 10 INJECTION, SOLUTION INTRAMUSCULAR; INTRAVENOUS at 12:12

## 2021-01-11 RX ADMIN — SODIUM CHLORIDE 20 ML/HR: 0.9 INJECTION, SOLUTION INTRAVENOUS at 12:12

## 2021-01-11 RX ADMIN — DEXTROSE 20 ML/HR: 5 SOLUTION INTRAVENOUS at 12:42

## 2021-01-11 RX ADMIN — FLUOROURACIL 800 MG: 50 INJECTION, SOLUTION INTRAVENOUS at 14:47

## 2021-01-11 RX ADMIN — OXALIPLATIN 162.35 MG: 5 INJECTION, SOLUTION INTRAVENOUS at 12:52

## 2021-01-11 RX ADMIN — LEUCOVORIN CALCIUM 800 MG: 200 INJECTION, POWDER, LYOPHILIZED, FOR SUSPENSION INTRAMUSCULAR; INTRAVENOUS at 12:46

## 2021-01-11 NOTE — PROGRESS NOTES
Patient to the unit for first time Folfox  Tolerated infusions without adverse reaction  CADD pump applied  Patient understands and offers no questions after watching CADD pump video  AVS given, aware of future appointments  Voices no questions or concerns at discharge

## 2021-01-13 ENCOUNTER — HOSPITAL ENCOUNTER (OUTPATIENT)
Dept: INFUSION CENTER | Facility: CLINIC | Age: 76
Discharge: HOME | End: 2021-01-13

## 2021-01-13 VITALS
TEMPERATURE: 97.2 F | SYSTOLIC BLOOD PRESSURE: 126 MMHG | DIASTOLIC BLOOD PRESSURE: 73 MMHG | HEART RATE: 72 BPM | RESPIRATION RATE: 18 BRPM

## 2021-01-13 DIAGNOSIS — C17.9 SMALL BOWEL CANCER (HCC): Primary | ICD-10-CM

## 2021-01-13 NOTE — PROGRESS NOTES
Patient arrived on unit to have CADD pump D/C  Denies any present issues/concerns  Res volume- 0 ml  CADD pump D/C as per order  Aware of next appointment   Declined AVS today

## 2021-01-14 ENCOUNTER — TELEPHONE (OUTPATIENT)
Dept: INFUSION CENTER | Facility: CLINIC | Age: 76
End: 2021-01-14

## 2021-01-14 NOTE — TELEPHONE ENCOUNTER
Telephone call to pt  Post 72 hours  Pt  Denies any concerns at this time  Pt  States he has occasional abdominal pain which is controlled with Tylenol  Pt  Also verbalizes fatigue  Pt  Nick Head when and where he is to follow up with appointments  Pt  Will obtain labs 2 days before next scheduled chemotherapy  Pt recognized his nurse Lori and rest of infusion team   Denied any other quesitons at this time

## 2021-01-19 RX ORDER — DEXTROSE MONOHYDRATE 50 MG/ML
20 INJECTION, SOLUTION INTRAVENOUS ONCE
Status: CANCELLED | OUTPATIENT
Start: 2021-01-25

## 2021-01-19 RX ORDER — FLUOROURACIL 50 MG/ML
800 INJECTION, SOLUTION INTRAVENOUS ONCE
Status: CANCELLED | OUTPATIENT
Start: 2021-01-25

## 2021-01-19 RX ORDER — SODIUM CHLORIDE 9 MG/ML
20 INJECTION, SOLUTION INTRAVENOUS ONCE AS NEEDED
Status: CANCELLED | OUTPATIENT
Start: 2021-01-25

## 2021-01-20 ENCOUNTER — LAB (OUTPATIENT)
Dept: LAB | Facility: CLINIC | Age: 76
End: 2021-01-20
Payer: MEDICARE

## 2021-01-20 DIAGNOSIS — C17.9 SMALL BOWEL CANCER (HCC): ICD-10-CM

## 2021-01-20 LAB
ALBUMIN SERPL BCP-MCNC: 3.3 G/DL (ref 3.5–5)
ALP SERPL-CCNC: 117 U/L (ref 46–116)
ALT SERPL W P-5'-P-CCNC: 19 U/L (ref 12–78)
ANION GAP SERPL CALCULATED.3IONS-SCNC: 7 MMOL/L (ref 4–13)
AST SERPL W P-5'-P-CCNC: 22 U/L (ref 5–45)
BASOPHILS # BLD AUTO: 0.03 THOUSANDS/ΜL (ref 0–0.1)
BASOPHILS NFR BLD AUTO: 1 % (ref 0–1)
BILIRUB SERPL-MCNC: 0.34 MG/DL (ref 0.2–1)
BUN SERPL-MCNC: 15 MG/DL (ref 5–25)
CALCIUM ALBUM COR SERPL-MCNC: 10.2 MG/DL (ref 8.3–10.1)
CALCIUM SERPL-MCNC: 9.6 MG/DL (ref 8.3–10.1)
CHLORIDE SERPL-SCNC: 106 MMOL/L (ref 100–108)
CO2 SERPL-SCNC: 27 MMOL/L (ref 21–32)
CREAT SERPL-MCNC: 1.08 MG/DL (ref 0.6–1.3)
EOSINOPHIL # BLD AUTO: 0.34 THOUSAND/ΜL (ref 0–0.61)
EOSINOPHIL NFR BLD AUTO: 6 % (ref 0–6)
ERYTHROCYTE [DISTWIDTH] IN BLOOD BY AUTOMATED COUNT: 15.9 % (ref 11.6–15.1)
GFR SERPL CREATININE-BSD FRML MDRD: 66 ML/MIN/1.73SQ M
GLUCOSE P FAST SERPL-MCNC: 88 MG/DL (ref 65–99)
HCT VFR BLD AUTO: 39.6 % (ref 36.5–49.3)
HGB BLD-MCNC: 12.2 G/DL (ref 12–17)
IMM GRANULOCYTES # BLD AUTO: 0.02 THOUSAND/UL (ref 0–0.2)
IMM GRANULOCYTES NFR BLD AUTO: 0 % (ref 0–2)
LYMPHOCYTES # BLD AUTO: 1.81 THOUSANDS/ΜL (ref 0.6–4.47)
LYMPHOCYTES NFR BLD AUTO: 29 % (ref 14–44)
MCH RBC QN AUTO: 26.7 PG (ref 26.8–34.3)
MCHC RBC AUTO-ENTMCNC: 30.8 G/DL (ref 31.4–37.4)
MCV RBC AUTO: 87 FL (ref 82–98)
MONOCYTES # BLD AUTO: 0.36 THOUSAND/ΜL (ref 0.17–1.22)
MONOCYTES NFR BLD AUTO: 6 % (ref 4–12)
NEUTROPHILS # BLD AUTO: 3.64 THOUSANDS/ΜL (ref 1.85–7.62)
NEUTS SEG NFR BLD AUTO: 58 % (ref 43–75)
NRBC BLD AUTO-RTO: 0 /100 WBCS
PLATELET # BLD AUTO: 168 THOUSANDS/UL (ref 149–390)
PMV BLD AUTO: 11 FL (ref 8.9–12.7)
POTASSIUM SERPL-SCNC: 3.5 MMOL/L (ref 3.5–5.3)
PROT SERPL-MCNC: 8 G/DL (ref 6.4–8.2)
RBC # BLD AUTO: 4.57 MILLION/UL (ref 3.88–5.62)
SODIUM SERPL-SCNC: 140 MMOL/L (ref 136–145)
WBC # BLD AUTO: 6.2 THOUSAND/UL (ref 4.31–10.16)

## 2021-01-20 PROCEDURE — 85025 COMPLETE CBC W/AUTO DIFF WBC: CPT | Performed by: INTERNAL MEDICINE

## 2021-01-20 PROCEDURE — 80053 COMPREHEN METABOLIC PANEL: CPT | Performed by: INTERNAL MEDICINE

## 2021-01-20 PROCEDURE — 36415 COLL VENOUS BLD VENIPUNCTURE: CPT | Performed by: INTERNAL MEDICINE

## 2021-01-21 DIAGNOSIS — D70.1 CHEMOTHERAPY INDUCED NEUTROPENIA (HCC): ICD-10-CM

## 2021-01-21 DIAGNOSIS — C17.9 SMALL BOWEL CANCER (HCC): Primary | ICD-10-CM

## 2021-01-21 DIAGNOSIS — T45.1X5A CHEMOTHERAPY INDUCED NEUTROPENIA (HCC): ICD-10-CM

## 2021-01-25 ENCOUNTER — HOSPITAL ENCOUNTER (OUTPATIENT)
Dept: INFUSION CENTER | Facility: CLINIC | Age: 76
Discharge: HOME | End: 2021-01-25
Payer: MEDICARE

## 2021-01-25 VITALS
DIASTOLIC BLOOD PRESSURE: 67 MMHG | HEART RATE: 74 BPM | SYSTOLIC BLOOD PRESSURE: 123 MMHG | BODY MASS INDEX: 21.97 KG/M2 | RESPIRATION RATE: 18 BRPM | TEMPERATURE: 97.6 F | HEIGHT: 70 IN | WEIGHT: 153.44 LBS

## 2021-01-25 DIAGNOSIS — C17.9 SMALL BOWEL CANCER (HCC): Primary | ICD-10-CM

## 2021-01-25 DIAGNOSIS — T45.1X5A CHEMOTHERAPY INDUCED NEUTROPENIA (HCC): ICD-10-CM

## 2021-01-25 DIAGNOSIS — D70.1 CHEMOTHERAPY INDUCED NEUTROPENIA (HCC): ICD-10-CM

## 2021-01-25 PROCEDURE — 96368 THER/DIAG CONCURRENT INF: CPT

## 2021-01-25 PROCEDURE — 96415 CHEMO IV INFUSION ADDL HR: CPT

## 2021-01-25 PROCEDURE — 96411 CHEMO IV PUSH ADDL DRUG: CPT

## 2021-01-25 PROCEDURE — G0498 CHEMO EXTEND IV INFUS W/PUMP: HCPCS

## 2021-01-25 PROCEDURE — 96413 CHEMO IV INFUSION 1 HR: CPT

## 2021-01-25 PROCEDURE — 96367 TX/PROPH/DG ADDL SEQ IV INF: CPT

## 2021-01-25 RX ORDER — DEXTROSE MONOHYDRATE 50 MG/ML
20 INJECTION, SOLUTION INTRAVENOUS ONCE
Status: COMPLETED | OUTPATIENT
Start: 2021-01-25 | End: 2021-01-25

## 2021-01-25 RX ORDER — FLUOROURACIL 50 MG/ML
800 INJECTION, SOLUTION INTRAVENOUS ONCE
Status: COMPLETED | OUTPATIENT
Start: 2021-01-25 | End: 2021-01-25

## 2021-01-25 RX ORDER — SODIUM CHLORIDE 9 MG/ML
20 INJECTION, SOLUTION INTRAVENOUS ONCE AS NEEDED
Status: DISCONTINUED | OUTPATIENT
Start: 2021-01-25 | End: 2021-01-28 | Stop reason: HOSPADM

## 2021-01-25 RX ADMIN — DEXTROSE 20 ML/HR: 5 SOLUTION INTRAVENOUS at 11:31

## 2021-01-25 RX ADMIN — DEXAMETHASONE SODIUM PHOSPHATE: 10 INJECTION, SOLUTION INTRAMUSCULAR; INTRAVENOUS at 11:31

## 2021-01-25 RX ADMIN — FLUOROURACIL 800 MG: 50 INJECTION, SOLUTION INTRAVENOUS at 14:11

## 2021-01-25 RX ADMIN — LEUCOVORIN CALCIUM 800 MG: 200 INJECTION, POWDER, LYOPHILIZED, FOR SUSPENSION INTRAMUSCULAR; INTRAVENOUS at 12:11

## 2021-01-25 RX ADMIN — OXALIPLATIN 162.35 MG: 5 INJECTION, SOLUTION INTRAVENOUS at 12:14

## 2021-01-25 NOTE — PROGRESS NOTES
Patient arrived to the unit and denied infection  He tolerated his treatment well without adverse reaction   Patient is aware to return for CADD pump D/C

## 2021-01-27 ENCOUNTER — PATIENT OUTREACH (OUTPATIENT)
Dept: HEMATOLOGY ONCOLOGY | Facility: CLINIC | Age: 76
End: 2021-01-27

## 2021-01-27 ENCOUNTER — HOSPITAL ENCOUNTER (OUTPATIENT)
Dept: INFUSION CENTER | Facility: CLINIC | Age: 76
Discharge: HOME | End: 2021-01-27

## 2021-01-27 VITALS — TEMPERATURE: 97.1 F

## 2021-01-27 DIAGNOSIS — D70.1 CHEMOTHERAPY INDUCED NEUTROPENIA (HCC): ICD-10-CM

## 2021-01-27 DIAGNOSIS — C17.9 SMALL BOWEL CANCER (HCC): Primary | ICD-10-CM

## 2021-01-27 DIAGNOSIS — T45.1X5A CHEMOTHERAPY INDUCED NEUTROPENIA (HCC): ICD-10-CM

## 2021-01-27 NOTE — PROGRESS NOTES
Pt  Denied new symptoms or concerns today  Pt  Completed 5 FU without adverse event via cadd pump  Excellent blood return noted  Future appointments reviewed  AVS declined

## 2021-01-27 NOTE — PROGRESS NOTES
Phone outreach to patient today  He tells me he is tolerating chemo well so far  Had pump disconnected for cycle 2 today  Just this afternoon he felt some tingling in his fingertips when he had to get out of his car to pump gas but when he got back in the warm car the tingling subsided  Reviewed expected side effects and when to call the [facility] on the call today  He has the zofran at home should he need it but he has not had any nausea  He reports eating and drinking without difficulty  He had no other questions or concerns at this time  He has my contact information should he need it

## 2021-02-01 ENCOUNTER — TELEPHONE (OUTPATIENT)
Dept: HEMATOLOGY ONCOLOGY | Facility: CLINIC | Age: 76
End: 2021-02-01

## 2021-02-01 RX ORDER — SODIUM CHLORIDE 9 MG/ML
20 INJECTION, SOLUTION INTRAVENOUS ONCE AS NEEDED
Status: CANCELLED | OUTPATIENT
Start: 2021-02-08

## 2021-02-01 RX ORDER — FLUOROURACIL 50 MG/ML
800 INJECTION, SOLUTION INTRAVENOUS ONCE
Status: CANCELLED | OUTPATIENT
Start: 2021-02-08

## 2021-02-01 RX ORDER — DEXTROSE MONOHYDRATE 50 MG/ML
20 INJECTION, SOLUTION INTRAVENOUS ONCE
Status: CANCELLED | OUTPATIENT
Start: 2021-02-08

## 2021-02-01 NOTE — TELEPHONE ENCOUNTER
Spoke to patient and he stated that prefers to come into the office for his appt, virtual appts do not work well for him due to hearing issues  His appt was R/S to 2/16/21 at 3:00 pm with Dr Darwin Marrero at the [facility]

## 2021-02-03 ENCOUNTER — APPOINTMENT (OUTPATIENT)
Dept: LAB | Facility: CLINIC | Age: 76
End: 2021-02-03
Payer: MEDICARE

## 2021-02-03 ENCOUNTER — DOCUMENTATION (OUTPATIENT)
Dept: HEMATOLOGY ONCOLOGY | Facility: CLINIC | Age: 76
End: 2021-02-03

## 2021-02-03 ENCOUNTER — TELEPHONE (OUTPATIENT)
Dept: HEMATOLOGY ONCOLOGY | Facility: CLINIC | Age: 76
End: 2021-02-03

## 2021-02-03 DIAGNOSIS — C78.00 MALIGNANT NEOPLASM METASTATIC TO LUNG, UNSPECIFIED LATERALITY (HCC): ICD-10-CM

## 2021-02-03 DIAGNOSIS — C78.6 MALIGNANT NEOPLASM METASTATIC TO PERITONEUM (HCC): ICD-10-CM

## 2021-02-03 DIAGNOSIS — C17.9 SMALL BOWEL CANCER (HCC): Primary | ICD-10-CM

## 2021-02-03 DIAGNOSIS — D70.1 CHEMOTHERAPY INDUCED NEUTROPENIA (HCC): ICD-10-CM

## 2021-02-03 DIAGNOSIS — T45.1X5A CHEMOTHERAPY INDUCED NEUTROPENIA (HCC): ICD-10-CM

## 2021-02-03 DIAGNOSIS — C17.9 SMALL BOWEL CANCER (HCC): ICD-10-CM

## 2021-02-03 NOTE — PROGRESS NOTES
Noted [facility] report  He does not qualify for Erbitux  To add Avastin to FOLFOX to treat like colon cancer  He has small bowel cancer  Adding forth drug to FOLFOX was discussed with patient at last visit  To add Avastin he will need MRI scan of brain and urinalysis  Tests ordered

## 2021-02-03 NOTE — TELEPHONE ENCOUNTER
Telephone call left voice message  Dr Melanie Villalobos has reviewed [facility] testing results and he wants to add an additional tx drug  Please call our office tomorrow and ask for [place] to discuss

## 2021-02-04 ENCOUNTER — TELEPHONE (OUTPATIENT)
Dept: HEMATOLOGY ONCOLOGY | Facility: CLINIC | Age: 76
End: 2021-02-04

## 2021-02-04 DIAGNOSIS — E87.6 HYPOKALEMIA: Primary | ICD-10-CM

## 2021-02-04 RX ORDER — POTASSIUM CHLORIDE 750 MG/1
10 CAPSULE, EXTENDED RELEASE ORAL 2 TIMES DAILY
Qty: 20 CAPSULE | Refills: 0 | Status: SHIPPED | OUTPATIENT
Start: 2021-02-04 | End: 2021-01-01 | Stop reason: ALTCHOICE

## 2021-02-04 NOTE — TELEPHONE ENCOUNTER
I spoke with patient  Potassium is 3 3 and I sent potassium prescription to his pharmacy  Potassium 10 mEq twice a day for 10 days  Also I explained to him  adding Avastin to FOLFOX and he will be getting MRI scan of brain and urinalysis before that  Was okay with that  Arrangements are being made

## 2021-02-08 ENCOUNTER — HOSPITAL ENCOUNTER (OUTPATIENT)
Dept: INFUSION CENTER | Facility: CLINIC | Age: 76
Discharge: HOME | End: 2021-02-08
Payer: MEDICARE

## 2021-02-08 VITALS
HEIGHT: 70 IN | DIASTOLIC BLOOD PRESSURE: 75 MMHG | BODY MASS INDEX: 20.97 KG/M2 | HEART RATE: 65 BPM | TEMPERATURE: 97.3 F | SYSTOLIC BLOOD PRESSURE: 124 MMHG | RESPIRATION RATE: 18 BRPM | WEIGHT: 146.5 LBS

## 2021-02-08 DIAGNOSIS — T45.1X5A CHEMOTHERAPY INDUCED NEUTROPENIA (HCC): ICD-10-CM

## 2021-02-08 DIAGNOSIS — C78.6 MALIGNANT NEOPLASM METASTATIC TO PERITONEUM (HCC): ICD-10-CM

## 2021-02-08 DIAGNOSIS — D70.1 CHEMOTHERAPY INDUCED NEUTROPENIA (HCC): Primary | ICD-10-CM

## 2021-02-08 DIAGNOSIS — C78.00 MALIGNANT NEOPLASM METASTATIC TO LUNG, UNSPECIFIED LATERALITY (HCC): ICD-10-CM

## 2021-02-08 DIAGNOSIS — D70.1 CHEMOTHERAPY INDUCED NEUTROPENIA (HCC): ICD-10-CM

## 2021-02-08 DIAGNOSIS — T45.1X5A CHEMOTHERAPY INDUCED NEUTROPENIA (HCC): Primary | ICD-10-CM

## 2021-02-08 DIAGNOSIS — C17.9 SMALL BOWEL CANCER (HCC): Primary | ICD-10-CM

## 2021-02-08 PROCEDURE — 96368 THER/DIAG CONCURRENT INF: CPT

## 2021-02-08 PROCEDURE — 96415 CHEMO IV INFUSION ADDL HR: CPT

## 2021-02-08 PROCEDURE — G0498 CHEMO EXTEND IV INFUS W/PUMP: HCPCS

## 2021-02-08 PROCEDURE — 96413 CHEMO IV INFUSION 1 HR: CPT

## 2021-02-08 PROCEDURE — 96367 TX/PROPH/DG ADDL SEQ IV INF: CPT

## 2021-02-08 PROCEDURE — 96411 CHEMO IV PUSH ADDL DRUG: CPT

## 2021-02-08 RX ORDER — FLUOROURACIL 50 MG/ML
800 INJECTION, SOLUTION INTRAVENOUS ONCE
Status: COMPLETED | OUTPATIENT
Start: 2021-02-08 | End: 2021-02-08

## 2021-02-08 RX ORDER — DEXTROSE MONOHYDRATE 50 MG/ML
20 INJECTION, SOLUTION INTRAVENOUS ONCE
Status: COMPLETED | OUTPATIENT
Start: 2021-02-08 | End: 2021-02-08

## 2021-02-08 RX ORDER — SODIUM CHLORIDE 9 MG/ML
20 INJECTION, SOLUTION INTRAVENOUS ONCE AS NEEDED
Status: DISCONTINUED | OUTPATIENT
Start: 2021-02-08 | End: 2021-02-11 | Stop reason: HOSPADM

## 2021-02-08 RX ADMIN — DEXTROSE 20 ML/HR: 5 SOLUTION INTRAVENOUS at 11:55

## 2021-02-08 RX ADMIN — OXALIPLATIN 162.35 MG: 5 INJECTION, SOLUTION INTRAVENOUS at 11:56

## 2021-02-08 RX ADMIN — DEXAMETHASONE SODIUM PHOSPHATE: 10 INJECTION, SOLUTION INTRAMUSCULAR; INTRAVENOUS at 11:04

## 2021-02-08 RX ADMIN — FLUOROURACIL 800 MG: 50 INJECTION, SOLUTION INTRAVENOUS at 13:53

## 2021-02-08 RX ADMIN — LEUCOVORIN CALCIUM 800 MG: 200 INJECTION, POWDER, LYOPHILIZED, FOR SUSPENSION INTRAMUSCULAR; INTRAVENOUS at 11:55

## 2021-02-08 NOTE — PLAN OF CARE
Problem: INFECTION - ADULT  Goal: Absence or prevention of progression during hospitalization  Description: INTERVENTIONS:  - Assess and monitor for signs and symptoms of infection  - Monitor lab/diagnostic results  - Monitor all insertion sites, i e  indwelling lines, tubes, and drains  - Monitor endotracheal if appropriate and nasal secretions for changes in amount and color  - [place] appropriate cooling/warming therapies per order  - Administer medications as ordered  - Instruct and encourage patient and family to use good hand hygiene technique  - Identify and instruct in appropriate isolation precautions for identified infection/condition  Outcome: Progressing

## 2021-02-08 NOTE — PROGRESS NOTES
Patient arrived to the unit and denied infection or fevers  Dr Blake Gonsalez is aware of ANC of 1 39 and it is ok to proceed  Patient tolerated his treatment well without adverse reaction

## 2021-02-10 ENCOUNTER — HOSPITAL ENCOUNTER (OUTPATIENT)
Dept: INFUSION CENTER | Facility: CLINIC | Age: 76
Discharge: HOME | End: 2021-02-10
Payer: MEDICARE

## 2021-02-10 VITALS
HEART RATE: 101 BPM | SYSTOLIC BLOOD PRESSURE: 121 MMHG | DIASTOLIC BLOOD PRESSURE: 74 MMHG | TEMPERATURE: 96.7 F | RESPIRATION RATE: 18 BRPM

## 2021-02-10 DIAGNOSIS — C78.6 MALIGNANT NEOPLASM METASTATIC TO PERITONEUM (HCC): ICD-10-CM

## 2021-02-10 DIAGNOSIS — T45.1X5A CHEMOTHERAPY INDUCED NEUTROPENIA (HCC): ICD-10-CM

## 2021-02-10 DIAGNOSIS — D70.1 CHEMOTHERAPY INDUCED NEUTROPENIA (HCC): ICD-10-CM

## 2021-02-10 DIAGNOSIS — C17.9 SMALL BOWEL CANCER (HCC): Primary | ICD-10-CM

## 2021-02-10 DIAGNOSIS — C78.00 MALIGNANT NEOPLASM METASTATIC TO LUNG, UNSPECIFIED LATERALITY (HCC): ICD-10-CM

## 2021-02-10 PROCEDURE — 96372 THER/PROPH/DIAG INJ SC/IM: CPT

## 2021-02-10 RX ADMIN — PEGFILGRASTIM 6 MG: KIT SUBCUTANEOUS at 12:31

## 2021-02-10 NOTE — PROGRESS NOTES
Presented today for d/c of CADD pump  Port flush & deaccess, & on pro application  On pro applied to L arm, tolerated well  Will return for next visit as scheduled

## 2021-02-12 ENCOUNTER — HOSPITAL ENCOUNTER (OUTPATIENT)
Dept: MRI IMAGING | Facility: HOSPITAL | Age: 76
Discharge: HOME | End: 2021-02-12
Attending: INTERNAL MEDICINE
Payer: MEDICARE

## 2021-02-12 DIAGNOSIS — C78.00 MALIGNANT NEOPLASM METASTATIC TO LUNG, UNSPECIFIED LATERALITY (HCC): ICD-10-CM

## 2021-02-12 DIAGNOSIS — C17.9 SMALL BOWEL CANCER (HCC): ICD-10-CM

## 2021-02-12 PROCEDURE — 70553 MRI BRAIN STEM W/O & W/DYE: CPT

## 2021-02-12 PROCEDURE — G1004 CDSM NDSC: HCPCS

## 2021-02-12 PROCEDURE — A9585 GADOBUTROL INJECTION: HCPCS | Performed by: INTERNAL MEDICINE

## 2021-02-12 RX ADMIN — GADOBUTROL 7 ML: 604.72 INJECTION INTRAVENOUS at 11:14

## 2021-02-16 ENCOUNTER — TELEPHONE (OUTPATIENT)
Dept: HEMATOLOGY ONCOLOGY | Facility: CLINIC | Age: 76
End: 2021-02-16

## 2021-02-16 ENCOUNTER — TELEMEDICINE (OUTPATIENT)
Dept: HEMATOLOGY ONCOLOGY | Facility: CLINIC | Age: 76
End: 2021-02-16
Payer: MEDICARE

## 2021-02-16 DIAGNOSIS — T45.1X5A CHEMOTHERAPY-INDUCED NAUSEA: ICD-10-CM

## 2021-02-16 DIAGNOSIS — T45.1X5A CHEMOTHERAPY INDUCED NEUTROPENIA (HCC): ICD-10-CM

## 2021-02-16 DIAGNOSIS — C17.9 SMALL BOWEL CANCER (HCC): ICD-10-CM

## 2021-02-16 DIAGNOSIS — D70.1 CHEMOTHERAPY INDUCED NEUTROPENIA (HCC): ICD-10-CM

## 2021-02-16 DIAGNOSIS — E07.9 THYROID DISORDER: Primary | ICD-10-CM

## 2021-02-16 DIAGNOSIS — C78.00 MALIGNANT NEOPLASM METASTATIC TO LUNG, UNSPECIFIED LATERALITY (HCC): ICD-10-CM

## 2021-02-16 DIAGNOSIS — C88.4 MALT (MUCOSA ASSOCIATED LYMPHOID TISSUE) (HCC): ICD-10-CM

## 2021-02-16 DIAGNOSIS — R53.0 NEOPLASTIC MALIGNANT RELATED FATIGUE: ICD-10-CM

## 2021-02-16 DIAGNOSIS — C78.6 MALIGNANT NEOPLASM METASTATIC TO PERITONEUM (HCC): ICD-10-CM

## 2021-02-16 DIAGNOSIS — R11.0 CHEMOTHERAPY-INDUCED NAUSEA: ICD-10-CM

## 2021-02-16 PROCEDURE — 99214 OFFICE O/P EST MOD 30 MIN: CPT | Performed by: INTERNAL MEDICINE

## 2021-02-16 RX ORDER — DEXTROSE MONOHYDRATE 50 MG/ML
20 INJECTION, SOLUTION INTRAVENOUS ONCE
Status: CANCELLED | OUTPATIENT
Start: 2021-02-22

## 2021-02-16 RX ORDER — FLUOROURACIL 50 MG/ML
800 INJECTION, SOLUTION INTRAVENOUS ONCE
Status: CANCELLED | OUTPATIENT
Start: 2021-02-22

## 2021-02-16 RX ORDER — PREDNISONE 10 MG/1
10 TABLET ORAL DAILY
Qty: 30 TABLET | Refills: 1 | Status: SHIPPED | OUTPATIENT
Start: 2021-02-16 | End: 2021-04-23 | Stop reason: SDUPTHER

## 2021-02-16 RX ORDER — SODIUM CHLORIDE 9 MG/ML
20 INJECTION, SOLUTION INTRAVENOUS ONCE AS NEEDED
Status: CANCELLED | OUTPATIENT
Start: 2021-02-22

## 2021-02-16 NOTE — PATIENT INSTRUCTIONS
Blood work every 2 weeks 1-2 days prior to chemotherapy  Urinalysis once  Every 4 weeks  Patient will come tomorrow to the office to sign paper to add Avastin to chemotherapy  Starting on prednisone 10 mg daily with food for fatigue post chemotherapy  To plan CT chest in near future  Additional instructions will be given to patient tomorrow in the office

## 2021-02-16 NOTE — TELEPHONE ENCOUNTER
I called the patient to inform him that his appointment for 3/3 is cancelled but he is still on for 3/31 @ 11 am with labs to be completed  Patient voiced understanding and agreement

## 2021-02-17 DIAGNOSIS — C17.9 SMALL BOWEL CANCER (HCC): Primary | ICD-10-CM

## 2021-02-17 DIAGNOSIS — T45.1X5A CHEMOTHERAPY INDUCED NEUTROPENIA (HCC): ICD-10-CM

## 2021-02-17 DIAGNOSIS — D70.1 CHEMOTHERAPY INDUCED NEUTROPENIA (HCC): ICD-10-CM

## 2021-02-17 NOTE — PROGRESS NOTES
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)    Chemotherapy induced neutropenia (HCC)    Relevant Medications    predniSONE 10 mg tablet    Neoplastic malignant related fatigue    Relevant Medications    predniSONE 10 mg tablet               Reason for visit is No chief complaint on file  Encounter provider Renu Dalal MD    Provider located at [address] [place] [id number]      Recent Visits  No visits were found meeting these conditions  Showing recent visits within past 7 days and meeting all other requirements     Today's Visits  Date Type Provider Dept   02/16/21 Telephone David Olivia [facility]   02/16/21 Telemedicine Renu Dalal MD [facility]   Showing today's visits and meeting all other requirements     Future Appointments  No visits were found meeting these conditions  Showing future appointments within next 150 days and meeting all other requirements        The patient was identified by name and date of birth  Caputoangelito Blanton was informed that this is a telemedicine visit and that the visit is being conducted through [facility] and patient was informed that this is not a secure, HIPAA-compliant platform  He agrees to proceed     My office door was closed  No one else was in the room  He acknowledged consent and understanding of privacy and security of the video platform   The patient has agreed to participate and understands they can discontinue the visit at any time  Patient is aware this is a billable service  Subjective and HPI: Virtual video visit  Lissy Varghese is a 66 y o  male  He has completed 3 cycles of FOLFOX chemotherapy for 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  He gets tired after chemotherapy treatments  All 4 MMR proteins were intact  Specimen was sent to [facility] and he did not qualify for Erbitux  Avastin was discussed with him to be added to FOLFOX  He had MRI scan of brain for Avastin and there was no metastatic disease  Urinalysis is pending for urine protein  He plans to come to the office tomorrow to sign papers for Avastin  This coming Friday  or Saturday he will go for blood test and urine test   Cycle 4 will start on 02/22/ 21 and Avastin will be added to that  Also Neulasta because of borderline low WBC count with cycle 3    He also has 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 and even though H pylori was negative he was given antibiotics to treat H pylori  In December 2020 left lung nodule was biopsied and that came back adenocarcinoma consistent with colorectal cancer  He has  Tiredness but more tired post chemotherapy  Has exertional dyspnea  He will like to have a booster medication that might make him less tired      FOLFOX chemotherapy was started in January 2021        Past Medical History:   Diagnosis Date    Abnormal CT of liver     last assessed: 07/21/14    Anemia     iron def 6/2020 hgb 9 6    BPH (benign prostatic hypertrophy)     Dupuytren contracture     both hands    Erectile dysfunction of non-organic origin     last assessed: 11/27/12    Esophageal reflux     last assessed: 11/11/14    Esophagitis 03/13/2012    Familial hypercholesteremia     last assessed: 06/11/13    Hyperlipidemia     Hypertension     Paroxysmal atrial fibrillation ([facility] [address] )     last assessed: 04/06/17 post colonoscopy    Shortness of breath 2020    exertional due to anemia       Past Surgical History:   Procedure Laterality Date    BACK SURGERY      Lower    COLONOSCOPY      CT NEEDLE BIOPSY RETROPERITONEUM  12/31/2020    HAND CONTRACTURE RELEASE Left 5/23/2017    Procedure: Left hand percutaneous fasciotomy of palm adductor space x 2; index finger PIP joint; ring finger PIP joint; small finger MCP joint and PIP joint  ; splint application;  Surgeon: Estela Harding MD;  [facility]: [facility];  Service:     HAND SURGERY Bilateral     IR BIOPSY LUNG  12/9/2020    IR PORT PLACEMENT  12/31/2020       Current Outpatient Medications   Medication Sig Dispense Refill    aspirin (ECOTRIN LOW STRENGTH) 81 mg EC tablet Take 81 mg by mouth daily      atorvastatin (LIPITOR) 40 mg tablet Take 1 tablet (40 mg total) by mouth daily 90 tablet 1    chlordiazepoxide-clidinium (LIBRAX) 5-2 5 mg per capsule Take 1 capsule by mouth 3 (three) times a day as needed (Abdominal Pain) 90 capsule 1    Cholecalciferol (VITAMIN D3) 2000 units TABS Take 2,000 Units by mouth daily      cyanocobalamin (VITAMIN B-12) 1,000 mcg tablet Take 1,000 mcg by mouth daily      dicyclomine (BENTYL) 20 mg tablet Take 1 tablet (20 mg total) by mouth every 6 (six) hours 90 tablet 3    dutasteride (AVODART) 0 5 mg capsule Take 1 capsule (0 5 mg total) by mouth daily at bedtime 90 capsule 1    esomeprazole (NexIUM) 40 MG capsule Take 1 capsule (40 mg total) by mouth daily (Patient not taking: Reported on 12/24/2020) 30 capsule 2    esomeprazole (NexIUM) 40 MG capsule Take 1 capsule (40 mg total) by mouth 2 (two) times a day before meals for 14 days (Patient not taking: Reported on 12/24/2020) 28 capsule 0    ferrous sulfate 325 (65 Fe) mg tablet Take 325 mg by mouth 2 (two) times a day with meals      losartan (COZAAR) 25 mg tablet TAKE ONE TABLET DAILY 90 tablet 1    metoprolol tartrate (LOPRESSOR) 25 mg tablet Take 0 5 tablets (12 5 mg total) by mouth 2 (two) times a day 180 tablet 1    Multiple Vitamin (MULTIVITAMIN) tablet Take 1 tablet by mouth daily      Omega-3 Fatty Acids (FISH OIL) 1,000 mg Take 1,000 mg by mouth daily      ondansetron (ZOFRAN) 4 mg tablet Take 1 tablet (4 mg total) by mouth every 8 (eight) hours as needed for nausea or vomiting 20 tablet 2    potassium chloride (MICRO-K) 10 MEQ CR capsule Take 1 capsule (10 mEq total) by mouth 2 (two) times a day 20 capsule 0    predniSONE 10 mg tablet Take 1 tablet (10 mg total) by mouth daily 30 tablet 1     No current facility-administered medications for this visit  Allergies   Allergen Reactions    Other Other (See Comments)     Liquid lidocaine - couldn't swallow, panicked       Review of Systems:  Reviewed 12 systems  See symptoms in HPI  No other neurological, cardiac, pulmonary, GI and  symptoms other than listed in HPI  No fever, chills, bleeding, bone pains, skin rash, weight loss, night sweats, arthritis, and no swelling of the ankles and no swollen glands  Video Exam :  Patient appeared alert and oriented and not in distress and not anxious  No icterus  I did not notice any swelling around the neck   He did not experience chest pain on deep breath  He did not experience abdominal pain on palpation  He did not experience pain in the calf areas on compression  No edema of ankles  Ambulatory  No skin rash  He did not have any lump or lesion to show me  There were no vitals filed for this visit  Physical Exam     She video examination    I reviewed  CBC, CMP, Caris results and MRI scan of brain with patient and explained  I reviewed patient's records prior to this  Virtual visit  I interviewed the patient  I did video examination  I discussed test results with patient  I discussed adding Avastin to FOLFOX and discussed Avastin in detail, benefits and side effects  Also he will have Neulasta    He could try low dose prednisone as a booster and he preferred to do that and I sent prednisone prescription to his pharmacy, 10 mg daily with food and he will take with breakfast   He will be having CT chest in near future  Blood tests prior to each chemotherapy every 2 weeks and urinalysis every 4 weeks for proteinuria  All discussed in detail  Questions answered  Discussed the importance of eating healthy foods and activities as tolerated  Discussed precautions against coronavirus  Goal is prolongation of survival   See diagnoses and orders below     Will also check thyroid functions for tiredness  1  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[facility] )      2  Malignant neoplasm metastatic to lung, unspecified laterality (HCC)    - predniSONE 10 mg tablet; Take 1 tablet (10 mg total) by mouth daily  Dispense: 30 tablet; Refill: 1    3  Chemotherapy induced neutropenia (HCC)      4  Chemotherapy-induced nausea      5  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 ([facility] )      6  MALT (mucosa associated lymphoid tissue)- Gastric ([facility] )      7  Neoplastic malignant related fatigue    - predniSONE 10 mg tablet; Take 1 tablet (10 mg total) by mouth daily  Dispense: 30 tablet; Refill: 1    8  Thyroid disorder    - TSH, 3rd generation with Free T4 reflex; Future       Blood work every 2 weeks 1-2 days prior to chemotherapy  Urinalysis once  Every 4 weeks  Patient will come tomorrow to the office to sign paper to add Avastin to chemotherapy  Starting on prednisone 10 mg daily with food for fatigue post chemotherapy  To plan CT chest in near future  Additional instructions will be given to patient tomorrow in the office  VIRTUAL VISIT DISCLAIMER    Silke Pappas acknowledges that he has consented to an online visit or consultation   He understands that the online visit is based solely on information provided by him, and that, in the absence of a face-to-face physical evaluation by the physician, the diagnosis he receives is both limited and provisional in terms of accuracy and completeness  This is not intended to replace a full medical face-to-face evaluation by the physician  Мария Frye understands and accepts these terms

## 2021-02-19 ENCOUNTER — LAB (OUTPATIENT)
Dept: LAB | Facility: CLINIC | Age: 76
End: 2021-02-19
Payer: MEDICARE

## 2021-02-19 DIAGNOSIS — E07.9 THYROID DISORDER: ICD-10-CM

## 2021-02-19 DIAGNOSIS — C78.00 MALIGNANT NEOPLASM METASTATIC TO LUNG, UNSPECIFIED LATERALITY (HCC): ICD-10-CM

## 2021-02-19 DIAGNOSIS — C17.9 SMALL BOWEL CANCER (HCC): ICD-10-CM

## 2021-02-19 DIAGNOSIS — C78.6 MALIGNANT NEOPLASM METASTATIC TO PERITONEUM (HCC): ICD-10-CM

## 2021-02-19 LAB
BACTERIA UR QL AUTO: ABNORMAL /HPF
BILIRUB UR QL STRIP: ABNORMAL
CAOX CRY URNS QL MICRO: ABNORMAL /HPF
CLARITY UR: ABNORMAL
COLOR UR: ABNORMAL
GLUCOSE UR STRIP-MCNC: NEGATIVE MG/DL
HGB UR QL STRIP.AUTO: NEGATIVE
KETONES UR STRIP-MCNC: ABNORMAL MG/DL
LEUKOCYTE ESTERASE UR QL STRIP: NEGATIVE
MUCOUS THREADS UR QL AUTO: ABNORMAL
NITRITE UR QL STRIP: NEGATIVE
NON-SQ EPI CELLS URNS QL MICRO: ABNORMAL /HPF
PH UR STRIP.AUTO: 6.5 [PH]
PROT UR STRIP-MCNC: ABNORMAL MG/DL
RBC #/AREA URNS AUTO: ABNORMAL /HPF
SP GR UR STRIP.AUTO: 1.03 (ref 1–1.03)
T4 FREE SERPL-MCNC: 1.21 NG/DL (ref 0.76–1.46)
TSH SERPL DL<=0.05 MIU/L-ACNC: 5.19 UIU/ML (ref 0.36–3.74)
UROBILINOGEN UR QL STRIP.AUTO: 1 E.U./DL
WBC #/AREA URNS AUTO: ABNORMAL /HPF

## 2021-02-19 PROCEDURE — 81001 URINALYSIS AUTO W/SCOPE: CPT

## 2021-02-19 PROCEDURE — 84439 ASSAY OF FREE THYROXINE: CPT

## 2021-02-19 PROCEDURE — 84443 ASSAY THYROID STIM HORMONE: CPT

## 2021-02-22 ENCOUNTER — DOCUMENTATION (OUTPATIENT)
Dept: HEMATOLOGY ONCOLOGY | Facility: CLINIC | Age: 76
End: 2021-02-22

## 2021-02-22 ENCOUNTER — HOSPITAL ENCOUNTER (OUTPATIENT)
Dept: INFUSION CENTER | Facility: CLINIC | Age: 76
Discharge: HOME | End: 2021-02-22
Payer: MEDICARE

## 2021-02-22 VITALS — BODY MASS INDEX: 21.55 KG/M2 | WEIGHT: 145.5 LBS | HEIGHT: 69 IN

## 2021-02-22 DIAGNOSIS — D70.1 CHEMOTHERAPY INDUCED NEUTROPENIA (HCC): ICD-10-CM

## 2021-02-22 DIAGNOSIS — C17.9 SMALL BOWEL CANCER (HCC): Primary | ICD-10-CM

## 2021-02-22 DIAGNOSIS — T45.1X5A CHEMOTHERAPY INDUCED NEUTROPENIA (HCC): ICD-10-CM

## 2021-02-22 DIAGNOSIS — R80.9 PROTEINURIA, UNSPECIFIED TYPE: Primary | ICD-10-CM

## 2021-02-22 PROCEDURE — 96411 CHEMO IV PUSH ADDL DRUG: CPT

## 2021-02-22 PROCEDURE — 96415 CHEMO IV INFUSION ADDL HR: CPT

## 2021-02-22 PROCEDURE — 96413 CHEMO IV INFUSION 1 HR: CPT

## 2021-02-22 PROCEDURE — G0498 CHEMO EXTEND IV INFUS W/PUMP: HCPCS

## 2021-02-22 PROCEDURE — 96368 THER/DIAG CONCURRENT INF: CPT

## 2021-02-22 PROCEDURE — 96367 TX/PROPH/DG ADDL SEQ IV INF: CPT

## 2021-02-22 RX ORDER — DEXTROSE MONOHYDRATE 50 MG/ML
20 INJECTION, SOLUTION INTRAVENOUS ONCE
Status: COMPLETED | OUTPATIENT
Start: 2021-02-22 | End: 2021-02-22

## 2021-02-22 RX ORDER — FLUOROURACIL 50 MG/ML
800 INJECTION, SOLUTION INTRAVENOUS ONCE
Status: COMPLETED | OUTPATIENT
Start: 2021-02-22 | End: 2021-02-22

## 2021-02-22 RX ORDER — SODIUM CHLORIDE 9 MG/ML
20 INJECTION, SOLUTION INTRAVENOUS ONCE AS NEEDED
Status: DISCONTINUED | OUTPATIENT
Start: 2021-02-22 | End: 2021-02-25 | Stop reason: HOSPADM

## 2021-02-22 RX ADMIN — DEXTROSE 20 ML/HR: 5 SOLUTION INTRAVENOUS at 10:33

## 2021-02-22 RX ADMIN — FLUOROURACIL 800 MG: 50 INJECTION, SOLUTION INTRAVENOUS at 13:12

## 2021-02-22 RX ADMIN — OXALIPLATIN 162.35 MG: 5 INJECTION, SOLUTION INTRAVENOUS at 11:06

## 2021-02-22 RX ADMIN — LEUCOVORIN CALCIUM 800 MG: 200 INJECTION, POWDER, LYOPHILIZED, FOR SUSPENSION INTRAMUSCULAR; INTRAVENOUS at 11:05

## 2021-02-22 RX ADMIN — DEXAMETHASONE SODIUM PHOSPHATE: 10 INJECTION, SOLUTION INTRAMUSCULAR; INTRAVENOUS at 10:33

## 2021-02-22 NOTE — PROGRESS NOTES
Per Dr Dominique Dover, ok to tx with plts of 91,000  Hold the Avastin  Pt to have 24 hour urine protein lab  Michael Fernandez at infusion updated

## 2021-02-22 NOTE — PLAN OF CARE
Problem: Potential for Falls  Goal: Patient will remain free of falls  Description: INTERVENTIONS:  - Assess patient frequently for physical needs  -  Identify cognitive and physical deficits and behaviors that affect risk of falls    -  [place] fall precautions as indicated by assessment   - Educate patient/family on patient safety including physical limitations  - Instruct patient to call for assistance with activity based on assessment  - Modify environment to reduce risk of injury  - Consider OT/PT consult to assist with strengthening/mobility  Outcome: Progressing

## 2021-02-22 NOTE — PROGRESS NOTES
Pt Tolerated FOLFOX as ordered without adverse reaction Pt left unit with CADD pump infusing 5FU as ordered, pt knowledgeable re: monitoring of device and care of accessed PAC at home  Pt given AVS, aware of return appt in 46hrs for CADD pump disconnect  Pt wihtout question or concern

## 2021-02-22 NOTE — PROGRESS NOTES
Pt arrived to unit without complaint  CBC drawn 2/19/21, platelets=91  UA show 2+ protein  Dr Karen Townsend made aware of both via C/ Sergio Wren RN, [facility] [place] to proceed with treament of FOLFOX today, Avastin will be delayed  Pt needs 24hr urine completed prior to starting Avastin  Pt will be informed of same

## 2021-02-24 ENCOUNTER — HOSPITAL ENCOUNTER (OUTPATIENT)
Dept: INFUSION CENTER | Facility: CLINIC | Age: 76
Discharge: HOME | End: 2021-02-24
Payer: MEDICARE

## 2021-02-24 VITALS — TEMPERATURE: 96.1 F

## 2021-02-24 DIAGNOSIS — T45.1X5A CHEMOTHERAPY INDUCED NEUTROPENIA (HCC): ICD-10-CM

## 2021-02-24 DIAGNOSIS — D70.1 CHEMOTHERAPY INDUCED NEUTROPENIA (HCC): ICD-10-CM

## 2021-02-24 DIAGNOSIS — C17.9 SMALL BOWEL CANCER (HCC): Primary | ICD-10-CM

## 2021-02-24 PROCEDURE — 96372 THER/PROPH/DIAG INJ SC/IM: CPT

## 2021-02-24 RX ADMIN — PEGFILGRASTIM 6 MG: KIT SUBCUTANEOUS at 11:21

## 2021-02-24 NOTE — PROGRESS NOTES
Pt  Denied new symptoms or concerns today  5FU completed as scheduled  CADD pump disconnected  Excellent blood return noted  Neulasta onpro placed on TANYA  Pt  Understands process and will remove device independently  Future appointments reviewed  AVS declined

## 2021-02-24 NOTE — PLAN OF CARE
Problem: Potential for Falls  Goal: Patient will remain free of falls  Description: INTERVENTIONS:  - Assess patient frequently for physical needs  -  Identify cognitive and physical deficits and behaviors that affect risk of falls  -  [place] fall precautions as indicated by assessment   - Educate patient/family on patient safety including physical limitations  - Instruct patient to call for assistance with activity based on assessment  - Modify environment to reduce risk of injury  - Consider OT/PT consult to assist with strengthening/mobility  Outcome: Progressing     Problem: Knowledge Deficit  Goal: Patient/family/caregiver demonstrates understanding of disease process, treatment plan, medications, and discharge instructions  Description: Complete learning assessment and assess knowledge base    Interventions:  - Provide teaching at level of understanding  - Provide teaching via preferred learning methods  Outcome: Progressing     Problem: INFECTION - ADULT  Goal: Absence or prevention of progression during hospitalization  Description: INTERVENTIONS:  - Assess and monitor for signs and symptoms of infection  - Monitor lab/diagnostic results  - Monitor all insertion sites, i e  indwelling lines, tubes, and drains  - Monitor endotracheal if appropriate and nasal secretions for changes in amount and color  - [place] appropriate cooling/warming therapies per order  - Administer medications as ordered  - Instruct and encourage patient and family to use good hand hygiene technique  - Identify and instruct in appropriate isolation precautions for identified infection/condition  Outcome: Progressing  Goal: Absence of fever/infection during neutropenic period  Description: INTERVENTIONS:  - Monitor WBC    Outcome: Progressing

## 2021-02-25 ENCOUNTER — APPOINTMENT (OUTPATIENT)
Dept: LAB | Facility: CLINIC | Age: 76
End: 2021-02-25
Payer: MEDICARE

## 2021-02-25 DIAGNOSIS — R80.9 PROTEINURIA, UNSPECIFIED TYPE: ICD-10-CM

## 2021-02-25 LAB
PROT 24H UR-MCNC: 440 MG/24 HRS (ref 40–150)
SPECIMEN VOL UR: 1100 ML

## 2021-02-25 PROCEDURE — 84156 ASSAY OF PROTEIN URINE: CPT

## 2021-02-26 ENCOUNTER — TELEPHONE (OUTPATIENT)
Dept: HEMATOLOGY ONCOLOGY | Facility: CLINIC | Age: 76
End: 2021-02-26

## 2021-02-26 NOTE — TELEPHONE ENCOUNTER
Returned telephone call spoke with pt  Per Dr Barber Place the tiredness is due to the chemotherapy  Continue taking the prednisone if you think it is helping and rest when you need to rest  No additional meds can be ordered  Pt states he understands and usually gets better with each day after getting chemo  No other concerns at this time

## 2021-02-26 NOTE — TELEPHONE ENCOUNTER
Patient called to report that the Prednisone 10 mg daily helped for the first 2 days for his fatigue  Patient reports that it is no longer effective for helping his fatigue  He is questioning if there is anything additional that can be ordered? Will forward to Dr Bret Giang RN  Patient's call back # [phone] (M)      Pharmacy:  [address] [place] [address] [address]   Phone:  [phone] Self

## 2021-03-02 DIAGNOSIS — C17.9 SMALL BOWEL CANCER (HCC): Primary | ICD-10-CM

## 2021-03-02 RX ORDER — FLUOROURACIL 50 MG/ML
800 INJECTION, SOLUTION INTRAVENOUS ONCE
Status: CANCELLED | OUTPATIENT
Start: 2021-03-15

## 2021-03-02 RX ORDER — SODIUM CHLORIDE 9 MG/ML
20 INJECTION, SOLUTION INTRAVENOUS ONCE AS NEEDED
Status: CANCELLED | OUTPATIENT
Start: 2021-03-15

## 2021-03-02 RX ORDER — DEXTROSE MONOHYDRATE 50 MG/ML
20 INJECTION, SOLUTION INTRAVENOUS ONCE
Status: CANCELLED | OUTPATIENT
Start: 2021-03-15

## 2021-03-03 DIAGNOSIS — C17.9 SMALL BOWEL CANCER (HCC): Primary | ICD-10-CM

## 2021-03-03 DIAGNOSIS — T45.1X5A CHEMOTHERAPY INDUCED NEUTROPENIA (HCC): ICD-10-CM

## 2021-03-03 DIAGNOSIS — D70.1 CHEMOTHERAPY INDUCED NEUTROPENIA (HCC): ICD-10-CM

## 2021-03-05 ENCOUNTER — DOCUMENTATION (OUTPATIENT)
Dept: HEMATOLOGY ONCOLOGY | Facility: CLINIC | Age: 76
End: 2021-03-05

## 2021-03-05 ENCOUNTER — LAB (OUTPATIENT)
Dept: LAB | Facility: CLINIC | Age: 76
End: 2021-03-05
Payer: MEDICARE

## 2021-03-05 ENCOUNTER — TELEPHONE (OUTPATIENT)
Dept: HEMATOLOGY ONCOLOGY | Facility: CLINIC | Age: 76
End: 2021-03-05

## 2021-03-05 DIAGNOSIS — C17.9 SMALL BOWEL CANCER (HCC): Primary | ICD-10-CM

## 2021-03-05 DIAGNOSIS — C17.9 SMALL BOWEL CANCER (HCC): ICD-10-CM

## 2021-03-05 DIAGNOSIS — C78.00 MALIGNANT NEOPLASM METASTATIC TO LUNG, UNSPECIFIED LATERALITY (HCC): ICD-10-CM

## 2021-03-05 DIAGNOSIS — C78.6 MALIGNANT NEOPLASM METASTATIC TO PERITONEUM (HCC): ICD-10-CM

## 2021-03-05 DIAGNOSIS — R80.9 PROTEINURIA, UNSPECIFIED TYPE: ICD-10-CM

## 2021-03-05 NOTE — PROGRESS NOTES
Per pharmacist [place] and Dr Cony Reyez ok, changed Avastin to be given over 90 mins  Also pt to have 24 hour urine protein lab q 2nd cycle

## 2021-03-05 NOTE — TELEPHONE ENCOUNTER
Telephone cody spoke with pt  Dr Lizette Nuñez reviewed lab results, based on platelets of 12,010 tx deferred 1 week  Also instructed that 24 hr protein test was good and will get the avastin  Explained going forward every other cycle will need to have the urine test done  Pt stated he understands  In basket sent to infusion to cancel 3/8 and r/s for 3/15

## 2021-03-06 RX ORDER — FLUOROURACIL 50 MG/ML
600 INJECTION, SOLUTION INTRAVENOUS ONCE
Status: CANCELLED | OUTPATIENT
Start: 2021-03-15

## 2021-03-08 ENCOUNTER — HOSPITAL ENCOUNTER (OUTPATIENT)
Dept: INFUSION CENTER | Facility: CLINIC | Age: 76
Discharge: HOME | End: 2021-03-08

## 2021-03-09 ENCOUNTER — TELEPHONE (OUTPATIENT)
Dept: HEMATOLOGY ONCOLOGY | Facility: MEDICAL CENTER | Age: 76
End: 2021-03-09

## 2021-03-09 DIAGNOSIS — D70.1 CHEMOTHERAPY INDUCED NEUTROPENIA (HCC): ICD-10-CM

## 2021-03-09 DIAGNOSIS — C17.9 SMALL BOWEL CANCER (HCC): Primary | ICD-10-CM

## 2021-03-09 DIAGNOSIS — T45.1X5A CHEMOTHERAPY INDUCED NEUTROPENIA (HCC): ICD-10-CM

## 2021-03-12 ENCOUNTER — APPOINTMENT (OUTPATIENT)
Dept: LAB | Facility: CLINIC | Age: 76
End: 2021-03-12
Payer: MEDICARE

## 2021-03-12 ENCOUNTER — TELEPHONE (OUTPATIENT)
Dept: HEMATOLOGY ONCOLOGY | Facility: CLINIC | Age: 76
End: 2021-03-12

## 2021-03-12 DIAGNOSIS — D70.1 CHEMOTHERAPY INDUCED NEUTROPENIA (HCC): Primary | ICD-10-CM

## 2021-03-12 DIAGNOSIS — T45.1X5A CHEMOTHERAPY INDUCED NEUTROPENIA (HCC): Primary | ICD-10-CM

## 2021-03-12 DIAGNOSIS — C17.9 SMALL BOWEL CANCER (HCC): ICD-10-CM

## 2021-03-12 DIAGNOSIS — C78.00 MALIGNANT NEOPLASM METASTATIC TO LUNG, UNSPECIFIED LATERALITY (HCC): ICD-10-CM

## 2021-03-12 NOTE — TELEPHONE ENCOUNTER
Reviewed labs with Dr Benitez Proctor  Labs are unusual compared to patient's previous labs  Per Dr Benitez Proctor, patient is to have labs repeated tomorrow  Spoke with patient and explained that he needs to have his labs redrawn  Patient verbalized understanding and will go tomorrow

## 2021-03-13 ENCOUNTER — LAB (OUTPATIENT)
Dept: LAB | Facility: MEDICAL CENTER | Age: 76
End: 2021-03-13
Payer: MEDICARE

## 2021-03-13 DIAGNOSIS — C78.00 MALIGNANT NEOPLASM METASTATIC TO LUNG, UNSPECIFIED LATERALITY (HCC): ICD-10-CM

## 2021-03-13 DIAGNOSIS — T45.1X5A CHEMOTHERAPY INDUCED NEUTROPENIA (HCC): ICD-10-CM

## 2021-03-13 DIAGNOSIS — D70.1 CHEMOTHERAPY INDUCED NEUTROPENIA (HCC): ICD-10-CM

## 2021-03-13 LAB
ACANTHOCYTES BLD QL SMEAR: PRESENT
ALBUMIN SERPL BCP-MCNC: 3.2 G/DL (ref 3.5–5)
ALP SERPL-CCNC: 160 U/L (ref 46–116)
ALT SERPL W P-5'-P-CCNC: 63 U/L (ref 12–78)
ANION GAP SERPL CALCULATED.3IONS-SCNC: 4 MMOL/L (ref 4–13)
ANISOCYTOSIS BLD QL SMEAR: PRESENT
AST SERPL W P-5'-P-CCNC: 46 U/L (ref 5–45)
BASOPHILS # BLD MANUAL: 0 THOUSAND/UL (ref 0–0.1)
BASOPHILS NFR MAR MANUAL: 0 % (ref 0–1)
BILIRUB SERPL-MCNC: 0.45 MG/DL (ref 0.2–1)
BUN SERPL-MCNC: 17 MG/DL (ref 5–25)
CALCIUM ALBUM COR SERPL-MCNC: 9.8 MG/DL (ref 8.3–10.1)
CALCIUM SERPL-MCNC: 9.2 MG/DL (ref 8.3–10.1)
CHLORIDE SERPL-SCNC: 108 MMOL/L (ref 100–108)
CO2 SERPL-SCNC: 30 MMOL/L (ref 21–32)
CREAT SERPL-MCNC: 1.18 MG/DL (ref 0.6–1.3)
EOSINOPHIL # BLD MANUAL: 0.93 THOUSAND/UL (ref 0–0.4)
EOSINOPHIL NFR BLD MANUAL: 3 % (ref 0–6)
ERYTHROCYTE [DISTWIDTH] IN BLOOD BY AUTOMATED COUNT: 23.9 % (ref 11.6–15.1)
GFR SERPL CREATININE-BSD FRML MDRD: 59 ML/MIN/1.73SQ M
GLUCOSE P FAST SERPL-MCNC: 82 MG/DL (ref 65–99)
HCT VFR BLD AUTO: 35.9 % (ref 36.5–49.3)
HELMET CELLS BLD QL SMEAR: PRESENT
HGB BLD-MCNC: 11.1 G/DL (ref 12–17)
LG PLATELETS BLD QL SMEAR: PRESENT
LYMPHOCYTES # BLD AUTO: 2.47 THOUSAND/UL (ref 0.6–4.47)
LYMPHOCYTES # BLD AUTO: 8 % (ref 14–44)
MCH RBC QN AUTO: 27.1 PG (ref 26.8–34.3)
MCHC RBC AUTO-ENTMCNC: 30.9 G/DL (ref 31.4–37.4)
MCV RBC AUTO: 88 FL (ref 82–98)
METAMYELOCYTES NFR BLD MANUAL: 6 % (ref 0–1)
MONOCYTES # BLD AUTO: 1.55 THOUSAND/UL (ref 0–1.22)
MONOCYTES NFR BLD: 5 % (ref 4–12)
MYELOCYTES NFR BLD MANUAL: 4 % (ref 0–1)
NEUTROPHILS # BLD MANUAL: 22.87 THOUSAND/UL (ref 1.85–7.62)
NEUTS BAND NFR BLD MANUAL: 2 % (ref 0–8)
NEUTS SEG NFR BLD AUTO: 72 % (ref 43–75)
NRBC BLD AUTO-RTO: 2 /100 WBCS
OVALOCYTES BLD QL SMEAR: PRESENT
PLATELET # BLD AUTO: 278 THOUSANDS/UL (ref 149–390)
PLATELET BLD QL SMEAR: ADEQUATE
PMV BLD AUTO: 11.6 FL (ref 8.9–12.7)
POIKILOCYTOSIS BLD QL SMEAR: PRESENT
POLYCHROMASIA BLD QL SMEAR: PRESENT
POTASSIUM SERPL-SCNC: 4 MMOL/L (ref 3.5–5.3)
PROT SERPL-MCNC: 6.7 G/DL (ref 6.4–8.2)
RBC # BLD AUTO: 4.1 MILLION/UL (ref 3.88–5.62)
RBC MORPH BLD: PRESENT
SCHISTOCYTES BLD QL SMEAR: PRESENT
SODIUM SERPL-SCNC: 142 MMOL/L (ref 136–145)
TOTAL CELLS COUNTED SPEC: 100
TOXIC GRANULES BLD QL SMEAR: PRESENT
WBC # BLD AUTO: 30.91 THOUSAND/UL (ref 4.31–10.16)

## 2021-03-13 PROCEDURE — 36415 COLL VENOUS BLD VENIPUNCTURE: CPT

## 2021-03-13 PROCEDURE — 85007 BL SMEAR W/DIFF WBC COUNT: CPT

## 2021-03-13 PROCEDURE — 85027 COMPLETE CBC AUTOMATED: CPT

## 2021-03-13 PROCEDURE — 80053 COMPREHEN METABOLIC PANEL: CPT

## 2021-03-15 ENCOUNTER — HOSPITAL ENCOUNTER (OUTPATIENT)
Dept: INFUSION CENTER | Facility: CLINIC | Age: 76
Discharge: HOME | End: 2021-03-15
Payer: MEDICARE

## 2021-03-15 VITALS
HEART RATE: 97 BPM | TEMPERATURE: 96.5 F | DIASTOLIC BLOOD PRESSURE: 74 MMHG | SYSTOLIC BLOOD PRESSURE: 128 MMHG | RESPIRATION RATE: 18 BRPM | BODY MASS INDEX: 22.17 KG/M2 | WEIGHT: 149.69 LBS | HEIGHT: 69 IN

## 2021-03-15 DIAGNOSIS — C17.9 SMALL BOWEL CANCER (HCC): Primary | ICD-10-CM

## 2021-03-15 DIAGNOSIS — D70.1 CHEMOTHERAPY INDUCED NEUTROPENIA (HCC): ICD-10-CM

## 2021-03-15 DIAGNOSIS — T45.1X5A CHEMOTHERAPY INDUCED NEUTROPENIA (HCC): ICD-10-CM

## 2021-03-15 PROCEDURE — 96411 CHEMO IV PUSH ADDL DRUG: CPT

## 2021-03-15 PROCEDURE — 96367 TX/PROPH/DG ADDL SEQ IV INF: CPT

## 2021-03-15 PROCEDURE — 96417 CHEMO IV INFUS EACH ADDL SEQ: CPT

## 2021-03-15 PROCEDURE — G0498 CHEMO EXTEND IV INFUS W/PUMP: HCPCS

## 2021-03-15 PROCEDURE — 96415 CHEMO IV INFUSION ADDL HR: CPT

## 2021-03-15 PROCEDURE — 96368 THER/DIAG CONCURRENT INF: CPT

## 2021-03-15 PROCEDURE — 96413 CHEMO IV INFUSION 1 HR: CPT

## 2021-03-15 RX ORDER — FLUOROURACIL 50 MG/ML
600 INJECTION, SOLUTION INTRAVENOUS ONCE
Status: COMPLETED | OUTPATIENT
Start: 2021-03-15 | End: 2021-03-15

## 2021-03-15 RX ORDER — DEXTROSE MONOHYDRATE 50 MG/ML
20 INJECTION, SOLUTION INTRAVENOUS ONCE
Status: COMPLETED | OUTPATIENT
Start: 2021-03-15 | End: 2021-03-15

## 2021-03-15 RX ORDER — SODIUM CHLORIDE 9 MG/ML
20 INJECTION, SOLUTION INTRAVENOUS ONCE AS NEEDED
Status: DISCONTINUED | OUTPATIENT
Start: 2021-03-15 | End: 2021-03-18 | Stop reason: HOSPADM

## 2021-03-15 RX ADMIN — LEUCOVORIN CALCIUM 600 MG: 200 INJECTION, POWDER, LYOPHILIZED, FOR SUSPENSION INTRAMUSCULAR; INTRAVENOUS at 12:56

## 2021-03-15 RX ADMIN — DEXTROSE 20 ML/HR: 5 SOLUTION INTRAVENOUS at 12:44

## 2021-03-15 RX ADMIN — FLUOROURACIL 600 MG: 50 INJECTION, SOLUTION INTRAVENOUS at 14:53

## 2021-03-15 RX ADMIN — BEVACIZUMAB 352.5 MG: 400 INJECTION, SOLUTION INTRAVENOUS at 11:03

## 2021-03-15 RX ADMIN — OXALIPLATIN 124.15 MG: 5 INJECTION, SOLUTION INTRAVENOUS at 12:46

## 2021-03-15 RX ADMIN — SODIUM CHLORIDE 20 ML/HR: 0.9 INJECTION, SOLUTION INTRAVENOUS at 10:32

## 2021-03-15 RX ADMIN — DEXAMETHASONE SODIUM PHOSPHATE: 10 INJECTION, SOLUTION INTRAMUSCULAR; INTRAVENOUS at 10:36

## 2021-03-15 NOTE — PROGRESS NOTES
Pt arrived to unit without complaint  Pt tolerated treatment without incident  5FU CADD pump connected and infusing as ordered  AVS provided  Pt aware to return on 3/17 at 1330 for pump disconnect  Pt left unit in stable condition

## 2021-03-15 NOTE — PLAN OF CARE
Problem: Potential for Falls  Goal: Patient will remain free of falls  Description: INTERVENTIONS:  - Assess patient frequently for physical needs  -  Identify cognitive and physical deficits and behaviors that affect risk of falls    -  [place] fall precautions as indicated by assessment   - Educate patient/family on patient safety including physical limitations  - Instruct patient to call for assistance with activity based on assessment  - Modify environment to reduce risk of injury  - Consider OT/PT consult to assist with strengthening/mobility  Outcome: Progressing

## 2021-03-16 ENCOUNTER — TELEPHONE (OUTPATIENT)
Dept: HEMATOLOGY ONCOLOGY | Facility: CLINIC | Age: 76
End: 2021-03-16

## 2021-03-16 NOTE — TELEPHONE ENCOUNTER
Patient is requesting a call back regarding his chemo schedule, he indicates that it has changed without his acknowledgement and he would like to speak to someone regarding this matter   He can be reached back at [phone]

## 2021-03-16 NOTE — TELEPHONE ENCOUNTER
Spoke with patient  Schedule changed due to 3/8 treatment being deferred  Patient questions if he had appointments for 4/26 and 4/28 since his schedule from infusion didn't reflect that  I confirmed he does have appointments for those days and reviewed his new schedule  Patient verbalized understanding

## 2021-03-17 ENCOUNTER — HOSPITAL ENCOUNTER (OUTPATIENT)
Dept: INFUSION CENTER | Facility: CLINIC | Age: 76
Discharge: HOME | End: 2021-03-17

## 2021-03-17 DIAGNOSIS — T45.1X5A CHEMOTHERAPY INDUCED NEUTROPENIA (HCC): ICD-10-CM

## 2021-03-17 DIAGNOSIS — C17.9 SMALL BOWEL CANCER (HCC): Primary | ICD-10-CM

## 2021-03-17 DIAGNOSIS — D70.1 CHEMOTHERAPY INDUCED NEUTROPENIA (HCC): ICD-10-CM

## 2021-03-17 NOTE — PLAN OF CARE
Problem: INFECTION - ADULT  Goal: Absence or prevention of progression during hospitalization  Description: INTERVENTIONS:  - Assess and monitor for signs and symptoms of infection  - Monitor lab/diagnostic results  - Monitor all insertion sites, i e  indwelling lines, tubes, and drains  - Monitor endotracheal if appropriate and nasal secretions for changes in amount and color  - [place] appropriate cooling/warming therapies per order  - Administer medications as ordered  - Instruct and encourage patient and family to use good hand hygiene technique  - Identify and instruct in appropriate isolation precautions for identified infection/condition  Outcome: Progressing

## 2021-03-17 NOTE — PROGRESS NOTES
Patient arrived to the unit and denied complications  His CADD pump was disconnected  Port flushed well and was deaccessed  Confirmed with Travon Nuñez Rn with Dr Dave Kovacs that he does not need an OnPro this week because his WBC was elevated

## 2021-03-22 RX ORDER — SODIUM CHLORIDE 9 MG/ML
20 INJECTION, SOLUTION INTRAVENOUS ONCE AS NEEDED
Status: CANCELLED | OUTPATIENT
Start: 2021-03-29

## 2021-03-22 RX ORDER — FLUOROURACIL 50 MG/ML
600 INJECTION, SOLUTION INTRAVENOUS ONCE
Status: CANCELLED | OUTPATIENT
Start: 2021-03-29

## 2021-03-22 RX ORDER — DEXTROSE MONOHYDRATE 50 MG/ML
20 INJECTION, SOLUTION INTRAVENOUS ONCE
Status: CANCELLED | OUTPATIENT
Start: 2021-03-29

## 2021-03-24 DIAGNOSIS — C17.9 SMALL BOWEL CANCER (HCC): Primary | ICD-10-CM

## 2021-03-24 DIAGNOSIS — D70.1 CHEMOTHERAPY INDUCED NEUTROPENIA (HCC): ICD-10-CM

## 2021-03-24 DIAGNOSIS — C78.6 MALIGNANT NEOPLASM METASTATIC TO PERITONEUM (HCC): ICD-10-CM

## 2021-03-24 DIAGNOSIS — T45.1X5A CHEMOTHERAPY INDUCED NEUTROPENIA (HCC): ICD-10-CM

## 2021-03-26 ENCOUNTER — APPOINTMENT (OUTPATIENT)
Dept: LAB | Facility: CLINIC | Age: 76
End: 2021-03-26
Payer: MEDICARE

## 2021-03-26 ENCOUNTER — TELEPHONE (OUTPATIENT)
Dept: HEMATOLOGY ONCOLOGY | Facility: CLINIC | Age: 76
End: 2021-03-26

## 2021-03-26 DIAGNOSIS — C17.9 SMALL BOWEL CANCER (HCC): ICD-10-CM

## 2021-03-26 NOTE — TELEPHONE ENCOUNTER
Patient is scheduled for his COVID 19 vaccine on 3/30/21  He will have his CADD pump on  Is that OK with Dr Sarah Bishop? OK to retask to contact patient

## 2021-03-26 NOTE — TELEPHONE ENCOUNTER
Per Dr Leanne Ma, from a hematology/oncology standpoint, patient ok to receive vaccine as scheduled

## 2021-03-29 ENCOUNTER — HOSPITAL ENCOUNTER (OUTPATIENT)
Dept: INFUSION CENTER | Facility: CLINIC | Age: 76
Discharge: HOME | End: 2021-03-29
Payer: MEDICARE

## 2021-03-29 VITALS
WEIGHT: 153.88 LBS | DIASTOLIC BLOOD PRESSURE: 74 MMHG | TEMPERATURE: 98 F | SYSTOLIC BLOOD PRESSURE: 146 MMHG | HEIGHT: 69 IN | HEART RATE: 85 BPM | BODY MASS INDEX: 22.79 KG/M2 | RESPIRATION RATE: 18 BRPM

## 2021-03-29 DIAGNOSIS — T45.1X5A CHEMOTHERAPY INDUCED NEUTROPENIA (HCC): ICD-10-CM

## 2021-03-29 DIAGNOSIS — C78.6 MALIGNANT NEOPLASM METASTATIC TO PERITONEUM (HCC): ICD-10-CM

## 2021-03-29 DIAGNOSIS — D70.1 CHEMOTHERAPY INDUCED NEUTROPENIA (HCC): ICD-10-CM

## 2021-03-29 DIAGNOSIS — C17.9 SMALL BOWEL CANCER (HCC): Primary | ICD-10-CM

## 2021-03-29 PROCEDURE — 96417 CHEMO IV INFUS EACH ADDL SEQ: CPT

## 2021-03-29 PROCEDURE — G0498 CHEMO EXTEND IV INFUS W/PUMP: HCPCS

## 2021-03-29 PROCEDURE — 96413 CHEMO IV INFUSION 1 HR: CPT

## 2021-03-29 PROCEDURE — 96368 THER/DIAG CONCURRENT INF: CPT

## 2021-03-29 PROCEDURE — 96367 TX/PROPH/DG ADDL SEQ IV INF: CPT

## 2021-03-29 PROCEDURE — 96415 CHEMO IV INFUSION ADDL HR: CPT

## 2021-03-29 PROCEDURE — 96411 CHEMO IV PUSH ADDL DRUG: CPT

## 2021-03-29 RX ORDER — FLUOROURACIL 50 MG/ML
600 INJECTION, SOLUTION INTRAVENOUS ONCE
Status: COMPLETED | OUTPATIENT
Start: 2021-03-29 | End: 2021-03-29

## 2021-03-29 RX ORDER — DEXTROSE MONOHYDRATE 50 MG/ML
20 INJECTION, SOLUTION INTRAVENOUS ONCE
Status: COMPLETED | OUTPATIENT
Start: 2021-03-29 | End: 2021-03-29

## 2021-03-29 RX ORDER — SODIUM CHLORIDE 9 MG/ML
20 INJECTION, SOLUTION INTRAVENOUS ONCE AS NEEDED
Status: DISCONTINUED | OUTPATIENT
Start: 2021-03-29 | End: 2021-04-01 | Stop reason: HOSPADM

## 2021-03-29 RX ADMIN — DEXTROSE 20 ML/HR: 5 SOLUTION INTRAVENOUS at 12:02

## 2021-03-29 RX ADMIN — OXALIPLATIN 124.15 MG: 5 INJECTION, SOLUTION INTRAVENOUS at 12:07

## 2021-03-29 RX ADMIN — DEXAMETHASONE SODIUM PHOSPHATE: 10 INJECTION, SOLUTION INTRAMUSCULAR; INTRAVENOUS at 10:08

## 2021-03-29 RX ADMIN — FLUOROURACIL 600 MG: 50 INJECTION, SOLUTION INTRAVENOUS at 14:11

## 2021-03-29 RX ADMIN — SODIUM CHLORIDE 20 ML/HR: 0.9 INJECTION, SOLUTION INTRAVENOUS at 09:55

## 2021-03-29 RX ADMIN — LEUCOVORIN CALCIUM 600 MG: 200 INJECTION, POWDER, LYOPHILIZED, FOR SUSPENSION INTRAMUSCULAR; INTRAVENOUS at 12:04

## 2021-03-29 RX ADMIN — BEVACIZUMAB 352.5 MG: 400 INJECTION, SOLUTION INTRAVENOUS at 10:47

## 2021-03-29 NOTE — PLAN OF CARE
Problem: INFECTION - ADULT  Goal: Absence or prevention of progression during hospitalization  Description: INTERVENTIONS:  - Assess and monitor for signs and symptoms of infection  - Monitor lab/diagnostic results  - Monitor all insertion sites, i e  indwelling lines, tubes, and drains  - Monitor endotracheal if appropriate and nasal secretions for changes in amount and color  - [place] appropriate cooling/warming therapies per order  - Administer medications as ordered  - Instruct and encourage patient and family to use good hand hygiene technique  - Identify and instruct in appropriate isolation precautions for identified infection/condition  Outcome: Progressing  Goal: Absence of fever/infection during neutropenic period  Description: INTERVENTIONS:  - Monitor WBC    Outcome: Progressing     Problem: Knowledge Deficit  Goal: Patient/family/caregiver demonstrates understanding of disease process, treatment plan, medications, and discharge instructions  Description: Complete learning assessment and assess knowledge base    Interventions:  - Provide teaching at level of understanding  - Provide teaching via preferred learning methods  Outcome: Progressing

## 2021-03-29 NOTE — PROGRESS NOTES
Pt denied new symptoms or concerns today  Labs reviewed  Parameters met  Pt  Tolerated Avastin, Oxaliplatin, Leucovorin, and 5FU bolus without adverse event  5Fu will continuously infuse via cadd pump at 2ml/hr via cadd pump  Pt  Will return on Wednesday 3/31 for cadd pump disconnect  Pt understands to return at approx 1230  Pt will call physician with any concerns  AVS not given at this time

## 2021-03-30 ENCOUNTER — IMMUNIZATIONS (OUTPATIENT)
Dept: FAMILY MEDICINE CLINIC | Facility: HOSPITAL | Age: 76
End: 2021-03-30

## 2021-03-30 DIAGNOSIS — Z23 ENCOUNTER FOR IMMUNIZATION: Primary | ICD-10-CM

## 2021-03-30 PROCEDURE — 0011A SARS-COV-2 / COVID-19 MRNA VACCINE (MODERNA) 100 MCG: CPT

## 2021-03-30 PROCEDURE — 91301 SARS-COV-2 / COVID-19 MRNA VACCINE (MODERNA) 100 MCG: CPT

## 2021-03-31 ENCOUNTER — OFFICE VISIT (OUTPATIENT)
Dept: HEMATOLOGY ONCOLOGY | Facility: CLINIC | Age: 76
End: 2021-03-31
Payer: MEDICARE

## 2021-03-31 ENCOUNTER — HOSPITAL ENCOUNTER (OUTPATIENT)
Dept: INFUSION CENTER | Facility: CLINIC | Age: 76
Discharge: HOME | End: 2021-03-31

## 2021-03-31 VITALS
HEIGHT: 69 IN | DIASTOLIC BLOOD PRESSURE: 70 MMHG | HEART RATE: 85 BPM | RESPIRATION RATE: 20 BRPM | WEIGHT: 150 LBS | TEMPERATURE: 97.3 F | OXYGEN SATURATION: 97 % | SYSTOLIC BLOOD PRESSURE: 122 MMHG | BODY MASS INDEX: 22.22 KG/M2

## 2021-03-31 VITALS
SYSTOLIC BLOOD PRESSURE: 122 MMHG | DIASTOLIC BLOOD PRESSURE: 70 MMHG | HEART RATE: 85 BPM | RESPIRATION RATE: 20 BRPM | TEMPERATURE: 97.4 F

## 2021-03-31 DIAGNOSIS — T45.1X5A CHEMOTHERAPY-INDUCED NAUSEA: ICD-10-CM

## 2021-03-31 DIAGNOSIS — D70.1 CHEMOTHERAPY INDUCED NEUTROPENIA (HCC): ICD-10-CM

## 2021-03-31 DIAGNOSIS — C17.9 SMALL BOWEL CANCER (HCC): Primary | ICD-10-CM

## 2021-03-31 DIAGNOSIS — R80.9 PROTEINURIA, UNSPECIFIED TYPE: ICD-10-CM

## 2021-03-31 DIAGNOSIS — C78.00 MALIGNANT NEOPLASM METASTATIC TO LUNG, UNSPECIFIED LATERALITY (HCC): ICD-10-CM

## 2021-03-31 DIAGNOSIS — R11.0 CHEMOTHERAPY-INDUCED NAUSEA: ICD-10-CM

## 2021-03-31 DIAGNOSIS — Z45.2 ENCOUNTER FOR CARE RELATED TO VASCULAR ACCESS PORT: ICD-10-CM

## 2021-03-31 DIAGNOSIS — C88.4 MALT (MUCOSA ASSOCIATED LYMPHOID TISSUE) (HCC): ICD-10-CM

## 2021-03-31 DIAGNOSIS — T45.1X5A CHEMOTHERAPY INDUCED NEUTROPENIA (HCC): ICD-10-CM

## 2021-03-31 DIAGNOSIS — C78.6 MALIGNANT NEOPLASM METASTATIC TO PERITONEUM (HCC): ICD-10-CM

## 2021-03-31 PROCEDURE — 99214 OFFICE O/P EST MOD 30 MIN: CPT | Performed by: INTERNAL MEDICINE

## 2021-03-31 NOTE — PROGRESS NOTES
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  Patient received antibiotic therapy for H pylori  Patient has Port-A-Cath  Patient states he is breathing better  He does not have abdominal discomfort anymore  No bowel symptoms  He has numbness tingling only for couple days when he is receiving therapy but not  in between and does not last   Less tiredness  On 10/27/2020 patient had EGD and colonoscopy and 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  Negative H pylori and in spite of that patient was given antibiotic therapy against H pylori because of MALT lymphoma  On 12/09/2020 he had needle biopsy of left upper lung nodule and that came back adenocarcinoma  consistent with colorectal cancer  Colonoscopy had shown polyps but no cancer  Patient had needle biopsy of omental mass and that also showed adenocarcinoma                              Current Outpatient Medications:     atorvastatin (LIPITOR) 40 mg tablet, Take 1 tablet (40 mg total) by mouth daily, Disp: 90 tablet, Rfl: 1    chlordiazepoxide-clidinium (LIBRAX) 5-2 5 mg per capsule, Take 1 capsule by mouth 3 (three) times a day as needed (Abdominal Pain), Disp: 90 capsule, Rfl: 1    Cholecalciferol (VITAMIN D3) 2000 units TABS, Take 2,000 Units by mouth daily, Disp: , Rfl:     cyanocobalamin (VITAMIN B-12) 1,000 mcg tablet, Take 1,000 mcg by mouth daily, Disp: , Rfl:     dutasteride (AVODART) 0 5 mg capsule, Take 1 capsule (0 5 mg total) by mouth daily at bedtime, Disp: 90 capsule, Rfl: 1    ferrous sulfate 325 (65 Fe) mg tablet, Take 325 mg by mouth 2 (two) times a day with meals, Disp: , Rfl:    fluorouracil 4,585 mg in CADD infusion pump, Infuse 4,585 mg (1,200 mg/m2/day x 1 91 m2 (Treatment plan recorded BSA)) into a venous catheter over 46 hours for 2 days, Disp: 1 Device, Rfl: 0    losartan (COZAAR) 25 mg tablet, TAKE ONE TABLET DAILY, Disp: 90 tablet, Rfl: 1    metoprolol tartrate (LOPRESSOR) 25 mg tablet, Take 0 5 tablets (12 5 mg total) by mouth 2 (two) times a day, Disp: 180 tablet, Rfl: 1    Multiple Vitamin (MULTIVITAMIN) tablet, Take 1 tablet by mouth daily, Disp: , Rfl:     ondansetron (ZOFRAN) 4 mg tablet, Take 1 tablet (4 mg total) by mouth every 8 (eight) hours as needed for nausea or vomiting, Disp: 20 tablet, Rfl: 2    predniSONE 10 mg tablet, Take 1 tablet (10 mg total) by mouth daily, Disp: 30 tablet, Rfl: 1    aspirin (ECOTRIN LOW STRENGTH) 81 mg EC tablet, Take 81 mg by mouth daily, Disp: , Rfl:     dicyclomine (BENTYL) 20 mg tablet, Take 1 tablet (20 mg total) by mouth every 6 (six) hours (Patient not taking: Reported on 2/22/2021), Disp: 90 tablet, Rfl: 3    esomeprazole (NexIUM) 40 MG capsule, Take 1 capsule (40 mg total) by mouth daily (Patient not taking: Reported on 12/24/2020), Disp: 30 capsule, Rfl: 2    esomeprazole (NexIUM) 40 MG capsule, Take 1 capsule (40 mg total) by mouth 2 (two) times a day before meals for 14 days (Patient not taking: Reported on 12/24/2020), Disp: 28 capsule, Rfl: 0    Omega-3 Fatty Acids (FISH OIL) 1,000 mg, Take 1,000 mg by mouth daily, Disp: , Rfl:     potassium chloride (MICRO-K) 10 MEQ CR capsule, Take 1 capsule (10 mEq total) by mouth 2 (two) times a day (Patient not taking: Reported on 2/22/2021), Disp: 20 capsule, Rfl: 0  No current facility-administered medications for this visit       Facility-Administered Medications Ordered in Other Visits:     sodium chloride 0 9 % infusion, 20 mL/hr, Intravenous, Once PRN, Mi Wells MD, Stopped at 03/29/21 1150    Allergies   Allergen Reactions    Other Other (See Comments)     Liquid lidocaine - couldn't swallow, panicked       Oncology History   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[facility] [address] )   12/17/2020 Initial Diagnosis    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[facility] [address] )     1/11/2021 -  Chemotherapy    fluorouracil (ADRUCIL) injection 800 mg, 765 mg, Intravenous, Once, 6 of 12 cycles  Dose modification: 300 mg/m2 (original dose 400 mg/m2, Cycle 5, Reason: Dose Not Tolerated)  Administration: 800 mg (1/11/2021), 800 mg (1/25/2021), 800 mg (2/8/2021), 800 mg (2/22/2021), 600 mg (3/15/2021), 600 mg (3/29/2021)  pegfilgrastim (NEULASTA ONPRO) subcutaneous injection kit 6 mg, 6 mg, Subcutaneous, Once, 2 of 2 cycles  Administration: 6 mg (2/10/2021), 6 mg (2/24/2021)  bevacizumab (AVASTIN) 352 5 mg in sodium chloride 0 9 % 100 mL IVPB, 5 mg/kg = 352 5 mg (100 % of original dose 5 mg/kg), Intravenous, Once, 2 of 8 cycles  Dose modification: 5 mg/kg (original dose 5 mg/kg, Cycle 5)  Administration: 352 5 mg (3/15/2021), 352 5 mg (3/29/2021)  leucovorin 800 mg in dextrose 5 % 250 mL IVPB, 764 mg, Intravenous, Once, 6 of 12 cycles  Dose modification: 300 mg/m2 (original dose 400 mg/m2, Cycle 5, Reason: Dose Not Tolerated)  Administration: 800 mg (1/11/2021), 800 mg (1/25/2021), 800 mg (2/8/2021), 800 mg (2/22/2021), 600 mg (3/15/2021), 600 mg (3/29/2021)  oxaliplatin (ELOXATIN) 162 35 mg in dextrose 5 % 250 mL chemo infusion, 85 mg/m2 = 162 35 mg, Intravenous, Once, 6 of 12 cycles  Dose modification: 65 mg/m2 (original dose 85 mg/m2, Cycle 5, Reason: Dose Not Tolerated)  Administration: 162 35 mg (1/11/2021), 162 35 mg (1/25/2021), 162 35 mg (2/8/2021), 162 35 mg (2/22/2021), 124 15 mg (3/15/2021), 124 15 mg (3/29/2021)         ROS:  03/31/21 Reviewed 12 systems: See symptoms in HPI  Presently no other neurological, cardiac, pulmonary, GI and  symptoms other than listed in HPI     Other symptoms are in HPI  No other symptoms like  fever, chills, bleeding, bone pains, skin rash,  Weakness, arthritis, claudication and gait problem   No frequent infections  Not unusually sensitive to heat or cold  No swelling of the ankles  No swollen glands  Patient is less anxious  /70 (BP Location: Left arm, Patient Position: Sitting, Cuff Size: Adult)   Pulse 85   Temp (!) 97 3 °F (36 3 °C)   Resp 20   Ht 5' 9 29" (1 76 m)   Wt 68 kg (150 lb)   SpO2 97%   BMI 21 97 kg/m²     Physical Exam:  Alert, oriented, not in distress, stable vitals, no icterus, no oral thrush, no palpable neck mass, clear lung fields, regular heart rate, abdomen  soft and non tender,  no ascites, no edema of ankles, no calf tenderness, no focal neurological deficit, no skin rash, no palpable lymphadenopathy in the neck and axillary areas,  no clubbing  Patient is anxious  Performance status 1  IMAGING:      LABS:    Results for orders placed or performed in visit on 03/19/21   CBC and differential   Result Value Ref Range    WBC 5 20 4  31 - 10 16 Thousand/uL    RBC 4 09 3 88 - 5 62 Million/uL    Hemoglobin 11 7 (L) 12 0 - 17 0 g/dL    Hematocrit 36 3 (L) 36 5 - 49 3 %    MCV 89 82 - 98 fL    MCH 28 6 26 8 - 34 3 pg    MCHC 32 2 31 4 - 37 4 g/dL    RDW 26 7 (H) 11 6 - 15 1 %    MPV 11 4 8 9 - 12 7 fL    Platelets 500 847 - 219 Thousands/uL    nRBC 0 /100 WBCs    Neutrophils Relative 67 43 - 75 %    Immat GRANS % 1 0 - 2 %    Lymphocytes Relative 16 14 - 44 %    Monocytes Relative 15 (H) 4 - 12 %    Eosinophils Relative 0 0 - 6 %    Basophils Relative 1 0 - 1 %    Neutrophils Absolute 3 52 1 85 - 7 62 Thousands/µL    Immature Grans Absolute 0 03 0 00 - 0 20 Thousand/uL    Lymphocytes Absolute 0 84 0 60 - 4 47 Thousands/µL    Monocytes Absolute 0 77 0 17 - 1 22 Thousand/µL    Eosinophils Absolute 0 01 0 00 - 0 61 Thousand/µL    Basophils Absolute 0 03 0 00 - 0 10 Thousands/µL   Comprehensive metabolic panel   Result Value Ref Range    Sodium 139 136 - 145 mmol/L    Potassium 4 1 3 5 - 5 3 mmol/L    Chloride 107 100 - 108 mmol/L    CO2 26 21 - 32 mmol/L    ANION GAP 6 4 - 13 mmol/L    BUN 20 5 - 25 mg/dL    Creatinine 0 96 0 60 - 1 30 mg/dL    Glucose, Fasting 87 65 - 99 mg/dL    Calcium 9 2 8 3 - 10 1 mg/dL    Corrected Calcium 9 7 8 3 - 10 1 mg/dL    AST 27 5 - 45 U/L    ALT 39 12 - 78 U/L    Alkaline Phosphatase 93 46 - 116 U/L    Total Protein 6 7 6 4 - 8 2 g/dL    Albumin 3 4 (L) 3 5 - 5 0 g/dL    Total Bilirubin 0 74 0 20 - 1 00 mg/dL    eGFR 76 ml/min/1 73sq m     Labs, Imaging, & Other studies:   All pertinent labs and imaging studies were personally reviewed    Lab Results   Component Value Date     07/30/2015    K 4 1 03/26/2021     03/26/2021    CO2 26 03/26/2021    ANIONGAP 8 07/30/2015    BUN 20 03/26/2021    CREATININE 0 96 03/26/2021    GLUCOSE 89 07/30/2015    GLUF 87 03/26/2021    CALCIUM 9 2 03/26/2021    CORRECTEDCA 9 7 03/26/2021    AST 27 03/26/2021    ALT 39 03/26/2021    ALKPHOS 93 03/26/2021    PROT 6 8 07/30/2015    BILITOT 0 59 07/30/2015    EGFR 76 03/26/2021     Lab Results   Component Value Date    WBC 5 20 03/26/2021    HGB 11 7 (L) 03/26/2021    HCT 36 3 (L) 03/26/2021    MCV 89 03/26/2021     03/26/2021       Reviewed CBC, CMP and urine protein and discussed with patient  Assessment and plan:   Patient is here with his  Wife  Patient is receiving 6  FOLFOX plus Avastin treatment, day 3  Today  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  He has multiple lung lesions bilaterally  Biopsy was taken from lung lesion and omentum  All 4 MMR proteins were intact     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  Patient received antibiotic therapy for H pylori  Patient has Port-A-Cath  Patient states he is breathing better  He does not have abdominal discomfort anymore  No bowel symptoms    He has numbness tingling only for couple days when he is receiving therapy but not  in between and does not last   Less tiredness  On 10/27/2020 patient had EGD and colonoscopy and 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  Negative H pylori and in spite of that patient was given antibiotic therapy against H pylori because of MALT lymphoma  On 12/09/2020 he had needle biopsy of left upper lung nodule and that came back adenocarcinoma  consistent with colorectal cancer  Colonoscopy had shown polyps but no cancer  Patient had needle biopsy of omental mass and that also showed adenocarcinoma                  Physical examination and test results are as recorded and discussed   Reviewed CBC, CMP and urine protein and discussed with patient     Patient is not having any significant toxicity to systemic therapy  No change in systemic therapy  Ordered CT scans CEA and urinalysis for protein  All discussed in detail  Questions answered  Discussed the importance of eating healthy foods, activities as tolerated and health screening tests     Goal will be   Response and prolongation of survival   Patient is capable of self-care  Discussed precautions against coronavirus     Patient will continue to follow with his primary physician and other consultants  See diagnoses and orders below  1  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[facility] [address] )    - CT chest abdomen pelvis w contrast; Future  - CEA; Future    2  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 [facility])    - CT chest abdomen pelvis w contrast; Future  - CEA; Future    3  Malignant neoplasm metastatic to lung, unspecified laterality ([facility] [address] )    - CT chest abdomen pelvis w contrast; Future  - CEA; Future    4  Proteinuria, unspecified type    - UA (URINE) with reflex to Scope; Standing  - UA (URINE) with reflex to Scope    5  Chemotherapy induced neutropenia (HCC)      6  Chemotherapy-induced nausea      7  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

## 2021-03-31 NOTE — PROGRESS NOTES
Patient here for d/c CADD pump  Port also flushed with good blood return, de accessed  Declined AVS, left infusion center in baseline condition

## 2021-03-31 NOTE — PLAN OF CARE

## 2021-03-31 NOTE — PATIENT INSTRUCTIONS
No change in systemic therapy  Urinalysis every 4 weeks  CEA 1 time  Ordered CT scans  Follow-up in 2 months

## 2021-04-05 RX ORDER — DEXTROSE MONOHYDRATE 50 MG/ML
20 INJECTION, SOLUTION INTRAVENOUS ONCE
Status: CANCELLED | OUTPATIENT
Start: 2021-04-19

## 2021-04-05 RX ORDER — FLUOROURACIL 50 MG/ML
600 INJECTION, SOLUTION INTRAVENOUS ONCE
Status: CANCELLED | OUTPATIENT
Start: 2021-04-19

## 2021-04-05 RX ORDER — SODIUM CHLORIDE 9 MG/ML
20 INJECTION, SOLUTION INTRAVENOUS ONCE AS NEEDED
Status: CANCELLED | OUTPATIENT
Start: 2021-04-19

## 2021-04-06 DIAGNOSIS — C17.9 SMALL BOWEL CANCER (HCC): Primary | ICD-10-CM

## 2021-04-09 ENCOUNTER — APPOINTMENT (OUTPATIENT)
Dept: LAB | Facility: CLINIC | Age: 76
End: 2021-04-09
Payer: MEDICARE

## 2021-04-09 DIAGNOSIS — C17.9 SMALL BOWEL CANCER (HCC): ICD-10-CM

## 2021-04-09 DIAGNOSIS — C78.00 MALIGNANT NEOPLASM METASTATIC TO LUNG, UNSPECIFIED LATERALITY (HCC): ICD-10-CM

## 2021-04-09 DIAGNOSIS — C78.6 MALIGNANT NEOPLASM METASTATIC TO PERITONEUM (HCC): ICD-10-CM

## 2021-04-09 LAB
BACTERIA UR QL AUTO: ABNORMAL /HPF
BILIRUB UR QL STRIP: ABNORMAL
CAOX CRY URNS QL MICRO: ABNORMAL /HPF
CEA SERPL-MCNC: 1.6 NG/ML (ref 0–3)
CLARITY UR: ABNORMAL
COLOR UR: ABNORMAL
GLUCOSE UR STRIP-MCNC: NEGATIVE MG/DL
HGB UR QL STRIP.AUTO: NEGATIVE
KETONES UR STRIP-MCNC: NEGATIVE MG/DL
LEUKOCYTE ESTERASE UR QL STRIP: NEGATIVE
MUCOUS THREADS UR QL AUTO: ABNORMAL
NITRITE UR QL STRIP: NEGATIVE
NON-SQ EPI CELLS URNS QL MICRO: ABNORMAL /HPF
PH UR STRIP.AUTO: 6 [PH]
PROT UR STRIP-MCNC: ABNORMAL MG/DL
RBC #/AREA URNS AUTO: ABNORMAL /HPF
SP GR UR STRIP.AUTO: 1.02 (ref 1–1.03)
UROBILINOGEN UR QL STRIP.AUTO: 0.2 E.U./DL
WBC #/AREA URNS AUTO: ABNORMAL /HPF

## 2021-04-09 PROCEDURE — 82378 CARCINOEMBRYONIC ANTIGEN: CPT

## 2021-04-09 PROCEDURE — 81001 URINALYSIS AUTO W/SCOPE: CPT | Performed by: INTERNAL MEDICINE

## 2021-04-12 ENCOUNTER — DOCUMENTATION (OUTPATIENT)
Dept: HEMATOLOGY ONCOLOGY | Facility: CLINIC | Age: 76
End: 2021-04-12

## 2021-04-12 ENCOUNTER — HOSPITAL ENCOUNTER (OUTPATIENT)
Dept: INFUSION CENTER | Facility: CLINIC | Age: 76
Discharge: HOME | End: 2021-04-12
Payer: MEDICARE

## 2021-04-12 VITALS
RESPIRATION RATE: 18 BRPM | DIASTOLIC BLOOD PRESSURE: 79 MMHG | HEART RATE: 79 BPM | TEMPERATURE: 97.4 F | SYSTOLIC BLOOD PRESSURE: 152 MMHG

## 2021-04-12 DIAGNOSIS — C17.9 SMALL BOWEL CANCER (HCC): Primary | ICD-10-CM

## 2021-04-12 DIAGNOSIS — D70.1 CHEMOTHERAPY INDUCED NEUTROPENIA (HCC): ICD-10-CM

## 2021-04-12 DIAGNOSIS — T45.1X5A CHEMOTHERAPY INDUCED NEUTROPENIA (HCC): ICD-10-CM

## 2021-04-12 PROCEDURE — 96372 THER/PROPH/DIAG INJ SC/IM: CPT

## 2021-04-12 RX ADMIN — TBO-FILGRASTIM 300 MCG: 300 INJECTION, SOLUTION SUBCUTANEOUS at 09:52

## 2021-04-12 NOTE — PROGRESS NOTES
Pt arrived to unit without complaint  ANC 1 14 on 4/9/21  Alan Damon RN notified via teams  Per Dr Kelley Hansen treatment to be deferred by one week  Granix to be administered today  Pt tolerated Granix injection in left arm without incident  AVS provided  Pt left unit in stable condition

## 2021-04-12 NOTE — PROGRESS NOTES
Received message from Prabhakar Webster, RN at infusion  Patient's ANC 1  14  Per Dr Kelley Hansen, Folfox to be deferred one week and patient is to receive Granix today  Treatment plan updated and therapy plan added  Per Dr Kelley Hansen, patient is to receive granix on day 8 of the rest of his cycles  Treatment plan updated  Infusion updated

## 2021-04-13 DIAGNOSIS — T45.1X5A CHEMOTHERAPY INDUCED NEUTROPENIA (HCC): Primary | ICD-10-CM

## 2021-04-13 DIAGNOSIS — C17.9 SMALL BOWEL CANCER (HCC): ICD-10-CM

## 2021-04-13 DIAGNOSIS — D70.1 CHEMOTHERAPY INDUCED NEUTROPENIA (HCC): Primary | ICD-10-CM

## 2021-04-14 ENCOUNTER — HOSPITAL ENCOUNTER (OUTPATIENT)
Dept: INFUSION CENTER | Facility: CLINIC | Age: 76
End: 2021-04-14

## 2021-04-16 ENCOUNTER — APPOINTMENT (OUTPATIENT)
Dept: LAB | Facility: CLINIC | Age: 76
End: 2021-04-16
Payer: MEDICARE

## 2021-04-16 ENCOUNTER — TELEPHONE (OUTPATIENT)
Dept: HEMATOLOGY ONCOLOGY | Facility: CLINIC | Age: 76
End: 2021-04-16

## 2021-04-16 DIAGNOSIS — R80.9 PROTEINURIA, UNSPECIFIED TYPE: ICD-10-CM

## 2021-04-16 DIAGNOSIS — C17.9 SMALL BOWEL CANCER (HCC): ICD-10-CM

## 2021-04-16 DIAGNOSIS — C78.00 MALIGNANT NEOPLASM METASTATIC TO LUNG, UNSPECIFIED LATERALITY (HCC): ICD-10-CM

## 2021-04-16 DIAGNOSIS — C78.6 MALIGNANT NEOPLASM METASTATIC TO PERITONEUM (HCC): ICD-10-CM

## 2021-04-16 LAB
PROT 24H UR-MCNC: 324 MG/24 HRS (ref 40–150)
SPECIMEN VOL UR: 900 ML

## 2021-04-16 PROCEDURE — 84156 ASSAY OF PROTEIN URINE: CPT

## 2021-04-16 NOTE — TELEPHONE ENCOUNTER
Patient is calling in to reschedule his CT Scan, I have called central scheduling and have transferred patient over to reschedule appointment

## 2021-04-17 ENCOUNTER — APPOINTMENT (OUTPATIENT)
Dept: LAB | Facility: MEDICAL CENTER | Age: 76
End: 2021-04-17
Payer: MEDICARE

## 2021-04-17 DIAGNOSIS — T45.1X5A CHEMOTHERAPY INDUCED NEUTROPENIA (HCC): ICD-10-CM

## 2021-04-17 DIAGNOSIS — C17.9 SMALL BOWEL CANCER (HCC): ICD-10-CM

## 2021-04-17 DIAGNOSIS — D70.1 CHEMOTHERAPY INDUCED NEUTROPENIA (HCC): ICD-10-CM

## 2021-04-19 ENCOUNTER — HOSPITAL ENCOUNTER (OUTPATIENT)
Dept: INFUSION CENTER | Facility: CLINIC | Age: 76
Discharge: HOME | End: 2021-04-19
Payer: MEDICARE

## 2021-04-19 VITALS
RESPIRATION RATE: 18 BRPM | SYSTOLIC BLOOD PRESSURE: 127 MMHG | WEIGHT: 150.35 LBS | TEMPERATURE: 97.4 F | HEIGHT: 69 IN | HEART RATE: 101 BPM | BODY MASS INDEX: 22.27 KG/M2 | DIASTOLIC BLOOD PRESSURE: 78 MMHG

## 2021-04-19 DIAGNOSIS — D70.1 CHEMOTHERAPY INDUCED NEUTROPENIA (HCC): ICD-10-CM

## 2021-04-19 DIAGNOSIS — T45.1X5A CHEMOTHERAPY INDUCED NEUTROPENIA (HCC): ICD-10-CM

## 2021-04-19 DIAGNOSIS — C17.9 SMALL BOWEL CANCER (HCC): Primary | ICD-10-CM

## 2021-04-19 PROCEDURE — 96368 THER/DIAG CONCURRENT INF: CPT

## 2021-04-19 PROCEDURE — 96417 CHEMO IV INFUS EACH ADDL SEQ: CPT

## 2021-04-19 PROCEDURE — 96367 TX/PROPH/DG ADDL SEQ IV INF: CPT

## 2021-04-19 PROCEDURE — 96413 CHEMO IV INFUSION 1 HR: CPT

## 2021-04-19 PROCEDURE — 96411 CHEMO IV PUSH ADDL DRUG: CPT

## 2021-04-19 PROCEDURE — 96415 CHEMO IV INFUSION ADDL HR: CPT

## 2021-04-19 PROCEDURE — G0498 CHEMO EXTEND IV INFUS W/PUMP: HCPCS

## 2021-04-19 RX ORDER — SODIUM CHLORIDE 9 MG/ML
20 INJECTION, SOLUTION INTRAVENOUS ONCE AS NEEDED
Status: DISCONTINUED | OUTPATIENT
Start: 2021-04-19 | End: 2021-04-22 | Stop reason: HOSPADM

## 2021-04-19 RX ORDER — FLUOROURACIL 50 MG/ML
600 INJECTION, SOLUTION INTRAVENOUS ONCE
Status: COMPLETED | OUTPATIENT
Start: 2021-04-19 | End: 2021-04-19

## 2021-04-19 RX ORDER — DEXTROSE MONOHYDRATE 50 MG/ML
20 INJECTION, SOLUTION INTRAVENOUS ONCE
Status: COMPLETED | OUTPATIENT
Start: 2021-04-19 | End: 2021-04-19

## 2021-04-19 RX ADMIN — FLUOROURACIL 600 MG: 50 INJECTION, SOLUTION INTRAVENOUS at 13:49

## 2021-04-19 RX ADMIN — OXALIPLATIN 124.15 MG: 5 INJECTION, SOLUTION INTRAVENOUS at 11:49

## 2021-04-19 RX ADMIN — LEUCOVORIN CALCIUM 600 MG: 200 INJECTION, POWDER, LYOPHILIZED, FOR SUSPENSION INTRAMUSCULAR; INTRAVENOUS at 11:49

## 2021-04-19 RX ADMIN — BEVACIZUMAB 352.5 MG: 400 INJECTION, SOLUTION INTRAVENOUS at 11:06

## 2021-04-19 RX ADMIN — SODIUM CHLORIDE 20 ML/HR: 0.9 INJECTION, SOLUTION INTRAVENOUS at 10:42

## 2021-04-19 RX ADMIN — DEXTROSE 20 ML/HR: 5 SOLUTION INTRAVENOUS at 11:46

## 2021-04-19 RX ADMIN — DEXAMETHASONE SODIUM PHOSPHATE: 10 INJECTION, SOLUTION INTRAMUSCULAR; INTRAVENOUS at 10:42

## 2021-04-19 NOTE — PROGRESS NOTES
Pt  Verbalizes peripheral neuropathy of fingers for a short time after receiving treatment, but resolves until the next tx  Labs reviewed  Parameters met

## 2021-04-19 NOTE — PROGRESS NOTES
Pt  Tolerated chemotherapy without adverse event  Future appointments reviewed  5FU infusing via Cadd pump at 2ml/hour over next 46 hours  Pt  Will return on Wednesday 4/21 at approx  1000  Pt  Will call with any concerns  AVS provided

## 2021-04-21 ENCOUNTER — HOSPITAL ENCOUNTER (OUTPATIENT)
Dept: INFUSION CENTER | Facility: CLINIC | Age: 76
Discharge: HOME | End: 2021-04-21

## 2021-04-21 VITALS
RESPIRATION RATE: 18 BRPM | HEART RATE: 73 BPM | DIASTOLIC BLOOD PRESSURE: 84 MMHG | SYSTOLIC BLOOD PRESSURE: 145 MMHG | TEMPERATURE: 96.9 F

## 2021-04-21 DIAGNOSIS — T45.1X5A CHEMOTHERAPY INDUCED NEUTROPENIA (HCC): ICD-10-CM

## 2021-04-21 DIAGNOSIS — C17.9 SMALL BOWEL CANCER (HCC): Primary | ICD-10-CM

## 2021-04-21 DIAGNOSIS — D70.1 CHEMOTHERAPY INDUCED NEUTROPENIA (HCC): ICD-10-CM

## 2021-04-21 NOTE — PLAN OF CARE
Problem: Potential for Falls  Goal: Patient will remain free of falls  Description: INTERVENTIONS:  - Assess patient frequently for physical needs  -  Identify cognitive and physical deficits and behaviors that affect risk of falls    -  [place] fall precautions as indicated by assessment   - Educate patient/family on patient safety including physical limitations  - Instruct patient to call for assistance with activity based on assessment  - Modify environment to reduce risk of injury  - Consider OT/PT consult to assist with strengthening/mobility  Outcome: Progressing

## 2021-04-21 NOTE — PROGRESS NOTES
Pt arrived to unit without complaint  Pt here today for d/c CADD pump  Port flushed per protocol with great blood return  AVS declined, but aware of future appts  Pt left unit in stable condition

## 2021-04-23 ENCOUNTER — OFFICE VISIT (OUTPATIENT)
Dept: FAMILY MEDICINE CLINIC | Facility: CLINIC | Age: 76
End: 2021-04-23
Payer: MEDICARE

## 2021-04-23 VITALS
OXYGEN SATURATION: 98 % | BODY MASS INDEX: 22.78 KG/M2 | DIASTOLIC BLOOD PRESSURE: 70 MMHG | WEIGHT: 153.8 LBS | HEART RATE: 101 BPM | TEMPERATURE: 96.5 F | HEIGHT: 69 IN | SYSTOLIC BLOOD PRESSURE: 110 MMHG

## 2021-04-23 DIAGNOSIS — R11.0 NAUSEA: ICD-10-CM

## 2021-04-23 DIAGNOSIS — C78.00 MALIGNANT NEOPLASM METASTATIC TO LUNG, UNSPECIFIED LATERALITY (HCC): ICD-10-CM

## 2021-04-23 DIAGNOSIS — I10 BENIGN ESSENTIAL HYPERTENSION: ICD-10-CM

## 2021-04-23 DIAGNOSIS — R10.9 ABDOMINAL PAIN, UNSPECIFIED ABDOMINAL LOCATION: ICD-10-CM

## 2021-04-23 DIAGNOSIS — E78.2 MIXED HYPERLIPIDEMIA: ICD-10-CM

## 2021-04-23 DIAGNOSIS — R53.0 NEOPLASTIC MALIGNANT RELATED FATIGUE: ICD-10-CM

## 2021-04-23 DIAGNOSIS — J44.9 CHRONIC OBSTRUCTIVE PULMONARY DISEASE, UNSPECIFIED COPD TYPE (HCC): ICD-10-CM

## 2021-04-23 DIAGNOSIS — N40.1 BENIGN PROSTATIC HYPERPLASIA WITH LOWER URINARY TRACT SYMPTOMS, SYMPTOM DETAILS UNSPECIFIED: ICD-10-CM

## 2021-04-23 PROCEDURE — 99214 OFFICE O/P EST MOD 30 MIN: CPT | Performed by: FAMILY MEDICINE

## 2021-04-23 RX ORDER — LOSARTAN POTASSIUM 25 MG/1
TABLET ORAL
Qty: 90 TABLET | Refills: 1 | Status: SHIPPED | OUTPATIENT
Start: 2021-04-23 | End: 2021-01-01 | Stop reason: SDUPTHER

## 2021-04-23 RX ORDER — ONDANSETRON 4 MG/1
4 TABLET, FILM COATED ORAL EVERY 8 HOURS PRN
Qty: 20 TABLET | Refills: 2 | Status: SHIPPED | OUTPATIENT
Start: 2021-04-23 | End: 2021-01-01 | Stop reason: SDUPTHER

## 2021-04-23 RX ORDER — ATORVASTATIN CALCIUM 40 MG/1
40 TABLET, FILM COATED ORAL DAILY
Qty: 90 TABLET | Refills: 1 | Status: SHIPPED | OUTPATIENT
Start: 2021-04-23 | End: 2021-01-01 | Stop reason: SDUPTHER

## 2021-04-23 RX ORDER — CHLORDIAZEPOXIDE HYDROCHLORIDE AND CLIDINIUM BROMIDE 5; 2.5 MG/1; MG/1
1 CAPSULE ORAL 3 TIMES DAILY PRN
Qty: 90 CAPSULE | Refills: 1 | Status: SHIPPED | OUTPATIENT
Start: 2021-04-23 | End: 2022-01-01 | Stop reason: ALTCHOICE

## 2021-04-23 RX ORDER — PREDNISONE 10 MG/1
10 TABLET ORAL DAILY
Qty: 30 TABLET | Refills: 0 | Status: SHIPPED | OUTPATIENT
Start: 2021-04-23 | End: 2021-01-01 | Stop reason: SDUPTHER

## 2021-04-23 RX ORDER — DUTASTERIDE 0.5 MG/1
0.5 CAPSULE, LIQUID FILLED ORAL
Qty: 90 CAPSULE | Refills: 1 | Status: SHIPPED | OUTPATIENT
Start: 2021-04-23 | End: 2021-01-01 | Stop reason: SDUPTHER

## 2021-04-23 NOTE — PROGRESS NOTES
[facility]      NAME: Rox Sampson  AGE: 66 y o  SEX: male  : 1945   MRN: [id number]    DATE: 2021  TIME: 9:23 AM    Assessment and Plan     Problem List Items Addressed This Visit     Neoplastic malignant related fatigue    Relevant Medications    predniSONE 10 mg tablet    Lung metastasis (HCC)    Relevant Medications    predniSONE 10 mg tablet    Hyperlipidemia     Continue atorvastatin  Next lipid panel in   Relevant Medications    atorvastatin (LIPITOR) 40 mg tablet    Chronic obstructive pulmonary disease (HCC)     Presently stable  On no medication  Relevant Medications    predniSONE 10 mg tablet    Benign prostatic hyperplasia    Relevant Medications    dutasteride (AVODART) 0 5 mg capsule    Benign essential hypertension    Relevant Medications    metoprolol tartrate (LOPRESSOR) 25 mg tablet    losartan (COZAAR) 25 mg tablet      Other Visit Diagnoses     Nausea        Relevant Medications    ondansetron (ZOFRAN) 4 mg tablet    Abdominal pain, unspecified abdominal location        Relevant Medications    chlordiazepoxide-clidinium (LIBRAX) 5-2 5 mg per capsule              Return to office in:  6 months, annual wellness visit    Chief Complaint     Chief Complaint   Patient presents with    Follow-up    Heartburn       History of Present Illness     Presents in follow-up of chronic medical problems  His primary medical issue at this time is his metastatic adenocarcinoma from small bowel to omentum lungs  He is receiving chemotherapy currently  He is having some side effects from the chemotherapy including mild neuropathy which occurs for several days after each infusion  He does have some generalized fatigue but states that his appetite is good and overall does not feel too badly  His other medical conditions include COPD, hyperlipidemia, reflux, hypertension and BPH  He takes atorvastatin for his lipids  He is on Avodart for BPH    He takes losartan and metoprolol for his high blood pressure  He takes prednisone recently prescribed by Hematology-Oncology for side effects from his cancer treatment  He takes Librax for GI/IBS symptoms  The following portions of the patient's history were reviewed and updated as appropriate: allergies, current medications, past family history, past medical history, past social history, past surgical history and problem list     Review of Systems   Review of Systems   Constitutional: Positive for fatigue  Respiratory: Negative  Cardiovascular: Negative  Gastrointestinal: Negative  Genitourinary: Negative  Musculoskeletal: Positive for arthralgias  Neurological: Positive for numbness  Psychiatric/Behavioral: Negative          Active Problem List     Patient Active Problem List   Diagnosis    Dupuytren's disease    Anemia    Benign essential hypertension    Benign prostatic hyperplasia    Hyperlipidemia    Paroxysmal atrial fibrillation (HCC)    Lung nodules    Esophageal reflux    Chemotherapy-induced nausea    Elevated alkaline phosphatase level    Liver lesion    Tubular adenoma of colon    Tubular adenoma of small intestine    Prominent ampulla of Vater    Gastric ulcer    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)    Generalized abdominal pain    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[facility] [address] )    Lung metastasis ([facility] [address] )    Encounter for care related to vascular access port    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 ([facility] [address] )    Chemotherapy induced neutropenia (HCC)    Neoplastic malignant related fatigue    Thyroid disorder    Chemotherapy induced neutropenia (HCC)    Chronic obstructive pulmonary disease (HCC)       Objective   /70 (BP Location: Right arm, Patient Position: Sitting, Cuff Size: Standard)   Pulse 101   Temp (!) 96 5 °F (35 8 °C) (Tympanic)   Ht 5' 9 29" (1 76 m)   Wt 69 8 kg (153 lb 12 8 oz)   SpO2 98%   BMI 22 52 kg/m²     Physical Exam  Vitals signs and nursing note reviewed  Constitutional:       General: He is not in acute distress  Appearance: He is well-developed  He is not diaphoretic  HENT:      Head: Normocephalic and atraumatic  Eyes:      General:         Right eye: No discharge  Conjunctiva/sclera: Conjunctivae normal       Pupils: Pupils are equal, round, and reactive to light  Neck:      Musculoskeletal: Normal range of motion  Thyroid: No thyromegaly  Cardiovascular:      Rate and Rhythm: Normal rate and regular rhythm  Pulmonary:      Effort: Pulmonary effort is normal  No respiratory distress  Breath sounds: Normal breath sounds  Lymphadenopathy:      Cervical: No cervical adenopathy  Skin:     General: Skin is warm and dry  Neurological:      Mental Status: He is alert and oriented to person, place, and time  Psychiatric:         Behavior: Behavior normal          Thought Content:  Thought content normal          Judgment: Judgment normal            Current Medications     Current Outpatient Medications:     atorvastatin (LIPITOR) 40 mg tablet, Take 1 tablet (40 mg total) by mouth daily, Disp: 90 tablet, Rfl: 1    chlordiazepoxide-clidinium (LIBRAX) 5-2 5 mg per capsule, Take 1 capsule by mouth 3 (three) times a day as needed (Abdominal Pain), Disp: 90 capsule, Rfl: 1    Cholecalciferol (VITAMIN D3) 2000 units TABS, Take 2,000 Units by mouth daily, Disp: , Rfl:     cyanocobalamin (VITAMIN B-12) 1,000 mcg tablet, Take 1,000 mcg by mouth daily, Disp: , Rfl:     dutasteride (AVODART) 0 5 mg capsule, Take 1 capsule (0 5 mg total) by mouth daily at bedtime, Disp: 90 capsule, Rfl: 1    ferrous sulfate 325 (65 Fe) mg tablet, Take 325 mg by mouth 2 (two) times a day with meals, Disp: , Rfl:     losartan (COZAAR) 25 mg tablet, TAKE ONE TABLET DAILY, Disp: 90 tablet, Rfl: 1    metoprolol tartrate (LOPRESSOR) 25 mg tablet, Take 0 5 tablets (12 5 mg total) by mouth 2 (two) times a day, Disp: 180 tablet, Rfl: 1    Multiple Vitamin (MULTIVITAMIN) tablet, Take 1 tablet by mouth daily, Disp: , Rfl:     ondansetron (ZOFRAN) 4 mg tablet, Take 1 tablet (4 mg total) by mouth every 8 (eight) hours as needed for nausea or vomiting, Disp: 20 tablet, Rfl: 2    predniSONE 10 mg tablet, Take 1 tablet (10 mg total) by mouth daily, Disp: 30 tablet, Rfl: 0    aspirin (ECOTRIN LOW STRENGTH) 81 mg EC tablet, Take 81 mg by mouth daily, Disp: , Rfl:     dicyclomine (BENTYL) 20 mg tablet, Take 1 tablet (20 mg total) by mouth every 6 (six) hours (Patient not taking: Reported on 4/23/2021), Disp: 90 tablet, Rfl: 3    esomeprazole (NexIUM) 40 MG capsule, Take 1 capsule (40 mg total) by mouth daily (Patient not taking: Reported on 12/24/2020), Disp: 30 capsule, Rfl: 2    esomeprazole (NexIUM) 40 MG capsule, Take 1 capsule (40 mg total) by mouth 2 (two) times a day before meals for 14 days (Patient not taking: Reported on 12/24/2020), Disp: 28 capsule, Rfl: 0    Omega-3 Fatty Acids (FISH OIL) 1,000 mg, Take 1,000 mg by mouth daily, Disp: , Rfl:     potassium chloride (MICRO-K) 10 MEQ CR capsule, Take 1 capsule (10 mEq total) by mouth 2 (two) times a day (Patient not taking: Reported on 2/22/2021), Disp: 20 capsule, Rfl: 0    Health Maintenance     Health Maintenance   Topic Date Due    Colonoscopy Surveillance  04/27/2021    COVID-19 Vaccine (2 - Moderna 2-dose series) 04/27/2021    Depression Screening PHQ  10/16/2021    Fall Risk  10/16/2021    Medicare Annual Wellness Visit (AWV)  10/16/2021    BMI: Adult  04/19/2022    DTaP,Tdap,and Td Vaccines (2 - Td) 08/17/2027    Pneumococcal Vaccine: 65+ Years  Completed    Influenza Vaccine  Completed    HIB Vaccine  Aged Out    Hepatitis B Vaccine  Aged Out    IPV Vaccine  Aged Out    Hepatitis A Vaccine  Aged Out    Meningococcal ACWY Vaccine  Aged Out    HPV Vaccine  Aged [employer] History   Administered Date(s) Administered    INFLUENZA 09/16/2012, 10/17/2015, 10/12/2017, 10/01/2018, 10/13/2018    Influenza Split High Dose Preservative Free IM 10/17/2015, 09/29/2016, 10/12/2017, 10/01/2018    Influenza, high dose seasonal 0 7 mL 10/25/2019, 10/16/2020    Influenza, seasonal, injectable 01/01/2011, 09/11/2012, 10/12/2013, 09/04/2014    Pneumococcal Conjugate 13-Valent 07/30/2015    Pneumococcal Polysaccharide PPV23 01/01/2008, 10/16/2020    SARS-CoV-2 / COVID-19 mRNA IM (Moderna) 03/30/2021    Tdap 08/17/2017    Zoster Vaccine Recombinant 11/02/2019, 01/26/2020       Francisco Anaya DO  [facility]

## 2021-04-23 NOTE — ASSESSMENT & PLAN NOTE
Symptoms mild at this time  Patient previously on Nexium but not currently taking  Can restart if symptoms return or use over-the-counter antacids for intermittent symptoms

## 2021-04-25 ENCOUNTER — HOSPITAL ENCOUNTER (OUTPATIENT)
Dept: CT IMAGING | Facility: HOSPITAL | Age: 76
Discharge: HOME | End: 2021-04-25
Attending: INTERNAL MEDICINE
Payer: MEDICARE

## 2021-04-25 DIAGNOSIS — C78.00 MALIGNANT NEOPLASM METASTATIC TO LUNG, UNSPECIFIED LATERALITY (HCC): ICD-10-CM

## 2021-04-25 DIAGNOSIS — C78.6 MALIGNANT NEOPLASM METASTATIC TO PERITONEUM (HCC): ICD-10-CM

## 2021-04-25 DIAGNOSIS — C17.9 SMALL BOWEL CANCER (HCC): ICD-10-CM

## 2021-04-25 PROCEDURE — G1004 CDSM NDSC: HCPCS

## 2021-04-25 PROCEDURE — 74177 CT ABD & PELVIS W/CONTRAST: CPT

## 2021-04-25 PROCEDURE — 71260 CT THORAX DX C+: CPT

## 2021-04-25 RX ADMIN — IOHEXOL 100 ML: 350 INJECTION, SOLUTION INTRAVENOUS at 15:09

## 2021-04-26 DIAGNOSIS — D70.1 CHEMOTHERAPY INDUCED NEUTROPENIA (HCC): Primary | ICD-10-CM

## 2021-04-26 DIAGNOSIS — C17.9 SMALL BOWEL CANCER (HCC): ICD-10-CM

## 2021-04-26 DIAGNOSIS — T45.1X5A CHEMOTHERAPY INDUCED NEUTROPENIA (HCC): Primary | ICD-10-CM

## 2021-04-26 RX ORDER — DEXTROSE MONOHYDRATE 50 MG/ML
20 INJECTION, SOLUTION INTRAVENOUS ONCE
Status: CANCELLED | OUTPATIENT
Start: 2021-05-03

## 2021-04-26 RX ORDER — SODIUM CHLORIDE 9 MG/ML
20 INJECTION, SOLUTION INTRAVENOUS ONCE AS NEEDED
Status: CANCELLED | OUTPATIENT
Start: 2021-05-03

## 2021-04-26 RX ORDER — FLUOROURACIL 50 MG/ML
600 INJECTION, SOLUTION INTRAVENOUS ONCE
Status: CANCELLED | OUTPATIENT
Start: 2021-05-03

## 2021-04-29 ENCOUNTER — IMMUNIZATIONS (OUTPATIENT)
Dept: FAMILY MEDICINE CLINIC | Facility: HOSPITAL | Age: 76
End: 2021-04-29

## 2021-04-29 ENCOUNTER — DOCUMENTATION (OUTPATIENT)
Dept: HEMATOLOGY ONCOLOGY | Facility: CLINIC | Age: 76
End: 2021-04-29

## 2021-04-29 DIAGNOSIS — D70.1 CHEMOTHERAPY INDUCED NEUTROPENIA (HCC): Primary | ICD-10-CM

## 2021-04-29 DIAGNOSIS — C17.9 SMALL BOWEL CANCER (HCC): ICD-10-CM

## 2021-04-29 DIAGNOSIS — Z23 ENCOUNTER FOR IMMUNIZATION: Primary | ICD-10-CM

## 2021-04-29 DIAGNOSIS — T45.1X5A CHEMOTHERAPY INDUCED NEUTROPENIA (HCC): Primary | ICD-10-CM

## 2021-04-29 PROCEDURE — 91301 SARS-COV-2 / COVID-19 MRNA VACCINE (MODERNA) 100 MCG: CPT

## 2021-04-29 PROCEDURE — 0012A SARS-COV-2 / COVID-19 MRNA VACCINE (MODERNA) 100 MCG: CPT

## 2021-04-30 ENCOUNTER — APPOINTMENT (OUTPATIENT)
Dept: LAB | Facility: CLINIC | Age: 76
End: 2021-04-30
Payer: MEDICARE

## 2021-04-30 DIAGNOSIS — T45.1X5A CHEMOTHERAPY INDUCED NEUTROPENIA (HCC): ICD-10-CM

## 2021-04-30 DIAGNOSIS — D70.1 CHEMOTHERAPY INDUCED NEUTROPENIA (HCC): ICD-10-CM

## 2021-05-03 ENCOUNTER — HOSPITAL ENCOUNTER (OUTPATIENT)
Dept: INFUSION CENTER | Facility: CLINIC | Age: 76
Discharge: HOME | End: 2021-05-03
Payer: MEDICARE

## 2021-05-03 VITALS
RESPIRATION RATE: 18 BRPM | TEMPERATURE: 97.9 F | HEIGHT: 69 IN | DIASTOLIC BLOOD PRESSURE: 78 MMHG | BODY MASS INDEX: 22.66 KG/M2 | HEART RATE: 77 BPM | SYSTOLIC BLOOD PRESSURE: 130 MMHG | WEIGHT: 153 LBS

## 2021-05-03 DIAGNOSIS — C17.9 SMALL BOWEL CANCER (HCC): Primary | ICD-10-CM

## 2021-05-03 DIAGNOSIS — D70.1 CHEMOTHERAPY INDUCED NEUTROPENIA (HCC): ICD-10-CM

## 2021-05-03 DIAGNOSIS — T45.1X5A CHEMOTHERAPY INDUCED NEUTROPENIA (HCC): ICD-10-CM

## 2021-05-03 PROCEDURE — 96415 CHEMO IV INFUSION ADDL HR: CPT

## 2021-05-03 PROCEDURE — 96368 THER/DIAG CONCURRENT INF: CPT

## 2021-05-03 PROCEDURE — 96417 CHEMO IV INFUS EACH ADDL SEQ: CPT

## 2021-05-03 PROCEDURE — G0498 CHEMO EXTEND IV INFUS W/PUMP: HCPCS

## 2021-05-03 PROCEDURE — 96367 TX/PROPH/DG ADDL SEQ IV INF: CPT

## 2021-05-03 PROCEDURE — 96413 CHEMO IV INFUSION 1 HR: CPT

## 2021-05-03 PROCEDURE — 96411 CHEMO IV PUSH ADDL DRUG: CPT

## 2021-05-03 PROCEDURE — 36593 DECLOT VASCULAR DEVICE: CPT

## 2021-05-03 RX ORDER — FLUOROURACIL 50 MG/ML
600 INJECTION, SOLUTION INTRAVENOUS ONCE
Status: COMPLETED | OUTPATIENT
Start: 2021-05-03 | End: 2021-05-03

## 2021-05-03 RX ORDER — SODIUM CHLORIDE 9 MG/ML
20 INJECTION, SOLUTION INTRAVENOUS ONCE AS NEEDED
Status: DISCONTINUED | OUTPATIENT
Start: 2021-05-03 | End: 2021-05-06 | Stop reason: HOSPADM

## 2021-05-03 RX ORDER — DEXTROSE MONOHYDRATE 50 MG/ML
20 INJECTION, SOLUTION INTRAVENOUS ONCE
Status: COMPLETED | OUTPATIENT
Start: 2021-05-03 | End: 2021-05-03

## 2021-05-03 RX ADMIN — LEUCOVORIN CALCIUM 600 MG: 200 INJECTION, POWDER, LYOPHILIZED, FOR SUSPENSION INTRAMUSCULAR; INTRAVENOUS at 13:00

## 2021-05-03 RX ADMIN — BEVACIZUMAB 352.5 MG: 400 INJECTION, SOLUTION INTRAVENOUS at 11:00

## 2021-05-03 RX ADMIN — DEXTROSE 20 ML/HR: 5 SOLUTION INTRAVENOUS at 12:59

## 2021-05-03 RX ADMIN — OXALIPLATIN 124.15 MG: 5 INJECTION, SOLUTION INTRAVENOUS at 13:04

## 2021-05-03 RX ADMIN — DEXAMETHASONE SODIUM PHOSPHATE: 10 INJECTION, SOLUTION INTRAMUSCULAR; INTRAVENOUS at 10:29

## 2021-05-03 RX ADMIN — ALTEPLASE 2 MG: 2.2 INJECTION, POWDER, LYOPHILIZED, FOR SOLUTION INTRAVENOUS at 09:47

## 2021-05-03 RX ADMIN — FLUOROURACIL 600 MG: 50 INJECTION, SOLUTION INTRAVENOUS at 15:02

## 2021-05-03 NOTE — PLAN OF CARE
Problem: Potential for Falls  Goal: Patient will remain free of falls  Description: INTERVENTIONS:  - Assess patient frequently for physical needs  -  Identify cognitive and physical deficits and behaviors that affect risk of falls    -  [place] fall precautions as indicated by assessment   - Educate patient/family on patient safety including physical limitations  - Instruct patient to call for assistance with activity based on assessment  - Modify environment to reduce risk of injury  - Consider OT/PT consult to assist with strengthening/mobility  Outcome: Progressing

## 2021-05-03 NOTE — PROGRESS NOTES
Patient arrived to infusion center, port accessed  No noted blood return  Second RN attempted access, port remained with no blood return  Alteplase ordered and instilled at this time  Will reassess port after 30 minute dwell time

## 2021-05-03 NOTE — PROGRESS NOTES
Patient tolerated chemotherapy treatment  Port remains with positive blood return  CADD pump running with second RN verification  Patient aware of what time on Wednesday to return for CADD pump dc  AVS printed and patient left unit in baseline condition

## 2021-05-03 NOTE — PROGRESS NOTES
Port with positive blood return after 30 minute dwell time  Will proceed with treatments as ordered

## 2021-05-04 ENCOUNTER — TELEPHONE (OUTPATIENT)
Dept: HEMATOLOGY ONCOLOGY | Facility: CLINIC | Age: 76
End: 2021-05-04

## 2021-05-04 NOTE — TELEPHONE ENCOUNTER
Patient is calling results for CT from 4-  Patient also has some questions regarding his Granix injection   Please call patient back at [phone]

## 2021-05-04 NOTE — TELEPHONE ENCOUNTER
Reviewed CT scan results:  IMPRESSION:     Reidentified pulmonary metastases which are all smaller in size      The area of prominent jejunal thickening is no longer apparent      Unchanged omental metastases         Reviewed treatment plan with patient and purpose of granix    Patient verbalizes understanding    FYI to Dr Merrill RNs

## 2021-05-05 ENCOUNTER — HOSPITAL ENCOUNTER (OUTPATIENT)
Dept: INFUSION CENTER | Facility: CLINIC | Age: 76
Discharge: HOME | End: 2021-05-05

## 2021-05-05 VITALS — TEMPERATURE: 97.7 F

## 2021-05-05 DIAGNOSIS — T45.1X5A CHEMOTHERAPY INDUCED NEUTROPENIA (HCC): ICD-10-CM

## 2021-05-05 DIAGNOSIS — D70.1 CHEMOTHERAPY INDUCED NEUTROPENIA (HCC): ICD-10-CM

## 2021-05-05 DIAGNOSIS — C17.9 SMALL BOWEL CANCER (HCC): Primary | ICD-10-CM

## 2021-05-05 NOTE — PROGRESS NOTES
Presented today for CADD pump d/c, port flush & deaccess, tolerated same well   Will return day #8 for granix as ordered

## 2021-05-10 NOTE — PLAN OF CARE
Problem: INFECTION - ADULT  Goal: Absence or prevention of progression during hospitalization  Description: INTERVENTIONS:  - Assess and monitor for signs and symptoms of infection  - Monitor lab/diagnostic results  - Monitor all insertion sites, i e  indwelling lines, tubes, and drains  - Monitor endotracheal if appropriate and nasal secretions for changes in amount and color  - [place] appropriate cooling/warming therapies per order  - Administer medications as ordered  - Instruct and encourage patient and family to use good hand hygiene technique  - Identify and instruct in appropriate isolation precautions for identified infection/condition  Outcome: Progressing

## 2021-05-10 NOTE — PROGRESS NOTES
Pt tolerated granix injection without incident  Pt was provided with AVS and is aware of future appts

## 2021-05-17 NOTE — PROGRESS NOTES
Pt tolerated treatment without incident  Pt was connected to CADD pump as ordered  Pt instructed to return to infusion center on Wednesday 5/19/21 at 11:00 for CADD d/c  Pt was provided with AVS and also new appt for Granix on Monday

## 2021-05-18 NOTE — TELEPHONE ENCOUNTER
Patient is calling in requesting a call back from [place] [phone] regarding his treatment plan, he can be reached back at [phone]

## 2021-05-19 NOTE — PLAN OF CARE
Problem: Potential for Falls  Goal: Patient will remain free of falls  Description: INTERVENTIONS:  - Assess patient frequently for physical needs  -  Identify cognitive and physical deficits and behaviors that affect risk of falls    -  [place] fall precautions as indicated by assessment   - Educate patient/family on patient safety including physical limitations  - Instruct patient to call for assistance with activity based on assessment  - Modify environment to reduce risk of injury  - Consider OT/PT consult to assist with strengthening/mobility  Outcome: Progressing

## 2021-05-19 NOTE — PROGRESS NOTES
Pt arrived to unit without complaint  Pt here today for CADD pump d/c  Port flushed per protocol  AVS provided  Pt left unit in stable condition

## 2021-05-24 NOTE — PLAN OF CARE
Problem: Potential for Falls  Goal: Patient will remain free of falls  Description: INTERVENTIONS:  - Assess patient frequently for physical needs  -  Identify cognitive and physical deficits and behaviors that affect risk of falls  -  [place] fall precautions as indicated by assessment   - Educate patient/family on patient safety including physical limitations  - Instruct patient to call for assistance with activity based on assessment  - Modify environment to reduce risk of injury  - Consider OT/PT consult to assist with strengthening/mobility  Outcome: Progressing     Problem: Knowledge Deficit  Goal: Patient/family/caregiver demonstrates understanding of disease process, treatment plan, medications, and discharge instructions  Description: Complete learning assessment and assess knowledge base    Interventions:  - Provide teaching at level of understanding  - Provide teaching via preferred learning methods  Outcome: Progressing

## 2021-05-24 NOTE — PROGRESS NOTES
Pt  Denied new symptoms or concerns today  Granix 300mcg given SQ in [place]  Pt  Tolerated well  Future appointments reviewed  AVS provided

## 2021-06-01 NOTE — PLAN OF CARE
Problem: Potential for Falls  Goal: Patient will remain free of falls  Description: INTERVENTIONS:  - Assess patient frequently for physical needs  -  Identify cognitive and physical deficits and behaviors that affect risk of falls    -  [place] fall precautions as indicated by assessment   - Educate patient/family on patient safety including physical limitations  - Instruct patient to call for assistance with activity based on assessment  - Modify environment to reduce risk of injury  - Consider OT/PT consult to assist with strengthening/mobility  Outcome: Progressing

## 2021-06-01 NOTE — PROGRESS NOTES
Pt tolerated treatment without incident  Pt was connected to CADD pump as ordered  Pt instructed to return to infusion center on Thursday 6/3 at  11:30 for CADD d/c  Pt was provided with AVS and also has appt for Granix on Monday

## 2021-06-03 NOTE — PROGRESS NOTES
Patient here for CADD pump disconnect  Port flushed with good blood return and de accessed  AVS printed and patient aware of next appointment  Patient left infusion center in baseline condition

## 2021-06-03 NOTE — PLAN OF CARE
Problem: Potential for Falls  Goal: Patient will remain free of falls  Description: INTERVENTIONS:  - Assess patient frequently for physical needs  -  Identify cognitive and physical deficits and behaviors that affect risk of falls    -  [place] fall precautions as indicated by assessment   - Educate patient/family on patient safety including physical limitations  - Instruct patient to call for assistance with activity based on assessment  - Modify environment to reduce risk of injury  - Consider OT/PT consult to assist with strengthening/mobility  Outcome: Progressing

## 2021-06-07 NOTE — TELEPHONE ENCOUNTER
Telephone call spoke with pharmacist Nando Brothers  Prednisone rx pended to Dr Shola Le, he signed rx in computer and then it printed in our office  Pharmacist Nando Brothers took verbal order prednisone 10 mg 1 x day qty 30 with no refills  She staed it will be ready for  today

## 2021-06-07 NOTE — TELEPHONE ENCOUNTER
Medication Refill     Who is Calling  Patient    Medication  predniSONE 10 mg tablet        How many pills left  5   Preferred Pharmacy / Address [facility]    Call back number  [phone]   Relevant Information

## 2021-06-07 NOTE — TELEPHONE ENCOUNTER
Patient is requesting a refill on Prednisone  Patient stated PCP had refilled x 1 because he was in his office but Dr Garrett Koch would need to refill from there  Prescription pended to Dr Garrett Koch  6/8/21 scheduled infusion  Will notify Dr Paco Chance RN as Guillermina Jacobsen

## 2021-06-07 NOTE — TELEPHONE ENCOUNTER
Patient is requesting a refill on Prednisone  Patient stated PCP had refilled x 1 because he was in his office but Dr Mendy Samuels would need to refill from there  Prescription pended to Dr Mendy Samuels  6/8/21 scheduled infusion  Will notify Dr Genna Grant RN as Andre Cowden

## 2021-06-07 NOTE — TELEPHONE ENCOUNTER
Patient advised his prescription was sent to his pharmacy  Verbalized understanding of above  Dr Genna Grant RNs notified as Andre Cowden

## 2021-06-08 NOTE — PROGRESS NOTES
Patient arrived on unit for Granix  Denies any issues/concerns  Tolerated 1 injection in [place]  Patient aware of next appointment   Declined AVS

## 2021-06-14 NOTE — PROGRESS NOTES
Patient tolerated treatment without incident  CADD pump connected as per orders  Patient aware of next appointment to have CADD pump D/C   Declined AVS

## 2021-06-14 NOTE — PROGRESS NOTES
Patient arrived on unit for treatment today  Denies any present issues/concerns  TEAMS with Mark Restrepo RN and made aware of protein in urine on June 11, 2021  Patient cleared for treatment today   Treatment initiated

## 2021-06-16 NOTE — PROGRESS NOTES
Presented today for CADD pump removal, port flush & deaccess  Tolerated same well  Will return for granix as ordered

## 2021-06-21 NOTE — PROGRESS NOTES
Patient here for Granix, afebrile  Tolerated without complaints  AVS declined, aware of future appointments, left unit in stable condition

## 2021-06-28 NOTE — PROGRESS NOTES
Pt arrived to unit without complaint (please See note from Elvi Fulton RN re: urine protein today)  Pt tolerated treatment well without adverse reaction  Pt left unit with CADD pump infusing 5FU as ordered, pt aware of need to return in 46hr for CADD pump disconnect  Pt knowledgeable re: care of accessed PAC and CADD pump at home  Pt without question or concern

## 2021-06-28 NOTE — PROGRESS NOTES
Received message via team from Grecia Loera RN at [facility] asking to review patient's UA and if ok to treat  UA is positive for protein, unchanged from UA done on 6/11  Proteinuria noted in Dr Anahi Keane 3/31 OV note  Reviewed UA results with Cris Aldana  Per Chloe Pan, patient can proceed with treatment

## 2021-06-30 NOTE — PROGRESS NOTES
Pt here for CADD d/c   Pt offers no complaints at this time  CADD pump d/c'd and port flushed per protocol  Pt was provided with AVS and is aware of his appt on Tuesday for Granix

## 2021-06-30 NOTE — PLAN OF CARE
Problem: Potential for Falls  Goal: Patient will remain free of falls  Description: INTERVENTIONS:  - Educate patient/family on patient safety including physical limitations  - Instruct patient to call for assistance with activity   - Consult OT/PT to assist with strengthening/mobility   - Keep Call bell within reach  - Keep bed low and locked with side rails adjusted as appropriate  - Keep care items and personal belongings within reach  - Initiate and maintain comfort rounds  - Make Fall Risk Sign visible to staff  - Offer Toileting every Hours, in advance of need  - Initiate/Maintainalarm  - Obtain necessary fall risk management equipment:   - Apply yellow socks and bracelet for high fall risk patients  - Consider moving patient to room near nurses station  Outcome: Progressing

## 2021-07-21 NOTE — PROGRESS NOTES
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  He has been receiving FOLFOX plus Avastin  He has received 12 cycles of oxaliplatin so oxaliplatin will be discontinued at this time  He also had developed grade 3 peripheral neuropathy which has now significantly improved as he has had a break from chemotherapy for about 3 weeks  He is aware that he will not be receiving this and will not have cold sensitivity either  He will need a urine test prior to each infusion, every 2 weeks, at this time  We reviewed CT scan at length  We also reviewed at length that patient has stage IV incurable malignancy for which he will be on systemic therapy for the rest of his life  He seems somewhat surprised in regards to this  Reviewed that there is scheduling error regarding his chemotherapy not being continued past 12 cycles  Reviewed that he will remain on this specific therapy for as long as it continues to work  This will be determined with CT scans every 3 months  Reviewed that expectations at this therapy will not work forever and that eventually he will need second-line therapy and that will be determined once there is disease progression on CT scan  2  Chemotherapy induced neutropenia (HCC)  Patient previously has been requiring Granix  It is possible that he may not need this in the future as oxaliplatin has been discontinued  Will monitor labs to determine  He will have labs prior to his 1st infusion appointment that hopefully will be scheduled for next week  - predniSONE 10 mg tablet; Take 1 tablet (10 mg total) by mouth daily  Dispense: 30 tablet; Refill: 2    4   Neoplastic malignant related fatigue    He notes improvement with prednisone 1 tablet daily  He would like refill  This has been provided  - predniSONE 10 mg tablet; Take 1 tablet (10 mg total) by mouth daily  Dispense: 30 tablet;  Refill: 2    HPI:  Oncology History   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[facility] [address] )   12/17/2020 Initial Diagnosis    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[facility] [address] )     1/11/2021 -  Chemotherapy    fluorouracil (ADRUCIL), 765 mg, Intravenous, Once, 12 of 12 cycles  Dose modification: 300 mg/m2 (original dose 400 mg/m2, Cycle 5, Reason: Dose Not Tolerated)  Administration: 800 mg (1/11/2021), 800 mg (1/25/2021), 800 mg (2/8/2021), 800 mg (2/22/2021), 600 mg (3/15/2021), 600 mg (3/29/2021), 600 mg (4/19/2021), 600 mg (5/3/2021), 600 mg (5/17/2021), 600 mg (6/1/2021), 600 mg (6/14/2021), 600 mg (6/28/2021)  pegfilgrastim (Marlene Pinna), 6 mg, Subcutaneous, Once, 2 of 2 cycles  Administration: 6 mg (2/10/2021), 6 mg (2/24/2021)  bevacizumab (AVASTIN) IVPB, 5 mg/kg = 352 5 mg (100 % of original dose 5 mg/kg), Intravenous, Once, 8 of 8 cycles  Dose modification: 5 mg/kg (original dose 5 mg/kg, Cycle 5)  Administration: 352 5 mg (3/15/2021), 352 5 mg (3/29/2021), 352 5 mg (4/19/2021), 352 5 mg (5/3/2021), 352 5 mg (5/17/2021), 352 5 mg (6/1/2021), 352 5 mg (6/14/2021), 352 5 mg (6/28/2021)  leucovorin calcium IVPB, 764 mg, Intravenous, Once, 12 of 12 cycles  Dose modification: 300 mg/m2 (original dose 400 mg/m2, Cycle 5, Reason: Dose Not Tolerated)  Administration: 800 mg (1/11/2021), 800 mg (1/25/2021), 800 mg (2/8/2021), 800 mg (2/22/2021), 600 mg (3/15/2021), 600 mg (3/29/2021), 600 mg (4/19/2021), 600 mg (5/3/2021), 600 mg (5/17/2021), 600 mg (6/1/2021), 600 mg (6/14/2021), 600 mg (6/28/2021)  oxaliplatin (ELOXATIN) chemo infusion, 85 mg/m2 = 162 35 mg, Intravenous, Once, 12 of 12 cycles  Dose modification: 65 mg/m2 (original dose 85 mg/m2, Cycle 5, Reason: Dose Not Tolerated)  Administration: 162 35 mg (1/11/2021), 162 35 mg (1/25/2021), 162 35 mg (2/8/2021), 162 35 mg (2/22/2021), 124 15 mg (3/15/2021), 124 15 mg (3/29/2021), 124 15 mg (4/19/2021), 124 15 mg (5/3/2021), 124 15 mg (5/17/2021), 124 15 mg (6/1/2021), 124 15 mg (6/14/2021), 124 15 mg (6/28/2021)  fluorouracil (ADRUCIL) ambulatory infusion Soln, 1,200 mg/m2/day = 4,585 mg, Intravenous, Over 46 hours, 12 of 12 cycles  tbo-filgrastim (GRANIX), 300 mcg (100 % of original dose 300 mcg), Subcutaneous, Once, 6 of 6 cycles  Dose modification: 300 mcg (original dose 300 mcg, Cycle 7)  Administration: 300 mcg (5/10/2021), 300 mcg (5/24/2021), 300 mcg (6/8/2021), 300 mcg (6/21/2021), 300 mcg (7/6/2021)       58-year-old male presents for follow-up regarding history of metastatic adenocarcinoma to the lung 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  Patient was seen in consultation on 12/24/2020  It was noted at that time that in the last year he had begun with shortness of breath on exertion and was found to have iron deficiency anemia, treated with oral iron in symptoms improved  Cardiac workup was negative for her shortness of breath  He then was having worsening shortness of breath and weight loss  He had CT scan that showed bilateral lung nodules, thickening of the jejunum wall, mesenteric mass/ lymphadenopathy as well as omental masses  On 10/27/2020 he had an EGD and colonoscopy  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, negative H pylori however due to mal lymphoma patient was given therapy, antibiotic, against H pylori  In 12/09/2020 he had needle biopsy of the left upper lung nodule which showed at no carcinoma consistent with colorectal primary  Colonoscopy had shown polyps but no malignancy  Patient then had biopsy of mesenteric mass which showed metastatic intestinal adenocarcinoma  Due to this patient was placed on chemotherapy for metastatic adenocarcinoma colon primary  He is receiving FOLFOX plus Avastin  He has completed 12 cycles of chemotherapy       He had repeat imaging on 7/8/21  Interval history: Patient states that after the 12th cycle of chemo he has significant neuropathy  This was so significant was all the way up to his calves  Since chemotherapy treatment almost 4 weeks ago this has almost resolved  ROS: Review of Systems   Constitutional: Negative for appetite change, chills, fever and unexpected weight change  HENT: Negative for mouth sores and nosebleeds  Respiratory: Negative for cough and shortness of breath  Cardiovascular: Negative for chest pain, palpitations and leg swelling  Gastrointestinal: Negative for abdominal pain, blood in stool, constipation, diarrhea, nausea and vomiting  Genitourinary: Negative for difficulty urinating, dysuria and hematuria  Musculoskeletal: Negative for arthralgias  Skin: Negative  Neurological: Negative for dizziness, weakness, light-headedness, numbness and headaches  Hematological: Negative  Psychiatric/Behavioral: Negative  Past Medical History:   Diagnosis Date    Abnormal CT of liver     last assessed: 07/21/14    Anemia     iron def 6/2020 hgb 9 6    BPH (benign prostatic hypertrophy)     Dupuytren contracture     both hands    Erectile dysfunction of non-organic origin     last assessed: 11/27/12    Esophageal reflux     last assessed: 11/11/14    Esophagitis 03/13/2012    Familial hypercholesteremia     last assessed: 06/11/13    Hyperlipidemia     Hypertension     Paroxysmal atrial fibrillation ([facility] [address] )     last assessed: 04/06/17 post colonoscopy    Shortness of breath 2020    exertional due to anemia       Past Surgical History:   Procedure Laterality Date    BACK SURGERY      Lower    COLONOSCOPY      CT NEEDLE BIOPSY RETROPERITONEUM  12/31/2020    HAND CONTRACTURE RELEASE Left 5/23/2017    Procedure: Left hand percutaneous fasciotomy of palm adductor space x 2; index finger PIP joint; ring finger PIP joint; small finger MCP joint and PIP joint  ; splint application; Surgeon: Clyde Mulligan MD;  [facility]: [facility];  Service:     HAND SURGERY Bilateral     IR BIOPSY LUNG  2020    IR PORT PLACEMENT  2020       Social History     Socioeconomic History    Marital status: /Civil Union     Spouse name: None    Number of children: None    Years of education: None    Highest education level: None   Occupational History    None   Tobacco Use    Smoking status: Former Smoker     Packs/day: 0 50     Years: 3 00     Pack years: 1 50     Types: Cigarettes     Start date:      Quit date:      Years since quittin 8    Smokeless tobacco: Never Used   Vaping Use    Vaping Use: Never used   Substance and Sexual Activity    Alcohol use: No    Drug use: No    Sexual activity: None   Other Topics Concern    None   Social History Narrative     - grinding, nuclear work    Used [employer]     Social Determinants of Health     Financial Resource Strain:     Difficulty of Paying Living Expenses:    Food Insecurity:     Worried About [address] in the Last Year:    [address] in the Last Year:    Transportation Needs:     Lack of Transportation (Medical):      Lack of Transportation (Non-Medical):    Physical Activity:     Days of Exercise per Week:     Minutes of Exercise per Session:    Stress:     Feeling of Stress :    Social Connections:     Frequency of Communication with Friends and Family:     Frequency of Social Gatherings with Friends and Family:     Attends Gnosticist Services:     Active Member of Clubs or Organizations:     Attends Club or Organization Meetings:     Marital Status:    Intimate Partner Violence:     Fear of Current or Ex-Partner:     Emotionally Abused:     Physically Abused:     Sexually Abused:        Family History   Problem Relation Age of Onset    No Known Problems Mother         natural causes    Heart disease Father     Heart attack Brother        Allergies   Allergen Reactions  Other Other (See Comments)     Liquid lidocaine - couldn't swallow, panicked         Current Outpatient Medications:     atorvastatin (LIPITOR) 40 mg tablet, Take 1 tablet (40 mg total) by mouth daily, Disp: 90 tablet, Rfl: 1    chlordiazepoxide-clidinium (LIBRAX) 5-2 5 mg per capsule, Take 1 capsule by mouth 3 (three) times a day as needed (Abdominal Pain), Disp: 90 capsule, Rfl: 1    Cholecalciferol (VITAMIN D3) 2000 units TABS, Take 2,000 Units by mouth daily, Disp: , Rfl:     cyanocobalamin (VITAMIN B-12) 1,000 mcg tablet, Take 1,000 mcg by mouth daily, Disp: , Rfl:     dicyclomine (BENTYL) 20 mg tablet, Take 1 tablet (20 mg total) by mouth every 6 (six) hours (Patient taking differently: Take 20 mg by mouth as needed ), Disp: 90 tablet, Rfl: 3    dutasteride (AVODART) 0 5 mg capsule, Take 1 capsule (0 5 mg total) by mouth daily at bedtime, Disp: 90 capsule, Rfl: 1    ferrous sulfate 325 (65 Fe) mg tablet, Take 325 mg by mouth daily with breakfast , Disp: , Rfl:     losartan (COZAAR) 25 mg tablet, TAKE ONE TABLET DAILY, Disp: 90 tablet, Rfl: 1    metoprolol tartrate (LOPRESSOR) 25 mg tablet, Take 0 5 tablets (12 5 mg total) by mouth 2 (two) times a day, Disp: 180 tablet, Rfl: 1    Multiple Vitamin (MULTIVITAMIN) tablet, Take 1 tablet by mouth daily, Disp: , Rfl:     Omega-3 Fatty Acids (FISH OIL) 1,000 mg, Take 1,000 mg by mouth daily, Disp: , Rfl:     ondansetron (ZOFRAN) 4 mg tablet, Take 1 tablet (4 mg total) by mouth every 8 (eight) hours as needed for nausea or vomiting, Disp: 20 tablet, Rfl: 2    predniSONE 10 mg tablet, Take 1 tablet (10 mg total) by mouth daily, Disp: 30 tablet, Rfl: 0      Physical Exam:  /60 (BP Location: Left arm, Patient Position: Sitting, Cuff Size: Adult)   Pulse 105   Temp 98 °F (36 7 °C) (Temporal)   Resp 18   Ht 5' 9 29" (1 76 m)   Wt 69 7 kg (153 lb 9 6 oz)   BMI 22 49 kg/m²     Physical Exam  Constitutional:       General: He is not in acute distress  Appearance: He is well-developed  He is not ill-appearing  HENT:      Head: Normocephalic and atraumatic  Eyes:      General: No scleral icterus  Conjunctiva/sclera: Conjunctivae normal    Cardiovascular:      Rate and Rhythm: Normal rate and regular rhythm  Heart sounds: Normal heart sounds  No murmur heard  Pulmonary:      Effort: Pulmonary effort is normal  No respiratory distress  Breath sounds: Normal breath sounds  Abdominal:      Palpations: Abdomen is soft  Tenderness: There is no abdominal tenderness  Musculoskeletal:         General: No tenderness  Normal range of motion  Cervical back: Normal range of motion and neck supple  Right lower leg: No edema  Left lower leg: No edema  Lymphadenopathy:      Cervical: No cervical adenopathy  Skin:     General: Skin is warm and dry  Neurological:      Mental Status: He is alert and oriented to person, place, and time  Cranial Nerves: No cranial nerve deficit  Psychiatric:         Mood and Affect: Mood normal          Behavior: Behavior normal        Labs:  Lab Results   Component Value Date    WBC 7 58 06/25/2021    HGB 13 4 06/25/2021    HCT 41 7 06/25/2021     (H) 06/25/2021     (L) 06/25/2021     Lab Results   Component Value Date     07/30/2015    K 3 9 06/25/2021     06/25/2021    CO2 24 06/25/2021    ANIONGAP 8 07/30/2015    BUN 16 06/25/2021    CREATININE 0 98 06/25/2021    GLUCOSE 89 07/30/2015    GLUF 92 06/25/2021    CALCIUM 9 0 06/25/2021    CORRECTEDCA 9 6 06/25/2021    AST 35 06/25/2021    ALT 46 06/25/2021    ALKPHOS 130 (H) 06/25/2021    PROT 6 8 07/30/2015    BILITOT 0 59 07/30/2015    EGFR 74 06/25/2021     Patient voiced understanding and agreement in the above discussion  Aware to contact our office with questions/symptoms in the interim  This note has been generated by voice recognition software system    Therefore, there may be spelling, grammar, and or syntax errors  Please contact if questions arise

## 2021-07-21 NOTE — TELEPHONE ENCOUNTER
Patient called to check on Prednisone refill   [facility] [address]   Patient can be reached at [phone]

## 2021-07-26 NOTE — PLAN OF CARE
Problem: INFECTION - ADULT  Goal: Absence or prevention of progression during hospitalization  Description: INTERVENTIONS:  - Assess and monitor for signs and symptoms of infection  - Monitor lab/diagnostic results  - Monitor all insertion sites, i e  indwelling lines, tubes, and drains  - Monitor endotracheal if appropriate and nasal secretions for changes in amount and color  - [place] appropriate cooling/warming therapies per order  - Administer medications as ordered  - Instruct and encourage patient and family to use good hand hygiene technique  - Identify and instruct in appropriate isolation precautions for identified infection/condition  Outcome: Progressing  Goal: Absence of fever/infection during neutropenic period  Description: INTERVENTIONS:  - Monitor WBC    Outcome: Progressing     Problem: Knowledge Deficit  Goal: Patient/family/caregiver demonstrates understanding of disease process, treatment plan, medications, and discharge instructions  Description: Complete learning assessment and assess knowledge base    Interventions:  - Provide teaching at level of understanding  - Provide teaching via preferred learning methods  Outcome: Progressing

## 2021-07-26 NOTE — PROGRESS NOTES
Pt  Tolerated Avastin, Leucovorin and 5FU bolus without adverse event  5FU infusing via cadd pump at 2 ml/hr  Pt  Will return in 46 hours as ordered for pump disconnect  AVS declined

## 2021-07-28 NOTE — PROGRESS NOTES
Patient here for CADD pump disconnect and port flush  Patient had 0 ml of volume remaining at time of disconnect  Port flushed with positive blood return and de accessed  Patient declined AVS, aware of next appointment  Patient left infusion center in baseline condition

## 2021-07-29 NOTE — TELEPHONE ENCOUNTER
Returned telephone call spoke with pt  He states the neuropathy has not gotten worse nor any better  Denies pain  Per Sara BARCENAS unless there is pain no tx recommended  It will take some time for the neuropathy to improve  Pt states he understands and will keep our office updated with any changes  He states he takes a multivitamin

## 2021-07-29 NOTE — TELEPHONE ENCOUNTER
Patient stated he is having neuropathy from tips of his finger and below his knee into his toes, he would like to speak to someone about his symptoms and what he could do, please call back at [phone]

## 2021-07-29 NOTE — TELEPHONE ENCOUNTER
Per OV note:  He also had developed grade 3 peripheral neuropathy which has now significantly improved as he has had a break from chemotherapy for about 3 weeks  He is aware that he will not be receiving this and will not have cold sensitivity either       Patient states the cold sensitivity got better but the neuropathy of tips of finger and below knee to toes worsening at toes is not improved  Dexterity has not been affected but gait is "wobbly"    Patient does take B-12 supplementation and takes a MVI B-6    Can something else be prescribed for patient  Will send to Dr Wiley's Rns  Patient aware of plan

## 2021-08-03 NOTE — PROGRESS NOTES
Pt tolerated Granix injection in left arm without any issues  Pt provided with AVS and aware of next appointment

## 2021-08-09 NOTE — PROGRESS NOTES
Patient arrived to the unit and reported that his neuropathy is still there but not as bad  He tolerated his treatment well without adverse affects  CADD pump connected and infusing  Patient left ambulatory and is aware to return at 12:30 on the 11th for a disconnect

## 2021-08-11 NOTE — TELEPHONE ENCOUNTER
Appointment Cancellation Or Reschedule     Person calling in Patient     Provider Sally Alvarez    Office Visit Date and Time 9/1    Office Visit Location [place]    Did patient want to reschedule their office appointment? If so, when was it scheduled to? Yes 9/29 8:40 am    Is this patient calling to reschedule an infusion appointment? No    When is their next infusion appointment? 8/17   Is this patient a Chemo patient? Yes    Reason for Cancellation or Reschedule prefers to see Dr Dorinda Espinosa      If the patient is a treatment patient, please route this to the office nurse  If the patient is not on treatment, please route to the office MA

## 2021-08-17 NOTE — PLAN OF CARE
Problem: Knowledge Deficit  Goal: Patient/family/caregiver demonstrates understanding of disease process, treatment plan, medications, and discharge instructions  Description: Complete learning assessment and assess knowledge base    Interventions:  - Provide teaching at level of understanding  - Provide teaching via preferred learning methods  Outcome: Progressing Pt resting in bed.  Updated on plan of care.

## 2021-08-23 NOTE — PROGRESS NOTES
Patient tolerated chemotherapy infusions without noted adverse reactions  CADD pump attached and verified with second RN  Positive blood return verified in port as well  AVS given to patient and patient aware of time to return on Wednesday  Patient left infusion center in baseline condition

## 2021-08-25 NOTE — PROGRESS NOTES
Patient arrived on unit to have CADD pump D/C  Denies any present issues/concerns  Res volume- 0ml  CADD pump D/C  Aware of next appointment   Declined AVS

## 2021-09-04 NOTE — PROGRESS NOTES
Noted urinalysis and call the patient and he is having urinary frequency every 1-2 hour  No burning no fever  He will go for urine culture to the hospital lab because of long weekend, drink more fluids and I am starting him on Levaquin 500 mg once daily for 7 days  If he started to have more urinary symptoms, hematuria or burning or problem passing urine he will go to the emergency room  He has instructions  Addendum:  When I ordered urine culture I noticed that lab can use yesterday's specimen  I called patient and let him know

## 2021-09-07 NOTE — PROGRESS NOTES
Pt unable to provide urine specimen for urine culture  Pt refuses to stay in infusion any longer today  Pt states he will drop off urine specimen at [facility] outpatient lab site tomorrow

## 2021-09-07 NOTE — PROGRESS NOTES
Pt arrived to unit with complaint of continued urinary urgency and frequency  Pt denies burning or hematuria  Pt states all his urinary symptoms are unchanged since speaking to Dr Marquita Odell 3 days ago  Pt is taking Levaquin as prescribed  Pt's urine culture was not processed  Laboratory personnel states that they were unaware of need to process the urine culture on specimen collected 9/3/21  Urine Culture will be collected here today in infusion center  Julieth Henderson PA-C notified of all assessments and lab results  Pt has UTI  Pt's chemotherapy will be held today, pt to resume chemotherapy in 2 weeks  Pt instructed in same and verbalized understanding

## 2021-09-10 NOTE — TELEPHONE ENCOUNTER
Patient is calling to follow up on 9/7/21 urine culture  Results in Epic No growth  Patient is reporting that he is having urinary frequency every 20 minutes to 60 minutes for 2-3 oz of light yellow urine  Denies burning, hematuria  Patient feels like he is emptying his bladder but frequency persists  Patient has 1 more Levaquin tablet left  Continues with severe fatigue with walking short distances  + moderate to severe ARNDT after walking only 5 ft  ARNDT has been going on for 2-3 weeks and has been unchanged in severity  Denies fevers  97 6F temp last night  Neuropathy has worsened with being on Levaquin  Now patient has neuropathy from bilateral feet to knees and bilateral finger tips going to the first joint of his fingers  Patient is upset about reading that Levaquin can cause permanent neuropathy per package insert      Will forward to Dr Elysia Weaver RN to review with MD Juan Doyle ([facility]) [phone] Rosita LynnRody

## 2021-09-10 NOTE — TELEPHONE ENCOUNTER
Returned telephone call spoke with pt  Per li Gusman the culture came back negative  Pt stated he completed the rx  Pt's next tx is 9/21 and dr Kelly Mason will hold the oxaliplatin due to the neuropathy and see if it improves  Recommending urology consult for the frequency  Pt stated he wants to wait a few days to see if his symptoms improve before urology consult  He is aware of his fu appt on 9/29

## 2021-09-20 NOTE — PROGRESS NOTES
Pt  Denied new symptoms or concerns today  Labs reviewed  Parameters met  Pt  Completed Avastin and Leucovorin without adverse event  Pt  Tolerate 5Fu bolus  5Fu will continuously infuse over next 46 hours as ordered  Pt  Will return on 8/22/21 for pump disconnect  AVS provided

## 2021-09-22 NOTE — PROGRESS NOTES
Pt  Denied new symptoms or concerns today  Pt  Completed 5FU without adverse event  Pump and port disconnected as ordered  Excellent blood return noted  Future appointments reviewed  AVS declined

## 2021-09-29 NOTE — PROGRESS NOTES
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    All 4 MMR proteins were intact    Patient has Port-A-Cath     Initial treatment was FOLFOX plus Avastin and now he is on 5 FU plus leucovorin and Avastin  He he has grade 1 peripheral neuropathy in his feet and hands  Post chemotherapy gets very tired  Patient  has less shortness of breath  He does not have abdominal discomfort anymore  No bowel symptoms     He does not have bladder symptoms anymore             Current Outpatient Medications:     atorvastatin (LIPITOR) 40 mg tablet, Take 1 tablet (40 mg total) by mouth daily, Disp: 90 tablet, Rfl: 1    dutasteride (AVODART) 0 5 mg capsule, Take 1 capsule (0 5 mg total) by mouth daily at bedtime, Disp: 90 capsule, Rfl: 1    losartan (COZAAR) 25 mg tablet, TAKE ONE TABLET DAILY, Disp: 90 tablet, Rfl: 1    metoprolol tartrate (LOPRESSOR) 25 mg tablet, Take 0 5 tablets (12 5 mg total) by mouth 2 (two) times a day, Disp: 180 tablet, Rfl: 1    ondansetron (ZOFRAN) 4 mg tablet, Take 1 tablet (4 mg total) by mouth every 8 (eight) hours as needed for nausea or vomiting, Disp: 20 tablet, Rfl: 2    predniSONE 10 mg tablet, Take 1 tablet (10 mg total) by mouth daily (Patient taking differently: Take 10 mg by mouth daily Pt taking as needed), Disp: 30 tablet, Rfl: 2    chlordiazepoxide-clidinium (LIBRAX) 5-2 5 mg per capsule, Take 1 capsule by mouth 3 (three) times a day as needed (Abdominal Pain) (Patient not taking: Reported on 8/9/2021), Disp: 90 capsule, Rfl: 1    Cholecalciferol (VITAMIN D3) 2000 units TABS, Take 2,000 Units by mouth daily (Patient not taking: Reported on 9/7/2021), Disp: , Rfl:     cyanocobalamin (VITAMIN B-12) 1,000 mcg tablet, Take 1,000 mcg by mouth daily (Patient not taking: Reported on 9/7/2021), Disp: , Rfl:     dicyclomine (BENTYL) 20 mg tablet, Take 1 tablet (20 mg total) by mouth every 6 (six) hours (Patient not taking: Reported on 9/7/2021), Disp: 90 tablet, Rfl: 3    ferrous sulfate 325 (65 Fe) mg tablet, Take 325 mg by mouth daily with breakfast  (Patient not taking: Reported on 9/7/2021), Disp: , Rfl:     Multiple Vitamin (MULTIVITAMIN) tablet, Take 1 tablet by mouth daily (Patient not taking: Reported on 9/7/2021), Disp: , Rfl:     Omega-3 Fatty Acids (FISH OIL) 1,000 mg, Take 1,000 mg by mouth daily (Patient not taking: Reported on 9/7/2021), Disp: , Rfl:     Allergies   Allergen Reactions    Other Other (See Comments)     Liquid lidocaine - couldn't swallow, panicked       Oncology History   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[facility] [address] )   12/17/2020 Initial Diagnosis    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[facility] [address] )     1/11/2021 -  Chemotherapy    fluorouracil (ADRUCIL), 765 mg, Intravenous, Once, 12 of 12 cycles  Dose modification: 300 mg/m2 (original dose 400 mg/m2, Cycle 5, Reason: Dose Not Tolerated)  Administration: 800 mg (1/11/2021), 800 mg (1/25/2021), 800 mg (2/8/2021), 800 mg (2/22/2021), 600 mg (3/15/2021), 600 mg (3/29/2021), 600 mg (4/19/2021), 600 mg (5/3/2021), 600 mg (5/17/2021), 600 mg (6/1/2021), 600 mg (6/14/2021), 600 mg (6/28/2021)  fluorouracil (ADRUCIL), 600 mg (original dose 400 mg/m2), Intravenous, Once, 4 of 11 cycles  Dose modification: 300 mg/m2 (original dose 400 mg/m2, Cycle 13, Reason: Dose Not Tolerated)  Administration: 600 mg (7/26/2021), 600 mg (8/9/2021), 600 mg (8/23/2021), 600 mg (9/20/2021)  pegfilgrastim ([place]), 6 mg, Subcutaneous, Once, 2 of 2 cycles  Administration: 6 mg (2/10/2021), 6 mg (2/24/2021)  bevacizumab (AVASTIN) IVPB, 5 mg/kg = 352 5 mg (100 % of original dose 5 mg/kg), Intravenous, Once, 12 of 19 cycles  Dose modification: 5 mg/kg (original dose 5 mg/kg, Cycle 5)  Administration: 352 5 mg (3/15/2021), 352 5 mg (3/29/2021), 352 5 mg (4/19/2021), 352 5 mg (5/3/2021), 352 5 mg (5/17/2021), 352 5 mg (6/1/2021), 352 5 mg (6/14/2021), 352 5 mg (6/28/2021), 352 5 mg (7/26/2021), 352 5 mg (8/9/2021), 352 5 mg (8/23/2021), 352 5 mg (9/20/2021)  leucovorin calcium IVPB, 764 mg, Intravenous, Once, 16 of 23 cycles  Dose modification: 300 mg/m2 (original dose 400 mg/m2, Cycle 5, Reason: Dose Not Tolerated)  Administration: 800 mg (1/11/2021), 800 mg (1/25/2021), 800 mg (2/8/2021), 800 mg (2/22/2021), 600 mg (3/15/2021), 600 mg (3/29/2021), 600 mg (4/19/2021), 600 mg (5/3/2021), 600 mg (5/17/2021), 600 mg (6/1/2021), 600 mg (6/14/2021), 600 mg (6/28/2021), 600 mg (7/26/2021), 600 mg (8/9/2021), 600 mg (8/23/2021), 600 mg (9/20/2021)  oxaliplatin (ELOXATIN) chemo infusion, 85 mg/m2 = 162 35 mg, Intravenous, Once, 12 of 12 cycles  Dose modification: 65 mg/m2 (original dose 85 mg/m2, Cycle 5, Reason: Dose Not Tolerated)  Administration: 162 35 mg (1/11/2021), 162 35 mg (1/25/2021), 162 35 mg (2/8/2021), 162 35 mg (2/22/2021), 124 15 mg (3/15/2021), 124 15 mg (3/29/2021), 124 15 mg (4/19/2021), 124 15 mg (5/3/2021), 124 15 mg (5/17/2021), 124 15 mg (6/1/2021), 124 15 mg (6/14/2021), 124 15 mg (6/28/2021)  fluorouracil (ADRUCIL) ambulatory infusion Soln, 1,200 mg/m2/day = 4,585 mg, Intravenous, Over 46 hours, 16 of 23 cycles  tbo-filgrastim (GRANIX), 300 mcg (100 % of original dose 300 mcg), Subcutaneous, Once, 10 of 17 cycles  Dose modification: 300 mcg (original dose 300 mcg, Cycle 7)  Administration: 300 mcg (5/10/2021), 300 mcg (5/24/2021), 300 mcg (6/8/2021), 300 mcg (6/21/2021), 300 mcg (7/6/2021), 300 mcg (8/3/2021), 300 mcg (8/17/2021), 300 mcg (8/31/2021)         ROS:  09/29/21 Reviewed 12 systems: See symptoms in HPI  Presently no other neurological, cardiac, pulmonary, GI and  symptoms other than listed in HPI     Other symptoms are in HPI  No  fever, chills, bleeding, bone pains, skin rash,  Weakness, arthritis, claudication and gait problem  No frequent infections  Not unusually sensitive to heat or cold  No swelling of the ankles  No swollen glands  Patient is less anxious  /70 (BP Location: Left arm, Patient Position: Sitting, Cuff Size: Adult)   Pulse 77   Temp (!) 97 3 °F (36 3 °C)   Resp 18   Ht 5' 9 29" (1 76 m)   Wt 66 7 kg (147 lb)   SpO2 97%   BMI 21 53 kg/m²     Physical Exam:  Alert, oriented, not in distress, stable vitals, no icterus, no oral thrush, no palpable neck mass, clear lung fields, regular heart rate, abdomen  soft and non tender,  no ascites, no edema of ankles, no calf tenderness, no objective focal neurological deficit, no skin rash, no palpable lymphadenopathy in the neck and axillary areas,  no clubbing  Patient is anxious  Performance status 1      IMAGING:  IMPRESSION:     Unchanged pulmonary metastatic disease      Unchanged omental metastatic disease      Workstation performed: [id number]      Imaging    CT chest abdomen pelvis w contrast (Order: [id number]) - 7/8/2021      LABS:    Results for orders placed or performed in visit on 09/17/21   UA (URINE) with reflex to Scope   Result Value Ref Range    Color, UA Yellow     Clarity, UA Cloudy     Specific [place], UA 1 023 1 003 - 1 030    pH, UA 6 0 4 5, 5 0, 5 5, 6 0, 6 5, 7 0, 7 5, 8 0    Leukocytes, UA Small (A) Negative    Nitrite, UA Negative Negative    Protein, UA Trace (A) Negative mg/dl    Glucose, UA Negative Negative mg/dl    Ketones, UA Negative Negative mg/dl    Urobilinogen, UA 0 2 0 2, 1 0 E U /dl E U /dl    Bilirubin, UA Negative Negative    Blood, UA Negative Negative   Urine Microscopic   Result Value Ref Range    RBC, UA None Seen None Seen, 2-4 /hpf    WBC, UA 4-10 (A) None Seen, 2-4, 5-60 /hpf    Epithelial Cells Moderate (A) None Seen, Occasional /hpf    Bacteria, UA Occasional None Seen, Occasional /hpf    Hyaline Casts, UA 0-3 (A) None Seen /lpf    Ca Oxalate Sana, UA Innumerable (A) None Seen /hpf    MUCUS THREADS Moderate (A) None Seen     Labs, Imaging, & Other studies:   All pertinent labs and imaging studies were personally reviewed    Lab Results   Component Value Date     07/30/2015    K 3 7 09/17/2021     (H) 09/17/2021    CO2 24 09/17/2021    ANIONGAP 8 07/30/2015    BUN 14 09/17/2021    CREATININE 0 96 09/17/2021    GLUCOSE 89 07/30/2015    GLUF 83 09/17/2021    CALCIUM 9 1 09/17/2021    CORRECTEDCA 10 3 (H) 09/17/2021    AST 43 09/17/2021    ALT 36 09/17/2021    ALKPHOS 169 (H) 09/17/2021    PROT 6 8 07/30/2015    BILITOT 0 59 07/30/2015    EGFR 75 09/17/2021     Lab Results   Component Value Date    WBC 8 16 09/17/2021    HGB 12 0 09/17/2021    HCT 37 6 09/17/2021     (H) 09/17/2021     09/17/2021       Reviewed CBC, CMP and  Urinalysis, CT scan of chest abdomen and pelvis and discussed with patient  Assessment and plan:  Patient is here with his wife  Follow-up visit for   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  On 10/27/2020 patient had EGD and colonoscopy and 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  Negative H pylori and in spite of that patient was given antibiotic therapy against H pylori because of MALT lymphoma  On 12/09/2020 he had needle biopsy of left upper lung nodule and that came back adenocarcinoma  consistent with colorectal cancer  Colonoscopy had shown polyps but no cancer  Patient had needle biopsy of omental mass and that also showed adenocarcinoma   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    All 4 MMR proteins were intact      Patient has Port-A-Cath     Initial treatment was FOLFOX plus Avastin and now he is on 5 FU plus leucovorin and Avastin  He he has grade 1 peripheral neuropathy in his feet and hands  Post chemotherapy gets very tired  Patient  has less shortness of breath  He does not have abdominal discomfort anymore  No bowel symptoms     He does not have bladder symptoms anymore    Physical examination and test results are as recorded and discussed   No change in systemic therapy  Ordered CT scans   All discussed in detail  Questions answered  Discussed the importance of eating healthy foods, activities as tolerated and health screening tests     Goal will be response and prolongation of survival   Patient is capable of self-care  Discussed precautions against coronavirus     Patient will continue to follow with his primary physician and other consultants  See diagnoses, instructions and orders below  1  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[facility] [address] )    - CT chest abdomen pelvis w contrast; Future    2  Malignant neoplasm metastatic to lung, unspecified laterality ([facility] [address] )    - CT chest abdomen pelvis w contrast; Future    3  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

## 2021-10-26 PROBLEM — C78.00 MALIGNANT NEOPLASM METASTATIC TO LUNG (HCC): Status: ACTIVE | Noted: 2021-01-01

## 2021-10-26 PROBLEM — C78.6 OMENTAL METASTASIS (HCC): Status: ACTIVE | Noted: 2021-01-01

## 2022-01-01 ENCOUNTER — APPOINTMENT (OUTPATIENT)
Dept: LAB | Facility: HOSPITAL | Age: 77
End: 2022-01-01
Attending: INTERNAL MEDICINE
Payer: MEDICARE

## 2022-01-01 ENCOUNTER — HOSPITAL ENCOUNTER (OUTPATIENT)
Dept: INFUSION CENTER | Facility: CLINIC | Age: 77
Discharge: HOME | End: 2022-01-24
Payer: MEDICARE

## 2022-01-01 ENCOUNTER — TELEPHONE (OUTPATIENT)
Dept: OTHER | Facility: OTHER | Age: 77
End: 2022-01-01

## 2022-01-01 ENCOUNTER — HOME CARE VISIT (OUTPATIENT)
Dept: HOME HOSPICE | Facility: HOSPICE | Age: 77
End: 2022-01-01
Payer: MEDICARE

## 2022-01-01 ENCOUNTER — HOSPITAL ENCOUNTER (OUTPATIENT)
Dept: INFUSION CENTER | Facility: CLINIC | Age: 77
End: 2022-01-01

## 2022-01-01 ENCOUNTER — TELEPHONE (OUTPATIENT)
Dept: HEMATOLOGY ONCOLOGY | Facility: CLINIC | Age: 77
End: 2022-01-01

## 2022-01-01 ENCOUNTER — DOCUMENTATION (OUTPATIENT)
Dept: HEMATOLOGY ONCOLOGY | Facility: CLINIC | Age: 77
End: 2022-01-01

## 2022-01-01 ENCOUNTER — APPOINTMENT (OUTPATIENT)
Dept: LAB | Facility: CLINIC | Age: 77
DRG: 374 | End: 2022-01-01
Payer: MEDICARE

## 2022-01-01 ENCOUNTER — HOME CARE VISIT (OUTPATIENT)
Dept: HOME HEALTH SERVICES | Facility: HOME HEALTHCARE | Age: 77
End: 2022-01-01
Payer: MEDICARE

## 2022-01-01 ENCOUNTER — HOSPITAL ENCOUNTER (OUTPATIENT)
Dept: INFUSION CENTER | Facility: CLINIC | Age: 77
Discharge: HOME | End: 2022-01-17
Payer: MEDICARE

## 2022-01-01 ENCOUNTER — TELEPHONE (OUTPATIENT)
Dept: CARDIAC SURGERY | Facility: CLINIC | Age: 77
End: 2022-01-01

## 2022-01-01 ENCOUNTER — APPOINTMENT (OUTPATIENT)
Dept: LAB | Facility: CLINIC | Age: 77
End: 2022-01-01
Payer: MEDICARE

## 2022-01-01 ENCOUNTER — HOSPITAL ENCOUNTER (OUTPATIENT)
Dept: INFUSION CENTER | Facility: CLINIC | Age: 77
Discharge: HOME | End: 2022-02-23

## 2022-01-01 ENCOUNTER — TELEPHONE (OUTPATIENT)
Dept: GASTROENTEROLOGY | Facility: MEDICAL CENTER | Age: 77
End: 2022-01-01

## 2022-01-01 ENCOUNTER — APPOINTMENT (INPATIENT)
Dept: RADIOLOGY | Facility: HOSPITAL | Age: 77
DRG: 374 | End: 2022-01-01
Payer: MEDICARE

## 2022-01-01 ENCOUNTER — HOSPITAL ENCOUNTER (OUTPATIENT)
Dept: INFUSION CENTER | Facility: CLINIC | Age: 77
Discharge: HOME | End: 2022-02-07
Payer: MEDICARE

## 2022-01-01 ENCOUNTER — HOSPITAL ENCOUNTER (OUTPATIENT)
Dept: INFUSION CENTER | Facility: CLINIC | Age: 77
Discharge: HOME | End: 2022-01-10

## 2022-01-01 ENCOUNTER — HOSPITAL ENCOUNTER (OUTPATIENT)
Dept: INFUSION CENTER | Facility: CLINIC | Age: 77
Discharge: HOME | End: 2022-01-03

## 2022-01-01 ENCOUNTER — HOSPITAL ENCOUNTER (OUTPATIENT)
Dept: INFUSION CENTER | Facility: CLINIC | Age: 77
Discharge: HOME | End: 2022-02-02

## 2022-01-01 ENCOUNTER — APPOINTMENT (EMERGENCY)
Dept: CT IMAGING | Facility: HOSPITAL | Age: 77
DRG: 374 | End: 2022-01-01
Payer: MEDICARE

## 2022-01-01 ENCOUNTER — HOSPITAL ENCOUNTER (OUTPATIENT)
Dept: INFUSION CENTER | Facility: CLINIC | Age: 77
Discharge: HOME | End: 2022-02-14
Payer: MEDICARE

## 2022-01-01 ENCOUNTER — HOSPITAL ENCOUNTER (OUTPATIENT)
Dept: INFUSION CENTER | Facility: CLINIC | Age: 77
Discharge: HOME | End: 2022-03-23
Payer: MEDICARE

## 2022-01-01 ENCOUNTER — ANESTHESIA EVENT (INPATIENT)
Dept: GASTROENTEROLOGY | Facility: HOSPITAL | Age: 77
DRG: 374 | End: 2022-01-01
Payer: MEDICARE

## 2022-01-01 ENCOUNTER — HOSPITAL ENCOUNTER (INPATIENT)
Facility: HOSPITAL | Age: 77
LOS: 10 days | Discharge: HOSPICE | DRG: 374 | End: 2022-05-06
Attending: EMERGENCY MEDICINE | Admitting: INTERNAL MEDICINE
Payer: MEDICARE

## 2022-01-01 ENCOUNTER — HOSPITAL ENCOUNTER (OUTPATIENT)
Dept: INFUSION CENTER | Facility: CLINIC | Age: 77
Discharge: HOME | End: 2022-01-31
Payer: MEDICARE

## 2022-01-01 ENCOUNTER — HOSPITAL ENCOUNTER (OUTPATIENT)
Dept: CT IMAGING | Facility: HOSPITAL | Age: 77
Discharge: HOME | End: 2022-03-13
Attending: INTERNAL MEDICINE
Payer: MEDICARE

## 2022-01-01 ENCOUNTER — TELEPHONE (OUTPATIENT)
Dept: FAMILY MEDICINE CLINIC | Facility: CLINIC | Age: 77
End: 2022-01-01

## 2022-01-01 ENCOUNTER — OFFICE VISIT (OUTPATIENT)
Dept: HEMATOLOGY ONCOLOGY | Facility: CLINIC | Age: 77
End: 2022-01-01
Payer: MEDICARE

## 2022-01-01 ENCOUNTER — HOSPITAL ENCOUNTER (OUTPATIENT)
Dept: CT IMAGING | Facility: HOSPITAL | Age: 77
Discharge: HOME | End: 2022-02-09
Attending: INTERNAL MEDICINE
Payer: MEDICARE

## 2022-01-01 ENCOUNTER — ANESTHESIA (INPATIENT)
Dept: GASTROENTEROLOGY | Facility: HOSPITAL | Age: 77
DRG: 374 | End: 2022-01-01
Payer: MEDICARE

## 2022-01-01 ENCOUNTER — OFFICE VISIT (OUTPATIENT)
Dept: GASTROENTEROLOGY | Facility: MEDICAL CENTER | Age: 77
End: 2022-01-01
Payer: MEDICARE

## 2022-01-01 ENCOUNTER — APPOINTMENT (INPATIENT)
Dept: PERIOP | Facility: HOSPITAL | Age: 77
DRG: 374 | End: 2022-01-01
Attending: INTERNAL MEDICINE
Payer: MEDICARE

## 2022-01-01 ENCOUNTER — HOSPICE ADMISSION (OUTPATIENT)
Dept: HOME HOSPICE | Facility: HOSPICE | Age: 77
End: 2022-01-01
Payer: MEDICARE

## 2022-01-01 ENCOUNTER — HOSPITAL ENCOUNTER (OUTPATIENT)
Dept: INFUSION CENTER | Facility: CLINIC | Age: 77
Discharge: HOME | End: 2022-01-19

## 2022-01-01 VITALS
TEMPERATURE: 96 F | SYSTOLIC BLOOD PRESSURE: 140 MMHG | HEIGHT: 69 IN | DIASTOLIC BLOOD PRESSURE: 76 MMHG | HEART RATE: 101 BPM | WEIGHT: 139.99 LBS | OXYGEN SATURATION: 93 % | BODY MASS INDEX: 20.73 KG/M2 | RESPIRATION RATE: 18 BRPM

## 2022-01-01 VITALS
SYSTOLIC BLOOD PRESSURE: 126 MMHG | HEIGHT: 69 IN | HEART RATE: 101 BPM | BODY MASS INDEX: 20.73 KG/M2 | DIASTOLIC BLOOD PRESSURE: 64 MMHG | OXYGEN SATURATION: 96 % | TEMPERATURE: 98.1 F | RESPIRATION RATE: 18 BRPM | WEIGHT: 140 LBS

## 2022-01-01 VITALS
TEMPERATURE: 98.4 F | HEART RATE: 98 BPM | SYSTOLIC BLOOD PRESSURE: 104 MMHG | RESPIRATION RATE: 22 BRPM | DIASTOLIC BLOOD PRESSURE: 60 MMHG

## 2022-01-01 VITALS
WEIGHT: 137.6 LBS | BODY MASS INDEX: 20.31 KG/M2 | SYSTOLIC BLOOD PRESSURE: 134 MMHG | HEART RATE: 91 BPM | DIASTOLIC BLOOD PRESSURE: 80 MMHG | TEMPERATURE: 98.3 F

## 2022-01-01 VITALS
HEIGHT: 69 IN | TEMPERATURE: 97.9 F | BODY MASS INDEX: 18.51 KG/M2 | RESPIRATION RATE: 16 BRPM | DIASTOLIC BLOOD PRESSURE: 62 MMHG | HEART RATE: 58 BPM | SYSTOLIC BLOOD PRESSURE: 120 MMHG | OXYGEN SATURATION: 95 % | WEIGHT: 125 LBS

## 2022-01-01 VITALS
BODY MASS INDEX: 20.44 KG/M2 | WEIGHT: 138 LBS | RESPIRATION RATE: 16 BRPM | OXYGEN SATURATION: 94 % | SYSTOLIC BLOOD PRESSURE: 112 MMHG | HEIGHT: 69 IN | TEMPERATURE: 97.8 F | DIASTOLIC BLOOD PRESSURE: 64 MMHG | HEART RATE: 94 BPM

## 2022-01-01 VITALS — TEMPERATURE: 98.3 F

## 2022-01-01 VITALS
RESPIRATION RATE: 18 BRPM | TEMPERATURE: 97.3 F | HEART RATE: 93 BPM | DIASTOLIC BLOOD PRESSURE: 72 MMHG | SYSTOLIC BLOOD PRESSURE: 116 MMHG

## 2022-01-01 VITALS — TEMPERATURE: 98 F

## 2022-01-01 VITALS
TEMPERATURE: 98.5 F | DIASTOLIC BLOOD PRESSURE: 78 MMHG | RESPIRATION RATE: 12 BRPM | SYSTOLIC BLOOD PRESSURE: 120 MMHG | HEART RATE: 128 BPM

## 2022-01-01 VITALS — HEIGHT: 69 IN | BODY MASS INDEX: 19.89 KG/M2 | WEIGHT: 134.26 LBS

## 2022-01-01 VITALS
DIASTOLIC BLOOD PRESSURE: 60 MMHG | TEMPERATURE: 97 F | RESPIRATION RATE: 20 BRPM | HEART RATE: 68 BPM | SYSTOLIC BLOOD PRESSURE: 114 MMHG

## 2022-01-01 VITALS
TEMPERATURE: 97.5 F | RESPIRATION RATE: 20 BRPM | SYSTOLIC BLOOD PRESSURE: 132 MMHG | DIASTOLIC BLOOD PRESSURE: 74 MMHG | WEIGHT: 135 LBS | BODY MASS INDEX: 19.33 KG/M2 | HEIGHT: 70 IN | HEART RATE: 84 BPM

## 2022-01-01 VITALS
HEART RATE: 89 BPM | RESPIRATION RATE: 18 BRPM | TEMPERATURE: 97.3 F | BODY MASS INDEX: 19.79 KG/M2 | DIASTOLIC BLOOD PRESSURE: 77 MMHG | SYSTOLIC BLOOD PRESSURE: 117 MMHG | WEIGHT: 133.6 LBS | HEIGHT: 69 IN

## 2022-01-01 VITALS
HEIGHT: 69 IN | BODY MASS INDEX: 19.98 KG/M2 | WEIGHT: 134.92 LBS | DIASTOLIC BLOOD PRESSURE: 80 MMHG | SYSTOLIC BLOOD PRESSURE: 137 MMHG | HEART RATE: 102 BPM | RESPIRATION RATE: 18 BRPM | TEMPERATURE: 98.7 F

## 2022-01-01 VITALS
SYSTOLIC BLOOD PRESSURE: 126 MMHG | HEART RATE: 96 BPM | RESPIRATION RATE: 18 BRPM | DIASTOLIC BLOOD PRESSURE: 75 MMHG | TEMPERATURE: 97.8 F

## 2022-01-01 VITALS — TEMPERATURE: 97.6 F

## 2022-01-01 DIAGNOSIS — C78.6 OMENTAL METASTASIS (HCC): ICD-10-CM

## 2022-01-01 DIAGNOSIS — D70.1 CHEMOTHERAPY INDUCED NEUTROPENIA (HCC): ICD-10-CM

## 2022-01-01 DIAGNOSIS — C78.00 MALIGNANT NEOPLASM METASTATIC TO LUNG, UNSPECIFIED LATERALITY (HCC): Primary | ICD-10-CM

## 2022-01-01 DIAGNOSIS — C17.9 SMALL BOWEL CANCER (HCC): Primary | ICD-10-CM

## 2022-01-01 DIAGNOSIS — C78.00 MALIGNANT NEOPLASM METASTATIC TO LUNG, UNSPECIFIED LATERALITY (HCC): ICD-10-CM

## 2022-01-01 DIAGNOSIS — C17.9 SMALL BOWEL CANCER (HCC): ICD-10-CM

## 2022-01-01 DIAGNOSIS — C78.6 MALIGNANT NEOPLASM METASTATIC TO PERITONEUM (HCC): ICD-10-CM

## 2022-01-01 DIAGNOSIS — C88.4 MALT (MUCOSA ASSOCIATED LYMPHOID TISSUE) (HCC): ICD-10-CM

## 2022-01-01 DIAGNOSIS — E87.6 HYPOKALEMIA: Primary | ICD-10-CM

## 2022-01-01 DIAGNOSIS — R10.84 GENERALIZED ABDOMINAL PAIN: ICD-10-CM

## 2022-01-01 DIAGNOSIS — K56.609 SMALL BOWEL OBSTRUCTION (HCC): ICD-10-CM

## 2022-01-01 DIAGNOSIS — C78.01 MALIGNANT NEOPLASM METASTATIC TO BOTH LUNGS (HCC): ICD-10-CM

## 2022-01-01 DIAGNOSIS — C78.6 MALIGNANT NEOPLASM METASTATIC TO PERITONEUM (HCC): Primary | ICD-10-CM

## 2022-01-01 DIAGNOSIS — T45.1X5A CHEMOTHERAPY INDUCED NEUTROPENIA (HCC): ICD-10-CM

## 2022-01-01 DIAGNOSIS — J44.9 CHRONIC OBSTRUCTIVE PULMONARY DISEASE, UNSPECIFIED COPD TYPE (HCC): ICD-10-CM

## 2022-01-01 DIAGNOSIS — K56.609 SMALL BOWEL OBSTRUCTION (HCC): Primary | ICD-10-CM

## 2022-01-01 DIAGNOSIS — C78.01 MALIGNANT NEOPLASM METASTATIC TO BOTH LUNGS (HCC): Primary | ICD-10-CM

## 2022-01-01 DIAGNOSIS — R19.8 PROMINENT AMPULLA OF VATER: ICD-10-CM

## 2022-01-01 DIAGNOSIS — T45.1X5A CHEMOTHERAPY-INDUCED NAUSEA: ICD-10-CM

## 2022-01-01 DIAGNOSIS — C78.02 MALIGNANT NEOPLASM METASTATIC TO BOTH LUNGS (HCC): Primary | ICD-10-CM

## 2022-01-01 DIAGNOSIS — E44.0 MODERATE PROTEIN-CALORIE MALNUTRITION (HCC): ICD-10-CM

## 2022-01-01 DIAGNOSIS — D70.2 OTHER DRUG-INDUCED NEUTROPENIA (HCC): ICD-10-CM

## 2022-01-01 DIAGNOSIS — D12.6 TUBULAR ADENOMA OF COLON: Primary | ICD-10-CM

## 2022-01-01 DIAGNOSIS — D13.30 TUBULAR ADENOMA OF SMALL INTESTINE: ICD-10-CM

## 2022-01-01 DIAGNOSIS — Z51.5 HOSPICE CARE PATIENT: Primary | ICD-10-CM

## 2022-01-01 DIAGNOSIS — Z45.2 ENCOUNTER FOR CARE RELATED TO VASCULAR ACCESS PORT: Primary | ICD-10-CM

## 2022-01-01 DIAGNOSIS — K63.89 SMALL BOWEL POLYP: ICD-10-CM

## 2022-01-01 DIAGNOSIS — R53.0 NEOPLASTIC MALIGNANT RELATED FATIGUE: ICD-10-CM

## 2022-01-01 DIAGNOSIS — R11.0 CHEMOTHERAPY-INDUCED NAUSEA: ICD-10-CM

## 2022-01-01 DIAGNOSIS — C78.02 MALIGNANT NEOPLASM METASTATIC TO BOTH LUNGS (HCC): ICD-10-CM

## 2022-01-01 LAB
ALBUMIN SERPL BCP-MCNC: 1.7 G/DL (ref 3.5–5)
ALBUMIN SERPL BCP-MCNC: 1.8 G/DL (ref 3.5–5)
ALBUMIN SERPL BCP-MCNC: 2 G/DL (ref 3.5–5)
ALBUMIN SERPL BCP-MCNC: 2.1 G/DL (ref 3.5–5)
ALBUMIN SERPL BCP-MCNC: 2.3 G/DL (ref 3.5–5)
ALBUMIN SERPL BCP-MCNC: 2.7 G/DL (ref 3.5–5)
ALBUMIN SERPL BCP-MCNC: 2.8 G/DL (ref 3.5–5)
ALBUMIN SERPL BCP-MCNC: 3.1 G/DL (ref 3.5–5)
ALBUMIN SERPL BCP-MCNC: 3.2 G/DL (ref 3.5–5)
ALP SERPL-CCNC: 221 U/L (ref 46–116)
ALP SERPL-CCNC: 226 U/L (ref 46–116)
ALP SERPL-CCNC: 241 U/L (ref 46–116)
ALP SERPL-CCNC: 243 U/L (ref 46–116)
ALP SERPL-CCNC: 248 U/L (ref 46–116)
ALP SERPL-CCNC: 263 U/L (ref 46–116)
ALP SERPL-CCNC: 292 U/L (ref 46–116)
ALP SERPL-CCNC: 335 U/L (ref 46–116)
ALP SERPL-CCNC: 384 U/L (ref 46–116)
ALT SERPL W P-5'-P-CCNC: 22 U/L (ref 12–78)
ALT SERPL W P-5'-P-CCNC: 22 U/L (ref 12–78)
ALT SERPL W P-5'-P-CCNC: 26 U/L (ref 12–78)
ALT SERPL W P-5'-P-CCNC: 27 U/L (ref 12–78)
ALT SERPL W P-5'-P-CCNC: 29 U/L (ref 12–78)
ALT SERPL W P-5'-P-CCNC: 36 U/L (ref 12–78)
ALT SERPL W P-5'-P-CCNC: 38 U/L (ref 12–78)
ALT SERPL W P-5'-P-CCNC: 47 U/L (ref 12–78)
ALT SERPL W P-5'-P-CCNC: 58 U/L (ref 12–78)
ANION GAP SERPL CALCULATED.3IONS-SCNC: 5 MMOL/L (ref 4–13)
ANION GAP SERPL CALCULATED.3IONS-SCNC: 6 MMOL/L (ref 4–13)
ANION GAP SERPL CALCULATED.3IONS-SCNC: 6 MMOL/L (ref 4–13)
ANION GAP SERPL CALCULATED.3IONS-SCNC: 7 MMOL/L (ref 4–13)
ANION GAP SERPL CALCULATED.3IONS-SCNC: 7 MMOL/L (ref 4–13)
ANION GAP SERPL CALCULATED.3IONS-SCNC: 8 MMOL/L (ref 4–13)
ANION GAP SERPL CALCULATED.3IONS-SCNC: 9 MMOL/L (ref 4–13)
ANISOCYTOSIS BLD QL SMEAR: PRESENT
ARTIFACT: PRESENT
AST SERPL W P-5'-P-CCNC: 18 U/L (ref 5–45)
AST SERPL W P-5'-P-CCNC: 19 U/L (ref 5–45)
AST SERPL W P-5'-P-CCNC: 21 U/L (ref 5–45)
AST SERPL W P-5'-P-CCNC: 24 U/L (ref 5–45)
AST SERPL W P-5'-P-CCNC: 32 U/L (ref 5–45)
AST SERPL W P-5'-P-CCNC: 33 U/L (ref 5–45)
AST SERPL W P-5'-P-CCNC: 47 U/L (ref 5–45)
ATRIAL RATE: 102 BPM
ATRIAL RATE: 90 BPM
ATRIAL RATE: 95 BPM
BACTERIA BLD CULT: NORMAL
BACTERIA BLD CULT: NORMAL
BASOPHILS # BLD AUTO: 0.01 THOUSANDS/ΜL (ref 0–0.1)
BASOPHILS # BLD AUTO: 0.03 THOUSANDS/ΜL (ref 0–0.1)
BASOPHILS # BLD AUTO: 0.03 THOUSANDS/ΜL (ref 0–0.1)
BASOPHILS # BLD AUTO: 0.08 THOUSANDS/ΜL (ref 0–0.1)
BASOPHILS # BLD MANUAL: 0 THOUSAND/UL (ref 0–0.1)
BASOPHILS # BLD MANUAL: 0.01 THOUSAND/UL (ref 0–0.1)
BASOPHILS # BLD MANUAL: 0.02 THOUSAND/UL (ref 0–0.1)
BASOPHILS NFR BLD AUTO: 0 % (ref 0–1)
BASOPHILS NFR BLD AUTO: 1 % (ref 0–1)
BASOPHILS NFR MAR MANUAL: 0 % (ref 0–1)
BASOPHILS NFR MAR MANUAL: 1 % (ref 0–1)
BASOPHILS NFR MAR MANUAL: 1 % (ref 0–1)
BILIRUB SERPL-MCNC: 0.46 MG/DL (ref 0.2–1)
BILIRUB SERPL-MCNC: 0.59 MG/DL (ref 0.2–1)
BILIRUB SERPL-MCNC: 0.96 MG/DL (ref 0.2–1)
BILIRUB SERPL-MCNC: 0.99 MG/DL (ref 0.2–1)
BILIRUB SERPL-MCNC: 1.24 MG/DL (ref 0.2–1)
BILIRUB SERPL-MCNC: 1.27 MG/DL (ref 0.2–1)
BILIRUB SERPL-MCNC: 1.39 MG/DL (ref 0.2–1)
BILIRUB SERPL-MCNC: 1.72 MG/DL (ref 0.2–1)
BILIRUB SERPL-MCNC: 1.93 MG/DL (ref 0.2–1)
BUN SERPL-MCNC: 11 MG/DL (ref 5–25)
BUN SERPL-MCNC: 11 MG/DL (ref 5–25)
BUN SERPL-MCNC: 12 MG/DL (ref 5–25)
BUN SERPL-MCNC: 13 MG/DL (ref 5–25)
BUN SERPL-MCNC: 14 MG/DL (ref 5–25)
BUN SERPL-MCNC: 15 MG/DL (ref 5–25)
BUN SERPL-MCNC: 19 MG/DL (ref 5–25)
BUN SERPL-MCNC: 22 MG/DL (ref 5–25)
BUN SERPL-MCNC: 23 MG/DL (ref 5–25)
BUN SERPL-MCNC: 25 MG/DL (ref 5–25)
CALCIUM ALBUM COR SERPL-MCNC: 10 MG/DL (ref 8.3–10.1)
CALCIUM ALBUM COR SERPL-MCNC: 10.1 MG/DL (ref 8.3–10.1)
CALCIUM ALBUM COR SERPL-MCNC: 10.3 MG/DL (ref 8.3–10.1)
CALCIUM ALBUM COR SERPL-MCNC: 10.5 MG/DL (ref 8.3–10.1)
CALCIUM ALBUM COR SERPL-MCNC: 9.8 MG/DL (ref 8.3–10.1)
CALCIUM ALBUM COR SERPL-MCNC: 9.8 MG/DL (ref 8.3–10.1)
CALCIUM SERPL-MCNC: 7.5 MG/DL (ref 8.3–10.1)
CALCIUM SERPL-MCNC: 7.9 MG/DL (ref 8.3–10.1)
CALCIUM SERPL-MCNC: 8 MG/DL (ref 8.3–10.1)
CALCIUM SERPL-MCNC: 8.2 MG/DL (ref 8.3–10.1)
CALCIUM SERPL-MCNC: 8.2 MG/DL (ref 8.3–10.1)
CALCIUM SERPL-MCNC: 8.4 MG/DL (ref 8.3–10.1)
CALCIUM SERPL-MCNC: 8.5 MG/DL (ref 8.3–10.1)
CALCIUM SERPL-MCNC: 8.7 MG/DL (ref 8.3–10.1)
CALCIUM SERPL-MCNC: 9 MG/DL (ref 8.3–10.1)
CALCIUM SERPL-MCNC: 9 MG/DL (ref 8.3–10.1)
CALCIUM SERPL-MCNC: 9.5 MG/DL (ref 8.3–10.1)
CALCIUM SERPL-MCNC: 9.8 MG/DL (ref 8.3–10.1)
CHLORIDE SERPL-SCNC: 101 MMOL/L (ref 100–108)
CHLORIDE SERPL-SCNC: 104 MMOL/L (ref 100–108)
CHLORIDE SERPL-SCNC: 104 MMOL/L (ref 100–108)
CHLORIDE SERPL-SCNC: 105 MMOL/L (ref 100–108)
CHLORIDE SERPL-SCNC: 106 MMOL/L (ref 100–108)
CHLORIDE SERPL-SCNC: 107 MMOL/L (ref 100–108)
CHLORIDE SERPL-SCNC: 107 MMOL/L (ref 100–108)
CHLORIDE SERPL-SCNC: 108 MMOL/L (ref 100–108)
CHLORIDE SERPL-SCNC: 109 MMOL/L (ref 100–108)
CHLORIDE SERPL-SCNC: 110 MMOL/L (ref 100–108)
CHLORIDE SERPL-SCNC: 111 MMOL/L (ref 100–108)
CHLORIDE SERPL-SCNC: 113 MMOL/L (ref 100–108)
CO2 SERPL-SCNC: 24 MMOL/L (ref 21–32)
CO2 SERPL-SCNC: 25 MMOL/L (ref 21–32)
CO2 SERPL-SCNC: 26 MMOL/L (ref 21–32)
CO2 SERPL-SCNC: 27 MMOL/L (ref 21–32)
CO2 SERPL-SCNC: 27 MMOL/L (ref 21–32)
CO2 SERPL-SCNC: 28 MMOL/L (ref 21–32)
CREAT SERPL-MCNC: 1.01 MG/DL (ref 0.6–1.3)
CREAT SERPL-MCNC: 1.02 MG/DL (ref 0.6–1.3)
CREAT SERPL-MCNC: 1.03 MG/DL (ref 0.6–1.3)
CREAT SERPL-MCNC: 1.06 MG/DL (ref 0.6–1.3)
CREAT SERPL-MCNC: 1.07 MG/DL (ref 0.6–1.3)
CREAT SERPL-MCNC: 1.09 MG/DL (ref 0.6–1.3)
CREAT SERPL-MCNC: 1.1 MG/DL (ref 0.6–1.3)
CREAT SERPL-MCNC: 1.12 MG/DL (ref 0.6–1.3)
CREAT SERPL-MCNC: 1.15 MG/DL (ref 0.6–1.3)
CREAT SERPL-MCNC: 1.15 MG/DL (ref 0.6–1.3)
CREAT SERPL-MCNC: 1.17 MG/DL (ref 0.6–1.3)
CREAT SERPL-MCNC: 1.18 MG/DL (ref 0.6–1.3)
DACRYOCYTES BLD QL SMEAR: PRESENT
EOSINOPHIL # BLD AUTO: 0.02 THOUSAND/ΜL (ref 0–0.61)
EOSINOPHIL # BLD AUTO: 0.04 THOUSAND/ΜL (ref 0–0.61)
EOSINOPHIL # BLD AUTO: 0.06 THOUSAND/ΜL (ref 0–0.61)
EOSINOPHIL # BLD AUTO: 0.2 THOUSAND/ΜL (ref 0–0.61)
EOSINOPHIL # BLD MANUAL: 0 THOUSAND/UL (ref 0–0.4)
EOSINOPHIL # BLD MANUAL: 0 THOUSAND/UL (ref 0–0.4)
EOSINOPHIL # BLD MANUAL: 0.01 THOUSAND/UL (ref 0–0.4)
EOSINOPHIL # BLD MANUAL: 0.02 THOUSAND/UL (ref 0–0.4)
EOSINOPHIL # BLD MANUAL: 0.06 THOUSAND/UL (ref 0–0.4)
EOSINOPHIL NFR BLD AUTO: 1 % (ref 0–6)
EOSINOPHIL NFR BLD AUTO: 1 % (ref 0–6)
EOSINOPHIL NFR BLD AUTO: 2 % (ref 0–6)
EOSINOPHIL NFR BLD AUTO: 2 % (ref 0–6)
EOSINOPHIL NFR BLD MANUAL: 0 % (ref 0–6)
EOSINOPHIL NFR BLD MANUAL: 0 % (ref 0–6)
EOSINOPHIL NFR BLD MANUAL: 1 % (ref 0–6)
EOSINOPHIL NFR BLD MANUAL: 1 % (ref 0–6)
EOSINOPHIL NFR BLD MANUAL: 4 % (ref 0–6)
ERYTHROCYTE [DISTWIDTH] IN BLOOD BY AUTOMATED COUNT: 15.1 % (ref 11.6–15.1)
ERYTHROCYTE [DISTWIDTH] IN BLOOD BY AUTOMATED COUNT: 15.2 % (ref 11.6–15.1)
ERYTHROCYTE [DISTWIDTH] IN BLOOD BY AUTOMATED COUNT: 15.4 % (ref 11.6–15.1)
ERYTHROCYTE [DISTWIDTH] IN BLOOD BY AUTOMATED COUNT: 15.6 % (ref 11.6–15.1)
ERYTHROCYTE [DISTWIDTH] IN BLOOD BY AUTOMATED COUNT: 15.6 % (ref 11.6–15.1)
ERYTHROCYTE [DISTWIDTH] IN BLOOD BY AUTOMATED COUNT: 15.7 % (ref 11.6–15.1)
ERYTHROCYTE [DISTWIDTH] IN BLOOD BY AUTOMATED COUNT: 15.8 % (ref 11.6–15.1)
ERYTHROCYTE [DISTWIDTH] IN BLOOD BY AUTOMATED COUNT: 17 % (ref 11.6–15.1)
ERYTHROCYTE [DISTWIDTH] IN BLOOD BY AUTOMATED COUNT: 17.3 % (ref 11.6–15.1)
ERYTHROCYTE [DISTWIDTH] IN BLOOD BY AUTOMATED COUNT: 17.8 % (ref 11.6–15.1)
ERYTHROCYTE [DISTWIDTH] IN BLOOD BY AUTOMATED COUNT: 18.1 % (ref 11.6–15.1)
ERYTHROCYTE [DISTWIDTH] IN BLOOD BY AUTOMATED COUNT: 18.2 % (ref 11.6–15.1)
GFR SERPL CREATININE-BSD FRML MDRD: 58 ML/MIN/1.73SQ M
GFR SERPL CREATININE-BSD FRML MDRD: 59 ML/MIN/1.73SQ M
GFR SERPL CREATININE-BSD FRML MDRD: 60 ML/MIN/1.73SQ M
GFR SERPL CREATININE-BSD FRML MDRD: 60 ML/MIN/1.73SQ M
GFR SERPL CREATININE-BSD FRML MDRD: 62 ML/MIN/1.73SQ M
GFR SERPL CREATININE-BSD FRML MDRD: 63 ML/MIN/1.73SQ M
GFR SERPL CREATININE-BSD FRML MDRD: 64 ML/MIN/1.73SQ M
GFR SERPL CREATININE-BSD FRML MDRD: 66 ML/MIN/1.73SQ M
GFR SERPL CREATININE-BSD FRML MDRD: 66 ML/MIN/1.73SQ M
GFR SERPL CREATININE-BSD FRML MDRD: 69 ML/MIN/1.73SQ M
GFR SERPL CREATININE-BSD FRML MDRD: 70 ML/MIN/1.73SQ M
GFR SERPL CREATININE-BSD FRML MDRD: 70 ML/MIN/1.73SQ M
GLUCOSE P FAST SERPL-MCNC: 79 MG/DL (ref 65–99)
GLUCOSE P FAST SERPL-MCNC: 83 MG/DL (ref 65–99)
GLUCOSE P FAST SERPL-MCNC: 83 MG/DL (ref 65–99)
GLUCOSE SERPL-MCNC: 101 MG/DL (ref 65–140)
GLUCOSE SERPL-MCNC: 102 MG/DL (ref 65–140)
GLUCOSE SERPL-MCNC: 102 MG/DL (ref 65–140)
GLUCOSE SERPL-MCNC: 107 MG/DL (ref 65–140)
GLUCOSE SERPL-MCNC: 107 MG/DL (ref 65–140)
GLUCOSE SERPL-MCNC: 109 MG/DL (ref 65–140)
GLUCOSE SERPL-MCNC: 116 MG/DL (ref 65–140)
GLUCOSE SERPL-MCNC: 93 MG/DL (ref 65–140)
GLUCOSE SERPL-MCNC: 95 MG/DL (ref 65–140)
HCT VFR BLD AUTO: 26.1 % (ref 36.5–49.3)
HCT VFR BLD AUTO: 27.1 % (ref 36.5–49.3)
HCT VFR BLD AUTO: 27.2 % (ref 36.5–49.3)
HCT VFR BLD AUTO: 29.1 % (ref 36.5–49.3)
HCT VFR BLD AUTO: 30.4 % (ref 36.5–49.3)
HCT VFR BLD AUTO: 31.8 % (ref 36.5–49.3)
HCT VFR BLD AUTO: 31.9 % (ref 36.5–49.3)
HCT VFR BLD AUTO: 32.2 % (ref 36.5–49.3)
HCT VFR BLD AUTO: 34.1 % (ref 36.5–49.3)
HCT VFR BLD AUTO: 34.7 % (ref 36.5–49.3)
HCT VFR BLD AUTO: 34.9 % (ref 36.5–49.3)
HCT VFR BLD AUTO: 36.7 % (ref 36.5–49.3)
HCT VFR BLD AUTO: 38.9 % (ref 36.5–49.3)
HCT VFR BLD AUTO: 42.1 % (ref 36.5–49.3)
HGB BLD-MCNC: 10.3 G/DL (ref 12–17)
HGB BLD-MCNC: 10.3 G/DL (ref 12–17)
HGB BLD-MCNC: 10.4 G/DL (ref 12–17)
HGB BLD-MCNC: 10.8 G/DL (ref 12–17)
HGB BLD-MCNC: 10.8 G/DL (ref 12–17)
HGB BLD-MCNC: 10.9 G/DL (ref 12–17)
HGB BLD-MCNC: 11.9 G/DL (ref 12–17)
HGB BLD-MCNC: 12 G/DL (ref 12–17)
HGB BLD-MCNC: 13.8 G/DL (ref 12–17)
HGB BLD-MCNC: 8.3 G/DL (ref 12–17)
HGB BLD-MCNC: 8.7 G/DL (ref 12–17)
HGB BLD-MCNC: 8.8 G/DL (ref 12–17)
HGB BLD-MCNC: 9.4 G/DL (ref 12–17)
HGB BLD-MCNC: 9.9 G/DL (ref 12–17)
IMM GRANULOCYTES # BLD AUTO: 0.01 THOUSAND/UL (ref 0–0.2)
IMM GRANULOCYTES # BLD AUTO: 0.02 THOUSAND/UL (ref 0–0.2)
IMM GRANULOCYTES # BLD AUTO: 0.14 THOUSAND/UL (ref 0–0.2)
IMM GRANULOCYTES # BLD AUTO: 0.19 THOUSAND/UL (ref 0–0.2)
IMM GRANULOCYTES NFR BLD AUTO: 0 % (ref 0–2)
IMM GRANULOCYTES NFR BLD AUTO: 0 % (ref 0–2)
IMM GRANULOCYTES NFR BLD AUTO: 2 % (ref 0–2)
IMM GRANULOCYTES NFR BLD AUTO: 5 % (ref 0–2)
INR PPP: 1.19 (ref 0.84–1.19)
LACTATE SERPL-SCNC: 1.8 MMOL/L (ref 0.5–2)
LG PLATELETS BLD QL SMEAR: PRESENT
LIPASE SERPL-CCNC: 72 U/L (ref 73–393)
LYMPHOCYTES # BLD AUTO: 0.17 THOUSAND/UL (ref 0.6–4.47)
LYMPHOCYTES # BLD AUTO: 0.37 THOUSAND/UL (ref 0.6–4.47)
LYMPHOCYTES # BLD AUTO: 0.41 THOUSAND/UL (ref 0.6–4.47)
LYMPHOCYTES # BLD AUTO: 0.61 THOUSANDS/ΜL (ref 0.6–4.47)
LYMPHOCYTES # BLD AUTO: 0.72 THOUSAND/UL (ref 0.6–4.47)
LYMPHOCYTES # BLD AUTO: 1.41 THOUSANDS/ΜL (ref 0.6–4.47)
LYMPHOCYTES # BLD AUTO: 1.49 THOUSAND/UL (ref 0.6–4.47)
LYMPHOCYTES # BLD AUTO: 1.96 THOUSANDS/ΜL (ref 0.6–4.47)
LYMPHOCYTES # BLD AUTO: 12 % (ref 14–44)
LYMPHOCYTES # BLD AUTO: 2.28 THOUSANDS/ΜL (ref 0.6–4.47)
LYMPHOCYTES # BLD AUTO: 21 % (ref 14–44)
LYMPHOCYTES # BLD AUTO: 37 % (ref 14–44)
LYMPHOCYTES # BLD AUTO: 39 % (ref 14–44)
LYMPHOCYTES # BLD AUTO: 48 % (ref 14–44)
LYMPHOCYTES NFR BLD AUTO: 26 % (ref 14–44)
LYMPHOCYTES NFR BLD AUTO: 27 % (ref 14–44)
LYMPHOCYTES NFR BLD AUTO: 34 % (ref 14–44)
LYMPHOCYTES NFR BLD AUTO: 36 % (ref 14–44)
MACROCYTES BLD QL AUTO: PRESENT
MACROCYTES BLD QL AUTO: PRESENT
MAGNESIUM SERPL-MCNC: 1.6 MG/DL (ref 1.6–2.6)
MAGNESIUM SERPL-MCNC: 1.7 MG/DL (ref 1.6–2.6)
MCH RBC QN AUTO: 30 PG (ref 26.8–34.3)
MCH RBC QN AUTO: 30.1 PG (ref 26.8–34.3)
MCH RBC QN AUTO: 30.1 PG (ref 26.8–34.3)
MCH RBC QN AUTO: 30.2 PG (ref 26.8–34.3)
MCH RBC QN AUTO: 30.4 PG (ref 26.8–34.3)
MCH RBC QN AUTO: 30.6 PG (ref 26.8–34.3)
MCH RBC QN AUTO: 30.7 PG (ref 26.8–34.3)
MCH RBC QN AUTO: 30.7 PG (ref 26.8–34.3)
MCH RBC QN AUTO: 30.9 PG (ref 26.8–34.3)
MCH RBC QN AUTO: 30.9 PG (ref 26.8–34.3)
MCH RBC QN AUTO: 31.1 PG (ref 26.8–34.3)
MCH RBC QN AUTO: 31.1 PG (ref 26.8–34.3)
MCH RBC QN AUTO: 31.2 PG (ref 26.8–34.3)
MCH RBC QN AUTO: 31.3 PG (ref 26.8–34.3)
MCHC RBC AUTO-ENTMCNC: 30.8 G/DL (ref 31.4–37.4)
MCHC RBC AUTO-ENTMCNC: 30.9 G/DL (ref 31.4–37.4)
MCHC RBC AUTO-ENTMCNC: 31.4 G/DL (ref 31.4–37.4)
MCHC RBC AUTO-ENTMCNC: 31.7 G/DL (ref 31.4–37.4)
MCHC RBC AUTO-ENTMCNC: 31.8 G/DL (ref 31.4–37.4)
MCHC RBC AUTO-ENTMCNC: 32.1 G/DL (ref 31.4–37.4)
MCHC RBC AUTO-ENTMCNC: 32.3 G/DL (ref 31.4–37.4)
MCHC RBC AUTO-ENTMCNC: 32.4 G/DL (ref 31.4–37.4)
MCHC RBC AUTO-ENTMCNC: 32.6 G/DL (ref 31.4–37.4)
MCHC RBC AUTO-ENTMCNC: 32.8 G/DL (ref 31.4–37.4)
MCV RBC AUTO: 94 FL (ref 82–98)
MCV RBC AUTO: 94 FL (ref 82–98)
MCV RBC AUTO: 95 FL (ref 82–98)
MCV RBC AUTO: 96 FL (ref 82–98)
MCV RBC AUTO: 96 FL (ref 82–98)
MCV RBC AUTO: 97 FL (ref 82–98)
MCV RBC AUTO: 98 FL (ref 82–98)
MCV RBC AUTO: 99 FL (ref 82–98)
METAMYELOCYTES NFR BLD MANUAL: 3 % (ref 0–1)
MONOCYTES # BLD AUTO: 0.06 THOUSAND/UL (ref 0–1.22)
MONOCYTES # BLD AUTO: 0.1 THOUSAND/UL (ref 0–1.22)
MONOCYTES # BLD AUTO: 0.14 THOUSAND/UL (ref 0–1.22)
MONOCYTES # BLD AUTO: 0.29 THOUSAND/UL (ref 0–1.22)
MONOCYTES # BLD AUTO: 0.36 THOUSAND/ΜL (ref 0.17–1.22)
MONOCYTES # BLD AUTO: 0.47 THOUSAND/ΜL (ref 0.17–1.22)
MONOCYTES # BLD AUTO: 0.76 THOUSAND/UL (ref 0–1.22)
MONOCYTES # BLD AUTO: 0.93 THOUSAND/ΜL (ref 0.17–1.22)
MONOCYTES # BLD AUTO: 1.57 THOUSAND/ΜL (ref 0.17–1.22)
MONOCYTES NFR BLD AUTO: 19 % (ref 4–12)
MONOCYTES NFR BLD AUTO: 20 % (ref 4–12)
MONOCYTES NFR BLD AUTO: 24 % (ref 4–12)
MONOCYTES NFR BLD AUTO: 6 % (ref 4–12)
MONOCYTES NFR BLD: 14 % (ref 4–12)
MONOCYTES NFR BLD: 19 % (ref 4–12)
MONOCYTES NFR BLD: 20 % (ref 4–12)
MONOCYTES NFR BLD: 4 % (ref 4–12)
MONOCYTES NFR BLD: 5 % (ref 4–12)
MYELOCYTES NFR BLD MANUAL: 1 % (ref 0–1)
NEUTROPHILS # BLD AUTO: 1.23 THOUSANDS/ΜL (ref 1.85–7.62)
NEUTROPHILS # BLD AUTO: 1.24 THOUSANDS/ΜL (ref 1.85–7.62)
NEUTROPHILS # BLD AUTO: 3.32 THOUSANDS/ΜL (ref 1.85–7.62)
NEUTROPHILS # BLD AUTO: 4.16 THOUSANDS/ΜL (ref 1.85–7.62)
NEUTROPHILS # BLD MANUAL: 0.03 THOUSAND/UL (ref 1.85–7.62)
NEUTROPHILS # BLD MANUAL: 0.47 THOUSAND/UL (ref 1.85–7.62)
NEUTROPHILS # BLD MANUAL: 1.17 THOUSAND/UL (ref 1.85–7.62)
NEUTROPHILS # BLD MANUAL: 1.33 THOUSAND/UL (ref 1.85–7.62)
NEUTROPHILS # BLD MANUAL: 1.42 THOUSAND/UL (ref 1.85–7.62)
NEUTS BAND NFR BLD MANUAL: 1 % (ref 0–8)
NEUTS BAND NFR BLD MANUAL: 1 % (ref 0–8)
NEUTS BAND NFR BLD MANUAL: 12 % (ref 0–8)
NEUTS SEG NFR BLD AUTO: 1 % (ref 43–75)
NEUTS SEG NFR BLD AUTO: 33 % (ref 43–75)
NEUTS SEG NFR BLD AUTO: 35 % (ref 43–75)
NEUTS SEG NFR BLD AUTO: 47 % (ref 43–75)
NEUTS SEG NFR BLD AUTO: 49 % (ref 43–75)
NEUTS SEG NFR BLD AUTO: 52 % (ref 43–75)
NEUTS SEG NFR BLD AUTO: 58 % (ref 43–75)
NEUTS SEG NFR BLD AUTO: 71 % (ref 43–75)
NEUTS SEG NFR BLD AUTO: 72 % (ref 43–75)
NRBC BLD AUTO-RTO: 0 /100 WBCS
OVALOCYTES BLD QL SMEAR: PRESENT
P AXIS: 37 DEGREES
P AXIS: 39 DEGREES
P AXIS: 43 DEGREES
PHOSPHATE SERPL-MCNC: 2.9 MG/DL (ref 2.3–4.1)
PLATELET # BLD AUTO: 114 THOUSANDS/UL (ref 149–390)
PLATELET # BLD AUTO: 136 THOUSANDS/UL (ref 149–390)
PLATELET # BLD AUTO: 142 THOUSANDS/UL (ref 149–390)
PLATELET # BLD AUTO: 143 THOUSANDS/UL (ref 149–390)
PLATELET # BLD AUTO: 168 THOUSANDS/UL (ref 149–390)
PLATELET # BLD AUTO: 172 THOUSANDS/UL (ref 149–390)
PLATELET # BLD AUTO: 212 THOUSANDS/UL (ref 149–390)
PLATELET # BLD AUTO: 411 THOUSANDS/UL (ref 149–390)
PLATELET # BLD AUTO: 66 THOUSANDS/UL (ref 149–390)
PLATELET # BLD AUTO: 67 THOUSANDS/UL (ref 149–390)
PLATELET # BLD AUTO: 67 THOUSANDS/UL (ref 149–390)
PLATELET # BLD AUTO: 75 THOUSANDS/UL (ref 149–390)
PLATELET # BLD AUTO: 84 THOUSANDS/UL (ref 149–390)
PLATELET # BLD AUTO: 86 THOUSANDS/UL (ref 149–390)
PLATELET # BLD AUTO: 86 THOUSANDS/UL (ref 149–390)
PLATELET BLD QL SMEAR: ABNORMAL
PLATELET BLD QL SMEAR: ADEQUATE
PMV BLD AUTO: 10.1 FL (ref 8.9–12.7)
PMV BLD AUTO: 10.2 FL (ref 8.9–12.7)
PMV BLD AUTO: 10.2 FL (ref 8.9–12.7)
PMV BLD AUTO: 10.4 FL (ref 8.9–12.7)
PMV BLD AUTO: 10.4 FL (ref 8.9–12.7)
PMV BLD AUTO: 10.5 FL (ref 8.9–12.7)
PMV BLD AUTO: 10.6 FL (ref 8.9–12.7)
PMV BLD AUTO: 10.6 FL (ref 8.9–12.7)
PMV BLD AUTO: 10.8 FL (ref 8.9–12.7)
PMV BLD AUTO: 10.9 FL (ref 8.9–12.7)
PMV BLD AUTO: 11.2 FL (ref 8.9–12.7)
PMV BLD AUTO: 11.3 FL (ref 8.9–12.7)
PMV BLD AUTO: 12 FL (ref 8.9–12.7)
PMV BLD AUTO: 9.6 FL (ref 8.9–12.7)
PMV BLD AUTO: 9.8 FL (ref 8.9–12.7)
POIKILOCYTOSIS BLD QL SMEAR: PRESENT
POIKILOCYTOSIS BLD QL SMEAR: PRESENT
POLYCHROMASIA BLD QL SMEAR: PRESENT
POTASSIUM SERPL-SCNC: 3 MMOL/L (ref 3.5–5.3)
POTASSIUM SERPL-SCNC: 3 MMOL/L (ref 3.5–5.3)
POTASSIUM SERPL-SCNC: 3.1 MMOL/L (ref 3.5–5.3)
POTASSIUM SERPL-SCNC: 3.1 MMOL/L (ref 3.5–5.3)
POTASSIUM SERPL-SCNC: 3.2 MMOL/L (ref 3.5–5.3)
POTASSIUM SERPL-SCNC: 3.5 MMOL/L (ref 3.5–5.3)
POTASSIUM SERPL-SCNC: 3.7 MMOL/L (ref 3.5–5.3)
POTASSIUM SERPL-SCNC: 3.7 MMOL/L (ref 3.5–5.3)
POTASSIUM SERPL-SCNC: 3.8 MMOL/L (ref 3.5–5.3)
POTASSIUM SERPL-SCNC: 4.6 MMOL/L (ref 3.5–5.3)
PR INTERVAL: 134 MS
PR INTERVAL: 136 MS
PR INTERVAL: 138 MS
PREALB SERPL-MCNC: 5.8 MG/DL (ref 18–40)
PROCALCITONIN SERPL-MCNC: 13.2 NG/ML
PROCALCITONIN SERPL-MCNC: 6.56 NG/ML
PROT SERPL-MCNC: 5.4 G/DL (ref 6.4–8.2)
PROT SERPL-MCNC: 5.6 G/DL (ref 6.4–8.2)
PROT SERPL-MCNC: 5.7 G/DL (ref 6.4–8.2)
PROT SERPL-MCNC: 5.8 G/DL (ref 6.4–8.2)
PROT SERPL-MCNC: 5.9 G/DL (ref 6.4–8.2)
PROT SERPL-MCNC: 6.6 G/DL (ref 6.4–8.2)
PROT SERPL-MCNC: 6.8 G/DL (ref 6.4–8.2)
PROT SERPL-MCNC: 6.8 G/DL (ref 6.4–8.2)
PROT SERPL-MCNC: 7.2 G/DL (ref 6.4–8.2)
PROTHROMBIN TIME: 14.7 SECONDS (ref 11.6–14.5)
QRS AXIS: 35 DEGREES
QRS AXIS: 36 DEGREES
QRS AXIS: 41 DEGREES
QRSD INTERVAL: 104 MS
QRSD INTERVAL: 92 MS
QRSD INTERVAL: 98 MS
QT INTERVAL: 338 MS
QT INTERVAL: 344 MS
QT INTERVAL: 346 MS
QTC INTERVAL: 423 MS
QTC INTERVAL: 432 MS
QTC INTERVAL: 440 MS
RBC # BLD AUTO: 2.76 MILLION/UL (ref 3.88–5.62)
RBC # BLD AUTO: 2.8 MILLION/UL (ref 3.88–5.62)
RBC # BLD AUTO: 2.81 MILLION/UL (ref 3.88–5.62)
RBC # BLD AUTO: 3.06 MILLION/UL (ref 3.88–5.62)
RBC # BLD AUTO: 3.17 MILLION/UL (ref 3.88–5.62)
RBC # BLD AUTO: 3.31 MILLION/UL (ref 3.88–5.62)
RBC # BLD AUTO: 3.35 MILLION/UL (ref 3.88–5.62)
RBC # BLD AUTO: 3.44 MILLION/UL (ref 3.88–5.62)
RBC # BLD AUTO: 3.56 MILLION/UL (ref 3.88–5.62)
RBC # BLD AUTO: 3.59 MILLION/UL (ref 3.88–5.62)
RBC # BLD AUTO: 3.6 MILLION/UL (ref 3.88–5.62)
RBC # BLD AUTO: 3.85 MILLION/UL (ref 3.88–5.62)
RBC # BLD AUTO: 3.95 MILLION/UL (ref 3.88–5.62)
RBC # BLD AUTO: 4.47 MILLION/UL (ref 3.88–5.62)
RBC MORPH BLD: PRESENT
RBC MORPH BLD: PRESENT
SODIUM SERPL-SCNC: 137 MMOL/L (ref 136–145)
SODIUM SERPL-SCNC: 138 MMOL/L (ref 136–145)
SODIUM SERPL-SCNC: 138 MMOL/L (ref 136–145)
SODIUM SERPL-SCNC: 139 MMOL/L (ref 136–145)
SODIUM SERPL-SCNC: 140 MMOL/L (ref 136–145)
SODIUM SERPL-SCNC: 140 MMOL/L (ref 136–145)
SODIUM SERPL-SCNC: 141 MMOL/L (ref 136–145)
SODIUM SERPL-SCNC: 142 MMOL/L (ref 136–145)
SODIUM SERPL-SCNC: 145 MMOL/L (ref 136–145)
SODIUM SERPL-SCNC: 146 MMOL/L (ref 136–145)
T WAVE AXIS: 29 DEGREES
T WAVE AXIS: 33 DEGREES
T WAVE AXIS: 33 DEGREES
TOXIC GRANULES BLD QL SMEAR: PRESENT
VARIANT LYMPHS # BLD AUTO: 1 %
VARIANT LYMPHS # BLD AUTO: 2 %
VARIANT LYMPHS # BLD AUTO: 26 %
VENTRICULAR RATE: 102 BPM
VENTRICULAR RATE: 90 BPM
VENTRICULAR RATE: 95 BPM
WBC # BLD AUTO: 0.99 THOUSAND/UL (ref 4.31–10.16)
WBC # BLD AUTO: 1.39 THOUSAND/UL (ref 4.31–10.16)
WBC # BLD AUTO: 1.5 THOUSAND/UL (ref 4.31–10.16)
WBC # BLD AUTO: 1.53 THOUSAND/UL (ref 4.31–10.16)
WBC # BLD AUTO: 1.76 THOUSAND/UL (ref 4.31–10.16)
WBC # BLD AUTO: 1.88 THOUSAND/UL (ref 4.31–10.16)
WBC # BLD AUTO: 1.9 THOUSAND/UL (ref 4.31–10.16)
WBC # BLD AUTO: 1.97 THOUSAND/UL (ref 4.31–10.16)
WBC # BLD AUTO: 2.37 THOUSAND/UL (ref 4.31–10.16)
WBC # BLD AUTO: 3.01 THOUSAND/UL (ref 4.31–10.16)
WBC # BLD AUTO: 3.81 THOUSAND/UL (ref 4.31–10.16)
WBC # BLD AUTO: 3.82 THOUSAND/UL (ref 4.31–10.16)
WBC # BLD AUTO: 5.75 THOUSAND/UL (ref 4.31–10.16)
WBC # BLD AUTO: 8.43 THOUSAND/UL (ref 4.31–10.16)

## 2022-01-01 PROCEDURE — 99232 SBSQ HOSP IP/OBS MODERATE 35: CPT | Performed by: NURSE PRACTITIONER

## 2022-01-01 PROCEDURE — 3E0G76Z INTRODUCTION OF NUTRITIONAL SUBSTANCE INTO UPPER GI, VIA NATURAL OR ARTIFICIAL OPENING: ICD-10-PCS | Performed by: INTERNAL MEDICINE

## 2022-01-01 PROCEDURE — 10330063 VN DURABLE MEDICAL EQUIPMENT, SUPPLIES/MEDS

## 2022-01-01 PROCEDURE — 99231 SBSQ HOSP IP/OBS SF/LOW 25: CPT | Performed by: INTERNAL MEDICINE

## 2022-01-01 PROCEDURE — 99223 1ST HOSP IP/OBS HIGH 75: CPT | Performed by: INTERNAL MEDICINE

## 2022-01-01 PROCEDURE — 96411 CHEMO IV PUSH ADDL DRUG: CPT

## 2022-01-01 PROCEDURE — C9113 INJ PANTOPRAZOLE SODIUM, VIA: HCPCS | Performed by: INTERNAL MEDICINE

## 2022-01-01 PROCEDURE — 85025 COMPLETE CBC W/AUTO DIFF WBC: CPT

## 2022-01-01 PROCEDURE — G0156 HHCP-SVS OF AIDE,EA 15 MIN: HCPCS

## 2022-01-01 PROCEDURE — 85027 COMPLETE CBC AUTOMATED: CPT | Performed by: INTERNAL MEDICINE

## 2022-01-01 PROCEDURE — 71260 CT THORAX DX C+: CPT

## 2022-01-01 PROCEDURE — T2042 HOSPICE ROUTINE HOME CARE: HCPCS

## 2022-01-01 PROCEDURE — 85027 COMPLETE CBC AUTOMATED: CPT | Performed by: NURSE PRACTITIONER

## 2022-01-01 PROCEDURE — 85007 BL SMEAR W/DIFF WBC COUNT: CPT | Performed by: EMERGENCY MEDICINE

## 2022-01-01 PROCEDURE — G0299 HHS/HOSPICE OF RN EA 15 MIN: HCPCS

## 2022-01-01 PROCEDURE — 74177 CT ABD & PELVIS W/CONTRAST: CPT

## 2022-01-01 PROCEDURE — 80053 COMPREHEN METABOLIC PANEL: CPT | Performed by: INTERNAL MEDICINE

## 2022-01-01 PROCEDURE — 85007 BL SMEAR W/DIFF WBC COUNT: CPT

## 2022-01-01 PROCEDURE — 99232 SBSQ HOSP IP/OBS MODERATE 35: CPT | Performed by: HOSPITALIST

## 2022-01-01 PROCEDURE — 84145 PROCALCITONIN (PCT): CPT | Performed by: EMERGENCY MEDICINE

## 2022-01-01 PROCEDURE — 43246 EGD PLACE GASTROSTOMY TUBE: CPT | Performed by: INTERNAL MEDICINE

## 2022-01-01 PROCEDURE — 99214 OFFICE O/P EST MOD 30 MIN: CPT | Performed by: INTERNAL MEDICINE

## 2022-01-01 PROCEDURE — 96367 TX/PROPH/DG ADDL SEQ IV INF: CPT

## 2022-01-01 PROCEDURE — 99232 SBSQ HOSP IP/OBS MODERATE 35: CPT | Performed by: INTERNAL MEDICINE

## 2022-01-01 PROCEDURE — 96375 TX/PRO/DX INJ NEW DRUG ADDON: CPT

## 2022-01-01 PROCEDURE — 10330064 BRIEF, WINGS CHOICE+ QUILTED LG (18/BG 4

## 2022-01-01 PROCEDURE — 84134 ASSAY OF PREALBUMIN: CPT | Performed by: STUDENT IN AN ORGANIZED HEALTH CARE EDUCATION/TRAINING PROGRAM

## 2022-01-01 PROCEDURE — 10330064 URINAL, W/TRANSPARENT LID (50/CS)

## 2022-01-01 PROCEDURE — 83735 ASSAY OF MAGNESIUM: CPT | Performed by: INTERNAL MEDICINE

## 2022-01-01 PROCEDURE — G0498 CHEMO EXTEND IV INFUS W/PUMP: HCPCS

## 2022-01-01 PROCEDURE — 99233 SBSQ HOSP IP/OBS HIGH 50: CPT | Performed by: NURSE PRACTITIONER

## 2022-01-01 PROCEDURE — G1004 CDSM NDSC: HCPCS

## 2022-01-01 PROCEDURE — 96368 THER/DIAG CONCURRENT INF: CPT

## 2022-01-01 PROCEDURE — 99215 OFFICE O/P EST HI 40 MIN: CPT | Performed by: INTERNAL MEDICINE

## 2022-01-01 PROCEDURE — NC001 PR NO CHARGE: Performed by: SURGERY

## 2022-01-01 PROCEDURE — 99222 1ST HOSP IP/OBS MODERATE 55: CPT | Performed by: INTERNAL MEDICINE

## 2022-01-01 PROCEDURE — 96417 CHEMO IV INFUS EACH ADDL SEQ: CPT

## 2022-01-01 PROCEDURE — 10330087 HSPC SERVICE INTENSITY ADD-ON

## 2022-01-01 PROCEDURE — 99285 EMERGENCY DEPT VISIT HI MDM: CPT | Performed by: EMERGENCY MEDICINE

## 2022-01-01 PROCEDURE — 85027 COMPLETE CBC AUTOMATED: CPT

## 2022-01-01 PROCEDURE — 10330064 UNDERPAD, REUSE 36X36 1DZ     BECKCL

## 2022-01-01 PROCEDURE — 80053 COMPREHEN METABOLIC PANEL: CPT

## 2022-01-01 PROCEDURE — 96372 THER/PROPH/DIAG INJ SC/IM: CPT

## 2022-01-01 PROCEDURE — 85027 COMPLETE CBC AUTOMATED: CPT | Performed by: HOSPITALIST

## 2022-01-01 PROCEDURE — 80053 COMPREHEN METABOLIC PANEL: CPT | Performed by: NURSE PRACTITIONER

## 2022-01-01 PROCEDURE — 99239 HOSP IP/OBS DSCHRG MGMT >30: CPT | Performed by: HOSPITALIST

## 2022-01-01 PROCEDURE — 10330064 ALIGNER, FOAM BODY SM (8/CS)

## 2022-01-01 PROCEDURE — 85027 COMPLETE CBC AUTOMATED: CPT | Performed by: EMERGENCY MEDICINE

## 2022-01-01 PROCEDURE — 93005 ELECTROCARDIOGRAM TRACING: CPT

## 2022-01-01 PROCEDURE — 97163 PT EVAL HIGH COMPLEX 45 MIN: CPT

## 2022-01-01 PROCEDURE — 99232 SBSQ HOSP IP/OBS MODERATE 35: CPT | Performed by: SURGERY

## 2022-01-01 PROCEDURE — 85049 AUTOMATED PLATELET COUNT: CPT | Performed by: INTERNAL MEDICINE

## 2022-01-01 PROCEDURE — 10330057 MEDICATION, GENERAL

## 2022-01-01 PROCEDURE — 80048 BASIC METABOLIC PNL TOTAL CA: CPT | Performed by: INTERNAL MEDICINE

## 2022-01-01 PROCEDURE — 96523 IRRIG DRUG DELIVERY DEVICE: CPT

## 2022-01-01 PROCEDURE — 36415 COLL VENOUS BLD VENIPUNCTURE: CPT

## 2022-01-01 PROCEDURE — 0DH63UZ INSERTION OF FEEDING DEVICE INTO STOMACH, PERCUTANEOUS APPROACH: ICD-10-PCS | Performed by: INTERNAL MEDICINE

## 2022-01-01 PROCEDURE — 74018 RADEX ABDOMEN 1 VIEW: CPT

## 2022-01-01 PROCEDURE — 83605 ASSAY OF LACTIC ACID: CPT | Performed by: EMERGENCY MEDICINE

## 2022-01-01 PROCEDURE — 10330064 BRIEF, WINGS CHOICE PLUS QUILTED MED (12

## 2022-01-01 PROCEDURE — 96413 CHEMO IV INFUSION 1 HR: CPT

## 2022-01-01 PROCEDURE — 99233 SBSQ HOSP IP/OBS HIGH 50: CPT | Performed by: INTERNAL MEDICINE

## 2022-01-01 PROCEDURE — 99221 1ST HOSP IP/OBS SF/LOW 40: CPT | Performed by: NURSE PRACTITIONER

## 2022-01-01 PROCEDURE — 87040 BLOOD CULTURE FOR BACTERIA: CPT | Performed by: EMERGENCY MEDICINE

## 2022-01-01 PROCEDURE — 96376 TX/PRO/DX INJ SAME DRUG ADON: CPT

## 2022-01-01 PROCEDURE — 84100 ASSAY OF PHOSPHORUS: CPT | Performed by: INTERNAL MEDICINE

## 2022-01-01 PROCEDURE — 97167 OT EVAL HIGH COMPLEX 60 MIN: CPT

## 2022-01-01 PROCEDURE — 96365 THER/PROPH/DIAG IV INF INIT: CPT

## 2022-01-01 PROCEDURE — 85007 BL SMEAR W/DIFF WBC COUNT: CPT | Performed by: INTERNAL MEDICINE

## 2022-01-01 PROCEDURE — G0155 HHCP-SVS OF CSW,EA 15 MIN: HCPCS

## 2022-01-01 PROCEDURE — 99497 ADVNCD CARE PLAN 30 MIN: CPT | Performed by: HOSPITALIST

## 2022-01-01 PROCEDURE — 93010 ELECTROCARDIOGRAM REPORT: CPT

## 2022-01-01 PROCEDURE — 85025 COMPLETE CBC W/AUTO DIFF WBC: CPT | Performed by: INTERNAL MEDICINE

## 2022-01-01 PROCEDURE — 83690 ASSAY OF LIPASE: CPT | Performed by: EMERGENCY MEDICINE

## 2022-01-01 PROCEDURE — 83735 ASSAY OF MAGNESIUM: CPT | Performed by: HOSPITALIST

## 2022-01-01 PROCEDURE — 99497 ADVNCD CARE PLAN 30 MIN: CPT | Performed by: SURGERY

## 2022-01-01 PROCEDURE — 36415 COLL VENOUS BLD VENIPUNCTURE: CPT | Performed by: EMERGENCY MEDICINE

## 2022-01-01 PROCEDURE — 99285 EMERGENCY DEPT VISIT HI MDM: CPT

## 2022-01-01 PROCEDURE — 10330064 BEDPAN, PONTOON GRAPHITE 55OZ (20/CS)

## 2022-01-01 PROCEDURE — 99214 OFFICE O/P EST MOD 30 MIN: CPT | Performed by: PHYSICIAN ASSISTANT

## 2022-01-01 PROCEDURE — 96415 CHEMO IV INFUSION ADDL HR: CPT

## 2022-01-01 PROCEDURE — 80048 BASIC METABOLIC PNL TOTAL CA: CPT | Performed by: HOSPITALIST

## 2022-01-01 PROCEDURE — 85610 PROTHROMBIN TIME: CPT | Performed by: INTERNAL MEDICINE

## 2022-01-01 PROCEDURE — 80053 COMPREHEN METABOLIC PANEL: CPT | Performed by: EMERGENCY MEDICINE

## 2022-01-01 PROCEDURE — 99498 ADVNCD CARE PLAN ADDL 30 MIN: CPT | Performed by: SURGERY

## 2022-01-01 PROCEDURE — 84145 PROCALCITONIN (PCT): CPT | Performed by: INTERNAL MEDICINE

## 2022-01-01 RX ORDER — POTASSIUM CHLORIDE 750 MG/1
10 TABLET, EXTENDED RELEASE ORAL 3 TIMES DAILY
Qty: 30 TABLET | Refills: 0 | Status: SHIPPED | OUTPATIENT
Start: 2022-01-01 | End: 2022-01-01 | Stop reason: CLARIF

## 2022-01-01 RX ORDER — ACETAMINOPHEN 325 MG/1
650 TABLET ORAL EVERY 6 HOURS PRN
Refills: 0
Start: 2022-01-01

## 2022-01-01 RX ORDER — HYDROMORPHONE HCL 110MG/55ML
2 PATIENT CONTROLLED ANALGESIA SYRINGE INTRAVENOUS EVERY 4 HOURS PRN
Status: DISCONTINUED | OUTPATIENT
Start: 2022-01-01 | End: 2022-01-01 | Stop reason: HOSPADM

## 2022-01-01 RX ORDER — OCTREOTIDE ACETATE 100 UG/ML
100 INJECTION, SOLUTION INTRAVENOUS; SUBCUTANEOUS EVERY 8 HOURS SCHEDULED
Status: DISCONTINUED | OUTPATIENT
Start: 2022-01-01 | End: 2022-01-01 | Stop reason: HOSPADM

## 2022-01-01 RX ORDER — DEXAMETHASONE 4 MG/1
4 TABLET ORAL
Qty: 14 TABLET | Refills: 0 | Status: SHIPPED | OUTPATIENT
Start: 2022-01-01

## 2022-01-01 RX ORDER — PREDNISONE 10 MG/1
10 TABLET ORAL DAILY
Qty: 90 TABLET | Refills: 1 | Status: SHIPPED | OUTPATIENT
Start: 2022-01-01 | End: 2022-01-01 | Stop reason: HOSPADM

## 2022-01-01 RX ORDER — POTASSIUM CHLORIDE 29.8 MG/ML
40 INJECTION INTRAVENOUS ONCE
Status: COMPLETED | OUTPATIENT
Start: 2022-01-01 | End: 2022-01-01

## 2022-01-01 RX ORDER — SODIUM CHLORIDE 9 MG/ML
20 INJECTION, SOLUTION INTRAVENOUS ONCE
Status: CANCELLED | OUTPATIENT
Start: 2022-01-01

## 2022-01-01 RX ORDER — POTASSIUM CHLORIDE 20 MEQ/1
20 TABLET, EXTENDED RELEASE ORAL ONCE
Status: CANCELLED
Start: 2022-01-01 | End: 2022-01-01

## 2022-01-01 RX ORDER — FLUOROURACIL 50 MG/ML
300 INJECTION, SOLUTION INTRAVENOUS ONCE
Status: CANCELLED | OUTPATIENT
Start: 2022-01-01

## 2022-01-01 RX ORDER — HYDROMORPHONE HCL/PF 1 MG/ML
0.5 SYRINGE (ML) INJECTION EVERY 4 HOURS PRN
Status: DISCONTINUED | OUTPATIENT
Start: 2022-01-01 | End: 2022-01-01 | Stop reason: HOSPADM

## 2022-01-01 RX ORDER — HYDROMORPHONE HCL/PF 1 MG/ML
1 SYRINGE (ML) INJECTION EVERY 4 HOURS PRN
Status: DISCONTINUED | OUTPATIENT
Start: 2022-01-01 | End: 2022-01-01

## 2022-01-01 RX ORDER — SODIUM CHLORIDE 9 MG/ML
5 INJECTION INTRAVENOUS
Status: CANCELLED | OUTPATIENT
Start: 2022-01-01

## 2022-01-01 RX ORDER — DEXAMETHASONE SODIUM PHOSPHATE 4 MG/ML
4 INJECTION, SOLUTION INTRA-ARTICULAR; INTRALESIONAL; INTRAMUSCULAR; INTRAVENOUS; SOFT TISSUE
Status: DISCONTINUED | OUTPATIENT
Start: 2022-01-01 | End: 2022-01-01 | Stop reason: HOSPADM

## 2022-01-01 RX ORDER — FLUOROURACIL 50 MG/ML
300 INJECTION, SOLUTION INTRAVENOUS ONCE
Status: COMPLETED | OUTPATIENT
Start: 2022-01-01 | End: 2022-01-01

## 2022-01-01 RX ORDER — TRIFLURIDINE AND TIPIRACIL 20; 8.19 MG/1; MG/1
TABLET, FILM COATED ORAL
COMMUNITY
Start: 2022-01-01 | End: 2022-01-01 | Stop reason: HOSPADM

## 2022-01-01 RX ORDER — FAMOTIDINE 10 MG
10 TABLET ORAL 2 TIMES DAILY PRN
COMMUNITY
End: 2022-01-01 | Stop reason: CLARIF

## 2022-01-01 RX ORDER — POTASSIUM CHLORIDE 14.9 MG/ML
20 INJECTION INTRAVENOUS ONCE
Status: COMPLETED | OUTPATIENT
Start: 2022-01-01 | End: 2022-01-01

## 2022-01-01 RX ORDER — HYDROMORPHONE HCL/PF 1 MG/ML
1 SYRINGE (ML) INJECTION EVERY 4 HOURS PRN
Status: DISCONTINUED | OUTPATIENT
Start: 2022-01-01 | End: 2022-01-01 | Stop reason: HOSPADM

## 2022-01-01 RX ORDER — MAGNESIUM HYDROXIDE/ALUMINUM HYDROXICE/SIMETHICONE 120; 1200; 1200 MG/30ML; MG/30ML; MG/30ML
30 SUSPENSION ORAL EVERY 4 HOURS PRN
COMMUNITY
End: 2022-01-01 | Stop reason: CLARIF

## 2022-01-01 RX ORDER — SODIUM CHLORIDE 9 MG/ML
20 INJECTION, SOLUTION INTRAVENOUS ONCE
Status: COMPLETED | OUTPATIENT
Start: 2022-01-01 | End: 2022-01-01

## 2022-01-01 RX ORDER — ATROPINE SULFATE 1 MG/ML
0.25 INJECTION, SOLUTION INTRAMUSCULAR; INTRAVENOUS; SUBCUTANEOUS ONCE AS NEEDED
Status: CANCELLED | OUTPATIENT
Start: 2022-01-01

## 2022-01-01 RX ORDER — FENTANYL CITRATE 50 UG/ML
INJECTION, SOLUTION INTRAMUSCULAR; INTRAVENOUS AS NEEDED
Status: DISCONTINUED | OUTPATIENT
Start: 2022-01-01 | End: 2022-01-01

## 2022-01-01 RX ORDER — SUCCINYLCHOLINE/SOD CL,ISO/PF 100 MG/5ML
SYRINGE (ML) INTRAVENOUS AS NEEDED
Status: DISCONTINUED | OUTPATIENT
Start: 2022-01-01 | End: 2022-01-01

## 2022-01-01 RX ORDER — DEXTROSE, SODIUM CHLORIDE, AND POTASSIUM CHLORIDE 5; .45; .075 G/100ML; G/100ML; G/100ML
100 INJECTION INTRAVENOUS CONTINUOUS
Status: DISCONTINUED | OUTPATIENT
Start: 2022-01-01 | End: 2022-01-01

## 2022-01-01 RX ORDER — FLUOROURACIL 50 MG/ML
500 INJECTION, SOLUTION INTRAVENOUS ONCE
Status: CANCELLED | OUTPATIENT
Start: 2022-01-01

## 2022-01-01 RX ORDER — ONDANSETRON 2 MG/ML
4 INJECTION INTRAMUSCULAR; INTRAVENOUS ONCE
Status: COMPLETED | OUTPATIENT
Start: 2022-01-01 | End: 2022-01-01

## 2022-01-01 RX ORDER — MORPHINE SULFATE 4 MG/ML
4 INJECTION, SOLUTION INTRAMUSCULAR; INTRAVENOUS ONCE
Status: COMPLETED | OUTPATIENT
Start: 2022-01-01 | End: 2022-01-01

## 2022-01-01 RX ORDER — METOPROLOL TARTRATE 5 MG/5ML
2.5 INJECTION INTRAVENOUS EVERY 6 HOURS
Status: DISCONTINUED | OUTPATIENT
Start: 2022-01-01 | End: 2022-01-01 | Stop reason: HOSPADM

## 2022-01-01 RX ORDER — MELATONIN
2000 DAILY
COMMUNITY
End: 2022-01-01 | Stop reason: HOSPADM

## 2022-01-01 RX ORDER — HYDROMORPHONE HCL/PF 1 MG/ML
0.5 SYRINGE (ML) INJECTION EVERY 4 HOURS PRN
Status: DISCONTINUED | OUTPATIENT
Start: 2022-01-01 | End: 2022-01-01

## 2022-01-01 RX ORDER — MORPHINE SULFATE 100 MG/5ML
10 SOLUTION, CONCENTRATE ORAL EVERY 4 HOURS PRN
Status: CANCELLED | OUTPATIENT
Start: 2022-01-01

## 2022-01-01 RX ORDER — DEXTROSE AND SODIUM CHLORIDE 5; .9 G/100ML; G/100ML
100 INJECTION, SOLUTION INTRAVENOUS CONTINUOUS
Status: DISCONTINUED | OUTPATIENT
Start: 2022-01-01 | End: 2022-01-01

## 2022-01-01 RX ORDER — MORPHINE SULFATE 10 MG/ML
INJECTION, SOLUTION INTRAMUSCULAR; INTRAVENOUS EVERY 4 HOURS PRN
Status: CANCELLED | OUTPATIENT
Start: 2022-01-01

## 2022-01-01 RX ORDER — ONDANSETRON 2 MG/ML
4 INJECTION INTRAMUSCULAR; INTRAVENOUS EVERY 4 HOURS PRN
Status: DISCONTINUED | OUTPATIENT
Start: 2022-01-01 | End: 2022-01-01 | Stop reason: HOSPADM

## 2022-01-01 RX ORDER — POTASSIUM CHLORIDE 20 MEQ/1
20 TABLET, EXTENDED RELEASE ORAL ONCE
Status: COMPLETED | OUTPATIENT
Start: 2022-01-01 | End: 2022-01-01

## 2022-01-01 RX ORDER — DICYCLOMINE HCL 20 MG
TABLET ORAL
Qty: 90 TABLET | Refills: 1 | Status: SHIPPED | OUTPATIENT
Start: 2022-01-01 | End: 2022-01-01 | Stop reason: CLARIF

## 2022-01-01 RX ORDER — HEPARIN SODIUM 5000 [USP'U]/ML
5000 INJECTION, SOLUTION INTRAVENOUS; SUBCUTANEOUS EVERY 8 HOURS SCHEDULED
Status: DISCONTINUED | OUTPATIENT
Start: 2022-01-01 | End: 2022-01-01 | Stop reason: HOSPADM

## 2022-01-01 RX ORDER — LANOLIN ALCOHOL/MO/W.PET/CERES
1000 CREAM (GRAM) TOPICAL DAILY
COMMUNITY
End: 2022-01-01 | Stop reason: HOSPADM

## 2022-01-01 RX ORDER — ATROPINE SULFATE 1 MG/ML
0.25 INJECTION, SOLUTION INTRAMUSCULAR; INTRAVENOUS; SUBCUTANEOUS ONCE AS NEEDED
Status: DISCONTINUED | OUTPATIENT
Start: 2022-01-01 | End: 2022-01-01 | Stop reason: HOSPADM

## 2022-01-01 RX ORDER — PROPOFOL 10 MG/ML
INJECTION, EMULSION INTRAVENOUS AS NEEDED
Status: DISCONTINUED | OUTPATIENT
Start: 2022-01-01 | End: 2022-01-01

## 2022-01-01 RX ORDER — DEXTROSE, SODIUM CHLORIDE, AND POTASSIUM CHLORIDE 5; .9; .15 G/100ML; G/100ML; G/100ML
75 INJECTION INTRAVENOUS CONTINUOUS
Status: DISCONTINUED | OUTPATIENT
Start: 2022-01-01 | End: 2022-01-01 | Stop reason: HOSPADM

## 2022-01-01 RX ORDER — FLUOROURACIL 50 MG/ML
500 INJECTION, SOLUTION INTRAVENOUS ONCE
Status: COMPLETED | OUTPATIENT
Start: 2022-01-01 | End: 2022-01-01

## 2022-01-01 RX ORDER — HYDROMORPHONE HCL IN WATER/PF 6 MG/30 ML
0.2 PATIENT CONTROLLED ANALGESIA SYRINGE INTRAVENOUS EVERY 4 HOURS PRN
Status: DISCONTINUED | OUTPATIENT
Start: 2022-01-01 | End: 2022-01-01

## 2022-01-01 RX ORDER — SODIUM CHLORIDE 9 MG/ML
INJECTION, SOLUTION INTRAVENOUS CONTINUOUS PRN
Status: DISCONTINUED | OUTPATIENT
Start: 2022-01-01 | End: 2022-01-01

## 2022-01-01 RX ORDER — ACETAMINOPHEN 325 MG/1
650 TABLET ORAL EVERY 6 HOURS PRN
Status: DISCONTINUED | OUTPATIENT
Start: 2022-01-01 | End: 2022-01-01 | Stop reason: HOSPADM

## 2022-01-01 RX ORDER — PANTOPRAZOLE SODIUM 40 MG/1
40 INJECTION, POWDER, FOR SOLUTION INTRAVENOUS EVERY 12 HOURS SCHEDULED
Status: DISCONTINUED | OUTPATIENT
Start: 2022-01-01 | End: 2022-01-01 | Stop reason: HOSPADM

## 2022-01-01 RX ADMIN — MORPHINE SULFATE 4 MG: 4 INJECTION INTRAVENOUS at 13:25

## 2022-01-01 RX ADMIN — HYDROMORPHONE HYDROCHLORIDE 1 MG: 1 INJECTION, SOLUTION INTRAMUSCULAR; INTRAVENOUS; SUBCUTANEOUS at 16:08

## 2022-01-01 RX ADMIN — Medication 100 MG: at 15:07

## 2022-01-01 RX ADMIN — POTASSIUM CHLORIDE 20 MEQ: 14.9 INJECTION, SOLUTION INTRAVENOUS at 09:23

## 2022-01-01 RX ADMIN — CEFEPIME HYDROCHLORIDE 2000 MG: 2 INJECTION, POWDER, FOR SOLUTION INTRAVENOUS at 13:20

## 2022-01-01 RX ADMIN — METOPROLOL TARTRATE 2.5 MG: 1 INJECTION, SOLUTION INTRAVENOUS at 08:39

## 2022-01-01 RX ADMIN — CEFEPIME HYDROCHLORIDE 2000 MG: 2 INJECTION, POWDER, FOR SOLUTION INTRAVENOUS at 14:03

## 2022-01-01 RX ADMIN — DEXAMETHASONE SODIUM PHOSPHATE 4 MG: 4 INJECTION INTRA-ARTICULAR; INTRALESIONAL; INTRAMUSCULAR; INTRAVENOUS; SOFT TISSUE at 13:48

## 2022-01-01 RX ADMIN — DEXTROSE, SODIUM CHLORIDE, AND POTASSIUM CHLORIDE 75 ML/HR: 5; .9; .15 INJECTION INTRAVENOUS at 18:21

## 2022-01-01 RX ADMIN — METOPROLOL TARTRATE 2.5 MG: 1 INJECTION, SOLUTION INTRAVENOUS at 01:36

## 2022-01-01 RX ADMIN — METOPROLOL TARTRATE 2.5 MG: 1 INJECTION, SOLUTION INTRAVENOUS at 02:30

## 2022-01-01 RX ADMIN — METOPROLOL TARTRATE 2.5 MG: 1 INJECTION, SOLUTION INTRAVENOUS at 02:57

## 2022-01-01 RX ADMIN — HEPARIN SODIUM 5000 UNITS: 5000 INJECTION INTRAVENOUS; SUBCUTANEOUS at 14:38

## 2022-01-01 RX ADMIN — SODIUM CHLORIDE 20 ML/HR: 0.9 INJECTION, SOLUTION INTRAVENOUS at 09:20

## 2022-01-01 RX ADMIN — OCTREOTIDE ACETATE 100 MCG: 100 INJECTION, SOLUTION INTRAVENOUS; SUBCUTANEOUS at 09:15

## 2022-01-01 RX ADMIN — HYDROMORPHONE HYDROCHLORIDE 2 MG: 2 INJECTION INTRAMUSCULAR; INTRAVENOUS; SUBCUTANEOUS at 12:39

## 2022-01-01 RX ADMIN — CEFEPIME HYDROCHLORIDE 2000 MG: 2 INJECTION, POWDER, FOR SOLUTION INTRAVENOUS at 02:00

## 2022-01-01 RX ADMIN — SODIUM CHLORIDE 20 ML/HR: 0.9 INJECTION, SOLUTION INTRAVENOUS at 09:16

## 2022-01-01 RX ADMIN — ONDANSETRON 4 MG: 2 INJECTION INTRAMUSCULAR; INTRAVENOUS at 17:06

## 2022-01-01 RX ADMIN — METOPROLOL TARTRATE 2.5 MG: 1 INJECTION, SOLUTION INTRAVENOUS at 13:49

## 2022-01-01 RX ADMIN — METOPROLOL TARTRATE 2.5 MG: 1 INJECTION, SOLUTION INTRAVENOUS at 14:02

## 2022-01-01 RX ADMIN — METOPROLOL TARTRATE 2.5 MG: 1 INJECTION, SOLUTION INTRAVENOUS at 14:49

## 2022-01-01 RX ADMIN — METOPROLOL TARTRATE 2.5 MG: 1 INJECTION, SOLUTION INTRAVENOUS at 13:20

## 2022-01-01 RX ADMIN — HYDROMORPHONE HYDROCHLORIDE 2 MG: 2 INJECTION INTRAMUSCULAR; INTRAVENOUS; SUBCUTANEOUS at 11:28

## 2022-01-01 RX ADMIN — DEXTROSE, SODIUM CHLORIDE, AND POTASSIUM CHLORIDE 75 ML/HR: 5; .9; .15 INJECTION INTRAVENOUS at 16:39

## 2022-01-01 RX ADMIN — HYDROMORPHONE HYDROCHLORIDE 1 MG: 1 INJECTION, SOLUTION INTRAMUSCULAR; INTRAVENOUS; SUBCUTANEOUS at 11:10

## 2022-01-01 RX ADMIN — PANTOPRAZOLE SODIUM 40 MG: 40 INJECTION, POWDER, FOR SOLUTION INTRAVENOUS at 08:04

## 2022-01-01 RX ADMIN — HEPARIN SODIUM 5000 UNITS: 5000 INJECTION INTRAVENOUS; SUBCUTANEOUS at 06:04

## 2022-01-01 RX ADMIN — DEXTROSE AND SODIUM CHLORIDE 100 ML/HR: 5; .9 INJECTION, SOLUTION INTRAVENOUS at 22:20

## 2022-01-01 RX ADMIN — IRINOTECAN HYDROCHLORIDE 320 MG: 20 INJECTION, SOLUTION INTRAVENOUS at 10:28

## 2022-01-01 RX ADMIN — DEXTROSE, SODIUM CHLORIDE, AND POTASSIUM CHLORIDE 75 ML/HR: 5; .9; .15 INJECTION INTRAVENOUS at 17:06

## 2022-01-01 RX ADMIN — CEFEPIME HYDROCHLORIDE 2000 MG: 2 INJECTION, POWDER, FOR SOLUTION INTRAVENOUS at 02:18

## 2022-01-01 RX ADMIN — HEPARIN SODIUM 5000 UNITS: 5000 INJECTION INTRAVENOUS; SUBCUTANEOUS at 13:41

## 2022-01-01 RX ADMIN — METOPROLOL TARTRATE 2.5 MG: 1 INJECTION, SOLUTION INTRAVENOUS at 20:30

## 2022-01-01 RX ADMIN — METOPROLOL TARTRATE 2.5 MG: 1 INJECTION, SOLUTION INTRAVENOUS at 01:55

## 2022-01-01 RX ADMIN — HYDROMORPHONE HYDROCHLORIDE 1 MG: 1 INJECTION, SOLUTION INTRAMUSCULAR; INTRAVENOUS; SUBCUTANEOUS at 11:41

## 2022-01-01 RX ADMIN — HYDROMORPHONE HYDROCHLORIDE 1 MG: 1 INJECTION, SOLUTION INTRAMUSCULAR; INTRAVENOUS; SUBCUTANEOUS at 13:41

## 2022-01-01 RX ADMIN — CEFEPIME HYDROCHLORIDE 2000 MG: 2 INJECTION, POWDER, FOR SOLUTION INTRAVENOUS at 02:57

## 2022-01-01 RX ADMIN — BEVACIZUMAB 330 MG: 400 INJECTION, SOLUTION INTRAVENOUS at 09:45

## 2022-01-01 RX ADMIN — PANTOPRAZOLE SODIUM 40 MG: 40 INJECTION, POWDER, FOR SOLUTION INTRAVENOUS at 09:20

## 2022-01-01 RX ADMIN — METOPROLOL TARTRATE 2.5 MG: 1 INJECTION, SOLUTION INTRAVENOUS at 08:02

## 2022-01-01 RX ADMIN — HYDROMORPHONE HYDROCHLORIDE 1 MG: 1 INJECTION, SOLUTION INTRAMUSCULAR; INTRAVENOUS; SUBCUTANEOUS at 03:11

## 2022-01-01 RX ADMIN — HYDROMORPHONE HYDROCHLORIDE 1 MG: 1 INJECTION, SOLUTION INTRAMUSCULAR; INTRAVENOUS; SUBCUTANEOUS at 21:37

## 2022-01-01 RX ADMIN — ONDANSETRON 4 MG: 2 INJECTION INTRAMUSCULAR; INTRAVENOUS at 03:45

## 2022-01-01 RX ADMIN — POTASSIUM CHLORIDE 20 MEQ: 1500 TABLET, EXTENDED RELEASE ORAL at 09:20

## 2022-01-01 RX ADMIN — METOPROLOL TARTRATE 2.5 MG: 1 INJECTION, SOLUTION INTRAVENOUS at 15:57

## 2022-01-01 RX ADMIN — OCTREOTIDE ACETATE 100 MCG: 100 INJECTION, SOLUTION INTRAVENOUS; SUBCUTANEOUS at 06:14

## 2022-01-01 RX ADMIN — CEFEPIME HYDROCHLORIDE 2000 MG: 2 INJECTION, POWDER, FOR SOLUTION INTRAVENOUS at 14:54

## 2022-01-01 RX ADMIN — OCTREOTIDE ACETATE 100 MCG: 100 INJECTION, SOLUTION INTRAVENOUS; SUBCUTANEOUS at 22:12

## 2022-01-01 RX ADMIN — HYDROMORPHONE HYDROCHLORIDE 1 MG: 1 INJECTION, SOLUTION INTRAMUSCULAR; INTRAVENOUS; SUBCUTANEOUS at 12:34

## 2022-01-01 RX ADMIN — PANTOPRAZOLE SODIUM 40 MG: 40 INJECTION, POWDER, FOR SOLUTION INTRAVENOUS at 20:12

## 2022-01-01 RX ADMIN — HYDROMORPHONE HYDROCHLORIDE 0.2 MG: 0.2 INJECTION, SOLUTION INTRAMUSCULAR; INTRAVENOUS; SUBCUTANEOUS at 08:15

## 2022-01-01 RX ADMIN — HEPARIN SODIUM 5000 UNITS: 5000 INJECTION INTRAVENOUS; SUBCUTANEOUS at 06:16

## 2022-01-01 RX ADMIN — CEFEPIME HYDROCHLORIDE 2000 MG: 2 INJECTION, POWDER, FOR SOLUTION INTRAVENOUS at 14:48

## 2022-01-01 RX ADMIN — BEVACIZUMAB 282.5 MG: 100 INJECTION, SOLUTION INTRAVENOUS at 10:05

## 2022-01-01 RX ADMIN — DEXTROSE, SODIUM CHLORIDE, AND POTASSIUM CHLORIDE 75 ML/HR: 5; .9; .15 INJECTION INTRAVENOUS at 01:19

## 2022-01-01 RX ADMIN — TBO-FILGRASTIM 300 MCG: 300 INJECTION, SOLUTION SUBCUTANEOUS at 09:00

## 2022-01-01 RX ADMIN — HYDROMORPHONE HYDROCHLORIDE 1 MG: 1 INJECTION, SOLUTION INTRAMUSCULAR; INTRAVENOUS; SUBCUTANEOUS at 08:00

## 2022-01-01 RX ADMIN — HYDROMORPHONE HYDROCHLORIDE 2 MG: 2 INJECTION INTRAMUSCULAR; INTRAVENOUS; SUBCUTANEOUS at 02:13

## 2022-01-01 RX ADMIN — OCTREOTIDE ACETATE 100 MCG: 100 INJECTION, SOLUTION INTRAVENOUS; SUBCUTANEOUS at 21:44

## 2022-01-01 RX ADMIN — HEPARIN SODIUM 5000 UNITS: 5000 INJECTION INTRAVENOUS; SUBCUTANEOUS at 20:41

## 2022-01-01 RX ADMIN — HYDROMORPHONE HYDROCHLORIDE 2 MG: 2 INJECTION INTRAMUSCULAR; INTRAVENOUS; SUBCUTANEOUS at 10:40

## 2022-01-01 RX ADMIN — PANTOPRAZOLE SODIUM 40 MG: 40 INJECTION, POWDER, FOR SOLUTION INTRAVENOUS at 20:01

## 2022-01-01 RX ADMIN — METOPROLOL TARTRATE 2.5 MG: 1 INJECTION, SOLUTION INTRAVENOUS at 08:15

## 2022-01-01 RX ADMIN — CEFEPIME HYDROCHLORIDE 2000 MG: 2 INJECTION, POWDER, FOR SOLUTION INTRAVENOUS at 01:50

## 2022-01-01 RX ADMIN — PANTOPRAZOLE SODIUM 40 MG: 40 INJECTION, POWDER, FOR SOLUTION INTRAVENOUS at 09:17

## 2022-01-01 RX ADMIN — HEPARIN SODIUM 5000 UNITS: 5000 INJECTION INTRAVENOUS; SUBCUTANEOUS at 16:39

## 2022-01-01 RX ADMIN — HEPARIN SODIUM 5000 UNITS: 5000 INJECTION INTRAVENOUS; SUBCUTANEOUS at 14:50

## 2022-01-01 RX ADMIN — CEFEPIME HYDROCHLORIDE 2000 MG: 2 INJECTION, POWDER, FOR SOLUTION INTRAVENOUS at 01:55

## 2022-01-01 RX ADMIN — PANTOPRAZOLE SODIUM 40 MG: 40 INJECTION, POWDER, FOR SOLUTION INTRAVENOUS at 09:12

## 2022-01-01 RX ADMIN — DEXAMETHASONE SODIUM PHOSPHATE: 10 INJECTION, SOLUTION INTRAMUSCULAR; INTRAVENOUS at 09:18

## 2022-01-01 RX ADMIN — HYDROMORPHONE HYDROCHLORIDE 0.5 MG: 1 INJECTION, SOLUTION INTRAMUSCULAR; INTRAVENOUS; SUBCUTANEOUS at 18:29

## 2022-01-01 RX ADMIN — CEFEPIME HYDROCHLORIDE 2000 MG: 2 INJECTION, POWDER, FOR SOLUTION INTRAVENOUS at 16:39

## 2022-01-01 RX ADMIN — HYDROMORPHONE HYDROCHLORIDE 1 MG: 1 INJECTION, SOLUTION INTRAMUSCULAR; INTRAVENOUS; SUBCUTANEOUS at 20:30

## 2022-01-01 RX ADMIN — HYDROMORPHONE HYDROCHLORIDE 1 MG: 1 INJECTION, SOLUTION INTRAMUSCULAR; INTRAVENOUS; SUBCUTANEOUS at 06:18

## 2022-01-01 RX ADMIN — OCTREOTIDE ACETATE 100 MCG: 100 INJECTION, SOLUTION INTRAVENOUS; SUBCUTANEOUS at 14:54

## 2022-01-01 RX ADMIN — HYDROMORPHONE HYDROCHLORIDE 0.5 MG: 1 INJECTION, SOLUTION INTRAMUSCULAR; INTRAVENOUS; SUBCUTANEOUS at 13:56

## 2022-01-01 RX ADMIN — HYDROMORPHONE HYDROCHLORIDE 1 MG: 1 INJECTION, SOLUTION INTRAMUSCULAR; INTRAVENOUS; SUBCUTANEOUS at 17:05

## 2022-01-01 RX ADMIN — HYDROMORPHONE HYDROCHLORIDE 1 MG: 1 INJECTION, SOLUTION INTRAMUSCULAR; INTRAVENOUS; SUBCUTANEOUS at 02:57

## 2022-01-01 RX ADMIN — PANTOPRAZOLE SODIUM 40 MG: 40 INJECTION, POWDER, FOR SOLUTION INTRAVENOUS at 21:44

## 2022-01-01 RX ADMIN — DEXTROSE AND SODIUM CHLORIDE 100 ML/HR: 5; .9 INJECTION, SOLUTION INTRAVENOUS at 13:56

## 2022-01-01 RX ADMIN — CEFEPIME HYDROCHLORIDE 2000 MG: 2 INJECTION, POWDER, FOR SOLUTION INTRAVENOUS at 14:11

## 2022-01-01 RX ADMIN — HYDROMORPHONE HYDROCHLORIDE 1 MG: 1 INJECTION, SOLUTION INTRAMUSCULAR; INTRAVENOUS; SUBCUTANEOUS at 10:20

## 2022-01-01 RX ADMIN — DEXTROSE, SODIUM CHLORIDE, AND POTASSIUM CHLORIDE 75 ML/HR: 5; .9; .15 INJECTION INTRAVENOUS at 06:13

## 2022-01-01 RX ADMIN — CEFEPIME HYDROCHLORIDE 2000 MG: 2 INJECTION, POWDER, FOR SOLUTION INTRAVENOUS at 02:30

## 2022-01-01 RX ADMIN — HYDROMORPHONE HYDROCHLORIDE 1 MG: 1 INJECTION, SOLUTION INTRAMUSCULAR; INTRAVENOUS; SUBCUTANEOUS at 22:20

## 2022-01-01 RX ADMIN — DEXAMETHASONE SODIUM PHOSPHATE 4 MG: 4 INJECTION INTRA-ARTICULAR; INTRALESIONAL; INTRAMUSCULAR; INTRAVENOUS; SOFT TISSUE at 08:12

## 2022-01-01 RX ADMIN — FLUOROURACIL 500 MG: 50 INJECTION, SOLUTION INTRAVENOUS at 12:21

## 2022-01-01 RX ADMIN — METOPROLOL TARTRATE 2.5 MG: 1 INJECTION, SOLUTION INTRAVENOUS at 21:44

## 2022-01-01 RX ADMIN — PANTOPRAZOLE SODIUM 40 MG: 40 INJECTION, POWDER, FOR SOLUTION INTRAVENOUS at 20:41

## 2022-01-01 RX ADMIN — HYDROMORPHONE HYDROCHLORIDE 0.5 MG: 1 INJECTION, SOLUTION INTRAMUSCULAR; INTRAVENOUS; SUBCUTANEOUS at 21:48

## 2022-01-01 RX ADMIN — HYDROMORPHONE HYDROCHLORIDE 1 MG: 1 INJECTION, SOLUTION INTRAMUSCULAR; INTRAVENOUS; SUBCUTANEOUS at 07:25

## 2022-01-01 RX ADMIN — METOPROLOL TARTRATE 2.5 MG: 1 INJECTION, SOLUTION INTRAVENOUS at 09:17

## 2022-01-01 RX ADMIN — DEXTROSE, SODIUM CHLORIDE, AND POTASSIUM CHLORIDE 75 ML/HR: 5; .9; .15 INJECTION INTRAVENOUS at 09:32

## 2022-01-01 RX ADMIN — DEXTROSE AND SODIUM CHLORIDE 100 ML/HR: 5; .9 INJECTION, SOLUTION INTRAVENOUS at 01:18

## 2022-01-01 RX ADMIN — HEPARIN SODIUM 5000 UNITS: 5000 INJECTION INTRAVENOUS; SUBCUTANEOUS at 14:00

## 2022-01-01 RX ADMIN — DEXAMETHASONE SODIUM PHOSPHATE 4 MG: 4 INJECTION INTRA-ARTICULAR; INTRALESIONAL; INTRAMUSCULAR; INTRAVENOUS; SOFT TISSUE at 08:39

## 2022-01-01 RX ADMIN — HEPARIN SODIUM 5000 UNITS: 5000 INJECTION INTRAVENOUS; SUBCUTANEOUS at 21:37

## 2022-01-01 RX ADMIN — HYDROMORPHONE HYDROCHLORIDE 0.5 MG: 1 INJECTION, SOLUTION INTRAMUSCULAR; INTRAVENOUS; SUBCUTANEOUS at 20:12

## 2022-01-01 RX ADMIN — HYDROMORPHONE HYDROCHLORIDE 1 MG: 1 INJECTION, SOLUTION INTRAMUSCULAR; INTRAVENOUS; SUBCUTANEOUS at 20:40

## 2022-01-01 RX ADMIN — DEXTROSE AND SODIUM CHLORIDE 100 ML/HR: 5; .9 INJECTION, SOLUTION INTRAVENOUS at 03:17

## 2022-01-01 RX ADMIN — HEPARIN SODIUM 5000 UNITS: 5000 INJECTION INTRAVENOUS; SUBCUTANEOUS at 21:27

## 2022-01-01 RX ADMIN — ONDANSETRON 4 MG: 2 INJECTION INTRAMUSCULAR; INTRAVENOUS at 12:43

## 2022-01-01 RX ADMIN — IOHEXOL 100 ML: 350 INJECTION, SOLUTION INTRAVENOUS at 13:25

## 2022-01-01 RX ADMIN — PANTOPRAZOLE SODIUM 40 MG: 40 INJECTION, POWDER, FOR SOLUTION INTRAVENOUS at 08:15

## 2022-01-01 RX ADMIN — HEPARIN SODIUM 5000 UNITS: 5000 INJECTION INTRAVENOUS; SUBCUTANEOUS at 14:46

## 2022-01-01 RX ADMIN — METOPROLOL TARTRATE 2.5 MG: 1 INJECTION, SOLUTION INTRAVENOUS at 20:32

## 2022-01-01 RX ADMIN — DEXAMETHASONE SODIUM PHOSPHATE: 10 INJECTION, SOLUTION INTRAMUSCULAR; INTRAVENOUS at 09:21

## 2022-01-01 RX ADMIN — METOPROLOL TARTRATE 2.5 MG: 1 INJECTION, SOLUTION INTRAVENOUS at 02:07

## 2022-01-01 RX ADMIN — METOPROLOL TARTRATE 2.5 MG: 1 INJECTION, SOLUTION INTRAVENOUS at 02:42

## 2022-01-01 RX ADMIN — HYDROMORPHONE HYDROCHLORIDE 2 MG: 2 INJECTION INTRAMUSCULAR; INTRAVENOUS; SUBCUTANEOUS at 16:57

## 2022-01-01 RX ADMIN — ACETAMINOPHEN 650 MG: 325 TABLET ORAL at 00:08

## 2022-01-01 RX ADMIN — SODIUM CHLORIDE, SODIUM LACTATE, POTASSIUM CHLORIDE, AND CALCIUM CHLORIDE 500 ML: .6; .31; .03; .02 INJECTION, SOLUTION INTRAVENOUS at 12:20

## 2022-01-01 RX ADMIN — HYDROMORPHONE HYDROCHLORIDE 0.5 MG: 1 INJECTION, SOLUTION INTRAMUSCULAR; INTRAVENOUS; SUBCUTANEOUS at 05:59

## 2022-01-01 RX ADMIN — DEXTROSE AND SODIUM CHLORIDE 100 ML/HR: 5; .9 INJECTION, SOLUTION INTRAVENOUS at 00:10

## 2022-01-01 RX ADMIN — CEFEPIME HYDROCHLORIDE 2000 MG: 2 INJECTION, POWDER, FOR SOLUTION INTRAVENOUS at 14:13

## 2022-01-01 RX ADMIN — CEFEPIME HYDROCHLORIDE 2000 MG: 2 INJECTION, POWDER, FOR SOLUTION INTRAVENOUS at 02:59

## 2022-01-01 RX ADMIN — CEFEPIME HYDROCHLORIDE 2000 MG: 2 INJECTION, POWDER, FOR SOLUTION INTRAVENOUS at 14:41

## 2022-01-01 RX ADMIN — DEXTROSE AND SODIUM CHLORIDE 100 ML/HR: 5; .9 INJECTION, SOLUTION INTRAVENOUS at 16:25

## 2022-01-01 RX ADMIN — FLUOROURACIL 545 MG: 50 INJECTION, SOLUTION INTRAVENOUS at 12:00

## 2022-01-01 RX ADMIN — HYDROMORPHONE HYDROCHLORIDE 1 MG: 1 INJECTION, SOLUTION INTRAMUSCULAR; INTRAVENOUS; SUBCUTANEOUS at 14:05

## 2022-01-01 RX ADMIN — HEPARIN SODIUM 5000 UNITS: 5000 INJECTION INTRAVENOUS; SUBCUTANEOUS at 13:20

## 2022-01-01 RX ADMIN — LEUCOVORIN CALCIUM 543 MG: 200 INJECTION, POWDER, LYOPHILIZED, FOR SUSPENSION INTRAMUSCULAR; INTRAVENOUS at 10:28

## 2022-01-01 RX ADMIN — DEXTROSE AND SODIUM CHLORIDE 100 ML/HR: 5; .9 INJECTION, SOLUTION INTRAVENOUS at 20:25

## 2022-01-01 RX ADMIN — PANTOPRAZOLE SODIUM 40 MG: 40 INJECTION, POWDER, FOR SOLUTION INTRAVENOUS at 09:23

## 2022-01-01 RX ADMIN — HEPARIN SODIUM 5000 UNITS: 5000 INJECTION INTRAVENOUS; SUBCUTANEOUS at 21:49

## 2022-01-01 RX ADMIN — HYDROMORPHONE HYDROCHLORIDE 2 MG: 2 INJECTION INTRAMUSCULAR; INTRAVENOUS; SUBCUTANEOUS at 22:41

## 2022-01-01 RX ADMIN — IOHEXOL 100 ML: 350 INJECTION, SOLUTION INTRAVENOUS at 14:20

## 2022-01-01 RX ADMIN — HYDROMORPHONE HYDROCHLORIDE 0.5 MG: 1 INJECTION, SOLUTION INTRAMUSCULAR; INTRAVENOUS; SUBCUTANEOUS at 19:54

## 2022-01-01 RX ADMIN — CEFEPIME HYDROCHLORIDE 2000 MG: 2 INJECTION, POWDER, FOR SOLUTION INTRAVENOUS at 13:41

## 2022-01-01 RX ADMIN — PANTOPRAZOLE SODIUM 40 MG: 40 INJECTION, POWDER, FOR SOLUTION INTRAVENOUS at 08:39

## 2022-01-01 RX ADMIN — HYDROMORPHONE HYDROCHLORIDE 2 MG: 2 INJECTION INTRAMUSCULAR; INTRAVENOUS; SUBCUTANEOUS at 21:51

## 2022-01-01 RX ADMIN — METOPROLOL TARTRATE 2.5 MG: 1 INJECTION, SOLUTION INTRAVENOUS at 20:41

## 2022-01-01 RX ADMIN — HYDROMORPHONE HYDROCHLORIDE 1 MG: 1 INJECTION, SOLUTION INTRAMUSCULAR; INTRAVENOUS; SUBCUTANEOUS at 02:42

## 2022-01-01 RX ADMIN — CEFEPIME HYDROCHLORIDE 2000 MG: 2 INJECTION, POWDER, FOR SOLUTION INTRAVENOUS at 01:18

## 2022-01-01 RX ADMIN — HYDROMORPHONE HYDROCHLORIDE 1 MG: 1 INJECTION, SOLUTION INTRAMUSCULAR; INTRAVENOUS; SUBCUTANEOUS at 06:36

## 2022-01-01 RX ADMIN — METOPROLOL TARTRATE 2.5 MG: 1 INJECTION, SOLUTION INTRAVENOUS at 19:41

## 2022-01-01 RX ADMIN — METOPROLOL TARTRATE 2.5 MG: 1 INJECTION, SOLUTION INTRAVENOUS at 09:13

## 2022-01-01 RX ADMIN — MORPHINE SULFATE 4 MG: 4 INJECTION INTRAVENOUS at 12:17

## 2022-01-01 RX ADMIN — OCTREOTIDE ACETATE 100 MCG: 100 INJECTION, SOLUTION INTRAVENOUS; SUBCUTANEOUS at 07:00

## 2022-01-01 RX ADMIN — POTASSIUM CHLORIDE 40 MEQ: 29.8 INJECTION, SOLUTION INTRAVENOUS at 17:00

## 2022-01-01 RX ADMIN — TBO-FILGRASTIM 300 MCG: 300 INJECTION, SOLUTION SUBCUTANEOUS at 12:00

## 2022-01-01 RX ADMIN — DEXTROSE AND SODIUM CHLORIDE 100 ML/HR: 5; .9 INJECTION, SOLUTION INTRAVENOUS at 17:33

## 2022-01-01 RX ADMIN — HYDROMORPHONE HYDROCHLORIDE 0.5 MG: 1 INJECTION, SOLUTION INTRAMUSCULAR; INTRAVENOUS; SUBCUTANEOUS at 05:33

## 2022-01-01 RX ADMIN — METOPROLOL TARTRATE 2.5 MG: 1 INJECTION, SOLUTION INTRAVENOUS at 20:31

## 2022-01-01 RX ADMIN — HYDROMORPHONE HYDROCHLORIDE 0.5 MG: 1 INJECTION, SOLUTION INTRAMUSCULAR; INTRAVENOUS; SUBCUTANEOUS at 13:17

## 2022-01-01 RX ADMIN — HYDROMORPHONE HYDROCHLORIDE 1 MG: 1 INJECTION, SOLUTION INTRAMUSCULAR; INTRAVENOUS; SUBCUTANEOUS at 00:07

## 2022-01-01 RX ADMIN — LEUCOVORIN CALCIUM 500 MG: 100 INJECTION, POWDER, LYOPHILIZED, FOR SUSPENSION INTRAMUSCULAR; INTRAVENOUS at 10:46

## 2022-01-01 RX ADMIN — ONDANSETRON 4 MG: 2 INJECTION INTRAMUSCULAR; INTRAVENOUS at 05:55

## 2022-01-01 RX ADMIN — HYDROMORPHONE HYDROCHLORIDE 2 MG: 2 INJECTION INTRAMUSCULAR; INTRAVENOUS; SUBCUTANEOUS at 06:14

## 2022-01-01 RX ADMIN — METOPROLOL TARTRATE 2.5 MG: 1 INJECTION, SOLUTION INTRAVENOUS at 02:00

## 2022-01-01 RX ADMIN — CEFEPIME HYDROCHLORIDE 2000 MG: 2 INJECTION, POWDER, FOR SOLUTION INTRAVENOUS at 13:56

## 2022-01-01 RX ADMIN — PANTOPRAZOLE SODIUM 40 MG: 40 INJECTION, POWDER, FOR SOLUTION INTRAVENOUS at 08:53

## 2022-01-01 RX ADMIN — HYDROMORPHONE HYDROCHLORIDE 1 MG: 1 INJECTION, SOLUTION INTRAMUSCULAR; INTRAVENOUS; SUBCUTANEOUS at 18:42

## 2022-01-01 RX ADMIN — ONDANSETRON 4 MG: 2 INJECTION INTRAMUSCULAR; INTRAVENOUS at 12:16

## 2022-01-01 RX ADMIN — HEPARIN SODIUM 5000 UNITS: 5000 INJECTION INTRAVENOUS; SUBCUTANEOUS at 07:00

## 2022-01-01 RX ADMIN — PANTOPRAZOLE SODIUM 40 MG: 40 INJECTION, POWDER, FOR SOLUTION INTRAVENOUS at 17:35

## 2022-01-01 RX ADMIN — POTASSIUM CHLORIDE 20 MEQ: 14.9 INJECTION, SOLUTION INTRAVENOUS at 11:59

## 2022-01-01 RX ADMIN — PROPOFOL 120 MG: 10 INJECTION, EMULSION INTRAVENOUS at 15:07

## 2022-01-01 RX ADMIN — METOPROLOL TARTRATE 2.5 MG: 1 INJECTION, SOLUTION INTRAVENOUS at 20:27

## 2022-01-01 RX ADMIN — HYDROMORPHONE HYDROCHLORIDE 0.5 MG: 1 INJECTION, SOLUTION INTRAMUSCULAR; INTRAVENOUS; SUBCUTANEOUS at 14:41

## 2022-01-01 RX ADMIN — PANTOPRAZOLE SODIUM 40 MG: 40 INJECTION, POWDER, FOR SOLUTION INTRAVENOUS at 08:12

## 2022-01-01 RX ADMIN — HYDROMORPHONE HYDROCHLORIDE 1 MG: 1 INJECTION, SOLUTION INTRAMUSCULAR; INTRAVENOUS; SUBCUTANEOUS at 01:18

## 2022-01-01 RX ADMIN — HYDROMORPHONE HYDROCHLORIDE 0.5 MG: 1 INJECTION, SOLUTION INTRAMUSCULAR; INTRAVENOUS; SUBCUTANEOUS at 08:59

## 2022-01-01 RX ADMIN — METOPROLOL TARTRATE 2.5 MG: 1 INJECTION, SOLUTION INTRAVENOUS at 13:44

## 2022-01-01 RX ADMIN — METOPROLOL TARTRATE 2.5 MG: 1 INJECTION, SOLUTION INTRAVENOUS at 09:26

## 2022-01-01 RX ADMIN — HYDROMORPHONE HYDROCHLORIDE 0.5 MG: 1 INJECTION, SOLUTION INTRAMUSCULAR; INTRAVENOUS; SUBCUTANEOUS at 14:46

## 2022-01-01 RX ADMIN — DEXAMETHASONE SODIUM PHOSPHATE 4 MG: 4 INJECTION INTRA-ARTICULAR; INTRALESIONAL; INTRAMUSCULAR; INTRAVENOUS; SOFT TISSUE at 09:12

## 2022-01-01 RX ADMIN — METOPROLOL TARTRATE 2.5 MG: 1 INJECTION, SOLUTION INTRAVENOUS at 09:20

## 2022-01-01 RX ADMIN — HYDROMORPHONE HYDROCHLORIDE 0.5 MG: 1 INJECTION, SOLUTION INTRAMUSCULAR; INTRAVENOUS; SUBCUTANEOUS at 01:05

## 2022-01-01 RX ADMIN — METOPROLOL TARTRATE 2.5 MG: 1 INJECTION, SOLUTION INTRAVENOUS at 14:37

## 2022-01-01 RX ADMIN — FENTANYL CITRATE 100 MCG: 50 INJECTION INTRAMUSCULAR; INTRAVENOUS at 15:07

## 2022-01-01 RX ADMIN — HYDROMORPHONE HYDROCHLORIDE 1 MG: 1 INJECTION, SOLUTION INTRAMUSCULAR; INTRAVENOUS; SUBCUTANEOUS at 18:59

## 2022-01-01 RX ADMIN — HEPARIN SODIUM 5000 UNITS: 5000 INJECTION INTRAVENOUS; SUBCUTANEOUS at 21:51

## 2022-01-01 RX ADMIN — DEXTROSE AND SODIUM CHLORIDE 100 ML/HR: 5; .9 INJECTION, SOLUTION INTRAVENOUS at 11:30

## 2022-01-01 RX ADMIN — ONDANSETRON 4 MG: 2 INJECTION INTRAMUSCULAR; INTRAVENOUS at 19:49

## 2022-01-01 RX ADMIN — METOPROLOL TARTRATE 2.5 MG: 1 INJECTION, SOLUTION INTRAVENOUS at 14:46

## 2022-01-01 RX ADMIN — PANTOPRAZOLE SODIUM 40 MG: 40 INJECTION, POWDER, FOR SOLUTION INTRAVENOUS at 20:32

## 2022-01-01 RX ADMIN — METOPROLOL TARTRATE 2.5 MG: 1 INJECTION, SOLUTION INTRAVENOUS at 02:24

## 2022-01-01 RX ADMIN — CEFEPIME HYDROCHLORIDE 2000 MG: 2 INJECTION, POWDER, FOR SOLUTION INTRAVENOUS at 02:13

## 2022-01-01 RX ADMIN — IRINOTECAN HYDROCHLORIDE 300 MG: 20 INJECTION, SOLUTION INTRAVENOUS at 10:48

## 2022-01-01 RX ADMIN — HYDROMORPHONE HYDROCHLORIDE 2 MG: 2 INJECTION INTRAMUSCULAR; INTRAVENOUS; SUBCUTANEOUS at 06:04

## 2022-01-01 RX ADMIN — HYDROMORPHONE HYDROCHLORIDE 1 MG: 1 INJECTION, SOLUTION INTRAMUSCULAR; INTRAVENOUS; SUBCUTANEOUS at 10:23

## 2022-01-01 RX ADMIN — HYDROMORPHONE HYDROCHLORIDE 1 MG: 1 INJECTION, SOLUTION INTRAMUSCULAR; INTRAVENOUS; SUBCUTANEOUS at 06:12

## 2022-01-01 RX ADMIN — HEPARIN SODIUM 5000 UNITS: 5000 INJECTION INTRAVENOUS; SUBCUTANEOUS at 13:48

## 2022-01-01 RX ADMIN — METOPROLOL TARTRATE 2.5 MG: 1 INJECTION, SOLUTION INTRAVENOUS at 08:54

## 2022-01-01 RX ADMIN — DEXTROSE AND SODIUM CHLORIDE 100 ML/HR: 5; .9 INJECTION, SOLUTION INTRAVENOUS at 11:47

## 2022-01-01 RX ADMIN — CEFEPIME HYDROCHLORIDE 2000 MG: 2 INJECTION, POWDER, FOR SOLUTION INTRAVENOUS at 02:43

## 2022-01-01 RX ADMIN — SODIUM CHLORIDE: 0.9 INJECTION, SOLUTION INTRAVENOUS at 14:58

## 2022-01-01 RX ADMIN — PANTOPRAZOLE SODIUM 40 MG: 40 INJECTION, POWDER, FOR SOLUTION INTRAVENOUS at 20:37

## 2022-01-01 RX ADMIN — METOPROLOL TARTRATE 2.5 MG: 1 INJECTION, SOLUTION INTRAVENOUS at 01:50

## 2022-01-01 RX ADMIN — HYDROMORPHONE HYDROCHLORIDE 0.2 MG: 0.2 INJECTION, SOLUTION INTRAMUSCULAR; INTRAVENOUS; SUBCUTANEOUS at 18:08

## 2022-01-01 RX ADMIN — METOPROLOL TARTRATE 2.5 MG: 1 INJECTION, SOLUTION INTRAVENOUS at 20:12

## 2022-01-01 RX ADMIN — HYDROMORPHONE HYDROCHLORIDE 0.5 MG: 1 INJECTION, SOLUTION INTRAMUSCULAR; INTRAVENOUS; SUBCUTANEOUS at 02:05

## 2022-01-01 RX ADMIN — LIDOCAINE HYDROCHLORIDE 100 MG: 20 INJECTION INTRAVENOUS at 15:07

## 2022-01-01 RX ADMIN — PANTOPRAZOLE SODIUM 40 MG: 40 INJECTION, POWDER, FOR SOLUTION INTRAVENOUS at 20:31

## 2022-01-01 RX ADMIN — DEXAMETHASONE SODIUM PHOSPHATE 4 MG: 4 INJECTION INTRA-ARTICULAR; INTRALESIONAL; INTRAMUSCULAR; INTRAVENOUS; SOFT TISSUE at 14:46

## 2022-01-01 RX ADMIN — DEXTROSE, SODIUM CHLORIDE, AND POTASSIUM CHLORIDE 75 ML/HR: 5; .9; .15 INJECTION INTRAVENOUS at 02:58

## 2022-01-01 RX ADMIN — HYDROMORPHONE HYDROCHLORIDE 1 MG: 1 INJECTION, SOLUTION INTRAMUSCULAR; INTRAVENOUS; SUBCUTANEOUS at 10:13

## 2022-01-01 RX ADMIN — OCTREOTIDE ACETATE 100 MCG: 100 INJECTION, SOLUTION INTRAVENOUS; SUBCUTANEOUS at 13:48

## 2022-01-01 RX ADMIN — METOPROLOL TARTRATE 2.5 MG: 1 INJECTION, SOLUTION INTRAVENOUS at 20:36

## 2022-01-01 RX ADMIN — HEPARIN SODIUM 5000 UNITS: 5000 INJECTION INTRAVENOUS; SUBCUTANEOUS at 05:58

## 2022-01-01 RX ADMIN — HYDROMORPHONE HYDROCHLORIDE 0.5 MG: 1 INJECTION, SOLUTION INTRAMUSCULAR; INTRAVENOUS; SUBCUTANEOUS at 02:34

## 2022-01-01 RX ADMIN — ALTEPLASE 2 MG: 2.2 INJECTION, POWDER, LYOPHILIZED, FOR SOLUTION INTRAVENOUS at 17:08

## 2022-01-01 RX ADMIN — PANTOPRAZOLE SODIUM 40 MG: 40 INJECTION, POWDER, FOR SOLUTION INTRAVENOUS at 19:57

## 2022-01-01 RX ADMIN — METOPROLOL TARTRATE 2.5 MG: 1 INJECTION, SOLUTION INTRAVENOUS at 08:12

## 2022-01-01 RX ADMIN — HYDROMORPHONE HYDROCHLORIDE 2 MG: 2 INJECTION INTRAMUSCULAR; INTRAVENOUS; SUBCUTANEOUS at 17:40

## 2022-01-01 RX ADMIN — HYDROMORPHONE HYDROCHLORIDE 0.5 MG: 1 INJECTION, SOLUTION INTRAMUSCULAR; INTRAVENOUS; SUBCUTANEOUS at 04:42

## 2022-01-01 RX ADMIN — IOHEXOL 100 ML: 350 INJECTION, SOLUTION INTRAVENOUS at 17:45

## 2022-01-01 RX ADMIN — HYDROMORPHONE HYDROCHLORIDE 0.5 MG: 1 INJECTION, SOLUTION INTRAMUSCULAR; INTRAVENOUS; SUBCUTANEOUS at 19:41

## 2022-01-01 RX ADMIN — HYDROMORPHONE HYDROCHLORIDE 1 MG: 1 INJECTION, SOLUTION INTRAMUSCULAR; INTRAVENOUS; SUBCUTANEOUS at 16:10

## 2022-01-01 RX ADMIN — HEPARIN SODIUM 5000 UNITS: 5000 INJECTION INTRAVENOUS; SUBCUTANEOUS at 05:36

## 2022-01-03 NOTE — TELEPHONE ENCOUNTER
Telephone call spoke with pt  Reviewed lab results and explained that pt did not get the granix last time because the [address] was [address], noting that he received granix on 12/16 and then labs were drawn the next day in prep for chemo on the following Monday  Pt prefers keeping his future txs as scheduled  He will go for granix on 1/10/22 and then have his labs drawn on Saturday 1/15/22 in prep for scheduled chemo on Monday 1/17/22

## 2022-01-10 NOTE — TELEPHONE ENCOUNTER
Telephone call spoke with pt  Per Dr Katz Staff, since last tx was 12/23 and he will not be getting tx until 1/17 no need for granix today  Pt states he understands and will have labs drawn again on Friday for Monday tx  Infusion updated

## 2022-01-17 NOTE — PROGRESS NOTES
Pt arrived to unit without complaint  Trace protein in urine  Spoke with Lizandro Shankar RN via teams  Per Dr Andrew Lopez trace protein is ok  Ok to continue with treatment  Pt tolerated treatment without incident  5FU CADD connected and infusing as order  AVS declined, but pt aware to return for pump d/c  Pt left unit in stable condition

## 2022-01-19 NOTE — PROGRESS NOTES
Patient arrived to the unit and denied complications other than fatigue  He denies SOB at this moment  CADD pump d/kamilla  Patient is aware of his next appointment

## 2022-01-24 NOTE — PROGRESS NOTES
Pt arrived to unit for Granix injection  Pt states he has had severe indigestion/reflux/heartburn since 1/20/22  Pt has tried OTC Mylanta and pepcid and  prescribed dicyclomine without relief  Pt is having difficulty swallowing d/t burning and tightness in throat  Pt has not reported these symptoms to Med/Onc  I did reach out to Radha Cueto and reported symptoms  Guy Ordoñez will call pt with instructions after speaking to provider  Pt aware  Pt left unit in stable condition

## 2022-01-24 NOTE — TELEPHONE ENCOUNTER
Telephone call spoke with pt   Jairo Patel RN from infusion call reporting Pt was having burning after swallowing and acid reflux after eating  Jsoe BARCENAS sent rx to [address] [place] [address] for Omeprazole 20 mg take in the morning on an empty stomach and continue taking the pepcid at HS  Pt stated he will  today and start tomorrow  He will call office to update if it helps

## 2022-01-26 PROBLEM — E44.0 MODERATE PROTEIN-CALORIE MALNUTRITION (HCC): Status: ACTIVE | Noted: 2022-01-01

## 2022-01-26 NOTE — PROGRESS NOTES
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 and was symptomatic from that  He will be going for CT scan  He feels very tired  He has lost weight  He will try nutritional supplements  Symptoms of GERD and he was taking Pepcid  Now he takes Prilosec  in the morning and Pepcid in the evening and is less symptomatic from GERD  For occasional bowel cramps he takes Bentyl and states that helps  On 10/27/2020 patient had EGD and colonoscopy and 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  Negative H pylori and in spite of that patient was given antibiotic therapy against H pylori because of MALT lymphoma               Current Outpatient Medications:     aluminum-magnesium hydroxide-simethicone (MYLANTA) 200-200-20 mg/5 mL suspension, Take 30 mL by mouth every 4 (four) hours as needed for indigestion or heartburn, Disp: , Rfl:     atorvastatin (LIPITOR) 40 mg tablet, Take 1 tablet (40 mg total) by mouth daily, Disp: 90 tablet, Rfl: 1    chlordiazepoxide-clidinium (LIBRAX) 5-2 5 mg per capsule, Take 1 capsule by mouth 3 (three) times a day as needed (Abdominal Pain), Disp: 90 capsule, Rfl: 1    Cholecalciferol (VITAMIN D3) 2000 units TABS, Take 2,000 Units by mouth daily , Disp: , Rfl:     cyanocobalamin (VITAMIN B-12) 1,000 mcg tablet, Take 1,000 mcg by mouth daily , Disp: , Rfl:     dicyclomine (BENTYL) 20 mg tablet, Take 1 tablet (20 mg total) by mouth every 6 (six) hours, Disp: 90 tablet, Rfl: 3    dutasteride (AVODART) 0 5 mg capsule, Take 1 capsule (0 5 mg total) by mouth daily at bedtime, Disp: 90 capsule, Rfl: 1    famotidine (PEPCID) 10 mg tablet, Take 10 mg by mouth 2 (two) times a day as needed for heartburn, Disp: , Rfl:     ferrous sulfate 325 (65 Fe) mg tablet, Take 325 mg by mouth daily with breakfast , Disp: , Rfl:     losartan (COZAAR) 25 mg tablet, TAKE ONE TABLET DAILY, Disp: 90 tablet, Rfl: 1    metoprolol tartrate (LOPRESSOR) 25 mg tablet, Take 0 5 tablets (12 5 mg total) by mouth 2 (two) times a day, Disp: 180 tablet, Rfl: 1    Multiple Vitamin (MULTIVITAMIN) tablet, Take 1 tablet by mouth daily , Disp: , Rfl:     Omega-3 Fatty Acids (FISH OIL) 1,000 mg, Take 1,000 mg by mouth daily  , Disp: , Rfl:     omeprazole (PriLOSEC) 20 mg delayed release capsule, Take 1 capsule (20 mg total) by mouth daily On empty stomach 30 min before eating , Disp: 90 capsule, Rfl: 1    ondansetron (ZOFRAN) 4 mg tablet, Take 1 tablet (4 mg total) by mouth every 8 (eight) hours as needed for nausea or vomiting, Disp: 20 tablet, Rfl: 2    predniSONE 10 mg tablet, Take 1 tablet (10 mg total) by mouth daily, Disp: 30 tablet, Rfl: 2    Allergies   Allergen Reactions    Other Other (See Comments)     Liquid lidocaine - couldn't swallow, panicked       Oncology History   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HCC)   12/17/2020 Initial Diagnosis    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[facility] [address] )     1/11/2021 - 10/26/2021 Chemotherapy    fluorouracil (ADRUCIL), 765 mg, Intravenous, Once, 12 of 12 cycles  Dose modification: 300 mg/m2 (original dose 400 mg/m2, Cycle 5, Reason: Dose Not Tolerated)  Administration: 800 mg (1/11/2021), 800 mg (1/25/2021), 800 mg (2/8/2021), 800 mg (2/22/2021), 600 mg (3/15/2021), 600 mg (3/29/2021), 600 mg (4/19/2021), 600 mg (5/3/2021), 600 mg (5/17/2021), 600 mg (6/1/2021), 600 mg (6/14/2021), 600 mg (6/28/2021)  fluorouracil (ADRUCIL), 600 mg (original dose 400 mg/m2), Intravenous, Once, 6 of 11 cycles  Dose modification: 300 mg/m2 (original dose 400 mg/m2, Cycle 13, Reason: Dose Not Tolerated)  Administration: 600 mg (7/26/2021), 600 mg (8/9/2021), 600 mg (8/23/2021), 600 mg (9/20/2021), 600 mg (10/4/2021), 600 mg (10/18/2021)  pegfilgrastim (Damaris Click), 6 mg, Subcutaneous, Once, 2 of 2 cycles  Administration: 6 mg (2/10/2021), 6 mg (2/24/2021)  bevacizumab (AVASTIN) IVPB, 5 mg/kg = 352 5 mg (100 % of original dose 5 mg/kg), Intravenous, Once, 14 of 19 cycles  Dose modification: 5 mg/kg (original dose 5 mg/kg, Cycle 5)  Administration: 352 5 mg (3/15/2021), 352 5 mg (3/29/2021), 352 5 mg (4/19/2021), 352 5 mg (5/3/2021), 352 5 mg (5/17/2021), 352 5 mg (6/1/2021), 352 5 mg (6/14/2021), 352 5 mg (6/28/2021), 352 5 mg (7/26/2021), 352 5 mg (8/9/2021), 352 5 mg (8/23/2021), 352 5 mg (9/20/2021), 352 5 mg (10/4/2021), 352 5 mg (10/18/2021)  leucovorin calcium IVPB, 764 mg, Intravenous, Once, 18 of 23 cycles  Dose modification: 300 mg/m2 (original dose 400 mg/m2, Cycle 5, Reason: Dose Not Tolerated)  Administration: 800 mg (1/11/2021), 800 mg (1/25/2021), 800 mg (2/8/2021), 800 mg (2/22/2021), 600 mg (3/15/2021), 600 mg (3/29/2021), 600 mg (4/19/2021), 600 mg (5/3/2021), 600 mg (5/17/2021), 600 mg (6/1/2021), 600 mg (6/14/2021), 600 mg (6/28/2021), 600 mg (7/26/2021), 600 mg (8/9/2021), 600 mg (8/23/2021), 600 mg (9/20/2021), 600 mg (10/4/2021), 600 mg (10/18/2021)  oxaliplatin (ELOXATIN) chemo infusion, 85 mg/m2 = 162 35 mg, Intravenous, Once, 12 of 12 cycles  Dose modification: 65 mg/m2 (original dose 85 mg/m2, Cycle 5, Reason: Dose Not Tolerated)  Administration: 162 35 mg (1/11/2021), 162 35 mg (1/25/2021), 162 35 mg (2/8/2021), 162 35 mg (2/22/2021), 124 15 mg (3/15/2021), 124 15 mg (3/29/2021), 124 15 mg (4/19/2021), 124 15 mg (5/3/2021), 124 15 mg (5/17/2021), 124 15 mg (6/1/2021), 124 15 mg (6/14/2021), 124 15 mg (6/28/2021)  fluorouracil (ADRUCIL) ambulatory infusion Soln, 1,200 mg/m2/day = 4,585 mg, Intravenous, Over 46 hours, 18 of 23 cycles  tbo-filgrastim (GRANIX), 300 mcg (100 % of original dose 300 mcg), Subcutaneous, Once, 12 of 17 cycles  Dose modification: 300 mcg (original dose 300 mcg, Cycle 7)  Administration: 300 mcg (5/10/2021), 300 mcg (5/24/2021), 300 mcg (6/8/2021), 300 mcg (6/21/2021), 300 mcg (7/6/2021), 300 mcg (8/3/2021), 300 mcg (8/17/2021), 300 mcg (8/31/2021), 300 mcg (9/29/2021), 300 mcg (10/12/2021), 300 mcg (10/26/2021)     11/1/2021 -  Chemotherapy    fluorouracil (ADRUCIL), 300 mg/m2 = 545 mg (75 % of original dose 400 mg/m2), Intravenous, Once, 5 of 10 cycles  Dose modification: 300 mg/m2 (original dose 400 mg/m2, Cycle 1, Reason: Dose Not Tolerated)  Administration: 545 mg (11/1/2021), 545 mg (11/15/2021), 545 mg (12/8/2021), 545 mg (12/21/2021), 545 mg (1/17/2022)  pegfilgrastim (NEULASTA ONPRO), 6 mg, Subcutaneous, Once, 2 of 2 cycles  Administration: 6 mg (11/3/2021), 6 mg (11/17/2021)  bevacizumab (AVASTIN) IVPB, 5 mg/kg = 330 mg, Intravenous, Once, 5 of 10 cycles  Administration: 330 mg (11/1/2021), 330 mg (11/15/2021), 330 mg (12/8/2021), 330 mg (12/21/2021), 330 mg (1/17/2022)  irinotecan (CAMPTOSAR) chemo infusion, 326 mg, Intravenous, Once, 5 of 10 cycles  Administration: 340 mg (11/1/2021), 340 mg (11/15/2021), 340 mg (12/8/2021), 326 mg (12/21/2021), 320 mg (1/17/2022)  leucovorin calcium IVPB, 300 mg/m2 = 543 mg (75 % of original dose 400 mg/m2), Intravenous, Once, 5 of 10 cycles  Dose modification: 300 mg/m2 (original dose 400 mg/m2, Cycle 1, Reason: Dose Not Tolerated)  Administration: 543 mg (11/1/2021), 543 mg (11/15/2021), 543 mg (12/8/2021), 543 mg (12/21/2021), 543 mg (1/17/2022)  fluorouracil (ADRUCIL) ambulatory infusion Soln, 1,200 mg/m2/day = 4,345 mg, Intravenous, Over 46 hours, 5 of 10 cycles  tbo-filgrastim (GRANIX), 300 mcg (100 % of original dose 300 mcg), Subcutaneous, Once, 2 of 7 cycles  Dose modification: 6 mcg (original dose 300 mcg, Cycle 3), 300 mcg (original dose 300 mcg, Cycle 3), 300 mcg (original dose 300 mcg, Cycle 3)  Administration: 300 mcg (12/15/2021), 300 mcg (1/24/2022), 300 mcg (12/16/2021)     Malignant neoplasm metastatic to lung ([facility] [address] )   10/26/2021 Initial Diagnosis    [facility]     11/1/2021 -  Chemotherapy    fluorouracil (ADRUCIL), 300 mg/m2 = 545 mg (75 % of original dose 400 mg/m2), Intravenous, Once, 5 of 10 cycles  Dose modification: 300 mg/m2 (original dose 400 mg/m2, Cycle 1, Reason: Dose Not Tolerated)  Administration: 545 mg (11/1/2021), 545 mg (11/15/2021), 545 mg (12/8/2021), 545 mg (12/21/2021), 545 mg (1/17/2022)  pegfilgrastim (Josephine Collado), 6 mg, Subcutaneous, Once, 2 of 2 cycles  Administration: 6 mg (11/3/2021), 6 mg (11/17/2021)  bevacizumab (AVASTIN) IVPB, 5 mg/kg = 330 mg, Intravenous, Once, 5 of 10 cycles  Administration: 330 mg (11/1/2021), 330 mg (11/15/2021), 330 mg (12/8/2021), 330 mg (12/21/2021), 330 mg (1/17/2022)  irinotecan (CAMPTOSAR) chemo infusion, 326 mg, Intravenous, Once, 5 of 10 cycles  Administration: 340 mg (11/1/2021), 340 mg (11/15/2021), 340 mg (12/8/2021), 326 mg (12/21/2021), 320 mg (1/17/2022)  leucovorin calcium IVPB, 300 mg/m2 = 543 mg (75 % of original dose 400 mg/m2), Intravenous, Once, 5 of 10 cycles  Dose modification: 300 mg/m2 (original dose 400 mg/m2, Cycle 1, Reason: Dose Not Tolerated)  Administration: 543 mg (11/1/2021), 543 mg (11/15/2021), 543 mg (12/8/2021), 543 mg (12/21/2021), 543 mg (1/17/2022)  fluorouracil (ADRUCIL) ambulatory infusion Soln, 1,200 mg/m2/day = 4,345 mg, Intravenous, Over 46 hours, 5 of 10 cycles  tbo-filgrastim (GRANIX), 300 mcg (100 % of original dose 300 mcg), Subcutaneous, Once, 2 of 7 cycles  Dose modification: 6 mcg (original dose 300 mcg, Cycle 3), 300 mcg (original dose 300 mcg, Cycle 3), 300 mcg (original dose 300 mcg, Cycle 3)  Administration: 300 mcg (12/15/2021), 300 mcg (1/24/2022), 300 mcg (12/16/2021)     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[address] )   10/26/2021 Initial Diagnosis    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[address] )     11/1/2021 -  Chemotherapy    fluorouracil (ADRUCIL), 300 mg/m2 = 545 mg (75 % of original dose 400 mg/m2), Intravenous, Once, 5 of 10 cycles  Dose modification: 300 mg/m2 (original dose 400 mg/m2, Cycle 1, Reason: Dose Not Tolerated)  Administration: 545 mg (11/1/2021), 545 mg (11/15/2021), 545 mg (12/8/2021), 545 mg (12/21/2021), 545 mg (1/17/2022)  pegfilgrastim ([place]), 6 mg, Subcutaneous, Once, 2 of 2 cycles  Administration: 6 mg (11/3/2021), 6 mg (11/17/2021)  bevacizumab (AVASTIN) IVPB, 5 mg/kg = 330 mg, Intravenous, Once, 5 of 10 cycles  Administration: 330 mg (11/1/2021), 330 mg (11/15/2021), 330 mg (12/8/2021), 330 mg (12/21/2021), 330 mg (1/17/2022)  irinotecan (CAMPTOSAR) chemo infusion, 326 mg, Intravenous, Once, 5 of 10 cycles  Administration: 340 mg (11/1/2021), 340 mg (11/15/2021), 340 mg (12/8/2021), 326 mg (12/21/2021), 320 mg (1/17/2022)  leucovorin calcium IVPB, 300 mg/m2 = 543 mg (75 % of original dose 400 mg/m2), Intravenous, Once, 5 of 10 cycles  Dose modification: 300 mg/m2 (original dose 400 mg/m2, Cycle 1, Reason: Dose Not Tolerated)  Administration: 543 mg (11/1/2021), 543 mg (11/15/2021), 543 mg (12/8/2021), 543 mg (12/21/2021), 543 mg (1/17/2022)  fluorouracil (ADRUCIL) ambulatory infusion Soln, 1,200 mg/m2/day = 4,345 mg, Intravenous, Over 46 hours, 5 of 10 cycles  tbo-filgrastim (GRANIX), 300 mcg (100 % of original dose 300 mcg), Subcutaneous, Once, 2 of 7 cycles  Dose modification: 6 mcg (original dose 300 mcg, Cycle 3), 300 mcg (original dose 300 mcg, Cycle 3), 300 mcg (original dose 300 mcg, Cycle 3)  Administration: 300 mcg (12/15/2021), 300 mcg (1/24/2022), 300 mcg (12/16/2021)         ROS:  01/26/22 Reviewed 12 systems: See symptoms in HPI  Presently no other neurological, cardiac, pulmonary, GI and  symptoms other than listed in HPI     Other symptoms are in HPI  No  fever, chills, bleeding, bone pains, skin rash,  arthritis, claudication and gait problem  No frequent infections  Not unusually sensitive to heat or cold  No swelling of the ankles   No swollen glands  Patient is  anxious  /62 (BP Location: Left arm, Patient Position: Sitting, Cuff Size: Adult)   Pulse 58   Temp 97 9 °F (36 6 °C) (Temporal)   Resp 16   Ht 5' 9 02" (1 753 m)   Wt 56 7 kg (125 lb)   SpO2 95%   BMI 18 45 kg/m²     Physical Exam:    Patient is alert and oriented  Patient is not in distress  Vital signs are above  There is no icterus, no oral thrush, no palpable neck mass, clear lung fields, regular heart rate, abdomen  soft and non tender,  no ascites, no edema of ankles, no calf tenderness, no objective focal neurological deficit, no skin rash, no palpable lymphadenopathy in the neck and axillary areas,  no clubbing  Patient is anxious  Performance status 2      IMAGING:    IMPRESSION:     Findings consistent with a mixed treatment response as evidenced by interval progression of pulmonary metastases and redemonstrated omental metastases, some of which appear decreased in size while others remain not significantly changed      Uncomplicated colonic diverticulosis      Cholelithiasis      The study was marked in EPIC for significant notification            Workstation performed: [id number]          Imaging    CT chest abdomen pelvis w contrast (Order: [id number]) - 10/14/2021      LABS:    Results for orders placed or performed in visit on 01/14/22   Comprehensive metabolic panel   Result Value Ref Range    Sodium 139 136 - 145 mmol/L    Potassium 3 8 3 5 - 5 3 mmol/L    Chloride 104 100 - 108 mmol/L    CO2 26 21 - 32 mmol/L    ANION GAP 9 4 - 13 mmol/L    BUN 15 5 - 25 mg/dL    Creatinine 1 15 0 60 - 1 30 mg/dL    Glucose, Fasting 79 65 - 99 mg/dL    Calcium 9 0 8 3 - 10 1 mg/dL    Corrected Calcium 10 0 8 3 - 10 1 mg/dL    AST 32 5 - 45 U/L    ALT 27 12 - 78 U/L    Alkaline Phosphatase 248 (H) 46 - 116 U/L    Total Protein 6 8 6 4 - 8 2 g/dL    Albumin 2 7 (L) 3 5 - 5 0 g/dL    Total Bilirubin 0 46 0 20 - 1 00 mg/dL    eGFR 60 ml/min/1 73sq m   CBC and differential   Result Value Ref Range    WBC 8 43 4 31 - 10 16 Thousand/uL    RBC 3 95 3 88 - 5 62 Million/uL    Hemoglobin 12 0 12 0 - 17 0 g/dL    Hematocrit 38 9 36 5 - 49 3 %    MCV 99 (H) 82 - 98 fL    MCH 30 4 26 8 - 34 3 pg    MCHC 30 8 (L) 31 4 - 37 4 g/dL    RDW 17 3 (H) 11 6 - 15 1 %    MPV 10 2 8 9 - 12 7 fL    Platelets 782 (H) 268 - 390 Thousands/uL    nRBC 0 /100 WBCs    Neutrophils Relative 49 43 - 75 %    Immat GRANS % 2 0 - 2 %    Lymphocytes Relative 27 14 - 44 %    Monocytes Relative 19 (H) 4 - 12 %    Eosinophils Relative 2 0 - 6 %    Basophils Relative 1 0 - 1 %    Neutrophils Absolute 4 16 1 85 - 7 62 Thousands/µL    Immature Grans Absolute 0 14 0 00 - 0 20 Thousand/uL    Lymphocytes Absolute 2 28 0 60 - 4 47 Thousands/µL    Monocytes Absolute 1 57 (H) 0 17 - 1 22 Thousand/µL    Eosinophils Absolute 0 20 0 00 - 0 61 Thousand/µL    Basophils Absolute 0 08 0 00 - 0 10 Thousands/µL     Labs, Imaging, & Other studies:   All pertinent labs and imaging studies were personally reviewed    Lab Results   Component Value Date     07/30/2015    K 3 8 01/14/2022     01/14/2022    CO2 26 01/14/2022    ANIONGAP 8 07/30/2015    BUN 15 01/14/2022    CREATININE 1 15 01/14/2022    GLUCOSE 89 07/30/2015    GLUF 79 01/14/2022    CALCIUM 9 0 01/14/2022    CORRECTEDCA 10 0 01/14/2022    AST 32 01/14/2022    ALT 27 01/14/2022    ALKPHOS 248 (H) 01/14/2022    PROT 6 8 07/30/2015    BILITOT 0 59 07/30/2015    EGFR 60 01/14/2022     Lab Results   Component Value Date    WBC 8 43 01/14/2022    HGB 12 0 01/14/2022    HCT 38 9 01/14/2022    MCV 99 (H) 01/14/2022     (H) 01/14/2022       Reviewed test results and discussed with patient  Assessment and plan:   Patient is here with his wife  Patient is on systemic chemotherapy for 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 diagnosed by biopsy from left lung in December 2020 and also he had omental mass biopsy  Initial treatment was FOLFOX plus Avastin and now he is on 5 FU plus leucovorin and Avastin  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 and was symptomatic from that  He will be going for CT scan  He feels very tired  He has lost weight  He will try nutritional supplements  Symptoms of GERD and he was taking Pepcid  Now he takes Prilosec  in the morning and Pepcid in the evening and is less symptomatic from GERD  For occasional bowel cramps he takes Bentyl and states that helps  On 10/27/2020 patient had EGD and colonoscopy and 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  Negative H pylori and in spite of that patient was given antibiotic therapy against H pylori because of MALT lymphoma  Physical examination and test results are as recorded and discussed   No change in systemic therapy but to adjust the dose because of weight loss  Nutritional supplements  Renewed Bentyl  Ordered CT scans   All discussed in detail  Questions answered  Discussed the importance of eating healthy foods, activities as tolerated and health screening tests     Goal will be response and prolongation of survival   Patient is capable of self-care  Discussed precautions against coronavirus     Patient will continue to follow with his primary physician and other consultants  See diagnoses, instructions and orders below  1  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[address] )    - CT chest abdomen pelvis w contrast; Future    2  Malignant neoplasm metastatic to lung, unspecified laterality ([address] )    - CT chest abdomen pelvis w contrast; Future    3  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)    - CT chest abdomen pelvis w contrast; Future    4  Chemotherapy induced neutropenia (HCC)      5  Chemotherapy-induced nausea      6  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[address] )      7  MALT (mucosa associated lymphoid tissue)- Gastric ([address] )      8  Neoplastic malignant related fatigue    9   Chronic obstructive pulmonary disease, unspecified COPD type ([address] )      10  Moderate protein-calorie malnutrition ([address] )      11  Generalized abdominal pain    - dicyclomine (BENTYL) 20 mg tablet; 1 tablet every 6 hours as needed abdominal cramps  Dispense: 90 tablet; Refill: 1      Renewed Bentyl  Ordered CT scans  No change in therapy in the meantime  Follow-up in 1 month  Patient voiced understanding and  is in agreement  Counseling / Coordination of Care      Provided counseling and support

## 2022-01-31 NOTE — PROGRESS NOTES
Pt tolerated treatment without incident  5FU CADD pump connected and infusing as ordered  AVS provided  Pt left unit in stable condition

## 2022-01-31 NOTE — TELEPHONE ENCOUNTER
Patient called stating a prescription for potassium was supposed to be called in for him  He was just at the pharmacy and they had not received anything yet  Please call to let him know when it has been sent   Call back number [phone]

## 2022-01-31 NOTE — TELEPHONE ENCOUNTER
Returned telephone call spoke with pt  Dr Ajay Hickman just signed the rx and it goes electronically to pharmacy  Pt stated he will picked it tomorrow

## 2022-01-31 NOTE — PROGRESS NOTES
Pt arrived to unit without complaint  K=3 2 and alk phos=[id number]  Spoke with Freddy Hernandez RN via teams  Per Dr Mark Anthony Carver ok to treat today  Dr Mark Anthony Carver will send a script for pts pharmacy for 10Meqs of K+ TID for 10 days  One dose of K+ given today at infusion center

## 2022-02-02 NOTE — PLAN OF CARE
Problem: Knowledge Deficit  Goal: Patient/family/caregiver demonstrates understanding of disease process, treatment plan, medications, and discharge instructions  Description: Complete learning assessment and assess knowledge base    Interventions:  - Provide teaching at level of understanding  - Provide teaching via preferred learning methods  2/2/2022 1043 by Ariadne Chen RN  Outcome: Progressing  2/2/2022 1042 by Ariadne Chen, RN  Outcome: Progressing

## 2022-02-02 NOTE — PROGRESS NOTES
Pt presents for CADD disconnect  CADD reservoir volume 0mL  No new meds or concerns  Pt tolerated infusion without adverse reaction  Excellent blood return via port a cath  Future appointments discussed, avs declined

## 2022-02-07 NOTE — PROGRESS NOTES
Patient here for Granix, afebrile  Tolerated without complications  AVS declined, aware of next appointment, left unit in stable condition

## 2022-02-11 NOTE — TELEPHONE ENCOUNTER
Reviewed critical WBC with Dr Mendy Samuels  Patient to have STAT CBCD repeated on Monday 2/14/22 prior to infusion appointment at 8:30  Order for STAT CBCD will be placed    Treatment plan will be made after labs are resulted  Neutropenic precautions reviewed with patient - he verbalized understanding  Her will go to [facility] for labs

## 2022-02-14 NOTE — TELEPHONE ENCOUNTER
ANC today = 1240  Delay chemo today x 1 week  Added granix SQ for day 9 in addition to day 8  Infusion center aware and will review with patient    He will have labs repeated prior to treatment next week

## 2022-02-14 NOTE — PROGRESS NOTES
Chemo held today due to labs outside parameters  Pt scheduled to return on 2/23 for next chemo infusion  Pt is agreeable to plan   Declined AVS

## 2022-02-16 NOTE — PROGRESS NOTES
Yesterday afternoon I spoke with patient about CT scan results with some disease progression and also a lesion in the small bowel and possibility of partial small-bowel obstruction  He said he has not been having problem with bowel movement and he had good bowel movement yesterday morning and is passing gas  Sometime he use stool softener  He said he will discuss more at the time of his next visit in the office  I told him if he developed abdominal pain, nausea, vomiting, abdominal distension he should report to the emergency room  I wanted to send him to his gastroenterologist but he said he is not having any GI symptoms  I forwarded the report to Dr Katie Russell

## 2022-02-21 NOTE — TELEPHONE ENCOUNTER
Patient calling to confirm his treatment appointment  He states there is a discrepency with his treatment plan  Cb Wilson He can be reached at [phone]

## 2022-02-21 NOTE — TELEPHONE ENCOUNTER
Confirmed with Dr Tessa Yanez that patient is not getting treatment this week    He is scheduled to see Dr Tessa Yanez on 2/23/22  I reviewed this with the patient  He verbalized understanding

## 2022-02-23 NOTE — PROGRESS NOTES
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 and patient has been on FOLFIRI plus Avastin and prior to that she was on FOLFOX plus Avastin  There has been some increase in size of the lung nodules  Disease could be progressing and I could give him Lonsurf or Stivarga or FOLFIRI plus Cyramza  These drugs are for large bowel of cancer and I explained that to him because his cancer is  of small bowel   His cancer tested positive and NRAS and negative for HER2 and low TMB and stable MSI  He is on chemotherapy holidays and is actually feeling better being off chemotherapy  I suggested seeing Dr Adela Wayne at [facility] for consultation  I sent a text message to Dr Adela Wayne with patient's demographics and requested that is office could call the patient directly on an appointment  Most recent CT scan of chest abdomen and pelvis showed an intraluminal polypoid lesion in the small bowel but patient is not having abdominal/bowel symptoms  except for occasional bowel cramps and he takes Bentyl and states that helps  Also couple times a week he uses a stool softener  Initial treatment was FOLFOX plus Avastin and now he is on 5 FU plus leucovorin plus irinotecan and Avastin  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 and was symptomatic from that  He feels less tired off chemotherapy  He has requested renewal of prednisone but his appetite and he is taking nutritional supplements and no further weight loss   Symptoms of GERD and he was taking Pepcid  Now he takes Prilosec  in the morning and Pepcid in the evening and is less symptomatic from GERD  On 10/27/2020 patient had EGD and colonoscopy and 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  Negative H pylori and in spite of that patient was given antibiotic therapy against H pylori because of MALT lymphoma               Current Outpatient Medications:     aluminum-magnesium hydroxide-simethicone (MYLANTA) 200-200-20 mg/5 mL suspension, Take 30 mL by mouth every 4 (four) hours as needed for indigestion or heartburn, Disp: , Rfl:     atorvastatin (LIPITOR) 40 mg tablet, Take 1 tablet (40 mg total) by mouth daily, Disp: 90 tablet, Rfl: 1    Cholecalciferol (VITAMIN D3) 2000 units TABS, Take 2,000 Units by mouth daily , Disp: , Rfl:     cyanocobalamin (VITAMIN B-12) 1,000 mcg tablet, Take 1,000 mcg by mouth daily , Disp: , Rfl:     dicyclomine (BENTYL) 20 mg tablet, 1 tablet every 6 hours as needed abdominal cramps, Disp: 90 tablet, Rfl: 1    dutasteride (AVODART) 0 5 mg capsule, Take 1 capsule (0 5 mg total) by mouth daily at bedtime, Disp: 90 capsule, Rfl: 1    famotidine (PEPCID) 10 mg tablet, Take 10 mg by mouth 2 (two) times a day as needed for heartburn, Disp: , Rfl:     ferrous sulfate 325 (65 Fe) mg tablet, Take 325 mg by mouth daily with breakfast , Disp: , Rfl:     losartan (COZAAR) 25 mg tablet, TAKE ONE TABLET DAILY, Disp: 90 tablet, Rfl: 1    metoprolol tartrate (LOPRESSOR) 25 mg tablet, Take 0 5 tablets (12 5 mg total) by mouth 2 (two) times a day, Disp: 180 tablet, Rfl: 1    Multiple Vitamin (MULTIVITAMIN) tablet, Take 1 tablet by mouth daily , Disp: , Rfl:     Omega-3 Fatty Acids (FISH OIL) 1,000 mg, Take 1,000 mg by mouth daily  , Disp: , Rfl:     omeprazole (PriLOSEC) 20 mg delayed release capsule, Take 1 capsule (20 mg total) by mouth daily On empty stomach 30 min before eating , Disp: 90 capsule, Rfl: 1    ondansetron (ZOFRAN) 4 mg tablet, Take 1 tablet (4 mg total) by mouth every 8 (eight) hours as needed for nausea or vomiting, Disp: 20 tablet, Rfl: 2    potassium chloride (K-DUR,KLOR-CON) 10 mEq tablet, Take 1 tablet (10 mEq total) by mouth 3 (three) times a day For 10 days, Disp: 30 tablet, Rfl: 0    predniSONE 10 mg tablet, Take 1 tablet (10 mg total) by mouth daily, Disp: 30 tablet, Rfl: 2    Allergies Allergen Reactions    Other Other (See Comments)     Liquid lidocaine - couldn't swallow, panicked       Oncology History   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[facility] [address] )   12/17/2020 Initial Diagnosis    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[facility] )     1/11/2021 - 10/26/2021 Chemotherapy    fluorouracil (ADRUCIL), 765 mg, Intravenous, Once, 12 of 12 cycles  Dose modification: 300 mg/m2 (original dose 400 mg/m2, Cycle 5, Reason: Dose Not Tolerated)  Administration: 800 mg (1/11/2021), 800 mg (1/25/2021), 800 mg (2/8/2021), 800 mg (2/22/2021), 600 mg (3/15/2021), 600 mg (3/29/2021), 600 mg (4/19/2021), 600 mg (5/3/2021), 600 mg (5/17/2021), 600 mg (6/1/2021), 600 mg (6/14/2021), 600 mg (6/28/2021)  fluorouracil (ADRUCIL), 600 mg (original dose 400 mg/m2), Intravenous, Once, 6 of 11 cycles  Dose modification: 300 mg/m2 (original dose 400 mg/m2, Cycle 13, Reason: Dose Not Tolerated)  Administration: 600 mg (7/26/2021), 600 mg (8/9/2021), 600 mg (8/23/2021), 600 mg (9/20/2021), 600 mg (10/4/2021), 600 mg (10/18/2021)  pegfilgrastim (Marianna Mccullough), 6 mg, Subcutaneous, Once, 2 of 2 cycles  Administration: 6 mg (2/10/2021), 6 mg (2/24/2021)  bevacizumab (AVASTIN) IVPB, 5 mg/kg = 352 5 mg (100 % of original dose 5 mg/kg), Intravenous, Once, 14 of 19 cycles  Dose modification: 5 mg/kg (original dose 5 mg/kg, Cycle 5)  Administration: 352 5 mg (3/15/2021), 352 5 mg (3/29/2021), 352 5 mg (4/19/2021), 352 5 mg (5/3/2021), 352 5 mg (5/17/2021), 352 5 mg (6/1/2021), 352 5 mg (6/14/2021), 352 5 mg (6/28/2021), 352 5 mg (7/26/2021), 352 5 mg (8/9/2021), 352 5 mg (8/23/2021), 352 5 mg (9/20/2021), 352 5 mg (10/4/2021), 352 5 mg (10/18/2021)  leucovorin calcium IVPB, 764 mg, Intravenous, Once, 18 of 23 cycles  Dose modification: 300 mg/m2 (original dose 400 mg/m2, Cycle 5, Reason: Dose Not Tolerated)  Administration: 800 mg (1/11/2021), 800 mg (1/25/2021), 800 mg (2/8/2021), 800 mg (2/22/2021), 600 mg (3/15/2021), 600 mg (3/29/2021), 600 mg (4/19/2021), 600 mg (5/3/2021), 600 mg (5/17/2021), 600 mg (6/1/2021), 600 mg (6/14/2021), 600 mg (6/28/2021), 600 mg (7/26/2021), 600 mg (8/9/2021), 600 mg (8/23/2021), 600 mg (9/20/2021), 600 mg (10/4/2021), 600 mg (10/18/2021)  oxaliplatin (ELOXATIN) chemo infusion, 85 mg/m2 = 162 35 mg, Intravenous, Once, 12 of 12 cycles  Dose modification: 65 mg/m2 (original dose 85 mg/m2, Cycle 5, Reason: Dose Not Tolerated)  Administration: 162 35 mg (1/11/2021), 162 35 mg (1/25/2021), 162 35 mg (2/8/2021), 162 35 mg (2/22/2021), 124 15 mg (3/15/2021), 124 15 mg (3/29/2021), 124 15 mg (4/19/2021), 124 15 mg (5/3/2021), 124 15 mg (5/17/2021), 124 15 mg (6/1/2021), 124 15 mg (6/14/2021), 124 15 mg (6/28/2021)  fluorouracil (ADRUCIL) ambulatory infusion Soln, 1,200 mg/m2/day = 4,585 mg, Intravenous, Over 46 hours, 18 of 23 cycles  tbo-filgrastim (GRANIX), 300 mcg (100 % of original dose 300 mcg), Subcutaneous, Once, 12 of 17 cycles  Dose modification: 300 mcg (original dose 300 mcg, Cycle 7)  Administration: 300 mcg (5/10/2021), 300 mcg (5/24/2021), 300 mcg (6/8/2021), 300 mcg (6/21/2021), 300 mcg (7/6/2021), 300 mcg (8/3/2021), 300 mcg (8/17/2021), 300 mcg (8/31/2021), 300 mcg (9/29/2021), 300 mcg (10/12/2021), 300 mcg (10/26/2021)     11/1/2021 -  Chemotherapy    fluorouracil (ADRUCIL), 300 mg/m2 = 545 mg (75 % of original dose 400 mg/m2), Intravenous, Once, 6 of 10 cycles  Dose modification: 300 mg/m2 (original dose 400 mg/m2, Cycle 1, Reason: Dose Not Tolerated)  Administration: 545 mg (11/1/2021), 545 mg (11/15/2021), 545 mg (12/8/2021), 545 mg (12/21/2021), 545 mg (1/17/2022), 500 mg (1/31/2022)  pegfilgrastim ([employer]), 6 mg, Subcutaneous, Once, 2 of 2 cycles  Administration: 6 mg (11/3/2021), 6 mg (11/17/2021)  bevacizumab (AVASTIN) IVPB, 5 mg/kg = 330 mg, Intravenous, Once, 6 of 10 cycles  Administration: 330 mg (11/1/2021), 330 mg (11/15/2021), 330 mg (12/8/2021), 330 mg (12/21/2021), 330 mg (1/17/2022), 282 5 mg (1/31/2022)  irinotecan (CAMPTOSAR) chemo infusion, 326 mg, Intravenous, Once, 6 of 10 cycles  Administration: 340 mg (11/1/2021), 340 mg (11/15/2021), 340 mg (12/8/2021), 326 mg (12/21/2021), 320 mg (1/17/2022), 300 mg (1/31/2022)  leucovorin calcium IVPB, 300 mg/m2 = 543 mg (75 % of original dose 400 mg/m2), Intravenous, Once, 6 of 10 cycles  Dose modification: 300 mg/m2 (original dose 400 mg/m2, Cycle 1, Reason: Dose Not Tolerated)  Administration: 543 mg (11/1/2021), 543 mg (11/15/2021), 543 mg (12/8/2021), 543 mg (12/21/2021), 543 mg (1/17/2022), 500 mg (1/31/2022)  fluorouracil (ADRUCIL) ambulatory infusion Soln, 1,200 mg/m2/day = 4,345 mg, Intravenous, Over 46 hours, 6 of 10 cycles  tbo-filgrastim (GRANIX), 300 mcg (100 % of original dose 300 mcg), Subcutaneous, Once, 3 of 7 cycles  Dose modification: 6 mcg (original dose 300 mcg, Cycle 3), 300 mcg (original dose 300 mcg, Cycle 3), 300 mcg (original dose 300 mcg, Cycle 3)  Administration: 300 mcg (12/15/2021), 300 mcg (1/24/2022), 300 mcg (2/7/2022), 300 mcg (12/16/2021)     Malignant neoplasm metastatic to lung ([facility] [address] )   10/26/2021 Initial Diagnosis    Malignant neoplasm metastatic to lung ([facility] [address] )     11/1/2021 -  Chemotherapy    fluorouracil (ADRUCIL), 300 mg/m2 = 545 mg (75 % of original dose 400 mg/m2), Intravenous, Once, 6 of 10 cycles  Dose modification: 300 mg/m2 (original dose 400 mg/m2, Cycle 1, Reason: Dose Not Tolerated)  Administration: 545 mg (11/1/2021), 545 mg (11/15/2021), 545 mg (12/8/2021), 545 mg (12/21/2021), 545 mg (1/17/2022), 500 mg (1/31/2022)  pegfilgrastim ([place]), 6 mg, Subcutaneous, Once, 2 of 2 cycles  Administration: 6 mg (11/3/2021), 6 mg (11/17/2021)  bevacizumab (AVASTIN) IVPB, 5 mg/kg = 330 mg, Intravenous, Once, 6 of 10 cycles  Administration: 330 mg (11/1/2021), 330 mg (11/15/2021), 330 mg (12/8/2021), 330 mg (12/21/2021), 330 mg (1/17/2022), 282 5 mg (1/31/2022)  irinotecan (CAMPTOSAR) chemo infusion, 326 mg, Intravenous, Once, 6 of 10 cycles  Administration: 340 mg (11/1/2021), 340 mg (11/15/2021), 340 mg (12/8/2021), 326 mg (12/21/2021), 320 mg (1/17/2022), 300 mg (1/31/2022)  leucovorin calcium IVPB, 300 mg/m2 = 543 mg (75 % of original dose 400 mg/m2), Intravenous, Once, 6 of 10 cycles  Dose modification: 300 mg/m2 (original dose 400 mg/m2, Cycle 1, Reason: Dose Not Tolerated)  Administration: 543 mg (11/1/2021), 543 mg (11/15/2021), 543 mg (12/8/2021), 543 mg (12/21/2021), 543 mg (1/17/2022), 500 mg (1/31/2022)  fluorouracil (ADRUCIL) ambulatory infusion Soln, 1,200 mg/m2/day = 4,345 mg, Intravenous, Over 46 hours, 6 of 10 cycles  tbo-filgrastim (GRANIX), 300 mcg (100 % of original dose 300 mcg), Subcutaneous, Once, 3 of 7 cycles  Dose modification: 6 mcg (original dose 300 mcg, Cycle 3), 300 mcg (original dose 300 mcg, Cycle 3), 300 mcg (original dose 300 mcg, Cycle 3)  Administration: 300 mcg (12/15/2021), 300 mcg (1/24/2022), 300 mcg (2/7/2022), 300 mcg (12/16/2021)     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[address] )   10/26/2021 Initial Diagnosis    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[address] )     11/1/2021 -  Chemotherapy    fluorouracil (ADRUCIL), 300 mg/m2 = 545 mg (75 % of original dose 400 mg/m2), Intravenous, Once, 6 of 10 cycles  Dose modification: 300 mg/m2 (original dose 400 mg/m2, Cycle 1, Reason: Dose Not Tolerated)  Administration: 545 mg (11/1/2021), 545 mg (11/15/2021), 545 mg (12/8/2021), 545 mg (12/21/2021), 545 mg (1/17/2022), 500 mg (1/31/2022)  pegfilgrastim (Shabbir Hyun), 6 mg, Subcutaneous, Once, 2 of 2 cycles  Administration: 6 mg (11/3/2021), 6 mg (11/17/2021)  bevacizumab (AVASTIN) IVPB, 5 mg/kg = 330 mg, Intravenous, Once, 6 of 10 cycles  Administration: 330 mg (11/1/2021), 330 mg (11/15/2021), 330 mg (12/8/2021), 330 mg (12/21/2021), 330 mg (1/17/2022), 282 5 mg (1/31/2022)  irinotecan (CAMPTOSAR) chemo infusion, 326 mg, Intravenous, Once, 6 of 10 cycles  Administration: 340 mg (11/1/2021), 340 mg (11/15/2021), 340 mg (12/8/2021), 326 mg (12/21/2021), 320 mg (1/17/2022), 300 mg (1/31/2022)  leucovorin calcium IVPB, 300 mg/m2 = 543 mg (75 % of original dose 400 mg/m2), Intravenous, Once, 6 of 10 cycles  Dose modification: 300 mg/m2 (original dose 400 mg/m2, Cycle 1, Reason: Dose Not Tolerated)  Administration: 543 mg (11/1/2021), 543 mg (11/15/2021), 543 mg (12/8/2021), 543 mg (12/21/2021), 543 mg (1/17/2022), 500 mg (1/31/2022)  fluorouracil (ADRUCIL) ambulatory infusion Soln, 1,200 mg/m2/day = 4,345 mg, Intravenous, Over 46 hours, 6 of 10 cycles  tbo-filgrastim (GRANIX), 300 mcg (100 % of original dose 300 mcg), Subcutaneous, Once, 3 of 7 cycles  Dose modification: 6 mcg (original dose 300 mcg, Cycle 3), 300 mcg (original dose 300 mcg, Cycle 3), 300 mcg (original dose 300 mcg, Cycle 3)  Administration: 300 mcg (12/15/2021), 300 mcg (1/24/2022), 300 mcg (2/7/2022), 300 mcg (12/16/2021)         ROS:  02/23/22 Reviewed 12 systems: See symptoms in HPI  Presently no other neurological, cardiac, pulmonary, GI and  symptoms other than listed in HPI     Other symptoms are in HPI  No symptoms like  fever, chills, bleeding, bone pains, skin rash, weight loss,  arthritis, claudication and gait problem  No frequent infections  Not unusually sensitive to heat or cold  No swelling of the ankles  No swollen glands  Patient is  anxious  Ht 5' 9 02" (1 753 m)   Wt 62 6 kg (138 lb)   BMI 20 37 kg/m²   Temperature 97 8 degrees  Blood pressure 112/64  Pulse 94  Respirations 16  Oxygen saturation 94  Physical Exam:  Alert and oriented and not in distress  Stable vitals  No icterus, no oral thrush, no palpable neck mass, clear lung fields, regular heart rate, abdomen  soft and non tender,  no ascites, no distension, bowel sounds present, no edema of ankles, no calf tenderness, no objective focal neurological deficit, no skin rash, no palpable lymphadenopathy in the neck and axillary areas,  no clubbing  Patient is anxious  Performance status 2  IMAGING:    IMPRESSION:     Findings consistent with a mixed treatment response as evidenced by interval progression of pulmonary metastases and redemonstrated omental metastases, some of which appear decreased in size while others remain not significantly changed      Uncomplicated colonic diverticulosis      Cholelithiasis      The study was marked in EPIC for significant notification            Workstation performed: [id number]          Imaging    CT chest abdomen pelvis w contrast (Order: [id number]) - 10/14/2021  OSSEOUS STRUCTURES:  Degenerative changes throughout the spine      IMPRESSION:        1  Bilateral pulmonary metastases, a few which have increased in size with index measurements provided above  2   Intraluminal polypoid small bowel lesion causing with dilatation of a short segment of upstream small bowel, raising concern for at least partial small bowel obstruction  3   No change in low attenuation omental tumor    4   8 mm probable side branch IPMN in the uncinate process of the pancreas      The study was marked in EPIC for immediate notification      Workstation performed: [id number]          Imaging    CT chest abdomen pelvis w contrast (Order: [id number]) - 2/9/2022      LABS:    Results for orders placed or performed during the hospital encounter of 02/14/22   CBC and differential   Result Value Ref Range    WBC 3 82 (L) 4 31 - 10 16 Thousand/uL    RBC 3 35 (L) 3 88 - 5 62 Million/uL    Hemoglobin 10 3 (L) 12 0 - 17 0 g/dL    Hematocrit 31 9 (L) 36 5 - 49 3 %    MCV 95 82 - 98 fL    MCH 30 7 26 8 - 34 3 pg    MCHC 32 3 31 4 - 37 4 g/dL    RDW 17 8 (H) 11 6 - 15 1 %    MPV 11 2 8 9 - 12 7 fL    Platelets 955 697 - 141 Thousands/uL    nRBC 0 /100 WBCs    Neutrophils Relative 33 (L) 43 - 75 %    Immat GRANS % 5 (H) 0 - 2 %    Lymphocytes Relative 36 14 - 44 %    Monocytes Relative 24 (H) 4 - 12 %    Eosinophils Relative 1 0 - 6 %    Basophils Relative 1 0 - 1 % Neutrophils Absolute 1 24 (L) 1 85 - 7 62 Thousands/µL    Immature Grans Absolute 0 19 0 00 - 0 20 Thousand/uL    Lymphocytes Absolute 1 41 0 60 - 4 47 Thousands/µL    Monocytes Absolute 0 93 0 17 - 1 22 Thousand/µL    Eosinophils Absolute 0 02 0 00 - 0 61 Thousand/µL    Basophils Absolute 0 03 0 00 - 0 10 Thousands/µL     Labs, Imaging, & Other studies:   All pertinent labs and imaging studies were personally reviewed    Lab Results   Component Value Date     07/30/2015    K 3 7 02/11/2022     02/11/2022    CO2 26 02/11/2022    ANIONGAP 8 07/30/2015    BUN 15 02/11/2022    CREATININE 1 07 02/11/2022    GLUCOSE 89 07/30/2015    GLUF 83 02/11/2022    CALCIUM 9 0 02/11/2022    CORRECTEDCA 10 0 02/11/2022    AST 24 02/11/2022    ALT 38 02/11/2022    ALKPHOS 263 (H) 02/11/2022    PROT 6 8 07/30/2015    BILITOT 0 59 07/30/2015    EGFR 66 02/11/2022     Lab Results   Component Value Date    WBC 3 81 (L) 02/14/2022    WBC 3 82 (L) 02/14/2022    HGB 10 3 (L) 02/14/2022    HGB 10 3 (L) 02/14/2022    HCT 31 8 (L) 02/14/2022    HCT 31 9 (L) 02/14/2022    MCV 96 02/14/2022    MCV 95 02/14/2022     02/14/2022     02/14/2022       Reviewed test results and discussed with patient  Assessment and plan: Follow-up visit for 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 and patient has been on FOLFIRI plus Avastin and prior to that she was on FOLFOX plus Avastin  There has been some increase in size of the lung nodules  Disease could be progressing and I could give him Lonsurf or Stivarga or FOLFIRI plus Cyramza  These drugs are for large bowel of cancer and I explained that to him because his cancer is  of small bowel   His cancer tested positive and NRAS and negative for HER2 and low TMB and stable MSI  He is on chemotherapy holidays and is actually feeling better being off chemotherapy    I suggested seeing Dr Abdullahi Solorzano at [facility] for consultation  I sent a text message to Dr Navin Spears with patient's demographics and requested that is office could call the patient directly on an appointment  Most recent CT scan of chest abdomen and pelvis showed an intraluminal polypoid lesion in the small bowel but patient is not having abdominal/bowel symptoms  except for occasional bowel cramps and he takes Bentyl and states that helps  Also couple times a week he uses a stool softener  Initial treatment was FOLFOX plus Avastin and now he is on 5 FU plus leucovorin plus irinotecan and Avastin  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 and was symptomatic from that  He feels less tired off chemotherapy  He has requested renewal of prednisone but his appetite and he is taking nutritional supplements and no further weight loss   Symptoms of GERD and he was taking Pepcid  Now he takes Prilosec  in the morning and Pepcid in the evening and is less symptomatic from GERD  On 10/27/2020 patient had EGD and colonoscopy and 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  Negative H pylori and in spite of that patient was given antibiotic therapy against H pylori because of MALT lymphoma  Physical examination and test results are as recorded and discussed   Plan is as above, a consultation from Dr Navin Spears  Renewed prednisone prescription All discussed in detail  Questions answered  Discussed the importance of eating healthy foods, activities as tolerated and health screening tests     Goal will be response and prolongation of survival   Patient is capable of self-care  Discussed precautions against coronavirus     Patient will continue to follow with his primary physician and other consultants  See diagnoses, instructions and orders below  1  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[facility] [address] )      2  Malignant neoplasm metastatic to lung, unspecified laterality ([facility])    - predniSONE 10 mg tablet;  Take 1 tablet (10 mg total) by mouth daily Dispense: 90 tablet; Refill: 1    3  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 ([address] )      4  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[facility] [address] )      5  MALT (mucosa associated lymphoid tissue)- Gastric ([facility] [address] )      6  Neoplastic malignant related fatigue    - predniSONE 10 mg tablet; Take 1 tablet (10 mg total) by mouth daily  Dispense: 90 tablet; Refill: 1      Port flush every 6 weeks  Chemotherapy is on hold  A consultation  at [facility] from Dr Janay Joe  I sent him a text message  Follow-up in 1 month  Patient voiced understanding and  is in agreement  Counseling / Coordination of Care      Provided counseling and support

## 2022-03-01 NOTE — TELEPHONE ENCOUNTER
Left a message for patient with my direct dial number, Dr Marroquin would like to see the patient in office or virtual tomorrow 03-01

## 2022-03-02 PROBLEM — K63.89 SMALL BOWEL POLYP: Status: ACTIVE | Noted: 2022-01-01

## 2022-03-02 NOTE — LETTER
March 4, 2022     [place] [address]    Patient: Charlee Saeed   YOB: 1945   Date of Visit: 3/2/2022       Dear Dr Mendy Samuels:    Thank you for referring Winsome Bacon to me for evaluation  Below are my notes for this consultation  If you have questions, please do not hesitate to call me  I look forward to following your patient along with you  Sincerely,        Hussain Kim MD        CC: No Recipients  Hussain Kim MD  3/4/2022  9:06 AM  Incomplete  Outpatient Follow up  [address]  [address]  [facility] [address]  Hussain Kim MD  Ph : [phone]  Fax : [phone]  Mobile : [phone]  Email : [email or web address]  Also available on Tiger Text    Charlee Saeed 64 y o  male MRN: [id number]    PCP: Neema Caballero DO  Referring: No referring provider defined for this encounter  Charlee Saeed presented for a follow up visit  My recommendations are included  Please do not hesitate to contact me with any questions you may have  ASSESSMENT AND PLAN:      No problem-specific Assessment & Plan notes found for this encounter  Diagnoses and all orders for this visit:    Tubular adenoma of colon    Tubular adenoma of small intestine  -     EGD with Single Balloon Enteroscopy; Future    MALT (mucosa associated lymphoid tissue)- Gastric ([facility] [address] )  -     EGD with Single Balloon Enteroscopy; Future    Prominent ampulla of Vater    Small bowel polyp  -     EGD with Single Balloon Enteroscopy; Future      64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  He was on chemotherapy but off that at this time  Imaging showed a polypoid lesion in the small bowel with upstream bowel distension  It is unclear what the location of this lesion is  He is completely asymptomatic from a obstruction standpoint  I will review with radiology     Consider single balloon Enteroscopy  Repeat colonoscopy to evaluate for the previous polyps  He is going to [facility] to find out about a therapy    ______________________________________________________________________    SUBJECTIVE:  Jewell Marrufo is a 66 y o  male with finding of MALT lymphoma and then was found to have increasing pulmonary nodules which were biopsied and showed adenocarcinoma metastatic from colorectal primary  He had a CT of the abdomen and the pelvis which showed jejunal thickening possibly the source of the primary malignancy along with omental thickening and nodules  His colonoscopy showed multiple colon polyps which were tubular adenomas  He is off the chemo  He has no nausea, vomiting  No abdominal pain  No bloating  Occasional constipation  May use a stool softener  No weight loss  Has gained about 3 lbs  CT :     EUS: 10/20 : 1  Prominent ampulla/intra duodenal portion of the bile duct  No mass lesion was seen endoscopically or by EUS  The ampulla was not polypoid  Biopsies were done  2  Area of the ulcer in the fundus was healed  Scarred area was seen  Biopsies were done  3  Antral biopsies were done  4  Cholelithiasis  Colonoscopy 10/20 : Residual cecal polyp successfully removed with hot and cold snare polypectomies using EMR technique  Six subcentimeter polyps removed from the colon using cold snare polypectomy  Mild to moderate diverticulosis    REVIEW OF SYSTEMS IS OTHERWISE NEGATIVE        Historical Information   Past Medical History:   Diagnosis Date    Abnormal CT of liver     last assessed: 07/21/14    Anemia     iron def 6/2020 hgb 9 6    BPH (benign prostatic hypertrophy)     Dupuytren contracture     both hands    Erectile dysfunction of non-organic origin     last assessed: 11/27/12    Esophageal reflux     last assessed: 11/11/14    Esophagitis 03/13/2012    Familial hypercholesteremia     last assessed: 06/11/13    Hyperlipidemia     Hypertension  Paroxysmal atrial fibrillation ([facility] [address] )     last assessed: 17 post colonoscopy    Shortness of breath 2020    exertional due to anemia     Past Surgical History:   Procedure Laterality Date    BACK SURGERY      Lower    COLONOSCOPY      CT NEEDLE BIOPSY RETROPERITONEUM  2020    HAND CONTRACTURE RELEASE Left 2017    Procedure: Left hand percutaneous fasciotomy of palm adductor space x 2; index finger PIP joint; ring finger PIP joint; small finger MCP joint and PIP joint  ; splint application;  Surgeon: Jami Gary MD;  [facility];  Service:     HAND SURGERY Bilateral     IR BIOPSY LUNG  2020    IR PORT PLACEMENT  2020     Social History   Social History     Substance and Sexual Activity   Alcohol Use No     Social History     Substance and Sexual Activity   Drug Use No     Social History     Tobacco Use   Smoking Status Former Smoker    Packs/day: 0 50    Years: 3 00    Pack years: 1 50    Types: Cigarettes    Start date:     Quit date:     Years since quittin 2   Smokeless Tobacco Never Used     Family History   Problem Relation Age of Onset    No Known Problems Mother         natural causes    Heart disease Father     Heart attack Brother        Meds/Allergies       Current Outpatient Medications:     aluminum-magnesium hydroxide-simethicone (MYLANTA) 200-200-20 mg/5 mL suspension    atorvastatin (LIPITOR) 40 mg tablet    Cholecalciferol (VITAMIN D3) 2000 units TABS    cyanocobalamin (VITAMIN B-12) 1,000 mcg tablet    dicyclomine (BENTYL) 20 mg tablet    dutasteride (AVODART) 0 5 mg capsule    famotidine (PEPCID) 10 mg tablet    ferrous sulfate 325 (65 Fe) mg tablet    losartan (COZAAR) 25 mg tablet    metoprolol tartrate (LOPRESSOR) 25 mg tablet    Multiple Vitamin (MULTIVITAMIN) tablet    Omega-3 Fatty Acids (FISH OIL) 1,000 mg    omeprazole (PriLOSEC) 20 mg delayed release capsule    ondansetron (ZOFRAN) 4 mg tablet   potassium chloride (K-DUR,KLOR-CON) 10 mEq tablet    predniSONE 10 mg tablet    Allergies   Allergen Reactions    Other Other (See Comments)     Liquid lidocaine - couldn't swallow, panicked           Objective     Blood pressure 134/80, pulse 91, temperature 98 3 °F (36 8 °C), temperature source Probe, weight 62 4 kg (137 lb 9 6 oz)  Body mass index is 20 31 kg/m²  PHYSICAL EXAM:      Physical Exam    Lab Results:   No visits with results within 1 Day(s) from this visit  Latest known visit with results is:   Hospital Outpatient Visit on 02/14/2022   Component Date Value    WBC 02/14/2022 3 82*    RBC 02/14/2022 3 35*    Hemoglobin 02/14/2022 10 3*    Hematocrit 02/14/2022 31 9*    MCV 02/14/2022 95     MCH 02/14/2022 30 7     MCHC 02/14/2022 32 3     RDW 02/14/2022 17 8*    MPV 02/14/2022 11 2     Platelets 44/23/5501 172     nRBC 02/14/2022 0     Neutrophils Relative 02/14/2022 33*    Immat GRANS % 02/14/2022 5*    Lymphocytes Relative 02/14/2022 36     Monocytes Relative 02/14/2022 24*    Eosinophils Relative 02/14/2022 1     Basophils Relative 02/14/2022 1     Neutrophils Absolute 02/14/2022 1 24*    Immature Grans Absolute 02/14/2022 0 19     Lymphocytes Absolute 02/14/2022 1 41     Monocytes Absolute 02/14/2022 0 93     Eosinophils Absolute 02/14/2022 0 02     Basophils Absolute 02/14/2022 0 03          Radiology Results:   CT chest abdomen pelvis w contrast    Result Date: 2/15/2022  Narrative: CT CHEST, ABDOMEN AND PELVIS WITH IV CONTRAST INDICATION:   C17 9: Malignant neoplasm of small intestine, unspecified C78 00: Secondary malignant neoplasm of unspecified lung C78 6: Secondary malignant neoplasm of retroperitoneum and peritoneum  Metastatic adenocarcinoma small bowel  COMPARISON:  Most recent prior imaging CT chest abdomen pelvis dated October 14, 2021  TECHNIQUE: CT examination of the chest, abdomen and pelvis was performed   Axial, sagittal, and coronal 2D reformatted images were created from the source data and submitted for interpretation  Radiation dose length product (DLP) for this visit:  601 mGy-cm   This examination, like all CT scans performed in the [facility], was performed utilizing techniques to minimize radiation dose exposure, including the use of iterative reconstruction and automated exposure control  IV Contrast:  100 mL of iohexol (OMNIPAQUE) Enteric Contrast: Enteric contrast was not administered  FINDINGS: CHEST LUNGS:  Bilateral pulmonary metastases are present, and while many have remained stable in size, a few have increased in size, with an index lesion in the left upper lobe measuring 1 3 x 0 9 cm, previously 1 x 0 8 cm  An index right upper lobe juxtapleural nodule measures 1 2 x 0 9 cm (3, 46) previously 1 1 x 0 9 cm  There are no central obstructing lesions  PLEURA:  Unremarkable  HEART/GREAT VESSELS: Heart is unremarkable for patient's age  Atherosclerotic calcification of the aorta and mild aneurysmal dilatation of the proximal descending aorta unchanged  MEDIASTINUM AND PURVI:  Unremarkable  CHEST WALL AND LOWER NECK:   Right-sided Mediport catheter with tip at the cavoatrial junction  ABDOMEN LIVER/BILIARY TREE:  Few hemangiomas are stable, as well as a few hepatic cysts  No suspicious liver lesions  GALLBLADDER:  There are gallstone(s) within the gallbladder, without pericholecystic inflammatory changes    SPLEEN:  Unremarkable  PANCREAS:  No change in the size or appearance of a cystic lesion in the uncinate process most likely representing a sidebranch IPMN ADRENAL GLANDS:  Unremarkable  KIDNEYS/URETERS:  Bilateral renal cysts unchanged  STOMACH AND BOWEL:  Multiple colonic diverticula  There is dilatation of a short segment of small bowel in the left mid abdomen and pelvis, caused by an intraluminal polypoid lesion (2, 82) with soft tissue and calcifications in the adjacent mesentery compatible with tumor    Distal to the polypoid lesion, the small bowel is normal in caliber  APPENDIX:  No findings to suggest appendicitis  ABDOMINOPELVIC CAVITY:  Again shown is low density tumor in the omentum, centrally and in the right upper quadrant which appears relatively stable  VESSELS:  Atherosclerosis  No aneurysm  PELVIS REPRODUCTIVE ORGANS:  Prostatomegaly with median lobe hypertrophy  URINARY BLADDER:  Unremarkable  ABDOMINAL WALL/INGUINAL REGIONS:  Unremarkable  OSSEOUS STRUCTURES:  Degenerative changes throughout the spine  Impression: 1  Bilateral pulmonary metastases, a few which have increased in size with index measurements provided above  2   Intraluminal polypoid small bowel lesion causing with dilatation of a short segment of upstream small bowel, raising concern for at least partial small bowel obstruction  3   No change in low attenuation omental tumor  4   8 mm probable side branch IPMN in the uncinate process of the pancreas  The study was marked in [facility] for immediate notification  Workstation performed: [id number]       Chayo Sellers MD  3/2/2022 10:48 AM  Incomplete  Outpatient Follow up  [address]  [address]  [facility] [address]  Chayo Sellers MD  Ph : [phone]  Fax : [phone]  Mobile : [phone]  Email : [email or web address]  Also available on [employer] Text    Shnanon Escalona 64 y o  male MRN: [id number]    PCP: Maikel Barton DO  Referring: No referring provider defined for this encounter  [place] Escalona presented for a follow up visit  My recommendations are included  Please do not hesitate to contact me with any questions you may have  ASSESSMENT AND PLAN:      No problem-specific Assessment & Plan notes found for this encounter  Diagnoses and all orders for this visit:    Tubular adenoma of colon    Tubular adenoma of small intestine      Review with radiology  Enteroscopy  Repeat colonoscopy     He is going to [facility] to find out about a therapy    ______________________________________________________________________    SUBJECTIVE:  Rox Sampson is a 66 y o  male with finding of MALT lymphoma and then was found to have increasing pulmonary nodules which were biopsied and showed adenocarcinoma metastatic from colorectal primary  He had a CT of the abdomen and the pelvis which showed jejunal thickening possibly the source of the primary malignancy along with omental thickening and nodules  His colonoscopy showed multiple colon polyps which were tubular adenomas  He is off the chemo  He has no nausea, vomiting  No abdominal pain  No bloating  Occasional constipation  May use a stool softener  No weight loss  Has gained about 3 lbs  CT :     EUS: 10/20 : 1  Prominent ampulla/intra duodenal portion of the bile duct  No mass lesion was seen endoscopically or by EUS  The ampulla was not polypoid  Biopsies were done  2  Area of the ulcer in the fundus was healed  Scarred area was seen  Biopsies were done  3  Antral biopsies were done  4  Cholelithiasis  Colonoscopy 10/20 : Residual cecal polyp successfully removed with hot and cold snare polypectomies using EMR technique  Six subcentimeter polyps removed from the colon using cold snare polypectomy  Mild to moderate diverticulosis    REVIEW OF SYSTEMS IS OTHERWISE NEGATIVE        Historical Information   Past Medical History:   Diagnosis Date    Abnormal CT of liver     last assessed: 07/21/14    Anemia     iron def 6/2020 hgb 9 6    BPH (benign prostatic hypertrophy)     Dupuytren contracture     both hands    Erectile dysfunction of non-organic origin     last assessed: 11/27/12    Esophageal reflux     last assessed: 11/11/14    Esophagitis 03/13/2012    Familial hypercholesteremia     last assessed: 06/11/13    Hyperlipidemia     Hypertension     Paroxysmal atrial fibrillation ([facility] [address] )     last assessed: 04/06/17 post colonoscopy  Shortness of breath     exertional due to anemia     Past Surgical History:   Procedure Laterality Date    BACK SURGERY      Lower    COLONOSCOPY      CT NEEDLE BIOPSY RETROPERITONEUM  2020    HAND CONTRACTURE RELEASE Left 2017    Procedure: Left hand percutaneous fasciotomy of palm adductor space x 2; index finger PIP joint; ring finger PIP joint; small finger MCP joint and PIP joint  ; splint application;  Surgeon: Allan Ludwig MD;  [facility];  Service:     HAND SURGERY Bilateral     IR BIOPSY LUNG  2020    IR PORT PLACEMENT  2020     Social History   Social History     Substance and Sexual Activity   Alcohol Use No     Social History     Substance and Sexual Activity   Drug Use No     Social History     Tobacco Use   Smoking Status Former Smoker    Packs/day: 0 50    Years: 3 00    Pack years: 1 50    Types: Cigarettes    Start date:     Quit date:     Years since quittin 2   Smokeless Tobacco Never Used     Family History   Problem Relation Age of Onset    No Known Problems Mother         natural causes    Heart disease Father     Heart attack Brother        Meds/Allergies       Current Outpatient Medications:     aluminum-magnesium hydroxide-simethicone (MYLANTA) 200-200-20 mg/5 mL suspension    atorvastatin (LIPITOR) 40 mg tablet    Cholecalciferol (VITAMIN D3) 2000 units TABS    cyanocobalamin (VITAMIN B-12) 1,000 mcg tablet    dicyclomine (BENTYL) 20 mg tablet    dutasteride (AVODART) 0 5 mg capsule    famotidine (PEPCID) 10 mg tablet    ferrous sulfate 325 (65 Fe) mg tablet    losartan (COZAAR) 25 mg tablet    metoprolol tartrate (LOPRESSOR) 25 mg tablet    Multiple Vitamin (MULTIVITAMIN) tablet    Omega-3 Fatty Acids (FISH OIL) 1,000 mg    omeprazole (PriLOSEC) 20 mg delayed release capsule    ondansetron (ZOFRAN) 4 mg tablet    potassium chloride (K-DUR,KLOR-CON) 10 mEq tablet    predniSONE 10 mg tablet    Allergies   Allergen Reactions    Other Other (See Comments)     Liquid lidocaine - couldn't swallow, panicked           Objective     Blood pressure 134/80, pulse 91, temperature 98 3 °F (36 8 °C), temperature source Probe, weight 62 4 kg (137 lb 9 6 oz)  Body mass index is 20 31 kg/m²  PHYSICAL EXAM:      Physical Exam    Lab Results:   No visits with results within 1 Day(s) from this visit  Latest known visit with results is:   Hospital Outpatient Visit on 02/14/2022   Component Date Value    WBC 02/14/2022 3 82*    RBC 02/14/2022 3 35*    Hemoglobin 02/14/2022 10 3*    Hematocrit 02/14/2022 31 9*    MCV 02/14/2022 95     MCH 02/14/2022 30 7     MCHC 02/14/2022 32 3     RDW 02/14/2022 17 8*    MPV 02/14/2022 11 2     Platelets 22/58/6496 172     nRBC 02/14/2022 0     Neutrophils Relative 02/14/2022 33*    Immat GRANS % 02/14/2022 5*    Lymphocytes Relative 02/14/2022 36     Monocytes Relative 02/14/2022 24*    Eosinophils Relative 02/14/2022 1     Basophils Relative 02/14/2022 1     Neutrophils Absolute 02/14/2022 1 24*    Immature Grans Absolute 02/14/2022 0 19     Lymphocytes Absolute 02/14/2022 1 41     Monocytes Absolute 02/14/2022 0 93     Eosinophils Absolute 02/14/2022 0 02     Basophils Absolute 02/14/2022 0 03          Radiology Results:   CT chest abdomen pelvis w contrast    Result Date: 2/15/2022  Narrative: CT CHEST, ABDOMEN AND PELVIS WITH IV CONTRAST INDICATION:   C17 9: Malignant neoplasm of small intestine, unspecified C78 00: Secondary malignant neoplasm of unspecified lung C78 6: Secondary malignant neoplasm of retroperitoneum and peritoneum  Metastatic adenocarcinoma small bowel  COMPARISON:  Most recent prior imaging CT chest abdomen pelvis dated October 14, 2021  TECHNIQUE: CT examination of the chest, abdomen and pelvis was performed   Axial, sagittal, and coronal 2D reformatted images were created from the source data and submitted for interpretation  Radiation dose length product (DLP) for this visit:  601 mGy-cm   This examination, like all CT scans performed in the [facility], was performed utilizing techniques to minimize radiation dose exposure, including the use of iterative reconstruction and automated exposure control  IV Contrast:  100 mL of iohexol (OMNIPAQUE) Enteric Contrast: Enteric contrast was not administered  FINDINGS: CHEST LUNGS:  Bilateral pulmonary metastases are present, and while many have remained stable in size, a few have increased in size, with an index lesion in the left upper lobe measuring 1 3 x 0 9 cm, previously 1 x 0 8 cm  An index right upper lobe juxtapleural nodule measures 1 2 x 0 9 cm (3, 46) previously 1 1 x 0 9 cm  There are no central obstructing lesions  PLEURA:  Unremarkable  HEART/GREAT VESSELS: Heart is unremarkable for patient's age  Atherosclerotic calcification of the aorta and mild aneurysmal dilatation of the proximal descending aorta unchanged  MEDIASTINUM AND PURVI:  Unremarkable  CHEST WALL AND LOWER NECK:   Right-sided Mediport catheter with tip at the cavoatrial junction  ABDOMEN LIVER/BILIARY TREE:  Few hemangiomas are stable, as well as a few hepatic cysts  No suspicious liver lesions  GALLBLADDER:  There are gallstone(s) within the gallbladder, without pericholecystic inflammatory changes    SPLEEN:  Unremarkable  PANCREAS:  No change in the size or appearance of a cystic lesion in the uncinate process most likely representing a sidebranch IPMN ADRENAL GLANDS:  Unremarkable  KIDNEYS/URETERS:  Bilateral renal cysts unchanged  STOMACH AND BOWEL:  Multiple colonic diverticula  There is dilatation of a short segment of small bowel in the left mid abdomen and pelvis, caused by an intraluminal polypoid lesion (2, 82) with soft tissue and calcifications in the adjacent mesentery compatible with tumor  Distal to the polypoid lesion, the small bowel is normal in caliber  APPENDIX:  No findings to suggest appendicitis  ABDOMINOPELVIC CAVITY:  Again shown is low density tumor in the omentum, centrally and in the right upper quadrant which appears relatively stable  VESSELS:  Atherosclerosis  No aneurysm  PELVIS REPRODUCTIVE ORGANS:  Prostatomegaly with median lobe hypertrophy  URINARY BLADDER:  Unremarkable  ABDOMINAL WALL/INGUINAL REGIONS:  Unremarkable  OSSEOUS STRUCTURES:  Degenerative changes throughout the spine  Impression: 1  Bilateral pulmonary metastases, a few which have increased in size with index measurements provided above  2   Intraluminal polypoid small bowel lesion causing with dilatation of a short segment of upstream small bowel, raising concern for at least partial small bowel obstruction  3   No change in low attenuation omental tumor  4   8 mm probable side branch IPMN in the uncinate process of the pancreas  The study was marked in [facility] for immediate notification   Workstation performed: [id number]

## 2022-03-02 NOTE — PATIENT INSTRUCTIONS
Scheduled date of Single Balloon EGD (as of today): 04/11/2022  Physician performing: Dr Yandy Vasquez  Location of procedure: [facility]   Bowel prep reviewed with patient: n/a  Instructions reviewed with patient by: MA  Clearances: n/a

## 2022-03-02 NOTE — PROGRESS NOTES
Outpatient Follow up  True Todd  [address]  [address]  [facility] [address]  Pascual Luther MD  Ph : [phone]  Fax : [phone]  Mobile : [phone]  Email : [email or web address]  Also available on [employer]    Carmen Way 64 y o  male MRN: [id number]    PCP: Su Cuba DO  Referring: No referring provider defined for this encounter  Carmen Way presented for a follow up visit  My recommendations are included  Please do not hesitate to contact me with any questions you may have  ASSESSMENT AND PLAN:      No problem-specific Assessment & Plan notes found for this encounter  Diagnoses and all orders for this visit:    Tubular adenoma of colon    Tubular adenoma of small intestine  -     EGD with Single Balloon Enteroscopy; Future    MALT (mucosa associated lymphoid tissue)- Gastric ([address] )  -     EGD with Single Balloon Enteroscopy; Future    Prominent ampulla of Vater    Small bowel polyp  -     EGD with Single Balloon Enteroscopy; Future      64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  He was on chemotherapy but off that at this time  Imaging showed a polypoid lesion in the small bowel with upstream bowel distension  It is unclear what the location of this lesion is  He is completely asymptomatic from a obstruction standpoint  I will review with radiology  Consider single balloon Enteroscopy  Repeat colonoscopy to evaluate for the previous polyps  [facility] is going to [facility] to find out about a therapy    ______________________________________________________________________    SUBJECTIVE:  Carmen Way is a 66 y o  male with finding of MALT lymphoma and then was found to have increasing pulmonary nodules which were biopsied and showed adenocarcinoma metastatic from colorectal primary    He had a CT of the abdomen and the pelvis which showed jejunal thickening possibly the source of the primary malignancy along with omental thickening and nodules  His colonoscopy showed multiple colon polyps which were tubular adenomas  He is off the chemo  He has no nausea, vomiting  No abdominal pain  No bloating  Occasional constipation  May use a stool softener  No weight loss  Has gained about 3 lbs  CT :     EUS: 10/20 : 1  Prominent ampulla/intra duodenal portion of the bile duct  No mass lesion was seen endoscopically or by EUS  The ampulla was not polypoid  Biopsies were done  2  Area of the ulcer in the fundus was healed  Scarred area was seen  Biopsies were done  3  Antral biopsies were done  4  Cholelithiasis  Colonoscopy 10/20 : Residual cecal polyp successfully removed with hot and cold snare polypectomies using EMR technique  Six subcentimeter polyps removed from the colon using cold snare polypectomy  Mild to moderate diverticulosis    REVIEW OF SYSTEMS IS OTHERWISE NEGATIVE  Historical Information   Past Medical History:   Diagnosis Date    Abnormal CT of liver     last assessed: 07/21/14    Anemia     iron def 6/2020 hgb 9 6    BPH (benign prostatic hypertrophy)     Dupuytren contracture     both hands    Erectile dysfunction of non-organic origin     last assessed: 11/27/12    Esophageal reflux     last assessed: 11/11/14    Esophagitis 03/13/2012    Familial hypercholesteremia     last assessed: 06/11/13    Hyperlipidemia     Hypertension     Paroxysmal atrial fibrillation ([facility] [address] )     last assessed: 04/06/17 post colonoscopy    Shortness of breath 2020    exertional due to anemia     Past Surgical History:   Procedure Laterality Date    BACK SURGERY      Lower    COLONOSCOPY      CT NEEDLE BIOPSY RETROPERITONEUM  12/31/2020    HAND CONTRACTURE RELEASE Left 5/23/2017    Procedure: Left hand percutaneous fasciotomy of palm adductor space x 2; index finger PIP joint; ring finger PIP joint; small finger MCP joint and PIP joint  ; splint application;  Surgeon: Shelton Sampson MD;  [facility]: [facility];  Service:     HAND SURGERY Bilateral     IR BIOPSY LUNG  2020    IR PORT PLACEMENT  2020     Social History   Social History     Substance and Sexual Activity   Alcohol Use No     Social History     Substance and Sexual Activity   Drug Use No     Social History     Tobacco Use   Smoking Status Former Smoker    Packs/day: 0 50    Years: 3 00    Pack years: 1 50    Types: Cigarettes    Start date:     Quit date:     Years since quittin 2   Smokeless Tobacco Never Used     Family History   Problem Relation Age of Onset    No Known Problems Mother         natural causes    Heart disease Father     Heart attack Brother        Meds/Allergies       Current Outpatient Medications:     aluminum-magnesium hydroxide-simethicone (MYLANTA) 200-200-20 mg/5 mL suspension    atorvastatin (LIPITOR) 40 mg tablet    Cholecalciferol (VITAMIN D3) 2000 units TABS    cyanocobalamin (VITAMIN B-12) 1,000 mcg tablet    dicyclomine (BENTYL) 20 mg tablet    dutasteride (AVODART) 0 5 mg capsule    famotidine (PEPCID) 10 mg tablet    ferrous sulfate 325 (65 Fe) mg tablet    losartan (COZAAR) 25 mg tablet    metoprolol tartrate (LOPRESSOR) 25 mg tablet    Multiple Vitamin (MULTIVITAMIN) tablet    Omega-3 Fatty Acids (FISH OIL) 1,000 mg    omeprazole (PriLOSEC) 20 mg delayed release capsule    ondansetron (ZOFRAN) 4 mg tablet    potassium chloride (K-DUR,KLOR-CON) 10 mEq tablet    predniSONE 10 mg tablet    Allergies   Allergen Reactions    Other Other (See Comments)     Liquid lidocaine - couldn't swallow, panicked           Objective     Blood pressure 134/80, pulse 91, temperature 98 3 °F (36 8 °C), temperature source Probe, weight 62 4 kg (137 lb 9 6 oz)  Body mass index is 20 31 kg/m²  PHYSICAL EXAM:      Physical Exam    Lab Results:   No visits with results within 1 Day(s) from this visit     Latest known visit with results is:   ROSITA BARNETT Outpatient Visit on 02/14/2022   Component Date Value    WBC 02/14/2022 3 82*    RBC 02/14/2022 3 35*    Hemoglobin 02/14/2022 10 3*    Hematocrit 02/14/2022 31 9*    MCV 02/14/2022 95     MCH 02/14/2022 30 7     MCHC 02/14/2022 32 3     RDW 02/14/2022 17 8*    MPV 02/14/2022 11 2     Platelets 16/26/5081 172     nRBC 02/14/2022 0     Neutrophils Relative 02/14/2022 33*    Immat GRANS % 02/14/2022 5*    Lymphocytes Relative 02/14/2022 36     Monocytes Relative 02/14/2022 24*    Eosinophils Relative 02/14/2022 1     Basophils Relative 02/14/2022 1     Neutrophils Absolute 02/14/2022 1 24*    Immature Grans Absolute 02/14/2022 0 19     Lymphocytes Absolute 02/14/2022 1 41     Monocytes Absolute 02/14/2022 0 93     Eosinophils Absolute 02/14/2022 0 02     Basophils Absolute 02/14/2022 0 03          Radiology Results:   CT chest abdomen pelvis w contrast    Result Date: 2/15/2022  Narrative: CT CHEST, ABDOMEN AND PELVIS WITH IV CONTRAST INDICATION:   C17 9: Malignant neoplasm of small intestine, unspecified C78 00: Secondary malignant neoplasm of unspecified lung C78 6: Secondary malignant neoplasm of retroperitoneum and peritoneum  Metastatic adenocarcinoma small bowel  COMPARISON:  Most recent prior imaging CT chest abdomen pelvis dated October 14, 2021  TECHNIQUE: CT examination of the chest, abdomen and pelvis was performed  Axial, sagittal, and coronal 2D reformatted images were created from the source data and submitted for interpretation  Radiation dose length product (DLP) for this visit:  601 mGy-cm   This examination, like all CT scans performed in the [facility], was performed utilizing techniques to minimize radiation dose exposure, including the use of iterative reconstruction and automated exposure control  IV Contrast:  100 mL of iohexol (OMNIPAQUE) Enteric Contrast: Enteric contrast was not administered   FINDINGS: CHEST LUNGS:  Bilateral pulmonary metastases are present, and while many have remained stable in size, a few have increased in size, with an index lesion in the left upper lobe measuring 1 3 x 0 9 cm, previously 1 x 0 8 cm  An index right upper lobe juxtapleural nodule measures 1 2 x 0 9 cm (3, 46) previously 1 1 x 0 9 cm  There are no central obstructing lesions  PLEURA:  Unremarkable  HEART/GREAT VESSELS: Heart is unremarkable for patient's age  Atherosclerotic calcification of the aorta and mild aneurysmal dilatation of the proximal descending aorta unchanged  MEDIASTINUM AND PURVI:  Unremarkable  CHEST WALL AND LOWER NECK:   Right-sided Mediport catheter with tip at the cavoatrial junction  ABDOMEN LIVER/BILIARY TREE:  Few hemangiomas are stable, as well as a few hepatic cysts  No suspicious liver lesions  GALLBLADDER:  There are gallstone(s) within the gallbladder, without pericholecystic inflammatory changes    SPLEEN:  Unremarkable  PANCREAS:  No change in the size or appearance of a cystic lesion in the uncinate process most likely representing a sidebranch IPMN ADRENAL GLANDS:  Unremarkable  KIDNEYS/URETERS:  Bilateral renal cysts unchanged  STOMACH AND BOWEL:  Multiple colonic diverticula  There is dilatation of a short segment of small bowel in the left mid abdomen and pelvis, caused by an intraluminal polypoid lesion (2, 82) with soft tissue and calcifications in the adjacent mesentery compatible with tumor  Distal to the polypoid lesion, the small bowel is normal in caliber  APPENDIX:  No findings to suggest appendicitis  ABDOMINOPELVIC CAVITY:  Again shown is low density tumor in the omentum, centrally and in the right upper quadrant which appears relatively stable  VESSELS:  Atherosclerosis  No aneurysm  PELVIS REPRODUCTIVE ORGANS:  Prostatomegaly with median lobe hypertrophy  URINARY BLADDER:  Unremarkable  ABDOMINAL WALL/INGUINAL REGIONS:  Unremarkable  OSSEOUS STRUCTURES:  Degenerative changes throughout the spine  Impression: 1  Bilateral pulmonary metastases, a few which have increased in size with index measurements provided above  2   Intraluminal polypoid small bowel lesion causing with dilatation of a short segment of upstream small bowel, raising concern for at least partial small bowel obstruction  3   No change in low attenuation omental tumor  4   8 mm probable side branch IPMN in the uncinate process of the pancreas  The study was marked in [facility] for immediate notification   Workstation performed: [id number]

## 2022-03-02 NOTE — TELEPHONE ENCOUNTER
Spoke to patient and notified him his procedure will be on 04/11/2022 with Dr Clifford Hatch at [address] has our back line phone number if it would interfere with his cancer trials he will let us know

## 2022-03-04 NOTE — TELEPHONE ENCOUNTER
Called and spoke with patient, he just scheduled the CT Dr Katie Russell ordered for him but states he was instructed to get a BUN and creatinine done first  I advised him these tests are included in the CMP that is ordered for him, he verbalized understanding and he will go to get this done

## 2022-03-04 NOTE — TELEPHONE ENCOUNTER
----- Message from Ling Rubin MD sent at 3/4/2022  9:28 AM EST -----  I have requested for a CT enterography  Please let the patient know that he should schedule this  Please let me know when the patient is scheduled    Thank you

## 2022-03-18 NOTE — TELEPHONE ENCOUNTER
CALL RETURN FORM   Reason for patient call? Needs to ensure medications are coordinated with Vladislav Zaman and [facility]    Patient's primary oncologist? Dr Benitez Proctor    Name of person the patient was calling for? Dr Benitez Proctor   Any additional information to add, if applicable? Approved by Kiki Hopper and called patient as ready  Did Dr Benitez Proctor get notified? Informed patient that the message will be forwarded to the team and someone will get back to them as soon as possible    Did you relay this information to the patient?   Yes

## 2022-03-18 NOTE — TELEPHONE ENCOUNTER
Telephone call spoke with pt   [facility] called him about [place] delivery  Pt wants to clarify with Dr Sid Whiitng the tx plan and if he has discussed the tx plan with [facility]  Explained Dr Sid Whiting is out of the office until Monday  I will call pt back on Monday

## 2022-03-23 NOTE — PROGRESS NOTES
Patient tolerated routine port flush without issue  Port flushed easily with good blood return noted  Appointment made for next port flush in 6 weeks   Discharged in stable condition with [facility]

## 2022-04-06 NOTE — PROGRESS NOTES
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 [facility])  24-year-old male presents for follow-up regarding history of metastatic small bowel cancer  Patient has progressed through  2 lines of chemotherapy  He was seen in consultation at [facility]  They suggested Lonsurf  They actually have take the initiative to prescribe this for the patient  He states that this is in an active process with the specialty pharmacy at [facility]  Therefore we will not be involved at the present time  The patient is advised to call us when he obtains the medication to review had to take it and to determine when he will have labs  He has port flush already scheduled  Reviewed that this would be given alone without any IV chemotherapy  He has gained weight due to improved energy, no longer short of breath, appetite very good  Patient was very pleasant today  Follow-up in 6 weeks        HPI:  Oncology History   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[facility] )   12/17/2020 Initial Diagnosis    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[facility] )     1/11/2021 - 10/26/2021 Chemotherapy    fluorouracil (ADRUCIL), 765 mg, Intravenous, Once, 12 of 12 cycles  Dose modification: 300 mg/m2 (original dose 400 mg/m2, Cycle 5, Reason: Dose Not Tolerated)  Administration: 800 mg (1/11/2021), 800 mg (1/25/2021), 800 mg (2/8/2021), 800 mg (2/22/2021), 600 mg (3/15/2021), 600 mg (3/29/2021), 600 mg (4/19/2021), 600 mg (5/3/2021), 600 mg (5/17/2021), 600 mg (6/1/2021), 600 mg (6/14/2021), 600 mg (6/28/2021)  fluorouracil (ADRUCIL), 600 mg (original dose 400 mg/m2), Intravenous, Once, 6 of 11 cycles  Dose modification: 300 mg/m2 (original dose 400 mg/m2, Cycle 13, Reason: Dose Not Tolerated)  Administration: 600 mg (7/26/2021), 600 mg (8/9/2021), 600 mg (8/23/2021), 600 mg (9/20/2021), 600 mg (10/4/2021), 600 mg (10/18/2021)  pegfilgrastim (Ombu), 6 mg, Subcutaneous, Once, 2 of 2 cycles  Administration: 6 mg (2/10/2021), 6 mg (2/24/2021)  bevacizumab (AVASTIN) IVPB, 5 mg/kg = 352 5 mg (100 % of original dose 5 mg/kg), Intravenous, Once, 14 of 19 cycles  Dose modification: 5 mg/kg (original dose 5 mg/kg, Cycle 5)  Administration: 352 5 mg (3/15/2021), 352 5 mg (3/29/2021), 352 5 mg (4/19/2021), 352 5 mg (5/3/2021), 352 5 mg (5/17/2021), 352 5 mg (6/1/2021), 352 5 mg (6/14/2021), 352 5 mg (6/28/2021), 352 5 mg (7/26/2021), 352 5 mg (8/9/2021), 352 5 mg (8/23/2021), 352 5 mg (9/20/2021), 352 5 mg (10/4/2021), 352 5 mg (10/18/2021)  leucovorin calcium IVPB, 764 mg, Intravenous, Once, 18 of 23 cycles  Dose modification: 300 mg/m2 (original dose 400 mg/m2, Cycle 5, Reason: Dose Not Tolerated)  Administration: 800 mg (1/11/2021), 800 mg (1/25/2021), 800 mg (2/8/2021), 800 mg (2/22/2021), 600 mg (3/15/2021), 600 mg (3/29/2021), 600 mg (4/19/2021), 600 mg (5/3/2021), 600 mg (5/17/2021), 600 mg (6/1/2021), 600 mg (6/14/2021), 600 mg (6/28/2021), 600 mg (7/26/2021), 600 mg (8/9/2021), 600 mg (8/23/2021), 600 mg (9/20/2021), 600 mg (10/4/2021), 600 mg (10/18/2021)  oxaliplatin (ELOXATIN) chemo infusion, 85 mg/m2 = 162 35 mg, Intravenous, Once, 12 of 12 cycles  Dose modification: 65 mg/m2 (original dose 85 mg/m2, Cycle 5, Reason: Dose Not Tolerated)  Administration: 162 35 mg (1/11/2021), 162 35 mg (1/25/2021), 162 35 mg (2/8/2021), 162 35 mg (2/22/2021), 124 15 mg (3/15/2021), 124 15 mg (3/29/2021), 124 15 mg (4/19/2021), 124 15 mg (5/3/2021), 124 15 mg (5/17/2021), 124 15 mg (6/1/2021), 124 15 mg (6/14/2021), 124 15 mg (6/28/2021)  fluorouracil (ADRUCIL) ambulatory infusion Soln, 1,200 mg/m2/day = 4,585 mg, Intravenous, Over 46 hours, 18 of 23 cycles  tbo-filgrastim (GRANIX), 300 mcg (100 % of original dose 300 mcg), Subcutaneous, Once, 12 of 17 cycles  Dose modification: 300 mcg (original dose 300 mcg, Cycle 7)  Administration: 300 mcg (5/10/2021), 300 mcg (5/24/2021), 300 mcg (6/8/2021), 300 mcg (6/21/2021), 300 mcg (7/6/2021), 300 mcg (8/3/2021), 300 mcg (8/17/2021), 300 mcg (8/31/2021), 300 mcg (9/29/2021), 300 mcg (10/12/2021), 300 mcg (10/26/2021)     11/1/2021 - 2/7/2022 Chemotherapy    fluorouracil (ADRUCIL), 300 mg/m2 = 545 mg (75 % of original dose 400 mg/m2), Intravenous, Once, 6 of 10 cycles  Dose modification: 300 mg/m2 (original dose 400 mg/m2, Cycle 1, Reason: Dose Not Tolerated)  Administration: 545 mg (11/1/2021), 545 mg (11/15/2021), 545 mg (12/8/2021), 545 mg (12/21/2021), 545 mg (1/17/2022), 500 mg (1/31/2022)  pegfilgrastim (Nani Shiver), 6 mg, Subcutaneous, Once, 2 of 2 cycles  Administration: 6 mg (11/3/2021), 6 mg (11/17/2021)  bevacizumab (AVASTIN) IVPB, 5 mg/kg = 330 mg, Intravenous, Once, 6 of 10 cycles  Administration: 330 mg (11/1/2021), 330 mg (11/15/2021), 330 mg (12/8/2021), 330 mg (12/21/2021), 330 mg (1/17/2022), 282 5 mg (1/31/2022)  irinotecan (CAMPTOSAR) chemo infusion, 326 mg, Intravenous, Once, 6 of 10 cycles  Administration: 340 mg (11/1/2021), 340 mg (11/15/2021), 340 mg (12/8/2021), 326 mg (12/21/2021), 320 mg (1/17/2022), 300 mg (1/31/2022)  leucovorin calcium IVPB, 300 mg/m2 = 543 mg (75 % of original dose 400 mg/m2), Intravenous, Once, 6 of 10 cycles  Dose modification: 300 mg/m2 (original dose 400 mg/m2, Cycle 1, Reason: Dose Not Tolerated)  Administration: 543 mg (11/1/2021), 543 mg (11/15/2021), 543 mg (12/8/2021), 543 mg (12/21/2021), 543 mg (1/17/2022), 500 mg (1/31/2022)  fluorouracil (ADRUCIL) ambulatory infusion Soln, 1,200 mg/m2/day = 4,345 mg, Intravenous, Over 46 hours, 6 of 10 cycles  tbo-filgrastim (GRANIX), 300 mcg (100 % of original dose 300 mcg), Subcutaneous, Once, 3 of 7 cycles  Dose modification: 6 mcg (original dose 300 mcg, Cycle 3), 300 mcg (original dose 300 mcg, Cycle 3), 300 mcg (original dose 300 mcg, Cycle 3)  Administration: 300 mcg (12/15/2021), 300 mcg (1/24/2022), 300 mcg (2/7/2022), 300 mcg (12/16/2021)     Malignant neoplasm metastatic to lung ([facility] )   10/26/2021 Initial Diagnosis    Malignant neoplasm metastatic to lung ([facility] [address] )     11/1/2021 - 2/7/2022 Chemotherapy    fluorouracil (ADRUCIL), 300 mg/m2 = 545 mg (75 % of original dose 400 mg/m2), Intravenous, Once, 6 of 10 cycles  Dose modification: 300 mg/m2 (original dose 400 mg/m2, Cycle 1, Reason: Dose Not Tolerated)  Administration: 545 mg (11/1/2021), 545 mg (11/15/2021), 545 mg (12/8/2021), 545 mg (12/21/2021), 545 mg (1/17/2022), 500 mg (1/31/2022)  pegfilgrastim ([place]), 6 mg, Subcutaneous, Once, 2 of 2 cycles  Administration: 6 mg (11/3/2021), 6 mg (11/17/2021)  bevacizumab (AVASTIN) IVPB, 5 mg/kg = 330 mg, Intravenous, Once, 6 of 10 cycles  Administration: 330 mg (11/1/2021), 330 mg (11/15/2021), 330 mg (12/8/2021), 330 mg (12/21/2021), 330 mg (1/17/2022), 282 5 mg (1/31/2022)  irinotecan (CAMPTOSAR) chemo infusion, 326 mg, Intravenous, Once, 6 of 10 cycles  Administration: 340 mg (11/1/2021), 340 mg (11/15/2021), 340 mg (12/8/2021), 326 mg (12/21/2021), 320 mg (1/17/2022), 300 mg (1/31/2022)  leucovorin calcium IVPB, 300 mg/m2 = 543 mg (75 % of original dose 400 mg/m2), Intravenous, Once, 6 of 10 cycles  Dose modification: 300 mg/m2 (original dose 400 mg/m2, Cycle 1, Reason: Dose Not Tolerated)  Administration: 543 mg (11/1/2021), 543 mg (11/15/2021), 543 mg (12/8/2021), 543 mg (12/21/2021), 543 mg (1/17/2022), 500 mg (1/31/2022)  fluorouracil (ADRUCIL) ambulatory infusion Soln, 1,200 mg/m2/day = 4,345 mg, Intravenous, Over 46 hours, 6 of 10 cycles  tbo-filgrastim (GRANIX), 300 mcg (100 % of original dose 300 mcg), Subcutaneous, Once, 3 of 7 cycles  Dose modification: 6 mcg (original dose 300 mcg, Cycle 3), 300 mcg (original dose 300 mcg, Cycle 3), 300 mcg (original dose 300 mcg, Cycle 3)  Administration: 300 mcg (12/15/2021), 300 mcg (1/24/2022), 300 mcg (2/7/2022), 300 mcg (12/16/2021)     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[facility] )   10/26/2021 Initial Diagnosis    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[facility] )     11/1/2021 - 2/7/2022 Chemotherapy    fluorouracil (ADRUCIL), 300 mg/m2 = 545 mg (75 % of original dose 400 mg/m2), Intravenous, Once, 6 of 10 cycles  Dose modification: 300 mg/m2 (original dose 400 mg/m2, Cycle 1, Reason: Dose Not Tolerated)  Administration: 545 mg (11/1/2021), 545 mg (11/15/2021), 545 mg (12/8/2021), 545 mg (12/21/2021), 545 mg (1/17/2022), 500 mg (1/31/2022)  pegfilgrastim (Earla Clas), 6 mg, Subcutaneous, Once, 2 of 2 cycles  Administration: 6 mg (11/3/2021), 6 mg (11/17/2021)  bevacizumab (AVASTIN) IVPB, 5 mg/kg = 330 mg, Intravenous, Once, 6 of 10 cycles  Administration: 330 mg (11/1/2021), 330 mg (11/15/2021), 330 mg (12/8/2021), 330 mg (12/21/2021), 330 mg (1/17/2022), 282 5 mg (1/31/2022)  irinotecan (CAMPTOSAR) chemo infusion, 326 mg, Intravenous, Once, 6 of 10 cycles  Administration: 340 mg (11/1/2021), 340 mg (11/15/2021), 340 mg (12/8/2021), 326 mg (12/21/2021), 320 mg (1/17/2022), 300 mg (1/31/2022)  leucovorin calcium IVPB, 300 mg/m2 = 543 mg (75 % of original dose 400 mg/m2), Intravenous, Once, 6 of 10 cycles  Dose modification: 300 mg/m2 (original dose 400 mg/m2, Cycle 1, Reason: Dose Not Tolerated)  Administration: 543 mg (11/1/2021), 543 mg (11/15/2021), 543 mg (12/8/2021), 543 mg (12/21/2021), 543 mg (1/17/2022), 500 mg (1/31/2022)  fluorouracil (ADRUCIL) ambulatory infusion Soln, 1,200 mg/m2/day = 4,345 mg, Intravenous, Over 46 hours, 6 of 10 cycles  tbo-filgrastim (GRANIX), 300 mcg (100 % of original dose 300 mcg), Subcutaneous, Once, 3 of 7 cycles  Dose modification: 6 mcg (original dose 300 mcg, Cycle 3), 300 mcg (original dose 300 mcg, Cycle 3), 300 mcg (original dose 300 mcg, Cycle 3)  Administration: 300 mcg (12/15/2021), 300 mcg (1/24/2022), 300 mcg (2/7/2022), 300 mcg (12/16/2021)       64-year-old male presents for follow-up regarding history of metastatic adenocarcinoma to the lung 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   Patient was seen in consultation on 12/24/2020  Merari Diaz was noted at that time that in the last year he had begun with shortness of breath on exertion and was found to have iron deficiency anemia, treated with oral iron in symptoms improved   Cardiac workup was negative for her shortness of breath   He then was having worsening shortness of breath and weight loss   He had CT scan that showed bilateral lung nodules, thickening of the jejunum wall, mesenteric mass/ lymphadenopathy as well as omental masses   On 10/27/2020 he had an EGD and colonoscopy   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, negative H pylori however due to mal lymphoma patient was given therapy, antibiotic, against H pylori   In 12/09/2020 he had needle biopsy of the left upper lung nodule which showed at no carcinoma consistent with colorectal primary   Colonoscopy had shown polyps but no malignancy   Patient then had biopsy of mesenteric mass which showed metastatic intestinal adenocarcinoma      Due to this patient was placed on chemotherapy for metastatic adenocarcinoma colon primary  William Iqbaler is receiving FOLFOX plus Avastin   He has completed 12 cycles of chemotherapy       He had repeat imaging on 7/8/21  Scan stable  Continued same treatment        Oxaliplatin was d/c after completing cycle 12  He then developed neuropathy at this time of d/c, no neuropathy sxs before, as to why this was not discontinued earlier in the cycles       Repeat CT on 10/14/21 showed disease progression  Changed treatment to   FOLFIRI plus Avastin  Patient had restaging imaging in February 2022  This showed bilateral pulmonary metastasis which had increased  There is intraluminal polypoid lesion of the small bowel causing dilation and concern for partial obstruction  No change in omental tumor noted  Patient had follow-up visit on 02/23/2022    Patient was offered other options including Lonsurf, Stivarga, FOLFIRI plus Cyramza  He was made aware that these are indicated for large bowel cancer and he was small-bowel cancer  Molecular testing showed positive for NRAS, negative for HER2, low tumor mutation burden, stable MSI  Patient was sent to Dr Carmita Arango at [facility]  Per review of note, he was made aware that FDA approved drugs for his condition are limited  He possibly could be considered for clinical trial with tipiracil-trifluridine or regorafenib  Per plan from note they were going to see if they could obtain tipiracil-trifluridine at an affordable cost to the patient not in the setting of a clinical trial     He was recommended to proceed with GI intervention that was planned  Proceed with trial or 1 of the standard orals, Lonsurf or Stivarga  Interval history: he states he has been in communication with [facility] prescription   occasional pain in the abdomen that resolves with 1/2 tab dicyclomine     ROS: Review of Systems   Constitutional: Negative for appetite change, chills, fatigue, fever and unexpected weight change  Respiratory: Negative for cough and shortness of breath  Cardiovascular: Negative for chest pain, palpitations and leg swelling  Gastrointestinal: Negative for abdominal pain, constipation, diarrhea, nausea and vomiting  Genitourinary: Negative for difficulty urinating, dysuria and hematuria  Musculoskeletal: Negative for arthralgias  Skin: Negative  Neurological: Negative for dizziness, weakness, light-headedness, numbness and headaches  Hematological: Negative  Psychiatric/Behavioral: Negative          Past Medical History:   Diagnosis Date    Abnormal CT of liver     last assessed: 07/21/14    Anemia     iron def 6/2020 hgb 9 6    BPH (benign prostatic hypertrophy)     Dupuytren contracture     both hands    Erectile dysfunction of non-organic origin     last assessed: 12    Esophageal reflux     last assessed: 14    Esophagitis 2012    Familial hypercholesteremia     last assessed: 13    Hyperlipidemia     Hypertension     Paroxysmal atrial fibrillation ([facility] )     last assessed: 17 post colonoscopy    Shortness of breath 2020    exertional due to anemia       Past Surgical History:   Procedure Laterality Date    BACK SURGERY      Lower    COLONOSCOPY      CT NEEDLE BIOPSY RETROPERITONEUM  2020    HAND CONTRACTURE RELEASE Left 2017    Procedure: Left hand percutaneous fasciotomy of palm adductor space x 2; index finger PIP joint; ring finger PIP joint; small finger MCP joint and PIP joint  ; splint application;  Surgeon: Rosy Porter MD;  [facility]: [facility];  Service:     HAND SURGERY Bilateral     IR BIOPSY LUNG  2020    IR PORT PLACEMENT  2020       Social History     Socioeconomic History    Marital status: /Civil Union     Spouse name: None    Number of children: None    Years of education: None    Highest education level: None   Occupational History    None   Tobacco Use    Smoking status: Former Smoker     Packs/day: 0 50     Years: 3 00     Pack years: 1 50     Types: Cigarettes     Start date:      Quit date:      Years since quittin 1    Smokeless tobacco: Never Used   Vaping Use    Vaping Use: Never used   Substance and Sexual Activity    Alcohol use: No    Drug use: No    Sexual activity: None   Other Topics Concern    None   Social History Narrative     - grinding, nuclear work    Used [employer]     Social Determinants of Health     Financial Resource Strain: Not on file   Food Insecurity: Not on file   Transportation Needs: Not on file   Physical Activity: Not on file   Stress: Not on file   Social Connections: Not on file   Intimate Partner Violence: Not on file   Housing Stability: Not on file       Family History   Problem Relation Age of Onset    No Known Problems Mother         natural causes    Heart disease Father     Heart attack Brother        Allergies   Allergen Reactions    Other Other (See Comments)     Liquid lidocaine - couldn't swallow, panicked         Current Outpatient Medications:     aluminum-magnesium hydroxide-simethicone (MYLANTA) 200-200-20 mg/5 mL suspension, Take 30 mL by mouth every 4 (four) hours as needed for indigestion or heartburn, Disp: , Rfl:     atorvastatin (LIPITOR) 40 mg tablet, Take 1 tablet (40 mg total) by mouth daily, Disp: 90 tablet, Rfl: 1    Cholecalciferol (VITAMIN D3) 2000 units TABS, Take 2,000 Units by mouth daily , Disp: , Rfl:     cyanocobalamin (VITAMIN B-12) 1,000 mcg tablet, Take 1,000 mcg by mouth daily , Disp: , Rfl:     dicyclomine (BENTYL) 20 mg tablet, 1 tablet every 6 hours as needed abdominal cramps, Disp: 90 tablet, Rfl: 1    dutasteride (AVODART) 0 5 mg capsule, Take 1 capsule (0 5 mg total) by mouth daily at bedtime, Disp: 90 capsule, Rfl: 1    famotidine (PEPCID) 10 mg tablet, Take 10 mg by mouth 2 (two) times a day as needed for heartburn, Disp: , Rfl:     ferrous sulfate 325 (65 Fe) mg tablet, Take 325 mg by mouth daily with breakfast , Disp: , Rfl:     losartan (COZAAR) 25 mg tablet, TAKE ONE TABLET DAILY, Disp: 90 tablet, Rfl: 1    metoprolol tartrate (LOPRESSOR) 25 mg tablet, Take 0 5 tablets (12 5 mg total) by mouth 2 (two) times a day, Disp: 180 tablet, Rfl: 1    Multiple Vitamin (MULTIVITAMIN) tablet, Take 1 tablet by mouth daily , Disp: , Rfl:     Omega-3 Fatty Acids (FISH OIL) 1,000 mg, Take 1,000 mg by mouth daily  , Disp: , Rfl:     omeprazole (PriLOSEC) 20 mg delayed release capsule, Take 1 capsule (20 mg total) by mouth daily On empty stomach 30 min before eating , Disp: 90 capsule, Rfl: 1    ondansetron (ZOFRAN) 4 mg tablet, Take 1 tablet (4 mg total) by mouth every 8 (eight) hours as needed for nausea or vomiting, Disp: 20 tablet, Rfl: 2    potassium chloride (K-DUR,KLOR-CON) 10 mEq tablet, Take 1 tablet (10 mEq total) by mouth 3 (three) times a day For 10 days, Disp: 30 tablet, Rfl: 0    predniSONE 10 mg tablet, Take 1 tablet (10 mg total) by mouth daily, Disp: 90 tablet, Rfl: 1    Lonsurf 20-8  19 MG TABS, , Disp: , Rfl:       Physical Exam:  /64 (BP Location: Left arm, Patient Position: Sitting, Cuff Size: Adult)   Pulse 101   Temp 98 1 °F (36 7 °C) (Temporal)   Resp 18   Ht 5' 9 02" (1 753 m)   Wt 63 5 kg (140 lb)   SpO2 96%   BMI 20 66 kg/m²     Physical Exam  Vitals reviewed  Constitutional:       General: He is not in acute distress  Appearance: He is well-developed  He is not ill-appearing  HENT:      Head: Normocephalic and atraumatic  Eyes:      General: No scleral icterus  Conjunctiva/sclera: Conjunctivae normal    Cardiovascular:      Rate and Rhythm: Normal rate and regular rhythm  Heart sounds: Normal heart sounds  No murmur heard  Pulmonary:      Effort: Pulmonary effort is normal  No respiratory distress  Breath sounds: Normal breath sounds  Abdominal:      Palpations: Abdomen is soft  Tenderness: There is no abdominal tenderness  Comments: No ascites   Musculoskeletal:         General: No tenderness  Normal range of motion  Cervical back: Normal range of motion and neck supple  Right lower leg: No edema  Left lower leg: No edema  Lymphadenopathy:      Cervical: No cervical adenopathy  Skin:     General: Skin is warm and dry  Neurological:      Mental Status: He is alert and oriented to person, place, and time  Cranial Nerves: No cranial nerve deficit     Psychiatric:         Mood and Affect: Mood normal          Behavior: Behavior normal        Labs:  Lab Results   Component Value Date    WBC 9 08 03/11/2022    HGB 11 0 (L) 03/11/2022    HCT 34 6 (L) 03/11/2022    MCV 96 03/11/2022     03/11/2022     Lab Results   Component Value Date     07/30/2015    K 3 6 03/11/2022     03/11/2022    CO2 26 03/11/2022    ANIONGAP 8 07/30/2015    BUN 15 03/11/2022    CREATININE 0 88 03/11/2022    GLUCOSE 89 07/30/2015    GLUF 77 03/11/2022    CALCIUM 9 4 03/11/2022    CORRECTEDCA 10 2 (H) 03/11/2022    AST 32 03/11/2022    ALT 35 03/11/2022    ALKPHOS 273 (H) 03/11/2022    PROT 6 8 07/30/2015    BILITOT 0 59 07/30/2015    EGFR 82 03/11/2022       Patient voiced understanding and agreement in the above discussion  Aware to contact our office with questions/symptoms in the interim  This note has been generated by voice recognition software system  Therefore, there may be spelling, grammar, and or syntax errors  Please contact if questions arise

## 2022-04-08 NOTE — TELEPHONE ENCOUNTER
Returned call to patient  He confirmed that he received his Lonsurf in the mail  He reviewed instructions with me and confirmed dose is BID  I moved his appointment to see Bhavya Martini to 4/27/22    Patient will have labs drawn prior to appointment  He verbalized understanding

## 2022-04-08 NOTE — TELEPHONE ENCOUNTER
CALL TRANSFER   Reason for patient call? Patient is calling in regards to his medication  Lonsurf  He was told by Dr Rahel Moss to call when he gets the medication to go over instructions  Patient's primary oncologist? Dr Rahel Moss   RN call was transferred to and time it was transferred? Fatimah Fried @ 1:52pm   Informed patient that the message will be forwarded to the team and someone will get back to them as soon as possible    Did you relay this information to the patient?  yes

## 2022-04-26 PROBLEM — K56.609 SMALL BOWEL OBSTRUCTION (HCC): Status: ACTIVE | Noted: 2022-01-01

## 2022-04-26 PROBLEM — D70.9 NEUTROPENIA (HCC): Status: ACTIVE | Noted: 2022-01-01

## 2022-04-26 NOTE — H&P
[facility] 1945, 64 y o  male MRN: [id number]  Unit/Bed#: ED 22 Encounter: [id number]  Primary Care Provider: Christian Branch DO   Date and time admitted to hospital: 4/26/2022 11:36 AM    * Small bowel obstruction ([address] )  Assessment & Plan  Known history of metastatic small bowel lymphoma  CTAP: Interval distention of multiple segments of proximal and mid small bowel with a suspected transition point of an obstruction in the right midabdomen  Multiple small bowel segments are adherent to this location suspicious for an adhesion  nchanged omental/peritoneal implants  General surgery evaluated in emergency department-no indication for acute surgical intervention  Recommend bowel rest, NG tube, IV fluid, p r n  Analgesics/antiemetics  Will consult Medical Oncology, GI, palliative Care  Patient wishes to be level 1 full code    Neutropenia [facility])  Assessment & Plan  Likely related to recent chemotherapy course  Possible new infection setting of small-bowel obstruction  Will continue cefepime which was initiated on admission  Trend CBC/fever curve  Follow-up blood cultures    MALT (mucosa associated lymphoid tissue)- Gastric [facility])  Assessment & Plan  Patient follows with Medical Oncology at [facility] as well as Zeferino Duron  Patient show progression through 2 lines of chemotherapy  Recently finished first 2 week course of Lonsurf Last friday  Appreciate medical Oncology recommendations  Patient takes prednisone 10 mg daily as needed for appetite    Benign prostatic hyperplasia  Assessment & Plan  Resume meds when able    Benign essential hypertension  Assessment & Plan  Previous to admission patient is on Lopressor 12 5 mg b i d   And losartan 25 mg daily  Start Lopressor IV 2 5 mg q 6 hours while NPO    VTE Prophylaxis:  Heparin  Code Status:  Level 1 full code    Anticipated Length of Stay:  Patient will be admitted on an Inpatient basis with an anticipated length of stay of greater than 2 midnights  Justification for Hospital Stay:  Inpatient management of small-bowel obstruction requiring IV fluids and IV medications    Total Time for Visit, including Counseling / Coordination of Care: 60 minutes  Greater than 50% of this total time spent on direct patient counseling and coordination of care  Chief Complaint:   Abdominal pain    History of Present Illness:    Naty Banuelos is a 64 y o  male  with 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, hypertension, BPH who presents the hospital acute-onset abdominal pain  Patient developed these symptoms after eating lunch yesterday  He developed nausea and nonbilious nonbloody vomiting  Symptoms progressing presented to the emergency department  Patient did have a bowel movement yesterday which he reports as normal   On presentation CT scan shows interval distension of multiple segments of proximal and mid small bowel with a suspected transition point of obstruction in the right mid abdomen  Suspicious for adhesions       Review of Systems:    Review of Systems    Past Medical and Surgical History:     Past Medical History:   Diagnosis Date    Abnormal CT of liver     last assessed: 07/21/14    Anemia     iron def 6/2020 hgb 9 6    BPH (benign prostatic hypertrophy)     Dupuytren contracture     both hands    Erectile dysfunction of non-organic origin     last assessed: 11/27/12    Esophageal reflux     last assessed: 11/11/14    Esophagitis 03/13/2012    Familial hypercholesteremia     last assessed: 06/11/13    Hyperlipidemia     Hypertension     Paroxysmal atrial fibrillation ([address] )     last assessed: 04/06/17 post colonoscopy    Shortness of breath 2020    exertional due to anemia       Past Surgical History:   Procedure Laterality Date    BACK SURGERY      Lower    COLONOSCOPY      CT NEEDLE BIOPSY RETROPERITONEUM  12/31/2020    HAND CONTRACTURE RELEASE Left 5/23/2017 Procedure: Left hand percutaneous fasciotomy of palm adductor space x 2; index finger PIP joint; ring finger PIP joint; small finger MCP joint and PIP joint  ; splint application;  Surgeon: Fadumo Fallon MD;  [facility];  Service:     HAND SURGERY Bilateral     IR BIOPSY LUNG  12/9/2020    IR PORT PLACEMENT  12/31/2020       Meds/Allergies:    Prior to Admission medications    Medication Sig Start Date End Date Taking? Authorizing Provider   cholecalciferol (VITAMIN D3) 1,000 units tablet Take 2,000 Units by mouth daily   Yes Historical Provider, MD   vitamin B-12 (VITAMIN B-12) 1,000 mcg tablet Take 1,000 mcg by mouth daily   Yes Historical Provider, MD   Cholecalciferol (VITAMIN D3) 2000 units TABS Take 2,000 Units by mouth daily   4/26/22 Yes Historical Provider, MD   cyanocobalamin (VITAMIN B-12) 1,000 mcg tablet Take 1,000 mcg by mouth daily   4/26/22 Yes Historical Provider, MD   atorvastatin (LIPITOR) 40 mg tablet Take 1 tablet (40 mg total) by mouth daily 11/2/21   Rocio Chaney DO   dutasteride (AVODART) 0 5 mg capsule Take 1 capsule (0 5 mg total) by mouth daily at bedtime 11/2/21   Rocio Chaney DO   Lonsurf 20-8  19 MG TABS  3/16/22   Historical Provider, MD   losartan (COZAAR) 25 mg tablet TAKE ONE TABLET DAILY 11/2/21   Rocio Chaney DO   metoprolol tartrate (LOPRESSOR) 25 mg tablet Take 0 5 tablets (12 5 mg total) by mouth 2 (two) times a day 11/2/21   Rocio Chaney DO   Multiple Vitamin (MULTIVITAMIN) tablet Take 1 tablet by mouth daily     Historical Provider, MD   ondansetron (ZOFRAN) 4 mg tablet Take 1 tablet (4 mg total) by mouth every 8 (eight) hours as needed for nausea or vomiting 11/2/21   Rocio Chaney DO   predniSONE 10 mg tablet Take 1 tablet (10 mg total) by mouth daily 2/23/22   Shayy Hopper MD   aluminum-magnesium hydroxide-simethicone (MYLANTA) 200-200-20 mg/5 mL suspension Take 30 mL by mouth every 4 (four) hours as needed for indigestion or heartburn  22  Historical Provider, MD   dicyclomine (BENTYL) 20 mg tablet 1 tablet every 6 hours as needed abdominal cramps 22  Meaghan Ross MD   famotidine (PEPCID) 10 mg tablet Take 10 mg by mouth 2 (two) times a day as needed for heartburn  22  Historical Provider, MD   ferrous sulfate 325 (65 Fe) mg tablet Take 325 mg by mouth daily with breakfast   22  Historical Provider, MD   Omega-3 Fatty Acids (FISH OIL) 1,000 mg Take 1,000 mg by mouth daily    22  Historical Provider, MD   omeprazole (PriLOSEC) 20 mg delayed release capsule Take 1 capsule (20 mg total) by mouth daily On empty stomach 30 min before eating  22  Toño Guadarrama PA-C   potassium chloride (K-DUR,KLOR-CON) 10 mEq tablet Take 1 tablet (10 mEq total) by mouth 3 (three) times a day For 10 days 22  Meaghan Ross MD       Allergies:    Allergies   Allergen Reactions    Other Other (See Comments)     Liquid lidocaine - couldn't swallow, panicked       Social History:     Marital Status: /Civil Union   Substance Use History:   Social History     Substance and Sexual Activity   Alcohol Use No     Social History     Tobacco Use   Smoking Status Former Smoker    Packs/day: 0 50    Years: 3 00    Pack years: 1 50    Types: Cigarettes    Start date:     Quit date:     Years since quittin 4   Smokeless Tobacco Never Used     Social History     Substance and Sexual Activity   Drug Use No       Family History:    Pertinent family history reviewed    Physical Exam:     Vitals:   Blood Pressure: 111/65 (22 1524)  Pulse: (!) 117 (22 1524)  Temperature: 98 7 °F (37 1 °C) (22 1109)  Temp Source: Oral (22 1109)  Respirations: 20 (22 1524)  Weight - Scale: 63 3 kg (139 lb 8 8 oz) (22 1109)  SpO2: 95 % (22 1524)    Physical Exam     Additional Data:     Lab Results: I have reviewed pertinent results     Results from last 7 days   Lab Units 04/26/22  1216 04/25/22  0717 04/25/22  0717   WBC Thousand/uL 1 39*   < > 4 02*   HEMOGLOBIN g/dL 11 9*   < > 11 5*   HEMATOCRIT % 36 7   < > 33 8*   PLATELETS Thousands/uL 114*   < > 140*   BANDS PCT % 12*  --   --    NEUTROS PCT %  --   --  65   LYMPHS PCT %  --   --  29   LYMPHO PCT % 12*  --   --    MONOS PCT %  --   --  4   MONO PCT % 4  --   --    EOS PCT % 0  --  1    < > = values in this interval not displayed  Results from last 7 days   Lab Units 04/26/22  1216   SODIUM mmol/L 137   POTASSIUM mmol/L 4 6   CHLORIDE mmol/L 101   CO2 mmol/L 28   BUN mg/dL 25   CREATININE mg/dL 1 18   ANION GAP mmol/L 8   CALCIUM mg/dL 9 5   ALBUMIN g/dL 3 2*   TOTAL BILIRUBIN mg/dL 1 93*   ALK PHOS U/L 384*   ALT U/L 58   AST U/L 33   GLUCOSE RANDOM mg/dL 109                 Results from last 7 days   Lab Units 04/26/22  1404 04/26/22  1216   LACTIC ACID mmol/L  --  1 8   PROCALCITONIN ng/ml 6 56*  --        Imaging: I have reviewed pertinent imaging     CT abdomen pelvis with contrast   Final Result by Margarita Jolly MD (04/26 1418)      Interval distention of multiple segments of proximal and mid small bowel with a suspected transition point of an obstruction in the right midabdomen #2/42  Multiple small bowel segments are adherent to this location suspicious for an adhesion  Unchanged omental/peritoneal implants  Stable 8 cm pancreatic uncinate process cysts  The study was marked in [facility] for immediate notification  Workstation performed: [id number]             EKG, Pathology, and Other Studies Reviewed on Admission:   · EKG: Reviewed     Allscripts / Epic Records Reviewed    ** Please Note: This note has been constructed using a voice recognition system   **

## 2022-04-26 NOTE — TELEPHONE ENCOUNTER
Returned telephone call spoke with both pt and spouse  Wife states pt is having 10 out of 10 pain in the rib and back area which started yesterday  Denies any falls, and he has been eating and drinking ok, had 2 normal Bms yesterday  Pt feels so bad he is thinking he cannot make it into the office for his fu appt this week due to the long walk from the house to the car  Currently the Pt has not been taking any pain meds  He has been taking Bentyl, explained that is not a pain medication it is for cramping and gas, etc   Explained will discuss with Tj Ibarra and call them back

## 2022-04-26 NOTE — CONSULTS
Consultation - General Surgery   Danilo Youssef 64 y o  male MRN: [id number]  Unit/Bed#: ED 22 Encounter: [id number]    Assessment/Plan     Assessment:  64 y o  M with history of metastatic small bowel lymphoma and known peritoneal/omental implants, RP lymphadenopathy, and small bowel polyps, presents with a malignant SBO    4/26 CTAP: Interval distention of multiple segments of proximal and mid small bowel with a suspected transition point of an obstruction in the right midabdomen  Multiple small bowel segments are adherent to this location suspicious for an adhesion  nchanged omental/peritoneal implants  WBC 1 39  Lactic 1 8  Cr 1 18     Plan:  Patient with a malignant SBO in setting of metastatic small bowel lymphoma  He is tender on abdominal exam, but does not exhibit peritoneal signs  There is no indication for acute surgical intervention  Recommend medicine admission, bowel rest, placement of an NGT, IVF resuscitation, prn analgesia/anti-emetics, med onc and GI consults  Possible discussion regarding palliative PEG for decompression in future pending progress  History of Present Illness     HPI:  Danilo Youssef is a 64 y o  male with PMHx of pAfib, HTN, HLD, GERD, BPH, metastatic small bowel lymphoma with peritoneal/omental implants, lung mets, retroperitoneal lymphadenopathy, currently on chemotherapy, who presents with one day of abdominal pain, nausea and dry heaving  Patient states he developed these symptoms yesterday afternoon after eating celery  His symptoms became progressively more severe, which prompted his visit to the ER this morning  He states his last flatus/BM was yesterday  He endorses similar symptoms in the past which had been self limiting  He follows with medical oncology and GI as an outpatient  States he is currently on his 3rd chemotherapy regimen for this lymphoma   He follows with Dr Christianne Kim closely for a small bowel polyp that was concerning for causing a partial small bowel obstruction in the past      Patient denies fevers, chills, chest pain, SOB, cough or any other complaints at this time  He denies prior abdominal surgeries  He does not take anticoagulant medications  Consults    Review of Systems   Constitutional: Negative for chills and fever  HENT: Negative for ear pain and sore throat  Eyes: Negative for pain and visual disturbance  Respiratory: Negative for cough and shortness of breath  Cardiovascular: Negative for chest pain and palpitations  Gastrointestinal: Positive for abdominal pain and nausea  Negative for vomiting  Genitourinary: Negative for dysuria and hematuria  Musculoskeletal: Negative for arthralgias and back pain  Skin: Negative for color change and rash  Neurological: Negative for seizures and syncope  All other systems reviewed and are negative  Historical Information   Past Medical History:   Diagnosis Date    Abnormal CT of liver     last assessed: 07/21/14    Anemia     iron def 6/2020 hgb 9 6    BPH (benign prostatic hypertrophy)     Dupuytren contracture     both hands    Erectile dysfunction of non-organic origin     last assessed: 11/27/12    Esophageal reflux     last assessed: 11/11/14    Esophagitis 03/13/2012    Familial hypercholesteremia     last assessed: 06/11/13    Hyperlipidemia     Hypertension     Paroxysmal atrial fibrillation ([facility] [address] )     last assessed: 04/06/17 post colonoscopy    Shortness of breath 2020    exertional due to anemia     Past Surgical History:   Procedure Laterality Date    BACK SURGERY      Lower    COLONOSCOPY      CT NEEDLE BIOPSY RETROPERITONEUM  12/31/2020    HAND CONTRACTURE RELEASE Left 5/23/2017    Procedure: Left hand percutaneous fasciotomy of palm adductor space x 2; index finger PIP joint; ring finger PIP joint; small finger MCP joint and PIP joint  ; splint application;  Surgeon: Primo Murillo MD;  [facility];  Service:     HAND SURGERY Bilateral     IR BIOPSY LUNG  2020    IR PORT PLACEMENT  2020     Social History   Social History     Substance and Sexual Activity   Alcohol Use No     Social History     Substance and Sexual Activity   Drug Use No     E-Cigarette/Vaping    E-Cigarette Use Never User      E-Cigarette/Vaping Substances    Nicotine No     THC No     CBD No     Flavoring No     Other No     Unknown No      Social History     Tobacco Use   Smoking Status Former Smoker    Packs/day: 0 50    Years: 3 00    Pack years: 1 50    Types: Cigarettes    Start date:     Quit date:     Years since quittin 3   Smokeless Tobacco Never Used     Family History: non-contributory    Meds/Allergies   current meds:   No current facility-administered medications for this encounter       Allergies   Allergen Reactions    Other Other (See Comments)     Liquid lidocaine - couldn't swallow, panicked       Objective   First Vitals:   Blood Pressure: 127/68 (22 1109)  Pulse: (!) 125 (22 1109)  Temperature: 98 7 °F (37 1 °C) (22 1109)  Temp Source: Oral (22 1109)  Respirations: 20 (22 1109)  Weight - Scale: 63 3 kg (139 lb 8 8 oz) (22 1109)  SpO2: 95 % (22 1109)    Current Vitals:   Blood Pressure: 111/65 (22 1524)  Pulse: (!) 117 (22 1524)  Temperature: 98 7 °F (37 1 °C) (22 1109)  Temp Source: Oral (22 1109)  Respirations: 20 (22 1524)  Weight - Scale: 63 3 kg (139 lb 8 8 oz) (22 1109)  SpO2: 95 % (22 1524)      Intake/Output Summary (Last 24 hours) at 2022 1534  Last data filed at 2022 1443  Gross per 24 hour   Intake 550 ml   Output --   Net 550 ml       Invasive Devices  Report    Central Venous Catheter Line            Port A Cath 20 Right Chest 481 days          Peripheral Intravenous Line            Peripheral IV 22 Distal;Left;Upper;Ventral (anterior) Arm <1 day                Physical Exam  Vitals and nursing note reviewed  Constitutional:       General: He is not in acute distress  Appearance: He is well-developed and normal weight  HENT:      Head: Normocephalic and atraumatic  Mouth/Throat:      Mouth: Mucous membranes are dry  Eyes:      Conjunctiva/sclera: Conjunctivae normal    Cardiovascular:      Rate and Rhythm: Normal rate and regular rhythm  Heart sounds: No murmur heard  Pulmonary:      Effort: Pulmonary effort is normal  No respiratory distress  Breath sounds: Normal breath sounds  Abdominal:      General: There is no distension  Palpations: Abdomen is soft  Tenderness: There is abdominal tenderness (Epigastric, RUQ, LUQ)  There is no rebound  Comments: + voluntary guarding in epigastric region   Musculoskeletal:      Cervical back: Neck supple  Right lower leg: No edema  Left lower leg: No edema  Skin:     General: Skin is warm and dry  Neurological:      General: No focal deficit present  Mental Status: He is alert  Psychiatric:         Mood and Affect: Mood normal          Behavior: Behavior normal          Lab Results:   CBC:   Lab Results   Component Value Date    WBC 1 39 (LL) 04/26/2022    HGB 11 9 (L) 04/26/2022    HCT 36 7 04/26/2022    MCV 95 04/26/2022     (L) 04/26/2022    MCH 30 9 04/26/2022    MCHC 32 4 04/26/2022    RDW 15 8 (H) 04/26/2022    MPV 9 8 04/26/2022   , CMP:   Lab Results   Component Value Date    SODIUM 137 04/26/2022    K 4 6 04/26/2022     04/26/2022    CO2 28 04/26/2022    BUN 25 04/26/2022    CREATININE 1 18 04/26/2022    CALCIUM 9 5 04/26/2022    AST 33 04/26/2022    ALT 58 04/26/2022    ALKPHOS 384 (H) 04/26/2022    EGFR 58 04/26/2022     Imaging: I have personally reviewed pertinent reports  EKG, Pathology, and Other Studies: I have personally reviewed pertinent reports  Counseling / Coordination of Care  Total floor / unit time spent today 30 minutes    Greater than 50% of total time was spent with the patient and / or family counseling and / or coordination of care    A description of the counseling / coordination of care: discussion with patient and surgical team

## 2022-04-26 NOTE — ASSESSMENT & PLAN NOTE
Likely related to recent chemotherapy course  Possible new infection setting of small-bowel obstruction  Will continue cefepime which was initiated on admission  Trend CBC/fever curve  Follow-up blood cultures

## 2022-04-26 NOTE — ED PROVIDER NOTES
History  Chief Complaint   Patient presents with    Abdominal Pain     pt c/o bilateral lower abd  pain for the past x1 day  pt also c/o nausea but denies v/d or fevers  pt denies cp/sob       History provided by:  Patient  Abdominal Pain  Pain location:  Generalized  Pain quality: stabbing    Pain radiates to:  Does not radiate  Pain severity:  Severe  Onset quality:  Gradual  Duration:  1 day  Timing:  Constant  Progression:  Worsening  Chronicity:  New  Relieved by:  Nothing  Worsened by:  Nothing  Associated symptoms: nausea and vomiting (vomiting yesterday, none today)    Associated symptoms: no chest pain, no constipation, no diarrhea, no dysuria, no fatigue, no fever, no hematuria, no shortness of breath and no sore throat    Risk factors comment:  Cancer      Prior to Admission Medications   Prescriptions Last Dose Informant Patient Reported? Taking? Lonsurf 20-8  19 MG TABS   Yes No   Patient not taking: Reported on 4/6/2022    Multiple Vitamin (MULTIVITAMIN) tablet  Self Yes No   Sig: Take 1 tablet by mouth daily    atorvastatin (LIPITOR) 40 mg tablet  Self No No   Sig: Take 1 tablet (40 mg total) by mouth daily   dutasteride (AVODART) 0 5 mg capsule  Self No No   Sig: Take 1 capsule (0 5 mg total) by mouth daily at bedtime   losartan (COZAAR) 25 mg tablet  Self No No   Sig: TAKE ONE TABLET DAILY   metoprolol tartrate (LOPRESSOR) 25 mg tablet  Self No No   Sig: Take 0 5 tablets (12 5 mg total) by mouth 2 (two) times a day   ondansetron (ZOFRAN) 4 mg tablet  Self No No   Sig: Take 1 tablet (4 mg total) by mouth every 8 (eight) hours as needed for nausea or vomiting   predniSONE 10 mg tablet  Self No No   Sig: Take 1 tablet (10 mg total) by mouth daily      Facility-Administered Medications: None       Past Medical History:   Diagnosis Date    Abnormal CT of liver     last assessed: 07/21/14    Anemia     iron def 6/2020 hgb 9 6    BPH (benign prostatic hypertrophy)     Dupuytren contracture     both hands    Erectile dysfunction of non-organic origin     last assessed: 12    Esophageal reflux     last assessed: 14    Esophagitis 2012    Familial hypercholesteremia     last assessed: 13    Hyperlipidemia     Hypertension     Paroxysmal atrial fibrillation ([facility] [address] )     last assessed: 17 post colonoscopy    Shortness of breath     exertional due to anemia       Past Surgical History:   Procedure Laterality Date    BACK SURGERY      Lower    COLONOSCOPY      CT NEEDLE BIOPSY RETROPERITONEUM  2020    HAND CONTRACTURE RELEASE Left 2017    Procedure: Left hand percutaneous fasciotomy of palm adductor space x 2; index finger PIP joint; ring finger PIP joint; small finger MCP joint and PIP joint  ; splint application;  Surgeon: Jaison Talley MD;  [facility];  Service:     HAND SURGERY Bilateral     IR BIOPSY LUNG  2020    IR PORT PLACEMENT  2020       Family History   Problem Relation Age of Onset    No Known Problems Mother         natural causes    Heart disease Father     Heart attack Brother      I have reviewed and agree with the history as documented  E-Cigarette/Vaping    E-Cigarette Use Never User      E-Cigarette/Vaping Substances    Nicotine No     THC No     CBD No     Flavoring No     Other No     Unknown No      Social History     Tobacco Use    Smoking status: Former Smoker     Packs/day: 0 50     Years: 3 00     Pack years: 1 50     Types: Cigarettes     Start date:      Quit date:      Years since quittin 4    Smokeless tobacco: Never Used   Vaping Use    Vaping Use: Never used   Substance Use Topics    Alcohol use: No    Drug use: No       Review of Systems   Constitutional: Negative for activity change, appetite change, fatigue and fever  HENT: Negative for congestion, dental problem, ear pain, rhinorrhea and sore throat  Eyes: Negative for pain and redness     Respiratory: Negative for chest tightness, shortness of breath and wheezing  Cardiovascular: Negative for chest pain and palpitations  Gastrointestinal: Positive for abdominal pain, nausea and vomiting (vomiting yesterday, none today)  Negative for blood in stool, constipation and diarrhea  Endocrine: Negative for cold intolerance and heat intolerance  Genitourinary: Negative for dysuria, frequency and hematuria  Musculoskeletal: Negative for arthralgias and myalgias  Skin: Negative for color change, pallor and rash  Neurological: Negative for weakness and numbness  Hematological: Does not bruise/bleed easily  Psychiatric/Behavioral: Negative for agitation, hallucinations and suicidal ideas  Physical Exam  Physical Exam  Vitals and nursing note reviewed  Constitutional:       Appearance: He is well-developed  HENT:      Mouth/Throat:      Pharynx: No oropharyngeal exudate  Eyes:      Conjunctiva/sclera: Conjunctivae normal    Neck:      Comments: No meningeal signs  Cardiovascular:      Rate and Rhythm: Normal rate and regular rhythm  Heart sounds: Normal heart sounds  Pulmonary:      Effort: Pulmonary effort is normal  No respiratory distress  Breath sounds: Normal breath sounds  No wheezing or rales  Chest:      Chest wall: No tenderness  Abdominal:      General: Bowel sounds are normal  There is no distension  Palpations: Abdomen is soft  There is no mass  Tenderness: There is generalized abdominal tenderness  There is guarding  There is no right CVA tenderness, left CVA tenderness or rebound  Hernia: No hernia is present  Comments: No cvat   Musculoskeletal:         General: Normal range of motion  Cervical back: Normal range of motion and neck supple  Lymphadenopathy:      Cervical: No cervical adenopathy  Skin:     Findings: No erythema or rash  Comments: No edema   Neurological:      General: No focal deficit present        Mental Status: He is alert and oriented to person, place, and time  Cranial Nerves: No cranial nerve deficit  Psychiatric:         Behavior: Behavior normal          Vital Signs  ED Triage Vitals [04/26/22 1109]   Temperature Pulse Respirations Blood Pressure SpO2   98 7 °F (37 1 °C) (!) 125 20 127/68 95 %      Temp Source Heart Rate Source Patient Position - Orthostatic VS BP Location FiO2 (%)   Oral Monitor Sitting Right arm --      Pain Score       10 - Worst Possible Pain           Vitals:    04/26/22 1109 04/26/22 1327 04/26/22 1524   BP: 127/68 136/70 111/65   Pulse: (!) 125 (!) 114 (!) 117   Patient Position - Orthostatic VS: Sitting Lying Lying         Visual Acuity      ED Medications  Medications   ondansetron (ZOFRAN) injection 4 mg (4 mg Intravenous Given 4/26/22 1216)   morphine (PF) 4 mg/mL injection 4 mg (4 mg Intravenous Given 4/26/22 1217)   lactated ringers bolus 500 mL (0 mL Intravenous Stopped 4/26/22 1320)   morphine (PF) 4 mg/mL injection 4 mg (4 mg Intravenous Given 4/26/22 1325)   iohexol (OMNIPAQUE) 350 MG/ML injection (SINGLE-DOSE) 100 mL (100 mL Intravenous Given 4/26/22 1325)   cefepime (MAXIPIME) 2 g/50 mL dextrose IVPB (0 mg Intravenous Stopped 4/26/22 1443)       Diagnostic Studies  Results Reviewed     Procedure Component Value Units Date/Time    Procalcitonin [[id number]]  (Abnormal) Collected: 04/26/22 1404    Lab Status: Final result Specimen: Blood from Arm, Left Updated: 04/26/22 1453     Procalcitonin 6 56 ng/ml     Blood culture #2 [[id number]] Collected: 04/26/22 1408    Lab Status: In process Specimen: Blood from Arm, Right Updated: 04/26/22 1413    Blood culture #1 [[id number]] Collected: 04/26/22 1404    Lab Status:  In process Specimen: Blood from Arm, Left Updated: 04/26/22 1413    CBC and differential [[id number]]  (Abnormal) Collected: 04/26/22 1216    Lab Status: Final result Specimen: Blood from Arm, Left Updated: 04/26/22 1341     WBC 1 39 Thousand/uL      RBC 3 85 Million/uL      Hemoglobin 11 9 g/dL      Hematocrit 36 7 %      MCV 95 fL      MCH 30 9 pg      MCHC 32 4 g/dL      RDW 15 8 %      MPV 9 8 fL      Platelets 842 Thousands/uL     Manual Differential(PHLEBS Do Not Order) [[id number]]  (Abnormal) Collected: 04/26/22 1216    Lab Status: Final result Specimen: Blood from Arm, Left Updated: 04/26/22 1341     Segmented % 72 %      Bands % 12 %      Lymphocytes % 12 %      Monocytes % 4 %      Eosinophils, % 0 %      Basophils % 0 %      Absolute Neutrophils 1 17 Thousand/uL      Lymphocytes Absolute 0 17 Thousand/uL      Monocytes Absolute 0 06 Thousand/uL      Eosinophils Absolute 0 00 Thousand/uL      Basophils Absolute 0 00 Thousand/uL      Total Counted --     RBC Morphology Present     Macrocytes Present     Platelet Estimate Borderline    Lactic acid [[id number]]  (Normal) Collected: 04/26/22 1216    Lab Status: Final result Specimen: Blood from Arm, Left Updated: 04/26/22 1247     LACTIC ACID 1 8 mmol/L     Narrative:      Result may be elevated if tourniquet was used during collection      Comprehensive metabolic panel [[id number]]  (Abnormal) Collected: 04/26/22 1216    Lab Status: Final result Specimen: Blood from Arm, Left Updated: 04/26/22 1244     Sodium 137 mmol/L      Potassium 4 6 mmol/L      Chloride 101 mmol/L      CO2 28 mmol/L      ANION GAP 8 mmol/L      BUN 25 mg/dL      Creatinine 1 18 mg/dL      Glucose 109 mg/dL      Calcium 9 5 mg/dL      Corrected Calcium 10 1 mg/dL      AST 33 U/L      ALT 58 U/L      Alkaline Phosphatase 384 U/L      Total Protein 7 2 g/dL      Albumin 3 2 g/dL      Total Bilirubin 1 93 mg/dL      eGFR 58 ml/min/1 73sq m     Narrative:      [place] guidelines for Chronic Kidney Disease (CKD):     Stage 1 with normal or high GFR (GFR > 90 mL/min/1 73 square meters)    Stage 2 Mild CKD (GFR = 60-89 mL/min/1 73 square meters)    Stage 3A Moderate CKD (GFR = 45-59 mL/min/1 73 square meters)    Stage 3B Moderate CKD (GFR = 30-44 mL/min/1 73 square meters)    Stage 4 Severe CKD (GFR = 15-29 mL/min/1 73 square meters)    Stage 5 End Stage CKD (GFR <15 mL/min/1 73 square meters)  Note: GFR calculation is accurate only with a steady state creatinine    Lipase [[id number]]  (Abnormal) Collected: 04/26/22 1216    Lab Status: Final result Specimen: Blood from Arm, Left Updated: 04/26/22 1244     Lipase 72 u/L                  CT abdomen pelvis with contrast   Final Result by Fab Maloney MD (04/26 1418)      Interval distention of multiple segments of proximal and mid small bowel with a suspected transition point of an obstruction in the right midabdomen #2/42  Multiple small bowel segments are adherent to this location suspicious for an adhesion  Unchanged omental/peritoneal implants  Stable 8 cm pancreatic uncinate process cysts  The study was marked in [facility] for immediate notification  Workstation performed: [id number]                    Procedures  Procedures         ED Course  ED Course as of 04/26/22 1606   Tue Apr 26, 2022   1430 Surgery consulted for sbo                              Initial Sepsis Screening     Row Name 04/26/22 1333                Is the patient's history suggestive of a new or worsening infection? Yes (Proceed)  -MH        Suspected source of infection suspect infection, source unknown  -MH        Are two or more of the following signs & symptoms of infection both present and new to the patient?  Yes (Proceed)  -        Indicate SIRS criteria Tachycardia > 90 bpm;Leukopenia (WBC < 4000 IJL);WBC > 10% bands  -MH        If the answer is yes to both questions, suspicion of sepsis is present --        If severe sepsis is present AND tissue hypoperfusion perists in the hour after fluid resuscitation or lactate > 4, the patient meets criteria for SEPTIC SHOCK --        Are any of the following organ dysfunction criteria present within 6 hours of suspected infection and SIRS criteria that are NOT considered to be chronic conditions? --        Organ dysfunction --        Date of presentation of severe sepsis --        Time of presentation of severe sepsis --        Tissue hypoperfusion persists in the hour after crystalloid fluid administration, evidenced, by either: --        Was hypotension present within one hour of the conclusion of crystalloid fluid administration? 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)       ED Disposition     ED Disposition Condition Date/Time Comment    Admit Stable Tue Apr 26, 2022  3:25 PM Case was discussed with Dr Chelsea Denny and the patient's admission status was agreed to be Admission Status: inpatient status to the service of Dr Evert Hull   Follow-up Information    None         Patient's Medications   Discharge Prescriptions    No medications on file       No discharge procedures on file      PDMP Review     None          ED Provider  Electronically Signed by           Clare Wang MD  04/26/22 7464

## 2022-04-26 NOTE — ASSESSMENT & PLAN NOTE
Previous to admission patient is on Lopressor 12 5 mg b i d   And losartan 25 mg daily  Start Lopressor IV 2 5 mg q 6 hours while NPO

## 2022-04-26 NOTE — TELEPHONE ENCOUNTER
Spoke with patient and wife this AM  New onset acute abdominal pain x 24 hours  Not able to eat or drink today  Patient sounds in distress from his pain while on the phone today  He is taking Bentyl without relief  Advised to go to the ED for evaluation and management  Reviewed that this could be a SBO, worsening of cancer  Best to be seen in ED as also oral analgesia is not going to be effective right away either  He was agreeable  He will call ambulance if his wife cannot drive him

## 2022-04-26 NOTE — TELEPHONE ENCOUNTER
Patient's wife called to report that patient is in terrible pain and they would like to know if he can have something stronger  She would like a call back to advise

## 2022-04-26 NOTE — ASSESSMENT & PLAN NOTE
Patient follows with Medical Oncology at [facility] as well as [facility]  Patient show progression through 2 lines of chemotherapy  Recently finished first 2 week course of Lonsurf Last friday     Appreciate medical Oncology recommendations  Patient takes prednisone 10 mg daily as needed for appetite

## 2022-04-26 NOTE — ASSESSMENT & PLAN NOTE
Known history of metastatic small bowel lymphoma  CTAP: Interval distention of multiple segments of proximal and mid small bowel with a suspected transition point of an obstruction in the right midabdomen  Multiple small bowel segments are adherent to this location suspicious for an adhesion  nchanged omental/peritoneal implants  General surgery evaluated in emergency department-no indication for acute surgical intervention  Recommend bowel rest, NG tube, IV fluid, p r n   Analgesics/antiemetics  Will consult Medical Oncology, GI, palliative Care  Patient wishes to be level 1 full code

## 2022-04-27 PROBLEM — I95.9 HYPOTENSION: Status: ACTIVE | Noted: 2022-01-01

## 2022-04-27 PROBLEM — D61.810 ANTINEOPLASTIC CHEMOTHERAPY INDUCED PANCYTOPENIA (CODE) (HCC): Status: ACTIVE | Noted: 2022-01-01

## 2022-04-27 NOTE — ASSESSMENT & PLAN NOTE
Antineoplastic chemotherapy induced pancytopenia in the setting of small bowel lymphoma with recent course of Lonsurf, as evidenced by leukopenia (WBC 1 39), anemia (RBC 3 85) and thrombocytopenia (Plt 114>86), requiring monitoring of serials of CBC    He has maltoma  Should improve -monitor closely   procalcitonin elevated-possible due to gi obstruction

## 2022-04-27 NOTE — CONSULTS
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-   -Was on FOLFIRI + Avastin, switiched to Folfox + Avastin  Evidence of progression; currently on third line of [facility]  Coordination of care with Goran Franks    -Patient wishes to continue with PO treatment  He is now on a scheduled two-week break from treatment  [address] on board to deliver oral chemotherapy before the start of next cycle  -ECOG 1-2  Prior to this admission, he was mowing the lawn and cleaning out his garage in preparation for a huge garage sale in the neighborhood  Worked at [employer] for 42 years  2  Abdominal pain/suspicious for obstruction/adhesions- exploring several options including surgery and decompressive PEG  For now, on NG tube  On bowel rest  Evidence of bloody discharge on NG tube  Bedside RN and primary care team informed  3  Consult Palliative medicine- appreciate assistance with goals of care  At this time, patient wishes to pursue all life-saving measures  Patient has a good performance status and admirable fighting spirit  Outpatient follow up plan: Follow up with Dr Justin Jarvis  Communication with patient/family: Spoke with Demario Timmons, patient's wife  Dr Juliet Garcia team informed as well  I did review this patient with Dr Rober Burger and he is in agreement with this plan  Thank you for this consult  James Downey, [address] [facility]  Hematology-Oncology      Reason for consultation: Malignant neoplasm mets to omentum    History of present illness:  Neil Tucker is a 64 y o  male with a hx of stage IV adenocarcinoma of small bowel metastasis to peritoneum/omentum  PMH of hypertension, BPH; presented in the ED for abdominal pain    Patient reported that he had two stalks of celery when he suddenly felt a stabbing pain on the right upper quadrant area of his abdomen  He was taking Bentyl with no relief  Experienced nausea and nonbilious nonbloody vomiting  CT scan shows interval distension of multiple segments of proximal and mid small bowel with a suspected transition point of obstruction in the right mid abdomen;suspicious for adhesions  Was on FOLFIRI + Avastin, switiched to Folfox + Avastin  Unfortunately, there was evidence of progression; currently on third line of treatment with [facility]  Coordination of care with Saman Chappell  Patient verbalized his wish to continue with chemo PO treatment  He is now on a scheduled two-week break from treatment  [address] on board to deliver oral chemotherapy before the start of next cycle        Oncology History:   Cancer Staging  No matching staging information was found for the patient    Oncology History   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[facility] [address] )   12/17/2020 Initial Diagnosis    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[facility] [address] )     1/11/2021 - 10/26/2021 Chemotherapy    fluorouracil (ADRUCIL), 765 mg, Intravenous, Once, 12 of 12 cycles  Dose modification: 300 mg/m2 (original dose 400 mg/m2, Cycle 5, Reason: Dose Not Tolerated)  Administration: 800 mg (1/11/2021), 800 mg (1/25/2021), 800 mg (2/8/2021), 800 mg (2/22/2021), 600 mg (3/15/2021), 600 mg (3/29/2021), 600 mg (4/19/2021), 600 mg (5/3/2021), 600 mg (5/17/2021), 600 mg (6/1/2021), 600 mg (6/14/2021), 600 mg (6/28/2021)  fluorouracil (ADRUCIL), 600 mg (original dose 400 mg/m2), Intravenous, Once, 6 of 11 cycles  Dose modification: 300 mg/m2 (original dose 400 mg/m2, Cycle 13, Reason: Dose Not Tolerated)  Administration: 600 mg (7/26/2021), 600 mg (8/9/2021), 600 mg (8/23/2021), 600 mg (9/20/2021), 600 mg (10/4/2021), 600 mg (10/18/2021)  pegfilgrastim (Harvis Neo), 6 mg, Subcutaneous, Once, 2 of 2 cycles  Administration: 6 mg (2/10/2021), 6 mg (2/24/2021)  bevacizumab (AVASTIN) IVPB, 5 mg/kg = 352 5 mg (100 % of original dose 5 mg/kg), Intravenous, Once, 14 of 19 cycles  Dose modification: 5 mg/kg (original dose 5 mg/kg, Cycle 5)  Administration: 352 5 mg (3/15/2021), 352 5 mg (3/29/2021), 352 5 mg (4/19/2021), 352 5 mg (5/3/2021), 352 5 mg (5/17/2021), 352 5 mg (6/1/2021), 352 5 mg (6/14/2021), 352 5 mg (6/28/2021), 352 5 mg (7/26/2021), 352 5 mg (8/9/2021), 352 5 mg (8/23/2021), 352 5 mg (9/20/2021), 352 5 mg (10/4/2021), 352 5 mg (10/18/2021)  leucovorin calcium IVPB, 764 mg, Intravenous, Once, 18 of 23 cycles  Dose modification: 300 mg/m2 (original dose 400 mg/m2, Cycle 5, Reason: Dose Not Tolerated)  Administration: 800 mg (1/11/2021), 800 mg (1/25/2021), 800 mg (2/8/2021), 800 mg (2/22/2021), 600 mg (3/15/2021), 600 mg (3/29/2021), 600 mg (4/19/2021), 600 mg (5/3/2021), 600 mg (5/17/2021), 600 mg (6/1/2021), 600 mg (6/14/2021), 600 mg (6/28/2021), 600 mg (7/26/2021), 600 mg (8/9/2021), 600 mg (8/23/2021), 600 mg (9/20/2021), 600 mg (10/4/2021), 600 mg (10/18/2021)  oxaliplatin (ELOXATIN) chemo infusion, 85 mg/m2 = 162 35 mg, Intravenous, Once, 12 of 12 cycles  Dose modification: 65 mg/m2 (original dose 85 mg/m2, Cycle 5, Reason: Dose Not Tolerated)  Administration: 162 35 mg (1/11/2021), 162 35 mg (1/25/2021), 162 35 mg (2/8/2021), 162 35 mg (2/22/2021), 124 15 mg (3/15/2021), 124 15 mg (3/29/2021), 124 15 mg (4/19/2021), 124 15 mg (5/3/2021), 124 15 mg (5/17/2021), 124 15 mg (6/1/2021), 124 15 mg (6/14/2021), 124 15 mg (6/28/2021)  fluorouracil (ADRUCIL) ambulatory infusion Soln, 1,200 mg/m2/day = 4,585 mg, Intravenous, Over 46 hours, 18 of 23 cycles  tbo-filgrastim (GRANIX), 300 mcg (100 % of original dose 300 mcg), Subcutaneous, Once, 12 of 17 cycles  Dose modification: 300 mcg (original dose 300 mcg, Cycle 7)  Administration: 300 mcg (5/10/2021), 300 mcg (5/24/2021), 300 mcg (6/8/2021), 300 mcg (6/21/2021), 300 mcg (7/6/2021), 300 mcg (8/3/2021), 300 mcg (8/17/2021), 300 mcg (8/31/2021), 300 mcg (9/29/2021), 300 mcg (10/12/2021), 300 mcg (10/26/2021) 11/1/2021 - 2/7/2022 Chemotherapy    fluorouracil (ADRUCIL), 300 mg/m2 = 545 mg (75 % of original dose 400 mg/m2), Intravenous, Once, 6 of 10 cycles  Dose modification: 300 mg/m2 (original dose 400 mg/m2, Cycle 1, Reason: Dose Not Tolerated)  Administration: 545 mg (11/1/2021), 545 mg (11/15/2021), 545 mg (12/8/2021), 545 mg (12/21/2021), 545 mg (1/17/2022), 500 mg (1/31/2022)  pegfilgrastim ([place]), 6 mg, Subcutaneous, Once, 2 of 2 cycles  Administration: 6 mg (11/3/2021), 6 mg (11/17/2021)  bevacizumab (AVASTIN) IVPB, 5 mg/kg = 330 mg, Intravenous, Once, 6 of 10 cycles  Administration: 330 mg (11/1/2021), 330 mg (11/15/2021), 330 mg (12/8/2021), 330 mg (12/21/2021), 330 mg (1/17/2022), 282 5 mg (1/31/2022)  irinotecan (CAMPTOSAR) chemo infusion, 326 mg, Intravenous, Once, 6 of 10 cycles  Administration: 340 mg (11/1/2021), 340 mg (11/15/2021), 340 mg (12/8/2021), 326 mg (12/21/2021), 320 mg (1/17/2022), 300 mg (1/31/2022)  leucovorin calcium IVPB, 300 mg/m2 = 543 mg (75 % of original dose 400 mg/m2), Intravenous, Once, 6 of 10 cycles  Dose modification: 300 mg/m2 (original dose 400 mg/m2, Cycle 1, Reason: Dose Not Tolerated)  Administration: 543 mg (11/1/2021), 543 mg (11/15/2021), 543 mg (12/8/2021), 543 mg (12/21/2021), 543 mg (1/17/2022), 500 mg (1/31/2022)  fluorouracil (ADRUCIL) ambulatory infusion Soln, 1,200 mg/m2/day = 4,345 mg, Intravenous, Over 46 hours, 6 of 10 cycles  tbo-filgrastim (GRANIX), 300 mcg (100 % of original dose 300 mcg), Subcutaneous, Once, 3 of 7 cycles  Dose modification: 6 mcg (original dose 300 mcg, Cycle 3), 300 mcg (original dose 300 mcg, Cycle 3), 300 mcg (original dose 300 mcg, Cycle 3)  Administration: 300 mcg (12/15/2021), 300 mcg (1/24/2022), 300 mcg (2/7/2022), 300 mcg (12/16/2021)     Malignant neoplasm metastatic to lung ([facility] [address] )   10/26/2021 Initial Diagnosis    Malignant neoplasm metastatic to lung ([facility] [address] )     11/1/2021 - 2/7/2022 Chemotherapy    fluorouracil (ADRUCIL), 300 mg/m2 = 545 mg (75 % of original dose 400 mg/m2), Intravenous, Once, 6 of 10 cycles  Dose modification: 300 mg/m2 (original dose 400 mg/m2, Cycle 1, Reason: Dose Not Tolerated)  Administration: 545 mg (11/1/2021), 545 mg (11/15/2021), 545 mg (12/8/2021), 545 mg (12/21/2021), 545 mg (1/17/2022), 500 mg (1/31/2022)  pegfilgrastim ([place]), 6 mg, Subcutaneous, Once, 2 of 2 cycles  Administration: 6 mg (11/3/2021), 6 mg (11/17/2021)  bevacizumab (AVASTIN) IVPB, 5 mg/kg = 330 mg, Intravenous, Once, 6 of 10 cycles  Administration: 330 mg (11/1/2021), 330 mg (11/15/2021), 330 mg (12/8/2021), 330 mg (12/21/2021), 330 mg (1/17/2022), 282 5 mg (1/31/2022)  irinotecan (CAMPTOSAR) chemo infusion, 326 mg, Intravenous, Once, 6 of 10 cycles  Administration: 340 mg (11/1/2021), 340 mg (11/15/2021), 340 mg (12/8/2021), 326 mg (12/21/2021), 320 mg (1/17/2022), 300 mg (1/31/2022)  leucovorin calcium IVPB, 300 mg/m2 = 543 mg (75 % of original dose 400 mg/m2), Intravenous, Once, 6 of 10 cycles  Dose modification: 300 mg/m2 (original dose 400 mg/m2, Cycle 1, Reason: Dose Not Tolerated)  Administration: 543 mg (11/1/2021), 543 mg (11/15/2021), 543 mg (12/8/2021), 543 mg (12/21/2021), 543 mg (1/17/2022), 500 mg (1/31/2022)  fluorouracil (ADRUCIL) ambulatory infusion Soln, 1,200 mg/m2/day = 4,345 mg, Intravenous, Over 46 hours, 6 of 10 cycles  tbo-filgrastim (GRANIX), 300 mcg (100 % of original dose 300 mcg), Subcutaneous, Once, 3 of 7 cycles  Dose modification: 6 mcg (original dose 300 mcg, Cycle 3), 300 mcg (original dose 300 mcg, Cycle 3), 300 mcg (original dose 300 mcg, Cycle 3)  Administration: 300 mcg (12/15/2021), 300 mcg (1/24/2022), 300 mcg (2/7/2022), 300 mcg (12/16/2021)     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[address] )   10/26/2021 Initial Diagnosis    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[address] )     11/1/2021 - 2/7/2022 Chemotherapy    fluorouracil (ADRUCIL), 300 mg/m2 = 545 mg (75 % of original dose 400 mg/m2), Intravenous, Once, 6 of 10 cycles  Dose modification: 300 mg/m2 (original dose 400 mg/m2, Cycle 1, Reason: Dose Not Tolerated)  Administration: 545 mg (11/1/2021), 545 mg (11/15/2021), 545 mg (12/8/2021), 545 mg (12/21/2021), 545 mg (1/17/2022), 500 mg (1/31/2022)  pegfilgrastim ([place]), 6 mg, Subcutaneous, Once, 2 of 2 cycles  Administration: 6 mg (11/3/2021), 6 mg (11/17/2021)  bevacizumab (AVASTIN) IVPB, 5 mg/kg = 330 mg, Intravenous, Once, 6 of 10 cycles  Administration: 330 mg (11/1/2021), 330 mg (11/15/2021), 330 mg (12/8/2021), 330 mg (12/21/2021), 330 mg (1/17/2022), 282 5 mg (1/31/2022)  irinotecan (CAMPTOSAR) chemo infusion, 326 mg, Intravenous, Once, 6 of 10 cycles  Administration: 340 mg (11/1/2021), 340 mg (11/15/2021), 340 mg (12/8/2021), 326 mg (12/21/2021), 320 mg (1/17/2022), 300 mg (1/31/2022)  leucovorin calcium IVPB, 300 mg/m2 = 543 mg (75 % of original dose 400 mg/m2), Intravenous, Once, 6 of 10 cycles  Dose modification: 300 mg/m2 (original dose 400 mg/m2, Cycle 1, Reason: Dose Not Tolerated)  Administration: 543 mg (11/1/2021), 543 mg (11/15/2021), 543 mg (12/8/2021), 543 mg (12/21/2021), 543 mg (1/17/2022), 500 mg (1/31/2022)  fluorouracil (ADRUCIL) ambulatory infusion Soln, 1,200 mg/m2/day = 4,345 mg, Intravenous, Over 46 hours, 6 of 10 cycles  tbo-filgrastim (GRANIX), 300 mcg (100 % of original dose 300 mcg), Subcutaneous, Once, 3 of 7 cycles  Dose modification: 6 mcg (original dose 300 mcg, Cycle 3), 300 mcg (original dose 300 mcg, Cycle 3), 300 mcg (original dose 300 mcg, Cycle 3)  Administration: 300 mcg (12/15/2021), 300 mcg (1/24/2022), 300 mcg (2/7/2022), 300 mcg (12/16/2021)         Past Medical History:   Past Medical History:   Diagnosis Date    Abnormal CT of liver     last assessed: 07/21/14    Anemia     iron def 6/2020 hgb 9 6    BPH (benign prostatic hypertrophy)     Dupuytren contracture     both hands    Erectile dysfunction of non-organic origin     last assessed: 11/27/12    Esophageal reflux     last assessed: 14    Esophagitis 2012    Familial hypercholesteremia     last assessed: 13    Hyperlipidemia     Hypertension     Paroxysmal atrial fibrillation ([address] )     last assessed: 17 post colonoscopy    Shortness of breath 2020    exertional due to anemia       Past Surgical History:   Procedure Laterality Date    BACK SURGERY      Lower    COLONOSCOPY      CT NEEDLE BIOPSY RETROPERITONEUM  2020    HAND CONTRACTURE RELEASE Left 2017    Procedure: Left hand percutaneous fasciotomy of palm adductor space x 2; index finger PIP joint; ring finger PIP joint; small finger MCP joint and PIP joint  ; splint application;  Surgeon: William Hou MD;  [facility];  Service:     HAND SURGERY Bilateral     IR BIOPSY LUNG  2020    IR PORT PLACEMENT  2020       Family History   Problem Relation Age of Onset    No Known Problems Mother         natural causes    Heart disease Father     Heart attack Brother        Social History     Socioeconomic History    Marital status: /Civil Union     Spouse name: None    Number of children: None    Years of education: None    Highest education level: None   Occupational History    None   Tobacco Use    Smoking status: Former Smoker     Packs/day: 0 50     Years: 3 00     Pack years: 1 50     Types: Cigarettes     Start date:      Quit date:      Years since quittin 4    Smokeless tobacco: Never Used   Vaping Use    Vaping Use: Never used   Substance and Sexual Activity    Alcohol use: Never    Drug use: No    Sexual activity: None   Other Topics Concern    None   Social History Narrative     - grinding, nuclear work    Used [employer]     Social Determinants of Health     Financial Resource Strain: Not on file   Food Insecurity: Not on file   Transportation Needs: Not on file   Physical Activity: Not on file   Stress: Not on file   Social Connections: Not on file   Intimate Partner Violence: Not on file   Housing Stability: Not on file         Current Facility-Administered Medications:     cefepime (MAXIPIME) 2,000 mg in dextrose 5 % 50 mL IVPB, 2,000 mg, Intravenous, Q12H, Stuart Lee DO, Last Rate: 100 mL/hr at 04/28/22 0200, 2,000 mg at 04/28/22 0200    dextrose 5 % and sodium chloride 0 9 % infusion, 100 mL/hr, Intravenous, Continuous, Stuart Lee DO, Last Rate: 100 mL/hr at 04/28/22 0317, 100 mL/hr at 04/28/22 0317    heparin (porcine) subcutaneous injection 5,000 Units, 5,000 Units, Subcutaneous, Q8H [address], 5,000 Units at 04/27/22 1341 **AND** [COMPLETED] Platelet count, , , Once, Stuart Lee,     HYDROmorphone HCl (DILAUDID) injection 0 2 mg, 0 2 mg, Intravenous, Q4H PRN, 0 2 mg at 04/28/22 0815 **OR** HYDROmorphone (DILAUDID) injection 0 5 mg, 0 5 mg, Intravenous, Q4H PRN, On license of [facility], , 0 5 mg at 04/28/22 0205    HYDROmorphone (DILAUDID) injection 1 mg, 1 mg, Intravenous, Q4H PRN, On license of [facility], DO, 1 mg at 04/28/22 0311    metoprolol (LOPRESSOR) injection 2 5 mg, 2 5 mg, Intravenous, Q6H, [facility] DO, 2 5 mg at 04/28/22 0815    ondansetron (ZOFRAN) injection 4 mg, 4 mg, Intravenous, Q4H PRN, On license of [facility], , 4 mg at 04/27/22 0555    pantoprazole (PROTONIX) injection 40 mg, 40 mg, Intravenous, Q12H [address], Paxton Javier MD, 40 mg at 04/28/22 0815    Medications Prior to Admission   Medication    cholecalciferol (VITAMIN D3) 1,000 units tablet    vitamin B-12 (VITAMIN B-12) 1,000 mcg tablet    atorvastatin (LIPITOR) 40 mg tablet    dutasteride (AVODART) 0 5 mg capsule    Lonsurf 20-8  19 MG TABS    losartan (COZAAR) 25 mg tablet    metoprolol tartrate (LOPRESSOR) 25 mg tablet    Multiple Vitamin (MULTIVITAMIN) tablet    ondansetron (ZOFRAN) 4 mg tablet    predniSONE 10 mg tablet       Allergies   Allergen Reactions    Other Other (See Comments)     Liquid lidocaine - couldn't swallow, panicked Physical Exam:     /67 (BP Location: Right arm)   Pulse 92   Temp (!) 96 1 °F (35 6 °C) (Oral)   Resp 16   Ht 5' 9" (1 753 m)   Wt 63 kg (139 lb)   SpO2 97%   BMI 20 53 kg/m²     Physical Exam  HENT:      Head: Normocephalic  Nose:      Comments: NG tube     Mouth/Throat:      Mouth: Mucous membranes are dry  Pulmonary:      Effort: Pulmonary effort is normal       Comments: Decreased breath sounds BL LL  Abdominal:      Tenderness: There is abdominal tenderness  There is guarding  Skin:     General: Skin is warm and dry  Findings: Bruising present  Neurological:      General: No focal deficit present  Mental Status: He is alert and oriented to person, place, and time  Motor: Weakness present  Psychiatric:         Mood and Affect: Mood normal          Behavior: Behavior normal          Thought Content:  Thought content normal            Recent Results (from the past 48 hour(s))   CBC and differential    Collection Time: 04/26/22 12:16 PM   Result Value Ref Range    WBC 1 39 (LL) 4 31 - 10 16 Thousand/uL    RBC 3 85 (L) 3 88 - 5 62 Million/uL    Hemoglobin 11 9 (L) 12 0 - 17 0 g/dL    Hematocrit 36 7 36 5 - 49 3 %    MCV 95 82 - 98 fL    MCH 30 9 26 8 - 34 3 pg    MCHC 32 4 31 4 - 37 4 g/dL    RDW 15 8 (H) 11 6 - 15 1 %    MPV 9 8 8 9 - 12 7 fL    Platelets 714 (L) 824 - 390 Thousands/uL   Comprehensive metabolic panel    Collection Time: 04/26/22 12:16 PM   Result Value Ref Range    Sodium 137 136 - 145 mmol/L    Potassium 4 6 3 5 - 5 3 mmol/L    Chloride 101 100 - 108 mmol/L    CO2 28 21 - 32 mmol/L    ANION GAP 8 4 - 13 mmol/L    BUN 25 5 - 25 mg/dL    Creatinine 1 18 0 60 - 1 30 mg/dL    Glucose 109 65 - 140 mg/dL    Calcium 9 5 8 3 - 10 1 mg/dL    Corrected Calcium 10 1 8 3 - 10 1 mg/dL    AST 33 5 - 45 U/L    ALT 58 12 - 78 U/L    Alkaline Phosphatase 384 (H) 46 - 116 U/L    Total Protein 7 2 6 4 - 8 2 g/dL    Albumin 3 2 (L) 3 5 - 5 0 g/dL    Total Bilirubin 1 93 (H) 0 20 - 1 00 mg/dL    eGFR 58 ml/min/1 73sq m   Lipase    Collection Time: 04/26/22 12:16 PM   Result Value Ref Range    Lipase 72 (L) 73 - 393 u/L   Lactic acid    Collection Time: 04/26/22 12:16 PM   Result Value Ref Range    LACTIC ACID 1 8 0 5 - 2 0 mmol/L   Manual Differential(PHLEBS Do Not Order)    Collection Time: 04/26/22 12:16 PM   Result Value Ref Range    Segmented % 72 43 - 75 %    Bands % 12 (H) 0 - 8 %    Lymphocytes % 12 (L) 14 - 44 %    Monocytes % 4 4 - 12 %    Eosinophils, % 0 0 - 6 %    Basophils % 0 0 - 1 %    Absolute Neutrophils 1 17 (L) 1 85 - 7 62 Thousand/uL    Lymphocytes Absolute 0 17 (L) 0 60 - 4 47 Thousand/uL    Monocytes Absolute 0 06 0 00 - 1 22 Thousand/uL    Eosinophils Absolute 0 00 0 00 - 0 40 Thousand/uL    Basophils Absolute 0 00 0 00 - 0 10 Thousand/uL    Total Counted      RBC Morphology Present     Macrocytes Present     Platelet Estimate Borderline (A) Adequate   Blood culture #1    Collection Time: 04/26/22  2:04 PM    Specimen: Arm, Left; Blood   Result Value Ref Range    Blood Culture No Growth at 24 hrs  Procalcitonin    Collection Time: 04/26/22  2:04 PM   Result Value Ref Range    Procalcitonin 6 56 (H) <=0 25 ng/ml   Blood culture #2    Collection Time: 04/26/22  2:08 PM    Specimen: Arm, Right; Blood   Result Value Ref Range    Blood Culture No Growth at 24 hrs      Platelet count    Collection Time: 04/26/22 11:22 PM   Result Value Ref Range    Platelets 86 (L) 069 - 390 Thousands/uL    MPV 10 4 8 9 - 12 7 fL   Comprehensive metabolic panel    Collection Time: 04/27/22  5:24 AM   Result Value Ref Range    Sodium 138 136 - 145 mmol/L    Potassium 3 7 3 5 - 5 3 mmol/L    Chloride 104 100 - 108 mmol/L    CO2 26 21 - 32 mmol/L    ANION GAP 8 4 - 13 mmol/L    BUN 23 5 - 25 mg/dL    Creatinine 1 17 0 60 - 1 30 mg/dL    Glucose 102 65 - 140 mg/dL    Calcium 8 4 8 3 - 10 1 mg/dL    Corrected Calcium 9 8 8 3 - 10 1 mg/dL    AST 24 5 - 45 U/L    ALT 36 12 - 78 U/L Alkaline Phosphatase 241 (H) 46 - 116 U/L    Total Protein 5 8 (L) 6 4 - 8 2 g/dL    Albumin 2 3 (L) 3 5 - 5 0 g/dL    Total Bilirubin 1 39 (H) 0 20 - 1 00 mg/dL    eGFR 59 ml/min/1 73sq m   Procalcitonin    Collection Time: 04/27/22  5:24 AM   Result Value Ref Range    Procalcitonin 13 20 (H) <=0 25 ng/ml   CBC and differential    Collection Time: 04/27/22  5:25 AM   Result Value Ref Range    WBC 1 76 (LL) 4 31 - 10 16 Thousand/uL    RBC 3 06 (L) 3 88 - 5 62 Million/uL    Hemoglobin 9 4 (L) 12 0 - 17 0 g/dL    Hematocrit 29 1 (L) 36 5 - 49 3 %    MCV 95 82 - 98 fL    MCH 30 7 26 8 - 34 3 pg    MCHC 32 3 31 4 - 37 4 g/dL    RDW 15 7 (H) 11 6 - 15 1 %    MPV 10 4 8 9 - 12 7 fL    Platelets 75 (L) 886 - 390 Thousands/uL    nRBC 0 /100 WBCs   ECG 12 lead    Collection Time: 04/27/22  6:37 PM   Result Value Ref Range    Ventricular Rate 102 BPM    Atrial Rate 102 BPM    NJ Interval 136 ms    QRSD Interval 92 ms    QT Interval 338 ms    QTC Interval 440 ms    P [place] 37 degrees    QRS Axis 41 degrees    T Wave Axis 33 degrees   CBC and differential    Collection Time: 04/27/22  6:45 PM   Result Value Ref Range    WBC 1 97 (LL) 4 31 - 10 16 Thousand/uL    RBC 3 17 (L) 3 88 - 5 62 Million/uL    Hemoglobin 9 9 (L) 12 0 - 17 0 g/dL    Hematocrit 30 4 (L) 36 5 - 49 3 %    MCV 96 82 - 98 fL    MCH 31 2 26 8 - 34 3 pg    MCHC 32 6 31 4 - 37 4 g/dL    RDW 15 4 (H) 11 6 - 15 1 %    MPV 10 6 8 9 - 12 7 fL    Platelets 67 (L) 833 - 390 Thousands/uL   Manual Differential(PHLEBS Do Not Order)    Collection Time: 04/27/22  6:45 PM   Result Value Ref Range    Segmented % 71 43 - 75 %    Bands % 1 0 - 8 %    Lymphocytes % 21 14 - 44 %    Monocytes % 5 4 - 12 %    Eosinophils, % 1 0 - 6 %    Basophils % 0 0 - 1 %    Atypical Lymphocytes % 1 (H) <=0 %    Absolute Neutrophils 1 42 (L) 1 85 - 7 62 Thousand/uL    Lymphocytes Absolute 0 41 (L) 0 60 - 4 47 Thousand/uL    Monocytes Absolute 0 10 0 00 - 1 22 Thousand/uL    Eosinophils Absolute 0  02 0 00 - 0 40 Thousand/uL    Basophils Absolute 0 00 0 00 - 0 10 Thousand/uL    Total Counted      Anisocytosis Present     Ovalocytes Present     Platelet Estimate Decreased (A) Adequate   ECG 12 lead    Collection Time: 04/28/22  3:27 AM   Result Value Ref Range    Ventricular Rate 90 BPM    Atrial Rate 90 BPM    VA Interval 134 ms    QRSD Interval 98 ms    QT Interval 346 ms    QTC Interval 423 ms    P [place] 39 degrees    QRS Axis 35 degrees    T Wave Axis 29 degrees   ECG 12 lead    Collection Time: 04/28/22  3:27 AM   Result Value Ref Range    Ventricular Rate 95 BPM    Atrial Rate 95 BPM    VA Interval 138 ms    QRSD Interval 104 ms    QT Interval 344 ms    QTC Interval 432 ms    P [place] 43 degrees    QRS Axis 36 degrees    T Wave Axis 33 degrees   CBC and differential    Collection Time: 04/28/22  4:36 AM   Result Value Ref Range    WBC 1 90 (LL) 4 31 - 10 16 Thousand/uL    RBC 2 76 (L) 3 88 - 5 62 Million/uL    Hemoglobin 8 3 (L) 12 0 - 17 0 g/dL    Hematocrit 26 1 (L) 36 5 - 49 3 %    MCV 95 82 - 98 fL    MCH 30 1 26 8 - 34 3 pg    MCHC 31 8 31 4 - 37 4 g/dL    RDW 15 6 (H) 11 6 - 15 1 %    MPV 10 6 8 9 - 12 7 fL    Platelets 66 (L) 928 - 390 Thousands/uL    nRBC 0 /100 WBCs   Comprehensive metabolic panel    Collection Time: 04/28/22  4:36 AM   Result Value Ref Range    Sodium 141 136 - 145 mmol/L    Potassium 3 5 3 5 - 5 3 mmol/L    Chloride 107 100 - 108 mmol/L    CO2 25 21 - 32 mmol/L    ANION GAP 9 4 - 13 mmol/L    BUN 22 5 - 25 mg/dL    Creatinine 1 06 0 60 - 1 30 mg/dL    Glucose 107 65 - 140 mg/dL    Calcium 8 5 8 3 - 10 1 mg/dL    Corrected Calcium 10 0 8 3 - 10 1 mg/dL    AST 18 5 - 45 U/L    ALT 29 12 - 78 U/L    Alkaline Phosphatase 221 (H) 46 - 116 U/L    Total Protein 5 7 (L) 6 4 - 8 2 g/dL    Albumin 2 1 (L) 3 5 - 5 0 g/dL    Total Bilirubin 0 99 0 20 - 1 00 mg/dL    eGFR 66 ml/min/1 73sq m   Magnesium    Collection Time: 04/28/22  4:36 AM   Result Value Ref Range    Magnesium 1 7 1 6 - 2 6 mg/dL   Phosphorus    Collection Time: 04/28/22  4:36 AM   Result Value Ref Range    Phosphorus 2 9 2 3 - 4 1 mg/dL       CT abdomen pelvis with contrast    Result Date: 4/26/2022  Narrative: CT ABDOMEN AND PELVIS WITH IV CONTRAST INDICATION:   LLQ abdominal pain acute abd pain  COMPARISON:  CT small bowel enterography 3/13/2022 CT chest abdomen pelvis 2/9/2022 TECHNIQUE:  CT examination of the abdomen and pelvis was performed  Axial, sagittal, and coronal 2D reformatted images were created from the source data and submitted for interpretation  Radiation dose length product (DLP) for this visit:  438 mGy-cm   This examination, like all CT scans performed in the [facility], was performed utilizing techniques to minimize radiation dose exposure, including the use of iterative reconstruction and automated exposure control  IV Contrast:  100 mL of iohexol (OMNIPAQUE) Enteric Contrast:  Enteric contrast was not administered  FINDINGS: ABDOMEN LOWER CHEST:  Unchanged bibasilar nodules measuring up to an 8 mm #2/4  Discoid atelectasis at the right lung base  LIVER/BILIARY TREE:  Unchanged hepatic cysts and hemangiomas  No suspicious hepatic lesions  GALLBLADDER:  There are gallstone(s) within the gallbladder, without pericholecystic inflammatory changes  SPLEEN:  Unremarkable  PANCREAS:  Unchanged 8 mm uncinate process cysts stable since 2014  #2/32 ADRENAL GLANDS:  Unremarkable  KIDNEYS/URETERS:  No hydronephrosis  Unchanged renal cysts  STOMACH AND BOWEL:  Distention of multiple segments of proximal and mid small bowel throughout the mid abdomen with mild mucosal hyperemia and areas of mild thickening  Possible transition point of an obstruction on #2/43 with multiple adjacent adherent  loops of small bowel possibly representing an adhesion  The terminal ileum is decompressed  Distal colonic diverticulosis without diverticulitis  APPENDIX:  No findings to suggest appendicitis   ABDOMINOPELVIC CAVITY:  No ascites or pneumoperitoneum  Unchanged 4 4 x 1 6 and 4 3 x 1 9 anterior omental lesions #2/38  Decreased size of the implant located inferior to the spleen now measuring 14 x 13 mm previously measured 2 5 x 1 1 cm  Unchanged partially calcified 14 mm right mesenteric nodule #240 is located adjacent to the area of suspected small bowel transition point  VESSELS:  Unremarkable for patient's age  PELVIS REPRODUCTIVE ORGANS:  Prostamegaly URINARY BLADDER:  Unremarkable  ABDOMINAL WALL/INGUINAL REGIONS:  Unchanged benign intramuscular hematoma anterior to the left femoral neck  OSSEOUS STRUCTURES:  No acute fracture or destructive osseous lesion  Impression: Interval distention of multiple segments of proximal and mid small bowel with a suspected transition point of an obstruction in the right midabdomen #2/42  Multiple small bowel segments are adherent to this location suspicious for an adhesion  Unchanged omental/peritoneal implants  Stable 8 cm pancreatic uncinate process cysts  The study was marked in [facility] for immediate notification  Workstation performed: [id number]       Labs and pertinent reports reviewed

## 2022-04-27 NOTE — PALLIATIVE CARE CONFERENCE
Palliative MSW saw patient at the bedside today  MSW appreciates the opportunity to provider patient/family with inpatient emotional support and guidance while patient continues to receive medical attention from the medical team     Topics discussed: Patient recalled his cancer journey so far, as well as the intense pain that has brought him to the hospital  He expressed that he has spoken with surgery and has agreed to have surgery for his obstruction  We discussed GOC and at this time he continues to be treatment focused, he even spoke about the possibility of going to [facility] for a trial in the future, if his current chemo pill does not work  He states that he is going to keep going until there are no other options  Dr Gus Ernandez spoke with him regarding his goals, if there were no other treatment options, and he states he would want to be kept comfortable at home  We discussed hospice and what that entails  Pt is well supported by his wife, Demario Timmons  She was present for the conversation  Areas that need follow-up: None  Resources given: Hospice information  Others present:  Dr Gus Ernandez and patient's ector    MSW will continue to follow as requested by the medical team, patient, or family    For medical information please refer to provider notes

## 2022-04-27 NOTE — ASSESSMENT & PLAN NOTE
Known history of metastatic small bowel lymphoma  CTAP: Interval distention of multiple segments of proximal and mid small bowel with a suspected transition point of an obstruction in the right midabdomen  Multiple small bowel segments are adherent to this location suspicious for an adhesion  nchanged omental/peritoneal implants  General surgery evaluated in emergency department-no indication for acute surgical intervention  Recommend bowel rest, NG tube, IV fluid, p r n   Analgesics/antiemetics   Medical Oncology-regarding treatment in the obstruction, GI-they have palliative procedures they can do, palliative Care-will discuss goals today  Surgery NGT to suction, possible surgical intervention for G- J tube placement   Cardiology clearance for surgery   Patient wishes to be level 1 full code

## 2022-04-27 NOTE — PROGRESS NOTES
[address]  Progress Note - Apryl Carpenter 1945, 64 y o  male MRN: [id number]  Unit/Bed#: E2 -02 Encounter: [id number]  Primary Care Provider: Sharon Bowman DO   Date and time admitted to hospital: 4/26/2022 11:36 AM    * Small bowel obstruction ([facility] )  Assessment & Plan  Known history of metastatic small bowel lymphoma  CTAP: Interval distention of multiple segments of proximal and mid small bowel with a suspected transition point of an obstruction in the right midabdomen  Multiple small bowel segments are adherent to this location suspicious for an adhesion  nchanged omental/peritoneal implants  General surgery evaluated in emergency department-no indication for acute surgical intervention  Recommend bowel rest, NG tube, IV fluid, p r n  Analgesics/antiemetics   Medical Oncology-regarding treatment in the obstruction, GI-they have palliative procedures they can do, palliative Care-will discuss goals today  Surgery NGT to suction, possible surgical intervention for G- J tube placement   Cardiology clearance for surgery   Patient wishes to be level 1 full code    Antineoplastic [facility]  Assessment & Plan  Antineoplastic chemotherapy induced pancytopenia in the setting of small bowel lymphoma with recent course of Lonsurf, as evidenced by leukopenia (WBC 1 39), anemia (RBC 3 85) and thrombocytopenia (Plt 114>86), requiring monitoring of serials of CBC  He has maltoma  Should improve -monitor closely   procalcitonin elevated-possible due to gi obstruction       Hypotension  Assessment & Plan  Patient has hypotension due to poor intake, a fib, his output is higher than input, possible infection   -hydration  -monitor closely  -improved     -A fib, continue with metoprolol      Neutropenia ([facility] )  Assessment & Plan  Likely related to recent chemotherapy course  Possible new infection setting of small-bowel obstruction  Will continue cefepime which was initiated on admission  Trend CBC/fever curve  Follow-up blood cultures    MALT (mucosa associated lymphoid tissue)- G[facility])  Assessment & Plan  Patient follows with Medical Oncology at [facility] as well as Johnathan Barnhart  Patient show progression through 2 lines of chemotherapy  Recently finished first 2 week course of Lonsurf Last friday  Appreciate medical Oncology recommendations  Patient takes prednisone 10 mg daily as needed for appetite    Benign prostatic hyperplasia  Assessment & Plan  Resume meds when able    Benign essential hypertension  Assessment & Plan  Previous to admission patient is on Lopressor 12 5 mg b i d  And losartan 25 mg daily  Start Lopressor IV 2 5 mg q 6 hours while NPO  Holding parameters noted           VTE Pharmacologic Prophylaxis: VTE Score: 6 High Risk (Score >/= 5) - Pharmacological DVT Prophylaxis Ordered: heparin  Sequential Compression Devices Ordered  Patient Centered Rounds: I performed bedside rounds with nursing staff today  Discussions with Specialists or Other Care Team Provider: GI, cardiology, palliative care    Education and Discussions with Family / Patient: Updated  (wife) at bedside  Time Spent for Care: 45 minutes  More than 50% of total time spent on counseling and coordination of care as described above  Current Length of Stay: 1 day(s)  Current Patient Status: Inpatient   Certification Statement: The patient will continue to require additional inpatient hospital stay due to SOB, decompression with NGT now, might need surgery  Discharge Plan: depending on improvement    Code Status: Level 1 - Full Code    Subjective:   Patient is still having NGT to suction, putting out yellow green bile  He has low blood pressures  Improved now   He says not passing 'flatulence'    Objective:     Vitals:   Temp (24hrs), Av 9 °F (36 6 °C), Min:97 9 °F (36 6 °C), Max:97 9 °F (36 6 °C)    Temp:  [97 9 °F (36 6 °C)] 97 9 °F (36 6 °C)  HR:  [] 106  Resp:  [18-20] 18  BP: ()/(56-78) 130/78  SpO2:  [94 %-98 %] 98 %  Body mass index is 20 53 kg/m²  Input and Output Summary (last 24 hours): Intake/Output Summary (Last 24 hours) at 4/27/2022 1449  Last data filed at 4/27/2022 0548  Gross per 24 hour   Intake --   Output 400 ml   Net -400 ml       Physical Exam:   Physical Exam  Vitals and nursing note reviewed  Constitutional:       Appearance: He is well-developed  HENT:      Head: Normocephalic and atraumatic  Eyes:      Conjunctiva/sclera: Conjunctivae normal    Cardiovascular:      Rate and Rhythm: Normal rate and regular rhythm  Heart sounds: No murmur heard  Pulmonary:      Effort: Pulmonary effort is normal  No respiratory distress  Breath sounds: Normal breath sounds  Abdominal:      Palpations: Abdomen is soft  Tenderness: There is no abdominal tenderness  Musculoskeletal:         General: No swelling, tenderness or deformity  Cervical back: Neck supple  Skin:     General: Skin is warm and dry  Neurological:      General: No focal deficit present  Mental Status: He is alert and oriented to person, place, and time  Mental status is at baseline  Cranial Nerves: No cranial nerve deficit  Sensory: No sensory deficit  Motor: No weakness  Coordination: Coordination normal    Psychiatric:         Mood and Affect: Mood normal          Behavior: Behavior normal          Thought Content:  Thought content normal          Judgment: Judgment normal            Additional Data:     Labs:  Results from last 7 days   Lab Units 04/27/22  0525 04/26/22  2322 04/26/22  1216 04/25/22  0717 04/25/22  0717   WBC Thousand/uL 1 76*  --  1 39*   < > 4 02*   HEMOGLOBIN g/dL 9 4*  --  11 9*   < > 11 5*   HEMATOCRIT % 29 1*  --  36 7   < > 33 8*   PLATELETS Thousands/uL 75*   < > 114*   < > 140*   BANDS PCT %  --   --  12*  --   --    NEUTROS PCT %  --   --   --   --  65   LYMPHS PCT % --   --   --   --  29   LYMPHO PCT %  --   --  12*  --   --    MONOS PCT %  --   --   --   --  4   MONO PCT %  --   --  4  --   --    EOS PCT %  --   --  0  --  1    < > = values in this interval not displayed  Results from last 7 days   Lab Units 04/27/22  0524   SODIUM mmol/L 138   POTASSIUM mmol/L 3 7   CHLORIDE mmol/L 104   CO2 mmol/L 26   BUN mg/dL 23   CREATININE mg/dL 1 17   ANION GAP mmol/L 8   CALCIUM mg/dL 8 4   ALBUMIN g/dL 2 3*   TOTAL BILIRUBIN mg/dL 1 39*   ALK PHOS U/L 241*   ALT U/L 36   AST U/L 24   GLUCOSE RANDOM mg/dL 102                 Results from last 7 days   Lab Units 04/27/22  0524 04/26/22  1404 04/26/22  1216   LACTIC ACID mmol/L  --   --  1 8   PROCALCITONIN ng/ml 13 20* 6 56*  --        Lines/Drains:  Invasive Devices  Report    Central Venous Catheter Line            Port A Cath 12/31/20 Right Chest 482 days          Peripheral Intravenous Line            Peripheral IV 04/26/22 Distal;Left;Upper;Ventral (anterior) Arm 1 day          Drain            NG/OG/Enteral Tube Nasogastric 16 Fr Right nare <1 day                Central Line:  Goal for removal: Will discontinue when peripheral access obtained  Imaging: Reviewed radiology reports from this admission including: abdominal/pelvic CT    Recent Cultures (last 7 days):   Results from last 7 days   Lab Units 04/26/22  1408 04/26/22  1404   BLOOD CULTURE  Received in Microbiology Lab  Culture in Progress  Received in Microbiology Lab  Culture in Progress         Last 24 Hours Medication List:   Current Facility-Administered Medications   Medication Dose Route Frequency Provider Last Rate    cefepime  2,000 mg Intravenous Q12H Dom Hernandez DO 2,000 mg (04/27/22 1341)    dextrose 5 % and sodium chloride 0 9 %  100 mL/hr Intravenous Continuous Dom Hernandez  mL/hr (04/26/22 2025)    heparin (porcine)  5,000 Units Subcutaneous [facility] DO      HYDROmorphone  0 2 mg Intravenous Q4H PRN Velia Spare, DO      Or    HYDROmorphone  0 5 mg Intravenous Q4H PRN Velia Spare, DO      HYDROmorphone  1 mg Intravenous Q4H PRN Velia Spare, DO      metoprolol  2 5 mg Intravenous Q6H Velia Spare, DO      ondansetron  4 mg Intravenous Q4H PRN Velia Spare, DO          Today, Patient Was Seen By: Jada Diaz MD    **Please Note: This note may have been constructed using a voice recognition system  **

## 2022-04-27 NOTE — ASSESSMENT & PLAN NOTE
Previous to admission patient is on Lopressor 12 5 mg b i d   And losartan 25 mg daily  Start Lopressor IV 2 5 mg q 6 hours while NPO  Holding parameters noted

## 2022-04-27 NOTE — CONSULTS
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, stomach)  6  Hypertension: Controlled   O/p Rx:  Cozaar 25 mg daily, Lopressor 12 5 mg b i d   7  Dyslipidemia:  Lipitor 40 mg    Discussion / Plan:  # patient admitted with signs of small-bowel obstruction currently receiving non operative management though PEG or similar may become necessary  #  vital signs and examinations have noted evidence of tachycardia though no clear evidence of dysrhythmia  #  patient currently with anemia noting 2 g drop in hemoglobin last 24 hours with NG drainage recently having turned bloody              Check ECG to confirm rhythm   Continue current dose of IV Lopressor as patient is NPO but would not aggressively treat heart rate with negative chronotropic agents suspecting this is physiologic response to anemia and intercurrent illness  · Regarding risk for possible abdominal surgery  At this gentleman's baseline and prior to admission he was rather active able to easily walk several blocks including up a grade, ascend a flight of steps, and perform other 4-6 metabolic equivalent activities without difficulty  He has no history of, nor signs/symptoms of: Dysrhythmia, coronary insufficiency, or congestive heart failure    As such this gentleman likely confers intermediate risk for MACE (major adverse cardiac events) ~~> should he require surgery would advise perioperative telemetry, ECG p r n  for any complaints of chest discomfort or hemodynamic instability, goal hemoglobin of not less than 8, with potassium and magnesium goals respectively of 4 0 and 2 0    History Of Present Illness:  Мария Frye is a 75-year-old known to have adenocarcinoma of the small bowel initially diagnosed in late 2020  This was treated with chemotherapy with unfortunate later evidence of metastases to the omentum and lungs  The patient is followed by oncology in our network but is also actively receiving chemotherapy at [facility]  Yesterday the patient call the office of his oncologist reporting a 1 day history of new abdominal pain for which he was referred to the emergency department  He was found to have malignant small-bowel obstruction and seen by surgery in the emergency department noting no need for emergent surgery  He has had placement of an NG tube and is receiving IVF  Patient is being followed by Gastroenterology and surgery with possibilities of decompressive PEG versus other surgical gastrostomy or jejunostomy guided by clinical course  He has also been seen by palliative care to address goals of therapy noting it willingness to pursue surgical procedures and return to palliative chemotherapy  Since admission he has also been observed to have atrial fibrillation - reportedly with increased ventricular rates  Cardiology consultation now requested, I believe, to assist in management of tachycardia - possible thought to be AF, and comment on perioperative risk stratification  Historically this gentleman has not had need for routine cardiology care  He has no prior history of atrial fibrillation or other dysrhythmia  He tells me that earlier today while being examined he was brought to his attention that he had some increase in heart rate and perhaps with some exam detection of irregularity  He was told this might be atrial fibrillation and was why he was receiving metoprolol though there is no ECG, telemetry, or other validation of this    Subjectively the patient acknowledges infrequent feelings of premature ectopy which he states have been present throughout his life and which were previously attributed to VPCs  He has no feelings of sustained tachycardia, presyncope nor syncope  - Echocardiogram 5/20/2020 showed normal LVEF with no regional wall motion abnormalities nor significant valve disease    - Exercise stress test 5/20/2020: Was normal by ECG criteria with no chest pain and achievement of target heart rate  Past Medical History:        Past Medical History:   Diagnosis Date    Abnormal CT of liver     last assessed: 07/21/14    Anemia     iron def 6/2020 hgb 9 6    BPH (benign prostatic hypertrophy)     Dupuytren contracture     both hands    Erectile dysfunction of non-organic origin     last assessed: 11/27/12    Esophageal reflux     last assessed: 11/11/14    Esophagitis 03/13/2012    Familial hypercholesteremia     last assessed: 06/11/13    Hyperlipidemia     Hypertension     Paroxysmal atrial fibrillation ([facility] [address] )     last assessed: 04/06/17 post colonoscopy    Shortness of breath 2020    exertional due to anemia      Past Surgical History:   Procedure Laterality Date    BACK SURGERY      Lower    COLONOSCOPY      CT NEEDLE BIOPSY RETROPERITONEUM  12/31/2020    HAND CONTRACTURE RELEASE Left 5/23/2017    Procedure: Left hand percutaneous fasciotomy of palm adductor space x 2; index finger PIP joint; ring finger PIP joint; small finger MCP joint and PIP joint  ; splint application;  Surgeon: Higinio Maldonado MD;  [facility];  Service:     HAND SURGERY Bilateral     IR BIOPSY LUNG  12/9/2020    IR PORT PLACEMENT  12/31/2020        Allergy:        Allergies   Allergen Reactions    Other Other (See Comments)     Liquid lidocaine - couldn't swallow, panicked       Medications:       Prior to Admission medications    Medication Sig Start Date End Date Taking?  Authorizing Provider   cholecalciferol (VITAMIN D3) 1,000 units tablet Take 2,000 Units by mouth daily   Yes Historical Provider, MD   vitamin B-12 (VITAMIN B-12) 1,000 mcg tablet Take 1,000 mcg by mouth daily   Yes Historical Provider, MD   atorvastatin (LIPITOR) 40 mg tablet Take 1 tablet (40 mg total) by mouth daily 21on Primer, DO   dutasteride (AVODART) 0 5 mg capsule Take 1 capsule (0 5 mg total) by mouth daily at bedtime 21on Primer, DO   Lonsurf 20-8  19 MG TABS  3/16/22   Historical Provider, MD   losartan (COZAAR) 25 mg tablet TAKE ONE TABLET DAILY 21on Primer, DO   metoprolol tartrate (LOPRESSOR) 25 mg tablet Take 0 5 tablets (12 5 mg total) by mouth 2 (two) times a day 21on Primer, DO   Multiple Vitamin (MULTIVITAMIN) tablet Take 1 tablet by mouth daily     Historical Provider, MD   ondansetron (ZOFRAN) 4 mg tablet Take 1 tablet (4 mg total) by mouth every 8 (eight) hours as needed for nausea or vomiting 21on Primer, DO   predniSONE 10 mg tablet Take 1 tablet (10 mg total) by mouth daily 22   Eliceo Bui MD       Family History:     Family History   Problem Relation Age of Onset    No Known Problems Mother         natural causes    Heart disease Father     Heart attack Brother         Social History:       Social History     Socioeconomic History    Marital status: /Civil Union     Spouse name: None    Number of children: None    Years of education: None    Highest education level: None   Occupational History    None   Tobacco Use    Smoking status: Former Smoker     Packs/day: 0 50     Years: 3 00     Pack years: 1 50     Types: Cigarettes     Start date:      Quit date:      Years since quittin 4    Smokeless tobacco: Never Used   Vaping Use    Vaping Use: Never used   Substance and Sexual Activity    Alcohol use: Never    Drug use: No    Sexual activity: None   Other Topics Concern    None   Social History Narrative     - grinding, nuclear work    Used [employer]     Social Determinants of Health Financial Resource Strain: Not on file   Food Insecurity: Not on file   Transportation Needs: Not on file   Physical Activity: Not on file   Stress: Not on file   Social Connections: Not on file   Intimate Partner Violence: Not on file   Housing Stability: Not on file       ROS:  Symptoms per HPI  Chronic mild abdominal discomfort with worsening on admission as discussed in HPI  The remainder of the review of systems is negative    Exam:  General:  Alert, normally conversant, comfortable appearing  Presently with NG tube  Head: Normocephalic, atraumatic  Eyes:  EOMI  Pupils - equal, round, reactive to accomodation  No icterus  Normal Conjunctiva  Oropharynx: Moist without lesion  Neck:  No gross bruit, JVD, thyromegaly, or lymphadenopathy  Heart:  Regular with controlled rate  No rub nor pathologic murmur  Lungs:  Clear without rales/rhonchi/wheeze  Abdomen:  Soft and nontender with normal bowel sounds  No organomegaly or mass  Lower Limbs:  No edema  Pulses[de-identified]  RLE - DP:  2+                 LLE - DP:  2+  Musculoskeletal: Independent movement of limbs observed, Formal ROM and strength eval not performed  Neurologic:    Oriented to: person, place, situation  Cranial Nerves: grossly intact - vision, smell, taste, and hearing were not tested  Motor function: grossly normal, symmetric   Sensation: Was not tested      Vitals:    04/27/22 0303 04/27/22 0700 04/27/22 0806 04/27/22 1341   BP: 99/61  92/56 130/78   BP Location: Right arm  Right arm Right arm   Pulse: 98  98 (!) 106   Resp:   18 18   Temp:   97 9 °F (36 6 °C)    TempSrc:   Oral    SpO2:   95% 98%   Weight:  63 kg (139 lb)     Height:  5' 9" (1 753 m)             DATA:      -----------  Weights:     Wt Readings from Last 20 Encounters:   04/27/22 63 kg (139 lb)   04/06/22 63 5 kg (140 lb)   03/02/22 62 4 kg (137 lb 9 6 oz)   02/23/22 62 6 kg (138 lb)   02/14/22 60 6 kg (133 lb 9 6 oz)   01/31/22 61 2 kg (134 lb 14 7 oz)   01/26/22 56 7 kg (125 lb)   01/17/22 60 9 kg (134 lb 4 2 oz)   12/21/21 63 2 kg (139 lb 5 3 oz)   12/08/21 65 6 kg (144 lb 10 oz)   11/30/21 65 8 kg (145 lb)   11/15/21 67 6 kg (149 lb 0 5 oz)   11/02/21 69 5 kg (153 lb 3 2 oz)   11/01/21 66 8 kg (147 lb 6 oz)   10/26/21 66 2 kg (146 lb)   10/18/21 68 kg (149 lb 14 6 oz)   10/04/21 67 1 kg (147 lb 14 9 oz)   09/29/21 66 7 kg (147 lb)   09/20/21 67 7 kg (149 lb 4 oz)   09/07/21 66 3 kg (146 lb 4 4 oz)   , Body mass index is 20 53 kg/m²  Lab Studies:               Results from last 7 days   Lab Units 04/27/22  0525 04/26/22  2322 04/26/22  1216 04/25/22  0717 04/25/22  0717   WBC Thousand/uL 1 76*  --  1 39*  --  4 02*   HEMOGLOBIN g/dL 9 4*  --  11 9*  --  11 5*   HEMATOCRIT % 29 1*  --  36 7  --  33 8*   PLATELETS Thousands/uL 75* 86* 114*   < > 140*    < > = values in this interval not displayed     ,   Results from last 7 days   Lab Units 04/27/22  0524 04/26/22  1216 04/25/22  0717   POTASSIUM mmol/L 3 7 4 6 3 6   CHLORIDE mmol/L 104 101 108   CO2 mmol/L 26 28 28   BUN mg/dL 23 25 21   CREATININE mg/dL 1 17 1 18 0 93   CALCIUM mg/dL 8 4 9 5 9 2   ALK PHOS U/L 241* 384* 368*   ALT U/L 36 58 67   AST U/L 24 33 48*

## 2022-04-27 NOTE — CONSULTS
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, HTN, HLD, and hx of dysrhythmias who presents to the ED with abdominal pain and n/v     1  Malignant SBO  2  Metastatic adenocarcinoma of the small bowel  3  MALT-gastric   Pt presents with abdominal pain and n/v x 1-2 days; CT in the ED depicted distention of multiple SB loops with suspected transition point of an obstruction and multiple SB segments adherent to this area, suspicious for adhesions  It also depicted unchanged omental/peritoneal implants and 8 cm pancreatic cyst  Surgery team saw the pt in consult and deemed no surgical intervention warranted at this time; NGT was placed  Surgery requested GI consult in case the pt warrants decompressive PEG in the future  Pt does follow with Dr Amanda Olivera from [facility], [facility] as an OP as he was initially going to undergo EGD w single balloon earlier this month due to potential jejunal lesion noted on CT however CTE did not depict these lesions, rather only inflammation was noted   Thus, Dr Amanda Olivera discussed with Dr Jeanna Ngo and the EGD was cancelled    -discussed with pt that at this time, conservative management is warranted   -continue NGT and bowel rest per surgery recs; monitor BP as he was trending down this AM  -IVF  -likely no role for EGD w single balloon in the acute setting as this may worsen his obstruction; we can discuss the role of this, if any, once pt is stable (with Dr Amanda Olivera and Dr Jeanna Ngo)   -will discuss with endoscopist decompressive PEG, however this is not yet warranted at this time    -monitor abdominal exam and monitor for signs of peritonitis  -appreciate surgical recommendations   ______________________________________________________________________    HPI:  Gagan Patel is a 63 y/o male with 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, HTN, HLD, and hx of dysrhythmias who presents to the ED with abdominal pain and n/v  CT in the ED noted findings that depicted SBO  The surgery team saw the pt in consult and deemed no surgical intervention warranted at this time  The surgery team requested [facility] consult in case the pt needs decompressive PEG in the future  Today, pt notes that he had generalized abdominal pain that began Monday/tuesday after eating a "light lunch " Pt says the after eating a few pieces of celery, he started to have NBNB n/v and 10/10 generalized abdominal pain that radiated to his neck and back  Pt notes that his pain is "better" today but still present  Pt does have NGT placed so he has not had any further vomiting and denies nausea  Pt denies BMs since Monday and says he has not passed any gas in the last 1-2 days, as well  Pt notes that his last BM on Monday was not bloody or black/tarry  Pt denies prior abdominal surgical hx  Pt denies any new meds or oTC supplements  Pt is not on any blood thinners at this time  Pt does follow with [facility], [facility] as an OP and was initially scheduled for EGD w single balloon w Dr Carlos José due to "intraluminal polypoid" lesion in the small bowel noted on CT  However, pt underwent CTE that noted that this lesion was no longer present, rather nonspecific inflammation was noted, so EGD was not done after this was dicussed with Dr Aniya Calero  REVIEW OF SYSTEMS:    CONSTITUTIONAL: Denies any fever, chills, rigors, and weight loss  HEENT: No earache or tinnitus  Denies hearing loss or visual disturbances  CARDIOVASCULAR: No chest pain or palpitations     RESPIRATORY: Denies any cough, hemoptysis, shortness of breath or dyspnea on exertion  GASTROINTESTINAL: As noted in the History of Present Illness  GENITOURINARY: No problems with urination  Denies any hematuria or dysuria  NEUROLOGIC: No dizziness or vertigo, denies headaches  MUSCULOSKELETAL: Denies any muscle or joint pain  SKIN: Denies skin rashes or itching  ENDOCRINE: Denies excessive thirst  Denies intolerance to heat or cold  PSYCHOSOCIAL: Denies depression or anxiety  Denies any recent memory loss  Historical Information   Past Medical History:   Diagnosis Date    Abnormal CT of liver     last assessed: 07/21/14    Anemia     iron def 6/2020 hgb 9 6    BPH (benign prostatic hypertrophy)     Dupuytren contracture     both hands    Erectile dysfunction of non-organic origin     last assessed: 11/27/12    Esophageal reflux     last assessed: 11/11/14    Esophagitis 03/13/2012    Familial hypercholesteremia     last assessed: 06/11/13    Hyperlipidemia     Hypertension     Paroxysmal atrial fibrillation ([facility] [address] )     last assessed: 04/06/17 post colonoscopy    Shortness of breath 2020    exertional due to anemia     Past Surgical History:   Procedure Laterality Date    BACK SURGERY      Lower    COLONOSCOPY      CT NEEDLE BIOPSY RETROPERITONEUM  12/31/2020    HAND CONTRACTURE RELEASE Left 5/23/2017    Procedure: Left hand percutaneous fasciotomy of palm adductor space x 2; index finger PIP joint; ring finger PIP joint; small finger MCP joint and PIP joint  ; splint application;  Surgeon: Hansel Patel MD;  [facility];  Service:     HAND SURGERY Bilateral     IR BIOPSY LUNG  12/9/2020    IR PORT PLACEMENT  12/31/2020     Social History   Social History     Substance and Sexual Activity   Alcohol Use Never     Social History     Substance and Sexual Activity   Drug Use No     Social History     Tobacco Use   Smoking Status Former Smoker    Packs/day: 0 50    Years: 3 00    Pack years: 1 50    Types: Cigarettes    Start date:     Quit date: Emanuel Donaldson Years since quittin 3   Smokeless Tobacco Never Used     Family History   Problem Relation Age of Onset    No Known Problems Mother         natural causes    Heart disease Father     Heart attack Brother        Meds/Allergies     Medications Prior to Admission   Medication    cholecalciferol (VITAMIN D3) 1,000 units tablet    vitamin B-12 (VITAMIN B-12) 1,000 mcg tablet    atorvastatin (LIPITOR) 40 mg tablet    dutasteride (AVODART) 0 5 mg capsule    Lonsurf 20-8  19 MG TABS    losartan (COZAAR) 25 mg tablet    metoprolol tartrate (LOPRESSOR) 25 mg tablet    Multiple Vitamin (MULTIVITAMIN) tablet    ondansetron (ZOFRAN) 4 mg tablet    predniSONE 10 mg tablet     Current Facility-Administered Medications   Medication Dose Route Frequency    cefepime (MAXIPIME) 2,000 mg in dextrose 5 % 50 mL IVPB  2,000 mg Intravenous Q12H    dextrose 5 % and sodium chloride 0 9 % infusion  100 mL/hr Intravenous Continuous    heparin (porcine) subcutaneous injection 5,000 Units  5,000 Units Subcutaneous Q8H [address]    HYDROmorphone HCl (DILAUDID) injection 0 2 mg  0 2 mg Intravenous Q4H PRN    Or    HYDROmorphone (DILAUDID) injection 0 5 mg  0 5 mg Intravenous Q4H PRN    HYDROmorphone (DILAUDID) injection 1 mg  1 mg Intravenous Q4H PRN    metoprolol (LOPRESSOR) injection 2 5 mg  2 5 mg Intravenous Q6H    ondansetron (ZOFRAN) injection 4 mg  4 mg Intravenous Q4H PRN       Allergies   Allergen Reactions    Other Other (See Comments)     Liquid lidocaine - couldn't swallow, panicked           Objective     Blood pressure 92/56, pulse 98, temperature 97 9 °F (36 6 °C), temperature source Oral, resp  rate 18, height 5' 9" (1 753 m), weight 63 kg (139 lb), SpO2 95 %  Body mass index is 20 53 kg/m²        Intake/Output Summary (Last 24 hours) at 2022 0908  Last data filed at 2022 0548  Gross per 24 hour   Intake 550 ml   Output 400 ml   Net 150 ml         PHYSICAL EXAM:      General Appearance:   Alert, cooperative, no distress   HEENT:   Normocephalic, atraumatic, anicteric      Neck:  Supple, symmetrical, trachea midline   Lungs:   Clear to auscultation bilaterally; no rales, rhonchi or wheezing; respirations unlabored    Heart[de-identified]   Regular rate and rhythm; no murmur, rub, or gallop     Abdomen:   Soft, non-tender, non-distended; normal bowel sounds; no masses, no organomegaly    Genitalia:   Deferred    Rectal:   Deferred    Extremities:  No cyanosis, clubbing or edema    Pulses:  2+ and symmetric all extremities    Skin:  No jaundice, rashes, or lesions    Lymph nodes:  No palpable cervical lymphadenopathy        Lab Results:   Admission on 04/26/2022   Component Date Value    WBC 04/26/2022 1 39*    RBC 04/26/2022 3 85*    Hemoglobin 04/26/2022 11 9*    Hematocrit 04/26/2022 36 7     MCV 04/26/2022 95     MCH 04/26/2022 30 9     MCHC 04/26/2022 32 4     RDW 04/26/2022 15 8*    MPV 04/26/2022 9 8     Platelets 27/06/5546 114*    Sodium 04/26/2022 137     Potassium 04/26/2022 4 6     Chloride 04/26/2022 101     CO2 04/26/2022 28     ANION GAP 04/26/2022 8     BUN 04/26/2022 25     Creatinine 04/26/2022 1 18     Glucose 04/26/2022 109     Calcium 04/26/2022 9 5     Corrected Calcium 04/26/2022 10 1     AST 04/26/2022 33     ALT 04/26/2022 58     Alkaline Phosphatase 04/26/2022 384*    Total Protein 04/26/2022 7 2     Albumin 04/26/2022 3 2*    Total Bilirubin 04/26/2022 1 93*    eGFR 04/26/2022 58     Lipase 04/26/2022 72*    LACTIC ACID 04/26/2022 1 8     Segmented % 04/26/2022 72     Bands % 04/26/2022 12*    Lymphocytes % 04/26/2022 12*    Monocytes % 04/26/2022 4     Eosinophils, % 04/26/2022 0     Basophils % 04/26/2022 0     Absolute Neutrophils 04/26/2022 1 17*    Lymphocytes Absolute 04/26/2022 0 17*    Monocytes Absolute 04/26/2022 0 06     Eosinophils Absolute 04/26/2022 0 00     Basophils Absolute 04/26/2022 0 00     RBC Morphology 04/26/2022 Present     Macrocytes 04/26/2022 Present     Platelet Estimate 14/49/5080 Borderline*    Blood Culture 04/26/2022 Received in Microbiology Lab  Culture in Progress   Blood Culture 04/26/2022 Received in Microbiology Lab  Culture in Progress   Procalcitonin 04/26/2022 6 56*    Platelets 86/45/2419 86*    MPV 04/26/2022 10 4     Sodium 04/27/2022 138     Potassium 04/27/2022 3 7     Chloride 04/27/2022 104     CO2 04/27/2022 26     ANION GAP 04/27/2022 8     BUN 04/27/2022 23     Creatinine 04/27/2022 1 17     Glucose 04/27/2022 102     Calcium 04/27/2022 8 4     Corrected Calcium 04/27/2022 9 8     AST 04/27/2022 24     ALT 04/27/2022 36     Alkaline Phosphatase 04/27/2022 241*    Total Protein 04/27/2022 5 8*    Albumin 04/27/2022 2 3*    Total Bilirubin 04/27/2022 1 39*    eGFR 04/27/2022 59     WBC 04/27/2022 1 76*    RBC 04/27/2022 3 06*    Hemoglobin 04/27/2022 9 4*    Hematocrit 04/27/2022 29 1*    MCV 04/27/2022 95     MCH 04/27/2022 30 7     MCHC 04/27/2022 32 3     RDW 04/27/2022 15 7*    MPV 04/27/2022 10 4     Platelets 83/39/0988 75*    nRBC 04/27/2022 0     Procalcitonin 04/27/2022 13 20*       Imaging Studies: I have personally reviewed pertinent imaging studies

## 2022-04-27 NOTE — ASSESSMENT & PLAN NOTE
Patient has hypotension due to poor intake, a fib, his output is higher than input, possible infection   -hydration  -monitor closely  -improved     -A fib, continue with metoprolol

## 2022-04-27 NOTE — PROGRESS NOTES
Progress Note    Corrina Brood 64 y o  male MRN: [id number]  Unit/Bed#: E2 -02 Encounter: [id number]    Assessment:  69-yr old M w/ malignant SBO 2/2 metastatic small bowel lymphoma  Tachycardic to 110s/afib  Afebrile; other VS stable  NGT: 400 cc: bilious  No flatus or BM  PLAN:  - NPO/NGT  - IVF crystalloid hydration  - palliative consult  - medical oncology consult   - gastrografin challenge tomorrow  - possible venting gastrostomy/jejunostomy if refractory obstruction  - consider cardiology evaluation for optimal afib/rate control  Subjective:   - c/o: occasional cramping abdominal pain  - dizziness & hypotension following metoprolol administration  Objective:     Vitals: Temp:  [98 7 °F (37 1 °C)] 98 7 °F (37 1 °C)  HR:  [] 98  Resp:  [20-22] 20  BP: ()/(61-70) 99/61  Body mass index is 20 6 kg/m²  I/O       04/25 0701 04/26 0700 04/26 0701 04/27 0700 04/27 0701 04/28 0700    IV Piggyback  550     Total Intake(mL/kg)  550 (8 7)     Emesis/NG output  400     Total Output  400     Net  +150                  Physical Exam:  GEN: NAD  HEENT: NGT in situ  CV: RRR  Lung: Normal effort  Ab: Soft, distended & tender (generalized): no peritoneal signs  Extrem: No CCE  Neuro: A+Ox3    Lab, Imaging and other studies:   I have personally reviewed pertinent reports    , CBC with diff:   Lab Results   Component Value Date    WBC 1 39 (LL) 04/26/2022    HGB 11 9 (L) 04/26/2022    HCT 36 7 04/26/2022    MCV 95 04/26/2022    PLT 86 (L) 04/26/2022    MCH 30 9 04/26/2022    MCHC 32 4 04/26/2022    RDW 15 8 (H) 04/26/2022    MPV 10 4 04/26/2022   , BMP/CMP:   Lab Results   Component Value Date    SODIUM 137 04/26/2022    K 4 6 04/26/2022     04/26/2022    CO2 28 04/26/2022    BUN 25 04/26/2022    CREATININE 1 18 04/26/2022    CALCIUM 9 5 04/26/2022    AST 33 04/26/2022    ALT 58 04/26/2022    ALKPHOS 384 (H) 04/26/2022    EGFR 58 04/26/2022   , Magnesium: No components found for: MAG  VTE Pharmacologic Prophylaxis: Heparin  VTE Mechanical Prophylaxis: sequential compression device

## 2022-04-27 NOTE — ASSESSMENT & PLAN NOTE
Patient follows with Medical Oncology at [facility] as well as Zeferino Duron  Patient show progression through 2 lines of chemotherapy  Recently finished first 2 week course of Lonsurf Last friday     Appreciate medical Oncology recommendations  Patient takes prednisone 10 mg daily as needed for appetite

## 2022-04-27 NOTE — CONSULTS
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)    Generalized abdominal pain    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[address] )    Lung metastasis ([address] )    Encounter for care related to vascular access port    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 ([address] )    Chemotherapy induced neutropenia [facility] [address] )    Neoplastic malignant related fatigue    Thyroid disorder    Chemotherapy induced neutropenia (HCC)    Chronic obstructive pulmonary disease (HCC)    Malignant neoplasm metastatic to lung (HCC)    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HCC)    Moderate protein-calorie malnutrition (HCC)    Small bowel polyp    Small bowel obstruction (HCC)    Neutropenia (HCC)       Active problems addressed:  · Metastatic small bowel adenocarcinoma  · Gastric MALT with peritoneal/omental implants  · Small bowel obstruction  · Abdominal pain  · Goals of care    Consult is for goals    PLAN:    1  Goals:    He is interested in interventions offered by surgery  He is willing to take the risks associated with the procedure rather than be in pain from potential obstruction again in the future   If things go well after this procedure, he wants to go back on his chemo regimen  He tolerated the first few doses well ("I felt nothing  I felt like Superman afterwards")  He was able to mow the lawn and do errands around the house until he developed acute abdominal pain      We did speak about hospice as an option to focus on his comfort should there be further decline in the future, failure to get better, or if there are no more chemo options available to him  He became tearful thinking about this but at this time, he is not ready to commit to this yet   He also wants to remain a full code, but does not want to remain on life-support if with no chance of any significant neurologic or functional recovery, ie no trach or artifical nutrition through tubes   D/w SLIM   Palliative will follow peripherally  Please reach out if urgent assistance is needed  Code status: Level 1 - Full Code   Decisional apparatus:  Patient does have capacity to make medical decisions on my exam today  If such capacity is lost, patient's substitute decision maker would default to wife by [place]  Advance Directive / Living Will / POLST:  None on file    2  Social Support:   Supportive listening provided   Time spent providing emotional support    3  Symptom management:   Continue IV dilaudid 0 2mg IV q4H prn moderate pain   IV dilaudid 0 5mg q4H prn severe pain   IV dilaudid 1mg q4H prn BT pain   Bowel regimen per surgery    Controlled Substance Review    [place]MP or [place]  reviewed: No red flags were identified; safe to proceed with prescription       No records found    I have reviewed the patient's controlled substance dispensing history in the Prescription Drug Monitoring Program in compliance with the [facility] regulations before prescribing any controlled substances  We appreciate the opportunity to participate in this patient's care  We will continue to follow  Please do not hesitate to contact our on-call provider through our clinic answering service at [phone] should you have acute symptom control concerns      IDENTIFICATION:  Inpatient consult to Palliative Care  Consult performed by: Rafaela Crowley MD  Consult ordered by: Marlis Dance, DO        Reason for Consult / Principal Problem: goals of care    HISTORY OF PRESENT ILLNESS:    Mala Chapman is a 64 y o  male with palliative diagnoses of metastatic small bowel adenocarcinoma (Dx in 12/2020) with disease progression to the lungs s/p 2 lines of chemotherapy, currently on Lonsurf  He also has gastric MALT with peritoneal/omental implants  He is admitted from home since 4/26 due to acute onset abdominal pain, found to have SBO on CTAP  He is evaluated by general surgery who recommended conservative supportive cares, with possible palliative PEG for decompression in the future if appropriate  Palliative consulted to discuss goals  Met with patient and his wife who was present at bedside  Introduced palliative care with focus on goals of care discussion  He was able to tell me events leading to this current hospitalization  He was able to tell me about his obstruction from cancer  He was able to tell me his treatment options and after discussing more about it with his specialists (SLIM, GI, surgery) today, he is opting to go ahead with surgery  He is aware that there could be risks with surgery, but he is willing to take that on as he does not want to go through the pain he felt when he first got obstructed  He is aware that this can happen again in the future  Ultimately, he hopes to get better from this in order to resume his chemotherapy  He was made aware by Dr Sarah Bishop that treatment options are becoming limited and when asked what his plans are when/if that day comes, he said he hopes to be comfortable  To this end, we did discuss starting hospice to ensure his comfort at home  He appears interested in this and is realistic that this will happen in the near future  However, he is still hoping to continue with recent chemo that was just started  He reports tolerating the first few doses well  He was able to mow his lawn and felt like superman afterwards  Patient's pain is significantly better   His NGT is still draining copious amount of bilious fluid  He reports that his IV dilaudid are working well for him  He has yet to pass gas  Interview and exam limited by: none    Review of Systems   Constitutional: Positive for activity change and fatigue  Negative for appetite change and fever  HENT: Negative for trouble swallowing  Respiratory: Negative for cough and shortness of breath  Cardiovascular: Negative for chest pain  Gastrointestinal: Positive for abdominal pain  Negative for constipation, diarrhea, nausea and vomiting  Musculoskeletal: Negative for back pain  Neurological: Negative for weakness  Psychiatric/Behavioral: Negative for sleep disturbance  The patient is not nervous/anxious  All other systems reviewed and are negative  Past Medical History:   Diagnosis Date    Abnormal CT of liver     last assessed: 07/21/14    Anemia     iron def 6/2020 hgb 9 6    BPH (benign prostatic hypertrophy)     Dupuytren contracture     both hands    Erectile dysfunction of non-organic origin     last assessed: 11/27/12    Esophageal reflux     last assessed: 11/11/14    Esophagitis 03/13/2012    Familial hypercholesteremia     last assessed: 06/11/13    Hyperlipidemia     Hypertension     Paroxysmal atrial fibrillation ([facility] [address] )     last assessed: 04/06/17 post colonoscopy    Shortness of breath 2020    exertional due to anemia     Past Surgical History:   Procedure Laterality Date    BACK SURGERY      Lower    COLONOSCOPY      CT NEEDLE BIOPSY RETROPERITONEUM  12/31/2020    HAND CONTRACTURE RELEASE Left 5/23/2017    Procedure: Left hand percutaneous fasciotomy of palm adductor space x 2; index finger PIP joint; ring finger PIP joint; small finger MCP joint and PIP joint  ; splint application;  Surgeon: Felicity Cranker, MD;  [facility];  Service:     HAND SURGERY Bilateral     IR BIOPSY LUNG  12/9/2020    IR PORT PLACEMENT  12/31/2020     Social History     Socioeconomic History    Marital status: /Civil Union     Spouse name: Not on file    Number of children: Not on file    Years of education: Not on file    Highest education level: Not on file   Occupational History    Not on file   Tobacco Use    Smoking status: Former Smoker     Packs/day: 0 50     Years: 3 00     Pack years: 1 50     Types: Cigarettes     Start date:      Quit date:      Years since quittin 4    Smokeless tobacco: Never Used   Vaping Use    Vaping Use: Never used   Substance and Sexual Activity    Alcohol use: Never    Drug use: No    Sexual activity: Not on file   Other Topics Concern    Not on file   Social History Narrative     - grinding, nuclear work    [employer]     Social Determinants of Health     Financial Resource Strain: Not on file   Food Insecurity: Not on file   Transportation Needs: Not on file   Physical Activity: Not on file   Stress: Not on file   Social Connections: Not on file   Intimate Partner Violence: Not on file   Housing Stability: Not on file     Family History   Problem Relation Age of Onset    No Known Problems Mother         natural causes    Heart disease Father     Heart attack Brother        MEDICATIONS / ALLERGIES:  all current active meds have been reviewed    Allergies   Allergen Reactions    Other Other (See Comments)     Liquid lidocaine - couldn't swallow, panicked       OBJECTIVE:  BP 92/56 (BP Location: Right arm)   Pulse 98   Temp 97 9 °F (36 6 °C) (Oral)   Resp 18   Ht 5' 9" (1 753 m)   Wt 63 kg (139 lb)   SpO2 95%   BMI 20 53 kg/m²   Physical Exam:  Constitutional: Appears well-developed and well-nourished  Thin, chronically ill but not toxic looking  Very pleasant and jovial  In no acute physical or emotional distress  Head: Normocephalic and atraumatic  Temporal mm wasting  Eyes: EOM are normal  No ocular discharge  No scleral icterus  Neck: No visible adenopathy or masses  Respiratory: Effort normal  No stridor   No respiratory distress  Gastrointestinal: No abdominal distension  NGT in place, draining copious amount of bilious fluid  Musculoskeletal: No edema  Neurological: Alert, oriented and appropriately conversant  Able to walk around the mata using a walker  Skin: Dry, no diaphoresis  Psychiatric: Displays a normal mood and affect  Behavior, judgment and thought content appear normal      Lab Results: I have personally reviewed pertinent labs  Imaging Studies: I have personally reviewed pertinent reports  EKG, Pathology, and Other Studies: I have personally reviewed pertinent reports  Counseling / Coordination of Care:  Counseling / Coordination of Care  Total floor / unit time spent today 60 minutes  Greater than 50% of total time was spent with the patient and / or family counseling and / or coordination of care  A description of the counseling / coordination of care: provided medical updates, discussed palliative care, discussed hospice care, determined competency, determined goals of care, determined POA, determined social/family support, discussed plans of care, discussed symptom management, provided psychosocial support       Christal Hunt MD  [address] and Supportive Care

## 2022-04-28 PROBLEM — I95.9 HYPOTENSION: Status: RESOLVED | Noted: 2022-01-01 | Resolved: 2022-01-01

## 2022-04-28 NOTE — ASSESSMENT & PLAN NOTE
· Antineoplastic chemotherapy induced pancytopenia in the setting of small bowel lymphoma with recent course of Lonsurf  · Serials CBCs    · Should improve -monitor closely   · procalcitonin elevated-possible due to gi obstruction   · Currently on cefepime

## 2022-04-28 NOTE — CASE MANAGEMENT
Case Management Assessment & Discharge Planning Note    Patient name Redd Gist  Location [place] [facility] /E2 -* MRN [id number]  : 1945 Date 2022       Current Admission Date: 2022  Current Admission Diagnosis:Small bowel obstruction [facility])   Patient Active Problem List    Diagnosis Date Noted    Hypotension 2022    Antineoplastic chemotherapy induced pancytopenia (CODE) ([facility] [address] ) 2022    Small bowel obstruction ([address] ) 2022    Neutropenia ([address] ) 2022    Small bowel polyp 2022    Moderate protein-calorie malnutrition ([address] ) 2022    Malignant neoplasm metastatic to lung ([address] ) 10/26/2021    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/2021    Chronic obstructive pulmonary disease ([address] ) 2021    Chemotherapy induced neutropenia ([facility] [address] ) 2021    Neoplastic malignant related fatigue 2021    Thyroid disorder 2021    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 ([address] ) 2021    Chemotherapy induced neutropenia ([address] ) 2021    Encounter for care related to vascular access port 2021    Lung metastasis ([address] ) 2020    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    Generalized abdominal pain 2020    MALT (mucosa associated lymphoid tissue)- Gastric ([address] ) 2020    Tubular adenoma of colon 2020    Tubular adenoma of small intestine 2020    Prominent ampulla of Vater 2020    Gastric ulcer 2020    Esophageal reflux 2020    Chemotherapy-induced nausea 2020    Elevated alkaline phosphatase level 2020    Liver lesion 2020    Lung nodules 2020    Dupuytren's disease 2017    Hyperlipidemia 2013    Anemia 2012    Benign essential hypertension 2012    Benign prostatic hyperplasia 10/25/2012      LOS (days): 2  Geometric Mean LOS (GMLOS) (days): 4 70  Days to GMLOS:2 8     OBJECTIVE:    Risk of Unplanned Readmission Score: 17      Current admission status: Inpatient     Preferred Pharmacy:   [address] [facility] [address] [facility] [address]  [address] [address]  Phone: [phone] Fax: [phone]    Primary Care Provider: Lesly Santillan DO    Primary Insurance: MEDICARE  Secondary Insurance: Solange Perfectopeggy HARVEY    ASSESSMENT:  Active Health Care Proxies    There are no active Health Care Proxies on file  Patient Information  Admitted from[de-identified] Home  Mental Status: Alert  During Assessment patient was accompanied by: Not accompanied during assessment  Assessment information provided by[de-identified] Patient  Primary Caregiver: Self  Support Systems: Spouse/significant other  Home entry access options  Select all that apply : Stairs  Number of steps to enter home : 1  Type of Current Residence: [address] [place] [address] home  Upon entering residence, is there a bedroom on the main floor (no further steps)?: Yes  Upon entering residence, is there a bathroom on the main floor (no further steps)?: Yes  Living Arrangements: Lives w/ Spouse/significant other    Activities of Daily Living Prior to Admission  Functional Status: Independent  Completes ADLs independently?: Yes  Ambulates independently?: Yes  Does patient use assisted devices?: No  Does patient currently own DME?: Yes  What DME does the patient currently own?: [employer]/[place]  Does the patient have a history of Short-Term Rehab?: No  Does patient have a history of HHC?: No    Means of Transportation  Means of Transport to Appts[de-identified] Drives Self    DISCHARGE DETAILS:    Discharge planning discussed with[de-identified] Patient  Freedom of Choice: Yes  Comments - Freedom of Choice: Pt has no questions or concerns at this time  CM will continue to follow       [facility]         Is the patient interested in [address] at discharge?: No    DME Referral Provided  Referral made for DME?: No    Treatment Team Recommendation: Home  Discharge Destination Plan[de-identified] Home  Transport at Discharge : Family

## 2022-04-28 NOTE — PROGRESS NOTES
[facility]  Progress Note - Neil Tucker 1945, 64 y o  male MRN: [id number]  Unit/Bed#: E2 -02 Encounter: [id number]  Primary Care Provider: Kamron Mcdermott DO   Date and time admitted to hospital: 4/26/2022 11:36 AM    * Small bowel obstruction (HCC)  Assessment & Plan  · Known history of metastatic small bowel lymphoma  · CTAP: Interval distention of multiple segments of proximal and mid small bowel with a suspected transition point of an obstruction in the right midabdomen  Multiple small bowel segments are adherent to this location suspicious for an adhesion  unchanged omental/peritoneal implants  · General surgery following   · NPO/NGT clamped and gastrografin challenge today   · Medical Oncology, palliative and GI following  · Cardiology clearance for surgery   · Ongoing discussions regarding consideration for decompressive PEG  No indication for acute surgical intervention  MALT (mucosa associated lymphoid tissue)- Gastric [facility])  Assessment & Plan  · Patient follows with Medical Oncology at [facility] as well as [facility]  Patient show progression through 2 lines of chemotherapy  Recently finished first 2 week course of Lonsurf Last friday  · Appreciate medical Oncology recommendations  · Patient takes prednisone 10 mg daily as needed for appetite    Antineoplastic chemotherapy induced pancytopenia (CODE) (HCC)  Assessment & Plan  · Antineoplastic chemotherapy induced pancytopenia in the setting of small bowel lymphoma with recent course of Lonsurf  · Serials CBCs    · Should improve -monitor closely   · procalcitonin elevated-possible due to gi obstruction   · Currently on cefepime       Neutropenia (HCC)  Assessment & Plan  · Likely related to recent chemotherapy course  · Possible new infection setting of small-bowel obstruction  · Will continue cefepime which was initiated on admission  · Trend CBC/fever curve  · Follow-up blood cultures    Benign prostatic hyperplasia  Assessment & Plan  Resume meds when able    Benign essential hypertension  Assessment & Plan  · Previous to admission patient is on Lopressor 12 5 mg b i d  And losartan 25 mg daily  · Start Lopressor IV 2 5 mg q 6 hours while NPO  · Holding parameters noted        VTE Pharmacologic Prophylaxis: VTE Score: 6 High Risk (Score >/= 5) - Pharmacological DVT Prophylaxis Ordered: heparin  Sequential Compression Devices Ordered  Patient Centered Rounds: I performed bedside rounds with nursing staff today  Discussions with Specialists or Other Care Team Provider: d/w RN     Education and Discussions with Family / Patient: Patient declined call to   Time Spent for Care: 30 minutes  More than 50% of total time spent on counseling and coordination of care as described above  Current Length of Stay: 2 day(s)  Current Patient Status: Inpatient   Certification Statement: The patient will continue to require additional inpatient hospital stay due to NGT  Discharge Plan: Anticipate discharge in 48-72 hrs to discharge location to be determined pending rehab evaluations  Code Status: Level 1 - Full Code    Subjective:   Pt lying in bed  Reports he is  Having 10/10 pain in his abdomen after having an xray which required him to move around  He denies any n/v  No bm  Objective:     Vitals:   Temp (24hrs), Av 4 °F (36 9 °C), Min:96 1 °F (35 6 °C), Max:99 4 °F (37 4 °C)    Temp:  [96 1 °F (35 6 °C)-99 4 °F (37 4 °C)] 99 1 °F (37 3 °C)  HR:  [] 95  Resp:  [16-20] 17  BP: (116-154)/(67-85) 124/73  SpO2:  [96 %-97 %] 97 %  Body mass index is 20 53 kg/m²  Input and Output Summary (last 24 hours): Intake/Output Summary (Last 24 hours) at 2022 1639  Last data filed at 2022 1625  Gross per 24 hour   Intake 4400 ml   Output 950 ml   Net 3450 ml       Physical Exam:   Physical Exam  Constitutional:       General: He is not in acute distress    Cardiovascular: Rate and Rhythm: Normal rate and regular rhythm  Pulses: Normal pulses  Heart sounds: Normal heart sounds  No murmur heard  Pulmonary:      Effort: No respiratory distress  Breath sounds: Normal breath sounds  No wheezing or rales  Abdominal:      General: There is distension  Palpations: Abdomen is soft  Tenderness: There is abdominal tenderness  Comments: NG tube clamped  Hypoactive    Musculoskeletal:         General: No swelling or tenderness  Skin:     General: Skin is warm and dry  Findings: No erythema or rash  Neurological:      General: No focal deficit present  Mental Status: He is alert  Mental status is at baseline  Psychiatric:         Attention and Perception: Attention normal          Mood and Affect: Mood normal           Additional Data:     Labs:  Results from last 7 days   Lab Units 04/28/22  0436 04/27/22  1845 04/27/22  1845 04/26/22  1216 04/25/22  0717   WBC Thousand/uL 1 90*   < > 1 97*   < > 4 02*   HEMOGLOBIN g/dL 8 3*   < > 9 9*   < > 11 5*   HEMATOCRIT % 26 1*   < > 30 4*   < > 33 8*   PLATELETS Thousands/uL 66*   < > 67*   < > 140*   BANDS PCT %  --   --  1   < >  --    NEUTROS PCT %  --   --   --   --  65   LYMPHS PCT %  --   --   --   --  29   LYMPHO PCT %  --   --  21   < >  --    MONOS PCT %  --   --   --   --  4   MONO PCT %  --   --  5   < >  --    EOS PCT %  --   --  1   < > 1    < > = values in this interval not displayed       Results from last 7 days   Lab Units 04/28/22  0436   SODIUM mmol/L 141   POTASSIUM mmol/L 3 5   CHLORIDE mmol/L 107   CO2 mmol/L 25   BUN mg/dL 22   CREATININE mg/dL 1 06   ANION GAP mmol/L 9   CALCIUM mg/dL 8 5   ALBUMIN g/dL 2 1*   TOTAL BILIRUBIN mg/dL 0 99   ALK PHOS U/L 221*   ALT U/L 29   AST U/L 18   GLUCOSE RANDOM mg/dL 107                 Results from last 7 days   Lab Units 04/27/22  0524 04/26/22  1404 04/26/22  1216   LACTIC ACID mmol/L  --   --  1 8   PROCALCITONIN ng/ml 13 20* 6 56*  -- Lines/Drains:  Invasive Devices  Report    Central Venous Catheter Line            Port A Cath 12/31/20 Right Chest 483 days          Peripheral Intravenous Line            Peripheral IV 04/26/22 Distal;Left;Upper;Ventral (anterior) Arm 2 days          Drain            NG/OG/Enteral Tube Nasogastric 16 Fr Right nare 2 days                Central Line:  Goal for removal: N/A - Chronic PICC             Imaging: Reviewed radiology reports from this admission including: abdominal/pelvic CT    Recent Cultures (last 7 days):   Results from last 7 days   Lab Units 04/26/22  1408 04/26/22  1404   BLOOD CULTURE  No Growth at 24 hrs  No Growth at 24 hrs  Last 24 Hours Medication List:   Current Facility-Administered Medications   Medication Dose Route Frequency Provider Last Rate    cefepime  2,000 mg Intravenous Q12H Alphia Mountain, DO 2,000 mg (04/28/22 1356)    dextrose 5 % and sodium chloride 0 9 %  100 mL/hr Intravenous Continuous Alphia Mountain,  mL/hr (04/28/22 1625)    heparin (porcine)  5,000 Units Subcutaneous [facility] Alphia Mountain, DO      HYDROmorphone  0 2 mg Intravenous Q4H PRN Alphia Mountain, DO      Or    HYDROmorphone  0 5 mg Intravenous Q4H PRN Alphia Mountain, DO      HYDROmorphone  1 mg Intravenous Q4H PRN Alphia Mountain, DO      metoprolol  2 5 mg Intravenous Q6H Alphia Mountain, DO      ondansetron  4 mg Intravenous Q4H PRN Alphia Mountain, DO      pantoprazole  40 mg Intravenous Q12H Sunday MD Manjeet          Today, Patient Was Seen By: GRISELDA Crouch    **Please Note: This note may have been constructed using a voice recognition system  **

## 2022-04-28 NOTE — ASSESSMENT & PLAN NOTE
· Likely related to recent chemotherapy course  · Possible new infection setting of small-bowel obstruction  · Will continue cefepime which was initiated on admission  · Trend CBC/fever curve  · Follow-up blood cultures

## 2022-04-28 NOTE — ASSESSMENT & PLAN NOTE
· Previous to admission patient is on Lopressor 12 5 mg b i d   And losartan 25 mg daily  · Start Lopressor IV 2 5 mg q 6 hours while NPO  · Holding parameters noted

## 2022-04-28 NOTE — PROGRESS NOTES
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, RP lymphadenopathy, and small bowel polyps, presents with a malignant SBO    Afebrile  VSS   NGT with 500 cc slight blood tinged bilious drainage  No bowel function    Plan:  Maintain NPO/NGT  [email or web address]  Consider small bowel follow through today  Continue IV Protonix for blood tinged output from NGT  OOB/Ambulate  Ongoing discussions regarding consideration for decompressive PEG  No indication for acute surgical intervention  Appreciate GI recommendations    Subjective/Objective     Subjective: Patient had an episode of increasing cramping abdominal pain overnight  No flatus or BMs yet  No fevers or chills  Objective:     Blood pressure 119/72, pulse 93, temperature 99 4 °F (37 4 °C), temperature source Tympanic, resp  rate 20, height 5' 9" (1 753 m), weight 63 kg (139 lb), SpO2 96 %  ,Body mass index is 20 53 kg/m²  Intake/Output Summary (Last 24 hours) at 4/28/2022 0650  Last data filed at 4/27/2022 2037  Gross per 24 hour   Intake --   Output 1050 ml   Net -1050 ml       Invasive Devices  Report    Central Venous Catheter Line            Port A Cath 12/31/20 Right Chest 482 days          Peripheral Intravenous Line            Peripheral IV 04/26/22 Distal;Left;Upper;Ventral (anterior) Arm 1 day          Drain            NG/OG/Enteral Tube Nasogastric 16 Fr Right nare 1 day                Physical Exam:   NAD, alert and oriented x3  Normocephalic, atraumatic  MMM, NGT in place with blood tinged bilious drainage   Norm resp effort on room air   Regular rate  Abd soft, minimal distention, mild epigastric tenderness     No calf tenderness or peripheral edema  Motor/sensation intact in distal extremities  CN grossly intact  -rash/lesions      Lab, Imaging and other studies:  CBC:   Lab Results   Component Value Date    WBC 1 90 (LL) 04/28/2022    HGB 8 3 (L) 04/28/2022    HCT 26 1 (L) 04/28/2022    MCV 95 04/28/2022    PLT 66 (L) 04/28/2022    MCH 30 1 04/28/2022    MCHC 31 8 04/28/2022    RDW 15 6 (H) 04/28/2022    MPV 10 6 04/28/2022    NRBC 0 04/28/2022   , CMP:   Lab Results   Component Value Date    SODIUM 141 04/28/2022    K 3 5 04/28/2022     04/28/2022    CO2 25 04/28/2022    BUN 22 04/28/2022    CREATININE 1 06 04/28/2022    CALCIUM 8 5 04/28/2022    AST 18 04/28/2022    ALT 29 04/28/2022    ALKPHOS 221 (H) 04/28/2022    EGFR 66 04/28/2022     VTE Pharmacologic Prophylaxis: Heparin  VTE Mechanical Prophylaxis: sequential compression device

## 2022-04-28 NOTE — PROGRESS NOTES
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, HTN, HLD, and hx of dysrhythmias who presents to the ED with abdominal pain and n/v      1  Malignant SBO  2  Metastatic adenocarcinoma of the small bowel  3  MALT-gastric   Pt presents with abdominal pain and n/v x 1-2 days; CT in the ED depicted distention of multiple SB loops with suspected transition point of an obstruction and multiple SB segments adherent to this area, suspicious for adhesions  It also depicted unchanged omental/peritoneal implants and 8 cm pancreatic cyst  Surgery team saw the pt in consult and deemed no surgical intervention warranted at this time; NGT was placed on continuous suction however pt did have grossly bloody output yesterday that has since resolved  Surgery requested GI consult in case the pt warrants decompressive PEG in the future  Today, pt is very frustrated as he explains that he was told by the surgery team that he "needs" PEG but pt does not want one  Pt's pain acutely worsened overnight and he still has not yet passed gas or had a BM  Hgb at 8 3, down from 9 9 today    -continue NGT and bowel rest per surgery recs; I do recommend low and intermittent suction as continuous suction may be the reason why he had grossly bloody output yesterday   I sent TT to surgery resident on-call to discuss this   -IVF  -monitor abdominal exam and monitor for signs of peritonitis  -monitor Hgb and NGT output  -I discussed with surgical resident: the plan from surgical standpoint is gastrografin challenge and  SB follow-through today; further recs to come after this   -appreciate surgical recommendations   -if pt's clinical status continues to worsen, we can consider decompressive PEG if pt is willing ______________________________________________________________________    Subjective:     Pt notes his pain acutely worsened overnight; no more bloody output in NGT but it is put back to continuous suction  Pt still has not passed gas or had a BM  Pt notes that he is very frustrated and upset bc he was told by the surgery team that he "needs" PEG tube but he does not want one       Medication Administration - last 24 hours from 04/27/2022 1012 to 04/28/2022 1012       Date/Time Order Dose Route Action Action by     04/28/2022 0815 metoprolol (LOPRESSOR) injection 2 5 mg 2 5 mg Intravenous Given Pat Ricks RN     04/28/2022 0200 metoprolol (LOPRESSOR) injection 2 5 mg 2 5 mg Intravenous Given Rodriguez Avila     04/27/2022 2030 metoprolol (LOPRESSOR) injection 2 5 mg 2 5 mg Intravenous Given Estrada Aguilar RN     04/27/2022 1344 metoprolol (LOPRESSOR) injection 2 5 mg 2 5 mg Intravenous Given Milagros Billy RN     04/28/2022 0317 dextrose 5 % and sodium chloride 0 9 % infusion 100 mL/hr Intravenous New Bag Laura Gomes     04/27/2022 1733 dextrose 5 % and sodium chloride 0 9 % infusion 100 mL/hr Intravenous New [facility]  Corey, [facility]     04/28/2022 0600 heparin (porcine) subcutaneous injection 5,000 Units 5,000 Units Subcutaneous Refused Laura Gomes     04/27/2022 2135 heparin (porcine) subcutaneous injection 5,000 Units 5,000 Units Subcutaneous Not Given Estrada Aguilar RN     04/27/2022 1341 heparin (porcine) subcutaneous injection 5,000 Units 5,000 Units Subcutaneous Given Milagros Billy RN     04/28/2022 0815 HYDROmorphone HCl (DILAUDID) injection 0 2 mg 0 2 mg Intravenous Given Pat Ricks RN     04/28/2022 0205 HYDROmorphone HCl (DILAUDID) injection 0 2 mg   Intravenous See Alternative Laura Gomes     04/28/2022 0815 HYDROmorphone (DILAUDID) injection 0 5 mg   Intravenous See Alternative Pat Ricks RN     04/28/2022 0205 HYDROmorphone (DILAUDID) injection 0 5 mg 0 5 mg Intravenous Given Laura Gomes     04/28/2022 0311 HYDROmorphone (DILAUDID) injection 1 mg 1 mg Intravenous Given Laura Gomes     04/27/2022 2030 HYDROmorphone (DILAUDID) injection 1 mg 1 mg Intravenous Given Johan Syed RN     04/27/2022 1341 HYDROmorphone (DILAUDID) injection 1 mg 1 mg Intravenous Given Milagros Billy RN     04/28/2022 0200 cefepime (MAXIPIME) 2,000 mg in dextrose 5 % 50 mL IVPB 2,000 mg Intravenous New Bag Laura Goems     04/27/2022 1341 cefepime (MAXIPIME) 2,000 mg in dextrose 5 % 50 mL IVPB 2,000 mg Intravenous New Bag Milagors Billy RN     04/28/2022 0815 pantoprazole (PROTONIX) injection 40 mg 40 mg Intravenous Given Arlene Garcia RN     04/27/2022 1735 pantoprazole (PROTONIX) injection 40 mg 40 mg Intravenous Given Vivek Goldberg RN          Objective:     Vitals: Blood pressure 125/67, pulse 92, temperature (!) 96 1 °F (35 6 °C), temperature source Oral, resp  rate 16, height 5' 9" (1 753 m), weight 63 kg (139 lb), SpO2 97 %  ,Body mass index is 20 53 kg/m²        Intake/Output Summary (Last 24 hours) at 4/28/2022 1012  Last data filed at 4/28/2022 0732  Gross per 24 hour   Intake --   Output 1200 ml   Net -1200 ml       Physical Exam:   General Appearance: Awake and alert, in no acute distress  Abdomen: Soft, non-tender, non-distended; bowel sounds normal; no masses or no organomegaly    Invasive Devices  Report    Central Venous Catheter Line            Port A Cath 12/31/20 Right Chest 482 days          Peripheral Intravenous Line            Peripheral IV 04/26/22 Distal;Left;Upper;Ventral (anterior) Arm 1 day          Drain            NG/OG/Enteral Tube Nasogastric 16 Fr Right nare 1 day                Lab Results:  Admission on 04/26/2022   Component Date Value    WBC 04/26/2022 1 39*    RBC 04/26/2022 3 85*    Hemoglobin 04/26/2022 11 9*    Hematocrit 04/26/2022 36 7     MCV 04/26/2022 95     MCH 04/26/2022 30 9     MCHC 04/26/2022 32 4  RDW 04/26/2022 15 8*    MPV 04/26/2022 9 8     Platelets 71/08/7931 114*    Sodium 04/26/2022 137     Potassium 04/26/2022 4 6     Chloride 04/26/2022 101     CO2 04/26/2022 28     ANION GAP 04/26/2022 8     BUN 04/26/2022 25     Creatinine 04/26/2022 1 18     Glucose 04/26/2022 109     Calcium 04/26/2022 9 5     Corrected Calcium 04/26/2022 10 1     AST 04/26/2022 33     ALT 04/26/2022 58     Alkaline Phosphatase 04/26/2022 384*    Total Protein 04/26/2022 7 2     Albumin 04/26/2022 3 2*    Total Bilirubin 04/26/2022 1 93*    eGFR 04/26/2022 58     Lipase 04/26/2022 72*    LACTIC ACID 04/26/2022 1 8     Segmented % 04/26/2022 72     Bands % 04/26/2022 12*    Lymphocytes % 04/26/2022 12*    Monocytes % 04/26/2022 4     Eosinophils, % 04/26/2022 0     Basophils % 04/26/2022 0     Absolute Neutrophils 04/26/2022 1 17*    Lymphocytes Absolute 04/26/2022 0 17*    Monocytes Absolute 04/26/2022 0 06     Eosinophils Absolute 04/26/2022 0 00     Basophils Absolute 04/26/2022 0 00     RBC Morphology 04/26/2022 Present     Macrocytes 04/26/2022 Present     Platelet Estimate 18/35/7876 Borderline*    Blood Culture 04/26/2022 No Growth at 24 hrs   Blood Culture 04/26/2022 No Growth at 24 hrs       Procalcitonin 04/26/2022 6 56*    Platelets 58/31/1079 86*    MPV 04/26/2022 10 4     Sodium 04/27/2022 138     Potassium 04/27/2022 3 7     Chloride 04/27/2022 104     CO2 04/27/2022 26     ANION GAP 04/27/2022 8     BUN 04/27/2022 23     Creatinine 04/27/2022 1 17     Glucose 04/27/2022 102     Calcium 04/27/2022 8 4     Corrected Calcium 04/27/2022 9 8     AST 04/27/2022 24     ALT 04/27/2022 36     Alkaline Phosphatase 04/27/2022 241*    Total Protein 04/27/2022 5 8*    Albumin 04/27/2022 2 3*    Total Bilirubin 04/27/2022 1 39*    eGFR 04/27/2022 59     WBC 04/27/2022 1 76*    RBC 04/27/2022 3 06*    Hemoglobin 04/27/2022 9 4*    Hematocrit 04/27/2022 29 1*    MCV 04/27/2022 95     MCH 04/27/2022 30 7     MCHC 04/27/2022 32 3     RDW 04/27/2022 15 7*    MPV 04/27/2022 10 4     Platelets 66/70/8877 75*    nRBC 04/27/2022 0     Procalcitonin 04/27/2022 13 20*    WBC 04/27/2022 1 97*    RBC 04/27/2022 3 17*    Hemoglobin 04/27/2022 9 9*    Hematocrit 04/27/2022 30 4*    MCV 04/27/2022 96     MCH 04/27/2022 31 2     MCHC 04/27/2022 32 6     RDW 04/27/2022 15 4*    MPV 04/27/2022 10 6     Platelets 53/71/0369 67*    Segmented % 04/27/2022 71     Bands % 04/27/2022 1     Lymphocytes % 04/27/2022 21     Monocytes % 04/27/2022 5     Eosinophils, % 04/27/2022 1     Basophils % 04/27/2022 0     Atypical Lymphocytes % 04/27/2022 1*    Absolute Neutrophils 04/27/2022 1 42*    Lymphocytes Absolute 04/27/2022 0 41*    Monocytes Absolute 04/27/2022 0 10     Eosinophils Absolute 04/27/2022 0 02     Basophils Absolute 04/27/2022 0 00     Anisocytosis 04/27/2022 Present     Ovalocytes 04/27/2022 Present     Platelet Estimate 03/49/5247 Decreased*    WBC 04/28/2022 1 90*    RBC 04/28/2022 2 76*    Hemoglobin 04/28/2022 8 3*    Hematocrit 04/28/2022 26 1*    MCV 04/28/2022 95     MCH 04/28/2022 30 1     MCHC 04/28/2022 31 8     RDW 04/28/2022 15 6*    MPV 04/28/2022 10 6     Platelets 72/38/9290 66*    nRBC 04/28/2022 0     Sodium 04/28/2022 141     Potassium 04/28/2022 3 5     Chloride 04/28/2022 107     CO2 04/28/2022 25     ANION GAP 04/28/2022 9     BUN 04/28/2022 22     Creatinine 04/28/2022 1 06     Glucose 04/28/2022 107     Calcium 04/28/2022 8 5     Corrected Calcium 04/28/2022 10 0     AST 04/28/2022 18     ALT 04/28/2022 29     Alkaline Phosphatase 04/28/2022 221*    Total Protein 04/28/2022 5 7*    Albumin 04/28/2022 2 1*    Total Bilirubin 04/28/2022 0 99     eGFR 04/28/2022 66     Magnesium 04/28/2022 1 7     Phosphorus 04/28/2022 2 9     Ventricular Rate 04/27/2022 102     Atrial Rate 04/27/2022 102     CO Interval 04/27/2022 136     QRSD Interval 04/27/2022 92     QT Interval 04/27/2022 338     QTC Interval 04/27/2022 440     P Axis 04/27/2022 37     QRS Axis 04/27/2022 41     T Wave Axis 04/27/2022 33     Ventricular Rate 04/28/2022 90     Atrial Rate 04/28/2022 90     MT Interval 04/28/2022 134     QRSD Interval 04/28/2022 98     QT Interval 04/28/2022 346     QTC Interval 04/28/2022 423     P Axis 04/28/2022 39     QRS Axis 04/28/2022 35     T Wave Axis 04/28/2022 29     Ventricular Rate 04/28/2022 95     Atrial Rate 04/28/2022 95     MT Interval 04/28/2022 138     QRSD Interval 04/28/2022 104     QT Interval 04/28/2022 344     QTC Interval 04/28/2022 432     P Axis 04/28/2022 43     QRS Axis 04/28/2022 36     T Wave Axis 04/28/2022 33        Imaging Studies: I have personally reviewed pertinent imaging studies

## 2022-04-28 NOTE — ASSESSMENT & PLAN NOTE
· Known history of metastatic small bowel lymphoma  · CTAP: Interval distention of multiple segments of proximal and mid small bowel with a suspected transition point of an obstruction in the right midabdomen  Multiple small bowel segments are adherent to this location suspicious for an adhesion  unchanged omental/peritoneal implants  · General surgery following   · NPO/NGT clamped and gastrografin challenge today   · Medical Oncology, palliative and GI following  · Cardiology clearance for surgery   · Ongoing discussions regarding consideration for decompressive PEG  No indication for acute surgical intervention

## 2022-04-28 NOTE — PROGRESS NOTES
Cardiology Progress Note   MD Juan Alberto Echols MD Pollyann Confer, DO, MD Emma Medrano DO, Saima Wilson DO, [facility] [place]  ----------------------------------------------------------------  [address] 64 y o  male MRN: [id number]  Unit/Bed#: E2 -02 Encounter: [id number]      ASSESSMENT:   · Preoperative CV risk assessment for abdominal surgery  · Small bowel obstruction  · Abdominal pain secondary to SBO  · Pancytopenia  · Small bowel adenocarcinoma with metastatic disease to the omentum and lungs bilaterally  · MALT lymphoma, October 2020  · Hypertension  · Dyslipidemia  · LVEF 62%, grade 1 diastolic dysfunction, trace MR/TR, May 2020  · Exercise stress test appears negative for myocardial ischemia, ET 5 min, 98% MPHR, 7 METs May 2020     PLAN:  Patient remains hemodynamically stable  ECG shows some PACs which may in part be related to the patient's irregularity that was noted on prior examinations  No evidence of atrial fibrillation with sinus rhythm on 3 ECGs since admission  CV risk remains intermediate; see note from yesterday  No further CV testing prior to the OR as it would not change our management  Monitor on telemetry postop    Signed: Donna Francisco DO, [facility] [place], [facility]      History of Present Illness:  Patient seen and examined  Denies chest pain, pressure, tightness or squeezing  Denies lightheadedness, dizziness or palpitations  Denies lower extremity swelling, orthopnea or paroxysmal nocturnal dyspnea  Review of Systems:  Review of Systems   Constitutional: Negative for decreased appetite, fever, weight gain and weight loss  HENT: Negative for congestion and sore throat  Eyes: Negative for visual disturbance  Cardiovascular: Negative for chest pain, dyspnea on exertion, leg swelling, near-syncope and palpitations  Respiratory: Negative for cough and shortness of breath  Hematologic/Lymphatic: Negative for bleeding problem  Skin: Negative for rash  Musculoskeletal: Negative for myalgias and neck pain  Gastrointestinal: Negative for abdominal pain and nausea  Neurological: Negative for light-headedness and weakness  Psychiatric/Behavioral: Negative for depression  Allergies   Allergen Reactions    Other Other (See Comments)     Liquid lidocaine - couldn't swallow, panicked       No current facility-administered medications on file prior to encounter  Current Outpatient Medications on File Prior to Encounter   Medication Sig    cholecalciferol (VITAMIN D3) 1,000 units tablet Take 2,000 Units by mouth daily    vitamin B-12 (VITAMIN B-12) 1,000 mcg tablet Take 1,000 mcg by mouth daily    atorvastatin (LIPITOR) 40 mg tablet Take 1 tablet (40 mg total) by mouth daily    dutasteride (AVODART) 0 5 mg capsule Take 1 capsule (0 5 mg total) by mouth daily at bedtime    Lonsurf 20-8  19 MG TABS  (Patient not taking: Reported on 4/6/2022 )    losartan (COZAAR) 25 mg tablet TAKE ONE TABLET DAILY    metoprolol tartrate (LOPRESSOR) 25 mg tablet Take 0 5 tablets (12 5 mg total) by mouth 2 (two) times a day    Multiple Vitamin (MULTIVITAMIN) tablet Take 1 tablet by mouth daily     ondansetron (ZOFRAN) 4 mg tablet Take 1 tablet (4 mg total) by mouth every 8 (eight) hours as needed for nausea or vomiting    predniSONE 10 mg tablet Take 1 tablet (10 mg total) by mouth daily        Current Facility-Administered Medications   Medication Dose Route Frequency Provider Last Rate    cefepime  2,000 mg Intravenous Q12H Velia Spare, DO 2,000 mg (04/28/22 0200)    dextrose 5 % and sodium chloride 0 9 %  100 mL/hr Intravenous Continuous Velia Spare,  mL/hr (04/28/22 0317)    heparin (porcine)  5,000 Units Subcutaneous [facility] Velia Spare, DO      HYDROmorphone  0 2 mg Intravenous Q4H PRN Velia Spare, DO      Or    HYDROmorphone  0 5 mg Intravenous Q4H PRN Fer Pea, DO      HYDROmorphone  1 mg Intravenous Q4H PRN [place] Pea, DO      metoprolol  2 5 mg Intravenous Q6H Fer Pea, DO      ondansetron  4 mg Intravenous Q4H PRN Fer Pea, DO      pantoprazole  40 mg Intravenous Q12H [address] Nadine Waldron MD         dextrose 5 % and sodium chloride 0 9 %, 100 mL/hr, Last Rate: 100 mL/hr (04/28/22 0317)        Vitals:    04/27/22 1528 04/27/22 2300 04/28/22 0200 04/28/22 0700   BP: 114/66 116/69 119/72 125/67   BP Location: Right arm  Right arm Right arm   Pulse: 92 93  92   Resp: 18 20  16   Temp: 99 1 °F (37 3 °C) 99 4 °F (37 4 °C)  (!) 96 1 °F (35 6 °C)   TempSrc: Oral Tympanic  Oral   SpO2: 95% 96%  97%   Weight:       Height:         Body mass index is 20 53 kg/m²  Intake/Output Summary (Last 24 hours) at 4/28/2022 0949  Last data filed at 4/28/2022 0732  Gross per 24 hour   Intake --   Output 1200 ml   Net -1200 ml       Weight change:     PHYSICAL EXAMINATION:  Gen: Awake, Alert, NAD  Head/eyes: AT/NC, pupils equal and round, Anicteric  ENT: mmm; + NGT  Neck: Supple, No elevated JVP, trachea midline  Resp: CTA bilaterally no w/r/r  CV: RRR +S1, S2, No m/r/g  Abd: Soft, NT/ND + BS  Ext: no LE edema bilaterally  Neuro: Follows commands, moves all extermities  Psych: Appropriate affect, happy mood, pleasant attitude, non-combative  Skin: warm; no rash, erythema or venous stasis changes on exposed skin    Lab Results:  Results from last 7 days   Lab Units 04/28/22  0436   WBC Thousand/uL 1 90*   HEMOGLOBIN g/dL 8 3*   HEMATOCRIT % 26 1*   PLATELETS Thousands/uL 66*     Results from last 7 days   Lab Units 04/28/22  0436   POTASSIUM mmol/L 3 5   CHLORIDE mmol/L 107   CO2 mmol/L 25   BUN mg/dL 22   CREATININE mg/dL 1 06   CALCIUM mg/dL 8 5   ALK PHOS U/L 221*   ALT U/L 29   AST U/L 18     No results found for: TROPONINT                  This note was completed in part utilizing [employer] Software    Grammatical errors, random word insertions, spelling mistakes, and incomplete sentences may be an occasional consequence of this system secondary to software limitations, ambient noise, and hardware issues  If you have any questions or concerns about the content, text, or information contained within the body of this dictation, please contact the provider for clarification

## 2022-04-28 NOTE — ASSESSMENT & PLAN NOTE
· Patient follows with Medical Oncology at [facility] as well as [facility]  Patient show progression through 2 lines of chemotherapy  Recently finished first 2 week course of Lonsurf Last friday     · Appreciate medical Oncology recommendations  · Patient takes prednisone 10 mg daily as needed for appetite

## 2022-04-28 NOTE — PLAN OF CARE
Problem: Prexisting or High Potential for Compromised Skin Integrity  Goal: Skin integrity is maintained or improved  Description: INTERVENTIONS:  - Identify patients at risk for skin breakdown  - Assess and monitor skin integrity  - Assess and monitor nutrition and hydration status  - Monitor labs   - Assess for incontinence   - Turn and reposition patient  - Assist with mobility/ambulation  - Relieve pressure over bony prominences  - Avoid friction and shearing  - Provide appropriate hygiene as needed including keeping skin clean and dry  - Evaluate need for skin moisturizer/barrier cream  - Collaborate with interdisciplinary team   - Patient/family teaching  - Consider wound care consult   Outcome: Progressing     Problem: PAIN - ADULT  Goal: Verbalizes/displays adequate comfort level or baseline comfort level  Description: Interventions:  - Encourage patient to monitor pain and request assistance  - Assess pain using appropriate pain scale  - Administer analgesics based on type and severity of pain and evaluate response  - Implement non-pharmacological measures as appropriate and evaluate response  - Consider cultural and social influences on pain and pain management  - Notify physician/advanced practitioner if interventions unsuccessful or patient reports new pain  Outcome: Progressing     Problem: DISCHARGE PLANNING  Goal: Discharge to home or other facility with appropriate resources  Description: INTERVENTIONS:  - Identify barriers to discharge w/patient and caregiver  - Arrange for needed discharge resources and transportation as appropriate  - Identify discharge learning needs (meds, wound care, etc )  - Arrange for interpretive services to assist at discharge as needed  - Refer to Case Management Department for coordinating discharge planning if the patient needs post-hospital services based on physician/advanced practitioner order or complex needs related to functional status, cognitive ability, or social support system  Outcome: Progressing     Problem: INFECTION - ADULT  Goal: Absence or prevention of progression during hospitalization  Description: INTERVENTIONS:  - Assess and monitor for signs and symptoms of infection  - Monitor lab/diagnostic results  - Monitor all insertion sites, i e  indwelling lines, tubes, and drains  - Monitor endotracheal if appropriate and nasal secretions for changes in amount and color  - [place] appropriate cooling/warming therapies per order  - Administer medications as ordered  - Instruct and encourage patient and family to use good hand hygiene technique  - Identify and instruct in appropriate isolation precautions for identified infection/condition  Outcome: Progressing  Goal: Absence of fever/infection during neutropenic period  Description: INTERVENTIONS:  - Monitor WBC    Outcome: Progressing     Problem: MOBILITY - ADULT  Goal: Maintain or return to baseline ADL function  Description: INTERVENTIONS:  -  Assess patient's ability to carry out ADLs; assess patient's baseline for ADL function and identify physical deficits which impact ability to perform ADLs (bathing, care of mouth/teeth, toileting, grooming, dressing, etc )  - Assess/evaluate cause of self-care deficits   - Assess range of motion  - Assess patient's mobility; develop plan if impaired  - Assess patient's need for assistive devices and provide as appropriate  - Encourage maximum independence but intervene and supervise when necessary  - Involve family in performance of ADLs  - Assess for home care needs following discharge   - Consider OT consult to assist with ADL evaluation and planning for discharge  - Provide patient education as appropriate  Outcome: Progressing  Goal: Maintains/Returns to pre admission functional level  Description: INTERVENTIONS:  - Perform BMAT or MOVE assessment daily    - Set and communicate daily mobility goal to care team and patient/family/caregiver     - Collaborate with rehabilitation services on mobility goals if consulted  - Perform Range of Motion  times a day  - Reposition patient every  hours    - Dangle patient  times a day  - Stand patient  times a day  - Ambulate patient  times a day  - Out of bed to chair  times a day   - Out of bed for meals  times a day  - Out of bed for toileting  - Record patient progress and toleration of activity level   Outcome: Progressing     Problem: Potential for Falls  Goal: Patient will remain free of falls  Description: INTERVENTIONS:  - Educate patient/family on patient safety including physical limitations  - Instruct patient to call for assistance with activity   - Consult OT/PT to assist with strengthening/mobility   - Keep Call bell within reach  - Keep bed low and locked with side rails adjusted as appropriate  - Keep care items and personal belongings within reach  - Initiate and maintain comfort rounds  - Make Fall Risk Sign visible to staff  - Offer Toileting every  Hours, in advance of need  - Initiate/Maintain alarm  - Obtain necessary fall risk management equipment:   - Apply yellow socks and bracelet for high fall risk patients  - Consider moving patient to room near nurses station  Outcome: Progressing     Problem: GASTROINTESTINAL - ADULT  Goal: Maintains or returns to baseline bowel function  Description: INTERVENTIONS:  - Assess bowel function  - Encourage oral fluids to ensure adequate hydration  - Administer IV fluids if ordered to ensure adequate hydration  - Administer ordered medications as needed  - Encourage mobilization and activity  - Consider nutritional services referral to assist patient with adequate nutrition and appropriate food choices  Outcome: Progressing  Goal: Maintains adequate nutritional intake  Description: INTERVENTIONS:  - Monitor percentage of each meal consumed  - Identify factors contributing to decreased intake, treat as appropriate  - Assist with meals as needed  - Monitor I&O, weight, and lab values if indicated  - Obtain nutrition services referral as needed  Outcome: Progressing     Problem: HEMATOLOGIC - ADULT  Goal: Maintains hematologic stability  Description: INTERVENTIONS  - Assess for signs and symptoms of bleeding or hemorrhage  - Monitor labs  - Administer supportive blood products/factors as ordered and appropriate  Outcome: Progressing

## 2022-04-29 PROBLEM — E87.6 HYPOKALEMIA: Status: ACTIVE | Noted: 2022-01-01

## 2022-04-29 NOTE — PROGRESS NOTES
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, HTN, HLD, and hx of dysrhythmias who presents to the ED with abdominal pain and n/v      1  Malignant SBO  2  Metastatic adenocarcinoma of the small bowel  3  MALT-gastric   Pt presents with abdominal pain and n/v x 1-2 days; CT in the ED depicted distention of multiple SB loops with suspected transition point of an obstruction and multiple SB segments adherent to this area, suspicious for adhesions  It also depicted unchanged omental/peritoneal implants and 8 cm pancreatic cyst  Surgery team saw the pt in consult and deemed no surgical intervention warranted at this time; NGT was placed on continuous suction however pt did have grossly bloody output yesterday that has since resolved  Surgery requested GI consult in case the pt warrants decompressive PEG in the future  Surgery continues with gastrografin challenge today with serial KUBs to track passage of contrast as KUB yesterday again noted that the contrast remained in stomach and SB  Hgb stable at 10 4 but WBC still low at 1 88     -continue NGT and bowel rest per surgery recs; when this is turned back on, I do recommend low and intermittent suction as continuous suction may be the reason why he had grossly bloody output   -IVF  -monitor abdominal exam and monitor for signs of peritonitis  -monitor Hgb and NGT output  -continue gastrografin challenge today, per surgery recs   -appreciate surgical recommendations: Dr Burns Shown and I discussed with surgical resident who again notes that pt has very limites surgical options, if any   A multidisciplinary meeting with GI, surgery, and palliative care should be done Monday to come up with definitive plan for pt regarding decompressive PEG VS surgery VS continued conservative management        ______________________________________________________________________    Subjective:     Pt notes his pain is intermittent now, rather than constant  Pt still has not passed gas or had a BM   Pt is continuing with serial KUB to track passage of contrast      Medication Administration - last 24 hours from 04/28/2022 1020 to 04/29/2022 1020       Date/Time Order Dose Route Action Action by     04/29/2022 0920 metoprolol (LOPRESSOR) injection 2 5 mg 2 5 mg Intravenous Given Petra Crowley RN     04/29/2022 0136 metoprolol (LOPRESSOR) injection 2 5 mg 2 5 mg Intravenous Given Inessa Casiano RN     04/28/2022 2032 metoprolol (LOPRESSOR) injection 2 5 mg 2 5 mg Intravenous Given Inessa Casiano RN     04/28/2022 1402 metoprolol (LOPRESSOR) injection 2 5 mg 2 5 mg Intravenous Given Chelsea Mitchell RN     04/29/2022 0118 dextrose 5 % and sodium chloride 0 9 % infusion 100 mL/hr Intravenous [address] Inessa Casiano RN     04/28/2022 1625 dextrose 5 % and sodium chloride 0 9 % infusion 100 mL/hr Intravenous Demetrio Billy, MADHU     04/29/2022 0500 heparin (porcine) subcutaneous injection 5,000 Units 5,000 Units Subcutaneous Not Given Inessa Casiano RN     04/28/2022 2144 heparin (porcine) subcutaneous injection 5,000 Units 5,000 Units Subcutaneous Not Given Inessa Casiano RN     04/28/2022 1301 heparin (porcine) subcutaneous injection 5,000 Units 5,000 Units Subcutaneous Refused Chelsea Mitchell RN     04/29/2022 1871 HYDROmorphone HCl (DILAUDID) injection 0 2 mg   Intravenous See Alternative Inessa Casiano RN     04/28/2022 2148 HYDROmorphone HCl (DILAUDID) injection 0 2 mg   Intravenous See Alternative Inessa Casiano RN     04/28/2022 1356 HYDROmorphone HCl (DILAUDID) injection 0 2 mg   Intravenous See Miriam Mitchell RN     04/29/2022 0442 HYDROmorphone (DILAUDID) injection 0 5 mg 0 5 mg Intravenous Given Inessa Casiano RN     04/28/2022 2148 HYDROmorphone (DILAUDID) injection 0 5 mg 0 5 mg Intravenous Given Lorie Swanson RN     04/28/2022 1356 HYDROmorphone (DILAUDID) injection 0 5 mg 0 5 mg Intravenous Given Elie García RN     04/29/2022 0725 HYDROmorphone (DILAUDID) injection 1 mg 1 mg Intravenous Given Terrance Mckeon RN     04/29/2022 0118 HYDROmorphone (DILAUDID) injection 1 mg 1 mg Intravenous Given Lorie Swanson RN     04/28/2022 1842 HYDROmorphone (DILAUDID) injection 1 mg 1 mg Intravenous Given Milagros Billy RN     04/29/2022 0118 cefepime (MAXIPIME) 2,000 mg in dextrose 5 % 50 mL IVPB 2,000 mg Intravenous [address] Lorie Swanson RN     04/28/2022 1356 cefepime (MAXIPIME) 2,000 mg in dextrose 5 % 50 mL IVPB 2,000 mg Intravenous [address] Elie García RN     04/29/2022 0920 pantoprazole (PROTONIX) injection 40 mg 40 mg Intravenous Given Terrance Mckeon RN     04/28/2022 2032 pantoprazole (PROTONIX) injection 40 mg 40 mg Intravenous Given Lorie Swanson RN     04/28/2022 1030 barium sulfate (LIQUID E-Z-PAQUE) oral suspension 355 mL 355 mL Oral Given Devyn Blue          Objective:     Vitals: Blood pressure 125/76, pulse (!) 106, temperature 98 6 °F (37 °C), temperature source Tympanic, resp  rate 16, height 5' 9" (1 753 m), weight 63 kg (139 lb), SpO2 96 %  ,Body mass index is 20 53 kg/m²        Intake/Output Summary (Last 24 hours) at 4/29/2022 1020  Last data filed at 4/29/2022 0455  Gross per 24 hour   Intake 4400 ml   Output 1150 ml   Net 3250 ml       Physical Exam:   General Appearance: Awake and alert, in no acute distress  Abdomen: Soft, non-tender, non-distended; bowel sounds normal; no masses or no organomegaly    Invasive Devices  Report    Central Venous Catheter Line            Port A Cath 12/31/20 Right Chest 484 days          Peripheral Intravenous Line            Peripheral IV 04/26/22 Distal;Left;Upper;Ventral (anterior) Arm 2 days          Drain            NG/OG/Enteral Tube Nasogastric 16 Fr Right nare 2 days                Lab Results:  Admission on 04/26/2022   Component Date Value    WBC 04/26/2022 1 39*    RBC 04/26/2022 3 85*    Hemoglobin 04/26/2022 11 9*    Hematocrit 04/26/2022 36 7     MCV 04/26/2022 95     MCH 04/26/2022 30 9     MCHC 04/26/2022 32 4     RDW 04/26/2022 15 8*    MPV 04/26/2022 9 8     Platelets 11/20/2391 114*    Sodium 04/26/2022 137     Potassium 04/26/2022 4 6     Chloride 04/26/2022 101     CO2 04/26/2022 28     ANION GAP 04/26/2022 8     BUN 04/26/2022 25     Creatinine 04/26/2022 1 18     Glucose 04/26/2022 109     Calcium 04/26/2022 9 5     Corrected Calcium 04/26/2022 10 1     AST 04/26/2022 33     ALT 04/26/2022 58     Alkaline Phosphatase 04/26/2022 384*    Total Protein 04/26/2022 7 2     Albumin 04/26/2022 3 2*    Total Bilirubin 04/26/2022 1 93*    eGFR 04/26/2022 58     Lipase 04/26/2022 72*    LACTIC ACID 04/26/2022 1 8     Segmented % 04/26/2022 72     Bands % 04/26/2022 12*    Lymphocytes % 04/26/2022 12*    Monocytes % 04/26/2022 4     Eosinophils, % 04/26/2022 0     Basophils % 04/26/2022 0     Absolute Neutrophils 04/26/2022 1 17*    Lymphocytes Absolute 04/26/2022 0 17*    Monocytes Absolute 04/26/2022 0 06     Eosinophils Absolute 04/26/2022 0 00     Basophils Absolute 04/26/2022 0 00     RBC Morphology 04/26/2022 Present     Macrocytes 04/26/2022 Present     Platelet Estimate 06/29/9041 Borderline*    Blood Culture 04/26/2022 No Growth at 48 hrs   Blood Culture 04/26/2022 No Growth at 48 hrs       Procalcitonin 04/26/2022 6 56*    Platelets 51/75/4764 86*    MPV 04/26/2022 10 4     Sodium 04/27/2022 138     Potassium 04/27/2022 3 7     Chloride 04/27/2022 104     CO2 04/27/2022 26     ANION GAP 04/27/2022 8     BUN 04/27/2022 23     Creatinine 04/27/2022 1 17     Glucose 04/27/2022 102     Calcium 04/27/2022 8 4     Corrected Calcium 04/27/2022 9 8     AST 04/27/2022 24     ALT 04/27/2022 36     Alkaline Phosphatase 04/27/2022 241*    Total Protein 04/27/2022 5 8*    Albumin 04/27/2022 2 3*    Total Bilirubin 04/27/2022 1 39*    eGFR 04/27/2022 59     WBC 04/27/2022 1 76*    RBC 04/27/2022 3 06*    Hemoglobin 04/27/2022 9 4*    Hematocrit 04/27/2022 29 1*    MCV 04/27/2022 95     MCH 04/27/2022 30 7     MCHC 04/27/2022 32 3     RDW 04/27/2022 15 7*    MPV 04/27/2022 10 4     Platelets 85/40/1221 75*    nRBC 04/27/2022 0     Procalcitonin 04/27/2022 13 20*    WBC 04/27/2022 1 97*    RBC 04/27/2022 3 17*    Hemoglobin 04/27/2022 9 9*    Hematocrit 04/27/2022 30 4*    MCV 04/27/2022 96     MCH 04/27/2022 31 2     MCHC 04/27/2022 32 6     RDW 04/27/2022 15 4*    MPV 04/27/2022 10 6     Platelets 59/67/8255 67*    Segmented % 04/27/2022 71     Bands % 04/27/2022 1     Lymphocytes % 04/27/2022 21     Monocytes % 04/27/2022 5     Eosinophils, % 04/27/2022 1     Basophils % 04/27/2022 0     Atypical Lymphocytes % 04/27/2022 1*    Absolute Neutrophils 04/27/2022 1 42*    Lymphocytes Absolute 04/27/2022 0 41*    Monocytes Absolute 04/27/2022 0 10     Eosinophils Absolute 04/27/2022 0 02     Basophils Absolute 04/27/2022 0 00     Anisocytosis 04/27/2022 Present     Ovalocytes 04/27/2022 Present     Platelet Estimate 14/01/8926 Decreased*    WBC 04/28/2022 1 90*    RBC 04/28/2022 2 76*    Hemoglobin 04/28/2022 8 3*    Hematocrit 04/28/2022 26 1*    MCV 04/28/2022 95     MCH 04/28/2022 30 1     MCHC 04/28/2022 31 8     RDW 04/28/2022 15 6*    MPV 04/28/2022 10 6     Platelets 83/04/7927 66*    nRBC 04/28/2022 0     Sodium 04/28/2022 141     Potassium 04/28/2022 3 5     Chloride 04/28/2022 107     CO2 04/28/2022 25     ANION GAP 04/28/2022 9     BUN 04/28/2022 22     Creatinine 04/28/2022 1 06     Glucose 04/28/2022 107     Calcium 04/28/2022 8 5     Corrected Calcium 04/28/2022 10 0     AST 04/28/2022 18     ALT 04/28/2022 29     Alkaline Phosphatase 04/28/2022 221*    Total Protein 04/28/2022 5 7*    Albumin 04/28/2022 2 1*    Total Bilirubin 04/28/2022 0 99     eGFR 04/28/2022 66     Magnesium 04/28/2022 1 7     Phosphorus 04/28/2022 2 9     Ventricular Rate 04/27/2022 102     Atrial Rate 04/27/2022 102     NV Interval 04/27/2022 136     QRSD Interval 04/27/2022 92     QT Interval 04/27/2022 338     QTC Interval 04/27/2022 440     P Axis 04/27/2022 37     QRS Axis 04/27/2022 41     T Wave Axis 04/27/2022 33     Ventricular Rate 04/28/2022 90     Atrial Rate 04/28/2022 90     NV Interval 04/28/2022 134     QRSD Interval 04/28/2022 98     QT Interval 04/28/2022 346     QTC Interval 04/28/2022 423     P Axis 04/28/2022 39     QRS Axis 04/28/2022 35     T Wave Axis 04/28/2022 29     Ventricular Rate 04/28/2022 95     Atrial Rate 04/28/2022 95     NV Interval 04/28/2022 138     QRSD Interval 04/28/2022 104     QT Interval 04/28/2022 344     QTC Interval 04/28/2022 432     P Axis 04/28/2022 43     QRS Axis 04/28/2022 36     T Wave Axis 04/28/2022 33     WBC 04/29/2022 1 88*    RBC 04/29/2022 3 44*    Hemoglobin 04/29/2022 10 4*    Hematocrit 04/29/2022 32 2*    MCV 04/29/2022 94     MCH 04/29/2022 30 2     MCHC 04/29/2022 32 3     RDW 04/29/2022 15 4*    Platelets 75/48/2254 84*    MPV 04/29/2022 9 6     Sodium 04/29/2022 140     Potassium 04/29/2022 3 2*    Chloride 04/29/2022 106     CO2 04/29/2022 27     ANION GAP 04/29/2022 7     BUN 04/29/2022 15     Creatinine 04/29/2022 1 03     Glucose 04/29/2022 116     Calcium 04/29/2022 8 7     Corrected Calcium 04/29/2022 10 3*    AST 04/29/2022 19     ALT 04/29/2022 26     Alkaline Phosphatase 04/29/2022 226*    Total Protein 04/29/2022 5 9*    Albumin 04/29/2022 2 0*    Total Bilirubin 04/29/2022 1 27*    eGFR 04/29/2022 69        Imaging Studies: I have personally reviewed pertinent imaging studies

## 2022-04-29 NOTE — PLAN OF CARE
Problem: Prexisting or High Potential for Compromised Skin Integrity  Goal: Skin integrity is maintained or improved  Description: INTERVENTIONS:  - Identify patients at risk for skin breakdown  - Assess and monitor skin integrity  - Assess and monitor nutrition and hydration status  - Monitor labs   - Assess for incontinence   - Turn and reposition patient  - Assist with mobility/ambulation  - Relieve pressure over bony prominences  - Avoid friction and shearing  - Provide appropriate hygiene as needed including keeping skin clean and dry  - Evaluate need for skin moisturizer/barrier cream  - Collaborate with interdisciplinary team   - Patient/family teaching  - Consider wound care consult   Outcome: Progressing     Problem: PAIN - ADULT  Goal: Verbalizes/displays adequate comfort level or baseline comfort level  Description: Interventions:  - Encourage patient to monitor pain and request assistance  - Assess pain using appropriate pain scale  - Administer analgesics based on type and severity of pain and evaluate response  - Implement non-pharmacological measures as appropriate and evaluate response  - Consider cultural and social influences on pain and pain management  - Notify physician/advanced practitioner if interventions unsuccessful or patient reports new pain  Outcome: Progressing     Problem: DISCHARGE PLANNING  Goal: Discharge to home or other facility with appropriate resources  Description: INTERVENTIONS:  - Identify barriers to discharge w/patient and caregiver  - Arrange for needed discharge resources and transportation as appropriate  - Identify discharge learning needs (meds, wound care, etc )  - Arrange for interpretive services to assist at discharge as needed  - Refer to Case Management Department for coordinating discharge planning if the patient needs post-hospital services based on physician/advanced practitioner order or complex needs related to functional status, cognitive ability, or social support system  Outcome: Progressing     Problem: MOBILITY - ADULT  Goal: Maintain or return to baseline ADL function  Description: INTERVENTIONS:  -  Assess patient's ability to carry out ADLs; assess patient's baseline for ADL function and identify physical deficits which impact ability to perform ADLs (bathing, care of mouth/teeth, toileting, grooming, dressing, etc )  - Assess/evaluate cause of self-care deficits   - Assess range of motion  - Assess patient's mobility; develop plan if impaired  - Assess patient's need for assistive devices and provide as appropriate  - Encourage maximum independence but intervene and supervise when necessary  - Involve family in performance of ADLs  - Assess for home care needs following discharge   - Consider OT consult to assist with ADL evaluation and planning for discharge  - Provide patient education as appropriate  Outcome: Progressing  Goal: Maintains/Returns to pre admission functional level  Description: INTERVENTIONS:  - Perform BMAT or MOVE assessment daily    - Set and communicate daily mobility goal to care team and patient/family/caregiver  - Collaborate with rehabilitation services on mobility goals if consulted  - Perform Range of Motion 3 times a day  - Reposition patient every 2 hours    - Dangle patient 3 times a day  - Stand patient 3 times a day  - Ambulate patient 3 times a day  - Out of bed to chair 3 times a day   - Out of bed for meals 3 times a day  - Out of bed for toileting  - Record patient progress and toleration of activity level   Outcome: Progressing     Problem: Potential for Falls  Goal: Patient will remain free of falls  Description: INTERVENTIONS:  - Educate patient/family on patient safety including physical limitations  - Instruct patient to call for assistance with activity   - Consult OT/PT to assist with strengthening/mobility   - Keep Call bell within reach  - Keep bed low and locked with side rails adjusted as appropriate  - Keep care items and personal belongings within reach  - Initiate and maintain comfort rounds  - Make Fall Risk Sign visible to staff  - Offer Toileting every 2 Hours, in advance of need  - Initiate/Maintain bed alarm  - Obtain necessary fall risk management equipment  - Apply yellow socks and bracelet for high fall risk patients  - Consider moving patient to room near nurses station  Outcome: Progressing     Problem: INFECTION - ADULT  Goal: Absence or prevention of progression during hospitalization  Description: INTERVENTIONS:  - Assess and monitor for signs and symptoms of infection  - Monitor lab/diagnostic results  - Monitor all insertion sites, i e  indwelling lines, tubes, and drains  - Monitor endotracheal if appropriate and nasal secretions for changes in amount and color  - [place] appropriate cooling/warming therapies per order  - Administer medications as ordered  - Instruct and encourage patient and family to use good hand hygiene technique  - Identify and instruct in appropriate isolation precautions for identified infection/condition  Outcome: Progressing  Goal: Absence of fever/infection during neutropenic period  Description: INTERVENTIONS:  - Monitor WBC    Outcome: Progressing     Problem: GASTROINTESTINAL - ADULT  Goal: Maintains or returns to baseline bowel function  Description: INTERVENTIONS:  - Assess bowel function  - Encourage oral fluids to ensure adequate hydration  - Administer IV fluids if ordered to ensure adequate hydration  - Administer ordered medications as needed  - Encourage mobilization and activity  - Consider nutritional services referral to assist patient with adequate nutrition and appropriate food choices  Outcome: Progressing  Goal: Maintains adequate nutritional intake  Description: INTERVENTIONS:  - Monitor percentage of each meal consumed  - Identify factors contributing to decreased intake, treat as appropriate  - Assist with meals as needed  - Monitor I&O, weight, and lab values if indicated  - Obtain nutrition services referral as needed  Outcome: Progressing     Problem: HEMATOLOGIC - ADULT  Goal: Maintains hematologic stability  Description: INTERVENTIONS  - Assess for signs and symptoms of bleeding or hemorrhage  - Monitor labs  - Administer supportive blood products/factors as ordered and appropriate  Outcome: Progressing

## 2022-04-29 NOTE — ASSESSMENT & PLAN NOTE
· Previous to admission patient is on Lopressor 12 5 mg b i d  And losartan 25 mg daily  · c/w Lopressor IV 2 5 mg q 6 hours while NPO  · Holding parameters noted  · Pt noted to be tachycardic on vital signs in the low 100s- may need to increase metoprolol   Likely tachycardia is secondary to pain

## 2022-04-29 NOTE — PROGRESS NOTES
Progress Note    Rory Reynoso 64 y o  male MRN: [id number]  Unit/Bed#: E2 -02 Encounter: [id number]    Assessment:  69-yr old M w/ metastatic SB lymphoma & malignant SBO  NGT: 600 cc; bilious  SBFT: contrast in SB  PLAN:  - NPO/NGT  - serial KUB to track contrast passage   - serial abdominal exams  Subjective:   - c/o: colicky abdominal pain  Objective:     Vitals: Temp:  [97 8 °F (36 6 °C)-99 1 °F (37 3 °C)] 98 6 °F (37 °C)  HR:  [] 106  Resp:  [16-20] 16  BP: (114-154)/(69-85) 125/76  Body mass index is 20 53 kg/m²  I/O       04/27 0701 04/28 0700 04/28 0701  04/29 0700 04/29 0701 04/30 0700    P  O   0     I V  (mL/kg)  4400 (69 8)     IV Piggyback       Total Intake(mL/kg)  4400 (69 8)     Urine (mL/kg/hr) 550 (0 4) 700 (0 5)     Emesis/NG output 500 600     Total Output 1050 1300     Net -1050 +3100                  Physical Exam:  GEN: NAD  HEENT: atraumatic  CV: RRR  Lung: Normal effort  Ab: Soft, moderately distended; marked R sided tenderness; no rebound/guarding  Extrem: No CCE  Neuro: A+Ox3    Lab, Imaging and other studies:   I have personally reviewed pertinent reports    , CBC with diff:   Lab Results   Component Value Date    WBC 1 88 (LL) 04/29/2022    HGB 10 4 (L) 04/29/2022    HCT 32 2 (L) 04/29/2022    MCV 94 04/29/2022    PLT 84 (L) 04/29/2022    MCH 30 2 04/29/2022    MCHC 32 3 04/29/2022    RDW 15 4 (H) 04/29/2022    MPV 9 6 04/29/2022   , BMP/CMP:   Lab Results   Component Value Date    SODIUM 140 04/29/2022    K 3 2 (L) 04/29/2022     04/29/2022    CO2 27 04/29/2022    BUN 15 04/29/2022    CREATININE 1 03 04/29/2022    CALCIUM 8 7 04/29/2022    AST 19 04/29/2022    ALT 26 04/29/2022    ALKPHOS 226 (H) 04/29/2022    EGFR 69 04/29/2022   , Magnesium: No components found for: MAG  VTE Pharmacologic Prophylaxis: Heparin  VTE Mechanical Prophylaxis: sequential compression device

## 2022-04-29 NOTE — ASSESSMENT & PLAN NOTE
· Patient follows with Medical Oncology at [facility] as well as [facility]  Patient show progression through 2 lines of chemotherapy  Recently finished first 2 week course of Lonsurf Last friday     · Appreciate medical Oncology recommendations  · Patient takes prednisone 10 mg daily as needed for appetite

## 2022-04-29 NOTE — PROGRESS NOTES
[address] [facility]  Progress Note - Fernanda Garay 1945, 64 y o  male MRN: [id number]  Unit/Bed#: E2 -02 Encounter: [id number]  Primary Care Provider: Izabela Marques DO   Date and time admitted to hospital: 4/26/2022 11:36 AM    * Small bowel obstruction (HCC)  Assessment & Plan  · Known history of metastatic small bowel lymphoma  · CTAP: Interval distention of multiple segments of proximal and mid small bowel with a suspected transition point of an obstruction in the right midabdomen  Multiple small bowel segments are adherent to this location suspicious for an adhesion  unchanged omental/peritoneal implants  · General surgery, GI, oncology and palliative care following   · gastrografin challenge today with serial KUBs to track passage of contrast as KUB yesterday again noted that the contrast remained in stomach and SB  · C/w NGT and bowel rest   · Cardiology clearance for surgery   · Ongoing discussions regarding consideration for decompressive PEG  MALT (mucosa associated lymphoid tissue)- Gastric [facility])  Assessment & Plan  · Patient follows with Medical Oncology at [facility] as well as [facility], [place], operated by [facility]  Patient show progression through 2 lines of chemotherapy  Recently finished first 2 week course of Lonsurf Last friday  · Appreciate medical Oncology recommendations  · Patient takes prednisone 10 mg daily as needed for appetite    Antineoplastic chemotherapy induced pancytopenia (CODE) (HCC)  Assessment & Plan  · Antineoplastic chemotherapy induced pancytopenia in the setting of small bowel lymphoma with recent course of Lonsurf  · Serials CBCs    · Should improve -monitor closely   · procalcitonin elevated-possible due to gi obstruction   · Currently on cefepime       Hypokalemia  Assessment & Plan  Replete with 40meq IV now for K of 3 2    Neutropenia (HCC)  Assessment & Plan  · Likely related to recent chemotherapy course  · Possible new infection setting of small-bowel obstruction  · Will continue cefepime which was initiated on admission  · Trend CBC/fever curve  · Follow-up blood cultures    Benign prostatic hyperplasia  Assessment & Plan  Resume meds when able    Benign essential hypertension  Assessment & Plan  · Previous to admission patient is on Lopressor 12 5 mg b i d  And losartan 25 mg daily  · c/w Lopressor IV 2 5 mg q 6 hours while NPO  · Holding parameters noted  · Pt noted to be tachycardic on vital signs in the low 100s- may need to increase metoprolol  Likely tachycardia is secondary to pain         VTE Pharmacologic Prophylaxis: VTE Score: 6 High Risk (Score >/= 5) - Pharmacological DVT Prophylaxis Ordered: heparin  Sequential Compression Devices Ordered  Patient Centered Rounds: I performed bedside rounds with nursing staff today  Discussions with Specialists or Other Care Team Provider: ollie/jagjit levin/jagjit oliver with GI    Education and Discussions with Family / Patient: Patient declined call to   Time Spent for Care: 30 minutes  More than 50% of total time spent on counseling and coordination of care as described above  Current Length of Stay: 3 day(s)  Current Patient Status: Inpatient   Certification Statement: The patient will continue to require additional inpatient hospital stay due to SBO  Discharge Plan: Anticipate discharge in >72 hrs to discharge location to be determined pending rehab evaluations  Code Status: Level 1 - Full Code    Subjective:   Pt reports he has been having severe pain in his abdomen 10/10 worsened with multiple xrays  Denies any n/v  Denies chest pain or sob     Objective:     Vitals:   Temp (24hrs), Av 6 °F (37 °C), Min:97 8 °F (36 6 °C), Max:99 1 °F (37 3 °C)    Temp:  [97 8 °F (36 6 °C)-99 1 °F (37 3 °C)] 98 6 °F (37 °C)  HR:  [] 106  Resp:  [16-20] 16  BP: (114-154)/(69-85) 125/76  SpO2:  [94 %-98 %] 96 %  Body mass index is 20 53 kg/m²       Input and Output Summary (last 24 hours): Intake/Output Summary (Last 24 hours) at 4/29/2022 1324  Last data filed at 4/29/2022 1147  Gross per 24 hour   Intake 4400 ml   Output 800 ml   Net 3600 ml       Physical Exam:   Physical Exam  Constitutional:       General: He is not in acute distress  Cardiovascular:      Rate and Rhythm: Normal rate and regular rhythm  Pulses: Normal pulses  Heart sounds: Normal heart sounds  No murmur heard  Pulmonary:      Effort: No respiratory distress  Breath sounds: Normal breath sounds  No wheezing or rales  Abdominal:      General: There is distension  Palpations: Abdomen is soft  Tenderness: There is abdominal tenderness (right upper quadrant )  Comments: Hypoactive, NG tube    Musculoskeletal:         General: No swelling or tenderness  Skin:     General: Skin is warm and dry  Findings: No erythema or rash  Neurological:      General: No focal deficit present  Mental Status: He is alert  Mental status is at baseline  Psychiatric:         Attention and Perception: Attention normal          Mood and Affect: Mood normal           Additional Data:     Labs:  Results from last 7 days   Lab Units 04/29/22  0439 04/28/22  0436 04/27/22  1845 04/26/22  1216 04/25/22  0717   WBC Thousand/uL 1 88*   < > 1 97*   < > 4 02*   HEMOGLOBIN g/dL 10 4*   < > 9 9*   < > 11 5*   HEMATOCRIT % 32 2*   < > 30 4*   < > 33 8*   PLATELETS Thousands/uL 84*   < > 67*   < > 140*   BANDS PCT %  --   --  1   < >  --    NEUTROS PCT %  --   --   --   --  65   LYMPHS PCT %  --   --   --   --  29   LYMPHO PCT %  --   --  21   < >  --    MONOS PCT %  --   --   --   --  4   MONO PCT %  --   --  5   < >  --    EOS PCT %  --   --  1   < > 1    < > = values in this interval not displayed       Results from last 7 days   Lab Units 04/29/22  0439   SODIUM mmol/L 140   POTASSIUM mmol/L 3 2*   CHLORIDE mmol/L 106   CO2 mmol/L 27   BUN mg/dL 15   CREATININE mg/dL 1 03   ANION GAP mmol/L 7   CALCIUM mg/dL 8  7   ALBUMIN g/dL 2 0*   TOTAL BILIRUBIN mg/dL 1 27*   ALK PHOS U/L 226*   ALT U/L 26   AST U/L 19   GLUCOSE RANDOM mg/dL 116                 Results from last 7 days   Lab Units 04/27/22  0524 04/26/22  1404 04/26/22  1216   LACTIC ACID mmol/L  --   --  1 8   PROCALCITONIN ng/ml 13 20* 6 56*  --        Lines/Drains:  Invasive Devices  Report    Central Venous Catheter Line            Port A Cath 12/31/20 Right Chest 484 days          Peripheral Intravenous Line            Peripheral IV 04/26/22 Distal;Left;Upper;Ventral (anterior) Arm 3 days          Drain            NG/OG/Enteral Tube Nasogastric 16 Fr Right nare 2 days                Central Line:  Goal for removal: N/A - Chronic PICC             Imaging: Reviewed radiology reports from this admission including: xray(s)    Recent Cultures (last 7 days):   Results from last 7 days   Lab Units 04/26/22  1408 04/26/22  1404   BLOOD CULTURE  No Growth at 48 hrs  No Growth at 48 hrs         Last 24 Hours Medication List:   Current Facility-Administered Medications   Medication Dose Route Frequency Provider Last Rate    cefepime  2,000 mg Intravenous Q12H Ami Corolla, DO 2,000 mg (04/29/22 0118)    dextrose 5 % and sodium chloride 0 9 %  100 mL/hr Intravenous Continuous Ami Corolla,  mL/hr (04/29/22 1147)    heparin (porcine)  5,000 Units Subcutaneous [facility] Sushila Foreman, DO      HYDROmorphone  0 5 mg Intravenous Q4H PRN GRISELDA Garland      Or    HYDROmorphone  1 mg Intravenous Q4H PRN GRISELDA Garland      HYDROmorphone  0 5 mg Intravenous Q4H PRN GRISELDA Garland      metoprolol  2 5 mg Intravenous Q6H Sushila Foreman, DO      ondansetron  4 mg Intravenous Q4H PRN Sushila Foreman, DO      pantoprazole  40 mg Intravenous Q12H [facility] Amalia Short MD      potassium chloride  40 mEq Intravenous Once GRISELDA Garland          Today, Patient Was Seen By: GRISELDA Garland    **Please Note: This note may have been constructed using a voice recognition system  **

## 2022-04-29 NOTE — PROGRESS NOTES
Progress Note - General Surgery   Danilo Domonique 64 y o  male MRN: [id number]  Unit/Bed#: E2 -02 Encounter: [id number]    Assessment:  64 y o  M with history of metastatic small bowel adenocarcinoma and known peritoneal/omental implants, and RP lymphadenopathy, presents with a malignant SBO    Afebrile  VSS   NGT with 250 cc bilious drainage  No flatus/BMs    Plan:  Maintain NPO/NGT  [email or web address]  Continue IV Protonix  OOB/Ambulate  Ongoing discussions regarding consideration for decompressive PEG  No indication for surgical intervention beyond this in the setting of carcinomatosis  Recommend PICC/TPN given this is now day 5 without po intake   Will plan for multidisciplinary discussion on Monday, 5/2, to discuss treatment plan going forward  Subjective/Objective     Subjective: No acute events overnight  Patient complains of persistent colicky like abdominal pain, but overall improved from day of admission  No flatus or BMs yet  No fevers, chills  Objective:     Blood pressure 143/79, pulse 105, temperature 98 2 °F (36 8 °C), temperature source Tympanic, resp  rate 18, height 5' 9" (1 753 m), weight 63 kg (139 lb), SpO2 95 %  ,Body mass index is 20 53 kg/m²  Intake/Output Summary (Last 24 hours) at 4/30/2022 1353  Last data filed at 4/29/2022 1801  Gross per 24 hour   Intake --   Output 575 ml   Net -575 ml       Invasive Devices  Report    Central Venous Catheter Line            Port A Cath 12/31/20 Right Chest 484 days          Peripheral Intravenous Line            Peripheral IV 04/26/22 Distal;Left;Upper;Ventral (anterior) Arm 3 days          Drain            NG/OG/Enteral Tube Nasogastric 16 Fr Right nare 3 days                Physical Exam:   NAD, alert and oriented x3  Normocephalic, atraumatic  MMM, NGT in place with bilious drainage  Norm resp effort on room air   Regular rate  Abd soft, distended, tenderness in epigastric and R side of abdomen   No rebound, rigidity or guarding   No calf tenderness or peripheral edema  -rash/lesions        Lab, Imaging and other studies:  CBC:   Lab Results   Component Value Date    WBC 1 53 (LL) 04/30/2022    HGB 8 8 (L) 04/30/2022    HCT 27 2 (L) 04/30/2022    MCV 97 04/30/2022    PLT 86 (L) 04/30/2022    MCH 31 3 04/30/2022    MCHC 32 4 04/30/2022    RDW 15 1 04/30/2022    MPV 10 1 04/30/2022   , CMP:   No results found for: SODIUM, K, CL, CO2, ANIONGAP, BUN, CREATININE, GLUCOSE, CALCIUM, AST, ALT, ALKPHOS, PROT, BILITOT, EGFR  VTE Pharmacologic Prophylaxis: Heparin  VTE Mechanical Prophylaxis: sequential compression device

## 2022-04-29 NOTE — ASSESSMENT & PLAN NOTE
· Known history of metastatic small bowel lymphoma  · CTAP: Interval distention of multiple segments of proximal and mid small bowel with a suspected transition point of an obstruction in the right midabdomen  Multiple small bowel segments are adherent to this location suspicious for an adhesion  unchanged omental/peritoneal implants  · General surgery, GI, oncology and palliative care following   · gastrografin challenge today with serial KUBs to track passage of contrast as KUB yesterday again noted that the contrast remained in stomach and SB  · C/w NGT and bowel rest   · Cardiology clearance for surgery   · Ongoing discussions regarding consideration for decompressive PEG

## 2022-04-30 NOTE — PROGRESS NOTES
Patient Name: Macy Mora  Patient MRN: [id number]  Date: 04/30/22  Service: [facility]    Subjective   Macy Mora is a 64 y o  male who was admitted with Small bowel obstruction ([address] )  He continues to have episodic abdominal pain which can be severe at times however he states he does feel more comfortable the NG tube is in place  He says the abdominal pain starts midepigastric region and then goes to the lower abdomen around the umbilicus and radiates to the sides  Gastrografin challenge with serial KUB's to track passage of contrast yesterday  noted that contrast remained in the stomach and small bowel  Objective     Vitals  Blood pressure 138/84, pulse (!) 110, temperature 99 3 °F (37 4 °C), temperature source Tympanic, resp  rate 18, height 5' 9" (1 753 m), weight 63 kg (139 lb), SpO2 96 %  General: Alert, no apparent distress  Eyes: No scleral icterus  ENT: MMM  Card: RRR no murmur  Lungs: Clear to ascultation b/l  No wheezes, rales, rhonchi  Abdomen: Soft  Mild generalized tenderness  distended   Bowel sounds present and hypoactive  Skin: No jaundice  Neuro: Alert and oriented x3        Laboratory Studies  Lab Results   Component Value Date    CREATININE 1 02 04/30/2022    BUN 13 04/30/2022    SODIUM 141 04/30/2022    K 3 1 (L) 04/30/2022     (H) 04/30/2022    CO2 26 04/30/2022    GLUCOSE 89 07/30/2015    CALCIUM 8 0 (L) 04/30/2022    PROT 6 8 07/30/2015    ALKPHOS 243 (H) 04/30/2022    BILITOT 0 59 07/30/2015    AST 21 04/30/2022    ALT 22 04/30/2022     Lab Results   Component Value Date    WBC 1 53 (LL) 04/30/2022    HGB 8 8 (L) 04/30/2022    HCT 27 2 (L) 04/30/2022    PLT 86 (L) 04/30/2022    MCV 97 04/30/2022     Lab Results   Component Value Date    PROTIME 13 1 12/09/2020    INR 1 01 12/09/2020       Imaging and Other Studies    Inhouse Medications       Current Facility-Administered Medications:     cefepime (MAXIPIME) 2,000 mg in dextrose 5 % 50 mL IVPB, 2,000 mg, Intravenous, Q12H, 2,000 mg at 04/30/22 0230    dextrose 5 % and sodium chloride 0 9 % infusion, 100 mL/hr, Intravenous, Continuous, 100 mL/hr at 04/29/22 2220    heparin (porcine) subcutaneous injection 5,000 Units, 5,000 Units, Subcutaneous, Q8H [address], 5,000 Units at 04/29/22 1450 **AND** [COMPLETED] Platelet count, , , Once    HYDROmorphone (DILAUDID) injection 0 5 mg, 0 5 mg, Intravenous, Q4H PRN, 0 5 mg at 04/30/22 0234 **OR** HYDROmorphone (DILAUDID) injection 1 mg, 1 mg, Intravenous, Q4H PRN, 1 mg at 04/30/22 1013    HYDROmorphone (DILAUDID) injection 0 5 mg, 0 5 mg, Intravenous, Q4H PRN    metoprolol (LOPRESSOR) injection 2 5 mg, 2 5 mg, Intravenous, Q6H, 2 5 mg at 04/30/22 0926    ondansetron (ZOFRAN) injection 4 mg, 4 mg, Intravenous, Q4H PRN, 4 mg at 04/29/22 1706    pantoprazole (PROTONIX) injection 40 mg, 40 mg, Intravenous, Q12H KERI, 40 mg at 04/30/22 0923    potassium chloride 20 mEq IVPB (premix), 20 mEq, Intravenous, Once, 20 mEq at 04/30/22 1159      Assessment/Plan:  1  Small-bowel obstruction secondary to known metastatic small-bowel lymphoma with omental and peritoneal implants  Continue NG tube and bowel rest  Ongoing discussion with surgery consideration for decompressive PEG will discuss with attending      GRISELDA Trevino

## 2022-04-30 NOTE — PLAN OF CARE
For history and physical Refer to consultation by Dr Donna Culver November 28th at 2:17 p m  Anna Moreno Filed as consultation in error  Problem: Prexisting or High Potential for Compromised Skin Integrity  Goal: Skin integrity is maintained or improved  Description: INTERVENTIONS:  - Identify patients at risk for skin breakdown  - Assess and monitor skin integrity  - Assess and monitor nutrition and hydration status  - Monitor labs   - Assess for incontinence   - Turn and reposition patient  - Assist with mobility/ambulation  - Relieve pressure over bony prominences  - Avoid friction and shearing  - Provide appropriate hygiene as needed including keeping skin clean and dry  - Evaluate need for skin moisturizer/barrier cream  - Collaborate with interdisciplinary team   - Patient/family teaching  - Consider wound care consult   Outcome: Progressing

## 2022-04-30 NOTE — ASSESSMENT & PLAN NOTE
Previous to admission patient is on Lopressor 12 5 mg b i d  And losartan 25 mg daily  c/w Lopressor IV 2 5 mg q 6 hours while NPO  Holding parameters noted  Pt noted to be tachycardic on vital signs in the low 100s- may need to increase metoprolol  Likely tachycardia is secondary to pain   Systolic is 978  Still tachycardic 110 on monitor - on auscultation down to 90s , no CP or SOB  Will monitor

## 2022-04-30 NOTE — ASSESSMENT & PLAN NOTE
Patient follows with Medical Oncology at [facility] as well as [facility]  Patient show progression through 2 lines of chemotherapy  Recently finished first 2 week course of Lonsurf Last friday  Appreciate medical Oncology recommendations  Patient takes prednisone 10 mg daily as needed for appetite  SBO in setting of peritoneal carcinomatosis     Needs goals of care depending on plan for PEG with surgery

## 2022-04-30 NOTE — PLAN OF CARE
Problem: Prexisting or High Potential for Compromised Skin Integrity  Goal: Skin integrity is maintained or improved  Description: INTERVENTIONS:  - Identify patients at risk for skin breakdown  - Assess and monitor skin integrity  - Assess and monitor nutrition and hydration status  - Monitor labs   - Assess for incontinence   - Turn and reposition patient  - Assist with mobility/ambulation  - Relieve pressure over bony prominences  - Avoid friction and shearing  - Provide appropriate hygiene as needed including keeping skin clean and dry  - Evaluate need for skin moisturizer/barrier cream  - Collaborate with interdisciplinary team   - Patient/family teaching  - Consider wound care consult   Outcome: Progressing     Problem: PAIN - ADULT  Goal: Verbalizes/displays adequate comfort level or baseline comfort level  Description: Interventions:  - Encourage patient to monitor pain and request assistance  - Assess pain using appropriate pain scale  - Administer analgesics based on type and severity of pain and evaluate response  - Implement non-pharmacological measures as appropriate and evaluate response  - Consider cultural and social influences on pain and pain management  - Notify physician/advanced practitioner if interventions unsuccessful or patient reports new pain  Outcome: Progressing     Problem: DISCHARGE PLANNING  Goal: Discharge to home or other facility with appropriate resources  Description: INTERVENTIONS:  - Identify barriers to discharge w/patient and caregiver  - Arrange for needed discharge resources and transportation as appropriate  - Identify discharge learning needs (meds, wound care, etc )  - Arrange for interpretive services to assist at discharge as needed  - Refer to Case Management Department for coordinating discharge planning if the patient needs post-hospital services based on physician/advanced practitioner order or complex needs related to functional status, cognitive ability, or social support system  Outcome: Progressing     Problem: MOBILITY - ADULT  Goal: Maintain or return to baseline ADL function  Description: INTERVENTIONS:  -  Assess patient's ability to carry out ADLs; assess patient's baseline for ADL function and identify physical deficits which impact ability to perform ADLs (bathing, care of mouth/teeth, toileting, grooming, dressing, etc )  - Assess/evaluate cause of self-care deficits   - Assess range of motion  - Assess patient's mobility; develop plan if impaired  - Assess patient's need for assistive devices and provide as appropriate  - Encourage maximum independence but intervene and supervise when necessary  - Involve family in performance of ADLs  - Assess for home care needs following discharge   - Consider OT consult to assist with ADL evaluation and planning for discharge  - Provide patient education as appropriate  Outcome: Progressing  Goal: Maintains/Returns to pre admission functional level  Description: INTERVENTIONS:  - Perform BMAT or MOVE assessment daily    - Set and communicate daily mobility goal to care team and patient/family/caregiver  - Collaborate with rehabilitation services on mobility goals if consulted  - Perform Range of Motion 3 times a day  - Reposition patient every 2 hours    - Dangle patient 3 times a day  - Stand patient 3 times a day  - Ambulate patient 3 times a day  - Out of bed to chair 3 times a day   - Out of bed for meals 3 times a day  - Out of bed for toileting  - Record patient progress and toleration of activity level   Outcome: Progressing     Problem: Potential for Falls  Goal: Patient will remain free of falls  Description: INTERVENTIONS:  - Educate patient/family on patient safety including physical limitations  - Instruct patient to call for assistance with activity   - Consult OT/PT to assist with strengthening/mobility   - Keep Call bell within reach  - Keep bed low and locked with side rails adjusted as appropriate  - Keep care items and personal belongings within reach  - Initiate and maintain comfort rounds  - Make Fall Risk Sign visible to staff  - Offer Toileting every 2 Hours, in advance of need  - Initiate/Maintain bed alarm  - Obtain necessary fall risk management equipment  - Apply yellow socks and bracelet for high fall risk patients  - Consider moving patient to room near nurses station  Outcome: Progressing     Problem: INFECTION - ADULT  Goal: Absence or prevention of progression during hospitalization  Description: INTERVENTIONS:  - Assess and monitor for signs and symptoms of infection  - Monitor lab/diagnostic results  - Monitor all insertion sites, i e  indwelling lines, tubes, and drains  - Monitor endotracheal if appropriate and nasal secretions for changes in amount and color  - [place] appropriate cooling/warming therapies per order  - Administer medications as ordered  - Instruct and encourage patient and family to use good hand hygiene technique  - Identify and instruct in appropriate isolation precautions for identified infection/condition  Outcome: Progressing  Goal: Absence of fever/infection during neutropenic period  Description: INTERVENTIONS:  - Monitor WBC    Outcome: Progressing     Problem: GASTROINTESTINAL - ADULT  Goal: Maintains or returns to baseline bowel function  Description: INTERVENTIONS:  - Assess bowel function  - Encourage oral fluids to ensure adequate hydration  - Administer IV fluids if ordered to ensure adequate hydration  - Administer ordered medications as needed  - Encourage mobilization and activity  - Consider nutritional services referral to assist patient with adequate nutrition and appropriate food choices  Outcome: Progressing  Goal: Maintains adequate nutritional intake  Description: INTERVENTIONS:  - Monitor percentage of each meal consumed  - Identify factors contributing to decreased intake, treat as appropriate  - Assist with meals as needed  - Monitor I&O, weight, and lab values if indicated  - Obtain nutrition services referral as needed  Outcome: Progressing     Problem: HEMATOLOGIC - ADULT  Goal: Maintains hematologic stability  Description: INTERVENTIONS  - Assess for signs and symptoms of bleeding or hemorrhage  - Monitor labs  - Administer supportive blood products/factors as ordered and appropriate  Outcome: Progressing     Problem: Nutrition/Hydration-ADULT  Goal: Nutrient/Hydration intake appropriate for improving, restoring or maintaining nutritional needs  Description: Monitor and assess patient's nutrition/hydration status for malnutrition  Collaborate with interdisciplinary team and initiate plan and interventions as ordered  Monitor patient's weight and dietary intake as ordered or per policy  Utilize nutrition screening tool and intervene as necessary  Determine patient's food preferences and provide high-protein, high-caloric foods as appropriate       INTERVENTIONS:  - Monitor oral intake, urinary output, labs, and treatment plans  - Assess nutrition and hydration status and recommend course of action  - Evaluate amount of meals eaten  - Assist patient with eating if necessary   - Allow adequate time for meals  - Recommend/ encourage appropriate diets, oral nutritional supplements, and vitamin/mineral supplements  - Order, calculate, and assess calorie counts as needed  - Recommend, monitor, and adjust tube feedings and TPN/PPN based on assessed needs  - Assess need for intravenous fluids  - Provide specific nutrition/hydration education as appropriate  - Include patient/family/caregiver in decisions related to nutrition  Outcome: Progressing

## 2022-04-30 NOTE — ASSESSMENT & PLAN NOTE
Known history of metastatic small bowel lymphoma  CTAP: Interval distention of multiple segments of proximal and mid small bowel with a suspected transition point of an obstruction in the right midabdomen  Multiple small bowel segments are adherent to this location suspicious for an adhesion  unchanged omental/peritoneal implants  General surgery, GI, oncology and palliative care following   gastrografin challenge with serial KUBs to track passage of contrast as KUB yesterday again noted that the contrast remained in stomach and SB  C/w NGT and bowel rest   Cardiology clearance for surgery   Ongoing discussions regarding consideration for decompressive PEG- ?  On Monday

## 2022-04-30 NOTE — PROGRESS NOTES
[facility]  Progress Note - Cecil Adkins 1945, 64 y o  male MRN: [id number]  Unit/Bed#: E2 -02 Encounter: [id number]  Primary Care Provider: Sarah Rodriguez DO   Date and time admitted to hospital: 4/26/2022 11:36 AM    Antineoplastic chemotherapy induced pancytopenia (CODE) ([facility] [address] )  Assessment & Plan      No diff - WCC is trending down  Possibly multifactorial in the setting of disease progression as well  Will check CBC with diff tomorrow  Neutropenia ([facility] )  Assessment & Plan  No ANC checked today  Afebrile   Regardless of broad spectrum abx which I will not DC today       MALT (mucosa associated lymphoid tissue)- Gastric [facility])  Assessment & Plan  Patient follows with Medical Oncology at [address] [place] [address] as well as [facility]  Patient show progression through 2 lines of chemotherapy  Recently finished first 2 week course of Lonsurf Last friday  Appreciate medical Oncology recommendations  Patient takes prednisone 10 mg daily as needed for appetite  SBO in setting of peritoneal carcinomatosis  Needs goals of care depending on plan for PEG with surgery       Benign prostatic hyperplasia  Assessment & Plan  Resume meds when able    Benign essential hypertension  Assessment & Plan  Previous to admission patient is on Lopressor 12 5 mg b i d  And losartan 25 mg daily  c/w Lopressor IV 2 5 mg q 6 hours while NPO  Holding parameters noted  Pt noted to be tachycardic on vital signs in the low 100s- may need to increase metoprolol  Likely tachycardia is secondary to pain   Systolic is 821  Still tachycardic 110 on monitor - on auscultation down to 90s , no CP or SOB  Will monitor  * Small bowel obstruction (HCC)  Assessment & Plan  Known history of metastatic small bowel lymphoma  CTAP: Interval distention of multiple segments of proximal and mid small bowel with a suspected transition point of an obstruction in the right midabdomen   Multiple small bowel segments are adherent to this location suspicious for an adhesion  unchanged omental/peritoneal implants  General surgery, GI, oncology and palliative care following   gastrografin challenge with serial KUBs to track passage of contrast as KUB yesterday again noted that the contrast remained in stomach and SB  C/w NGT and bowel rest   Cardiology clearance for surgery   Ongoing discussions regarding consideration for decompressive PEG- ? On Monday           VTE Pharmacologic Prophylaxis: VTE Score: 6 Moderate Risk (Score 3-4) - Pharmacological DVT Prophylaxis Ordered: heparin  Patient Centered Rounds: discussed with nursing  Discussions with Specialists or Other Care Team Provider: Not discussed with specialist today but noted general surgery note  Education and Discussions with Family / Patient: called wife - no answer  Left message    Time Spent for Care: 30 minutes  More than 50% of total time spent on counseling and coordination of care as described above  Current Length of Stay: 4 day(s)  Current Patient Status: Inpatient   Certification Statement: The patient will continue to require additional inpatient hospital stay due to NGT in place  Discharge Plan: Anticipate discharge in 48-72 hrs to unclear  Code Status: Level 1 - Full Code    Subjective:   Patient seen and examined   Feeling "the same"  "thirsty"      Objective:     Vitals:   Temp (24hrs), Av 4 °F (36 9 °C), Min:97 8 °F (36 6 °C), Max:99 3 °F (37 4 °C)    Temp:  [97 8 °F (36 6 °C)-99 3 °F (37 4 °C)] 99 3 °F (37 4 °C)  HR:  [] 110  Resp:  [18] 18  BP: (117-143)/(66-84) 138/84  SpO2:  [95 %-97 %] 96 %  Body mass index is 20 53 kg/m²  Input and Output Summary (last 24 hours): Intake/Output Summary (Last 24 hours) at 2022 0848  Last data filed at 2022 0650  Gross per 24 hour   Intake --   Output 775 ml   Net -775 ml       Physical Exam:   Physical Exam  Constitutional:       General: He is not in acute distress  Appearance: He is ill-appearing (chronically )  He is not toxic-appearing  HENT:      Head: Normocephalic  Eyes:      General: No scleral icterus  Right eye: No discharge  Left eye: No discharge  Pupils: Pupils are equal, round, and reactive to light  Cardiovascular:      Rate and Rhythm: Regular rhythm  Tachycardia present  Heart sounds: No murmur heard  No friction rub  No gallop  Pulmonary:      Effort: Pulmonary effort is normal  No respiratory distress  Breath sounds: No stridor  No wheezing, rhonchi or rales  Chest:      Chest wall: No tenderness  Abdominal:      General: Abdomen is flat  There is distension  Palpations: There is no mass  Tenderness: There is abdominal tenderness  There is no right CVA tenderness, left CVA tenderness, guarding or rebound  Hernia: No hernia is present  Musculoskeletal:         General: No swelling, tenderness or deformity  Left lower leg: No edema  Skin:     Coloration: Skin is pale  Skin is not jaundiced  Findings: No erythema, lesion or rash  Neurological:      General: No focal deficit present  Cranial Nerves: No cranial nerve deficit  Sensory: No sensory deficit  Motor: No weakness        Coordination: Coordination normal       Gait: Gait normal       Deep Tendon Reflexes: Reflexes normal    Psychiatric:         Mood and Affect: Mood normal           Additional Data:     Labs:  Results from last 7 days   Lab Units 04/30/22  0620 04/28/22  0436 04/27/22  1845 04/26/22  1216 04/25/22  0717   WBC Thousand/uL 1 53*   < > 1 97*   < > 4 02*   HEMOGLOBIN g/dL 8 8*   < > 9 9*   < > 11 5*   HEMATOCRIT % 27 2*   < > 30 4*   < > 33 8*   PLATELETS Thousands/uL 86*   < > 67*   < > 140*   BANDS PCT %  --   --  1   < >  --    NEUTROS PCT %  --   --   --   --  65   LYMPHS PCT %  --   --   --   --  29   LYMPHO PCT %  --   --  21   < >  --    MONOS PCT %  --   --   --   --  4   MONO PCT %  --   --  5   < >  --    EOS PCT %  --   --  1   < > 1    < > = values in this interval not displayed  Results from last 7 days   Lab Units 04/30/22  0620   SODIUM mmol/L 141   POTASSIUM mmol/L 3 1*   CHLORIDE mmol/L 109*   CO2 mmol/L 26   BUN mg/dL 13   CREATININE mg/dL 1 02   ANION GAP mmol/L 6   CALCIUM mg/dL 8 0*   ALBUMIN g/dL 1 7*   TOTAL BILIRUBIN mg/dL 1 24*   ALK PHOS U/L 243*   ALT U/L 22   AST U/L 21   GLUCOSE RANDOM mg/dL 93                 Results from last 7 days   Lab Units 04/27/22  0524 04/26/22  1404 04/26/22  1216   LACTIC ACID mmol/L  --   --  1 8   PROCALCITONIN ng/ml 13 20* 6 56*  --        Lines/Drains:  Invasive Devices  Report    Central Venous Catheter Line            Port A Cath 12/31/20 Right Chest 484 days          Peripheral Intravenous Line            Peripheral IV 04/26/22 Distal;Left;Upper;Ventral (anterior) Arm 3 days          Drain            NG/OG/Enteral Tube Nasogastric 16 Fr Right nare 3 days                Central Line:  Goal for removal: as per surgery              Imaging: Personally reviewed the following imaging: NA  and No pertinent imaging reviewed  Recent Cultures (last 7 days):   Results from last 7 days   Lab Units 04/26/22  1408 04/26/22  1404   BLOOD CULTURE  No Growth at 72 hrs  No Growth at 72 hrs         Last 24 Hours Medication List:   Current Facility-Administered Medications   Medication Dose Route Frequency Provider Last Rate    cefepime  2,000 mg Intravenous Q12H Alphia Mountain, DO 2,000 mg (04/30/22 0230)    dextrose 5 % and sodium chloride 0 9 %  100 mL/hr Intravenous Continuous Alphia Mountain,  mL/hr (04/29/22 2220)    heparin (porcine)  5,000 Units Subcutaneous [facility] Alphia Mountain, DO      HYDROmorphone  0 5 mg Intravenous Q4H PRN Lady Quarto, CRNP      Or    HYDROmorphone  1 mg Intravenous Q4H PRN Lady Quarto, CRNP      HYDROmorphone  0 5 mg Intravenous Q4H PRN Lady Quarto, CRNP      metoprolol  2 5 mg Intravenous Q6H Patti Luna Fallon,       ondansetron  4 mg Intravenous Q4H PRN Meridith Babinski, DO      pantoprazole  40 mg Intravenous Q12H [address] Yoandy Ibarra MD      potassium chloride  20 mEq Intravenous Once Baylee James MD      potassium chloride  20 mEq Intravenous Once Baylee James MD          Today, Patient Was Seen By: Baylee James MD    **Please Note: This note may have been constructed using a voice recognition system  **

## 2022-04-30 NOTE — ASSESSMENT & PLAN NOTE
No diff - WCC is trending down  Possibly multifactorial in the setting of disease progression as well  Will check CBC with diff tomorrow

## 2022-05-01 NOTE — PROGRESS NOTES
[address] [facility]  Progress Note - Kathie Ayala 1945, 64 y o  male MRN: [id number]  Unit/Bed#: E2 -02 Encounter: [id number]  Primary Care Provider: Анна Padilla DO   Date and time admitted to hospital: 4/26/2022 11:36 AM    Antineoplastic chemotherapy induced pancytopenia (CODE) ([address] )  Assessment & Plan    Continue to monitor        MALT (mucosa associated lymphoid tissue)- Gastric [facility])  Assessment & Plan  Patient follows with Medical Oncology at [facility] as well as [facility]  Patient show progression through 2 lines of chemotherapy  Recently finished first 2 week course of Lonsurf Last friday  Appreciate medical Oncology recommendations  Patient takes prednisone 10 mg daily as needed for appetite  SBO in setting of peritoneal carcinomatosis  Needs goals of care depending on plan for PEG with surgery         Benign prostatic hyperplasia  Assessment & Plan  Resume meds when able    * Small bowel obstruction (HCC)  Assessment & Plan  Known history of metastatic small bowel lymphoma  CTAP: Interval distention of multiple segments of proximal and mid small bowel with a suspected transition point of an obstruction in the right midabdomen  Multiple small bowel segments are adherent to this location suspicious for an adhesion  unchanged omental/peritoneal implants  General surgery, GI, oncology and palliative care following   gastrografin challenge with serial KUBs to track passage of contrast as KUB yesterday again noted that the contrast remained in stomach and SB  C/w NGT and bowel rest   Cardiology clearance for surgery   Ongoing discussions regarding consideration for decompressive PEG possibly on Monday        VTE Pharmacologic Prophylaxis:   Pharmacologic: Pharmacologic VTE Prophylaxis contraindicated due to sbo  Mechanical VTE Prophylaxis in Place: Yes    Patient Centered Rounds: I have performed bedside rounds with nursing staff today      Discussions with Specialists or Other Care Team Provider: cm, nursing    Education and Discussions with Family / Patient: pt    Time Spent for Care: 30 minutes  More than 50% of total time spent on counseling and coordination of care as described above  Current Length of Stay: 5 day(s)    Current Patient Status: Inpatient   Certification Statement: The patient will continue to require additional inpatient hospital stay due to See above    Discharge Plan:  See above pending further GI evaluation intervention  Code Status: Level 1 - Full Code      Subjective:   Denies chest pain, shortness breast, cough, fevers    Objective:     Vitals:   Temp (24hrs), Av 6 °F (38 1 °C), Min:99 8 °F (37 7 °C), Max:101 8 °F (38 8 °C)    Temp:  [99 8 °F (37 7 °C)-101 8 °F (38 8 °C)] 99 8 °F (37 7 °C)  HR:  [105-118] 113  Resp:  [18] 18  BP: (102-139)/(56-89) 116/73  SpO2:  [92 %-97 %] 97 %  Body mass index is 20 53 kg/m²  Input and Output Summary (last 24 hours): Intake/Output Summary (Last 24 hours) at 2022 0852  Last data filed at 2022 2322  Gross per 24 hour   Intake 100 ml   Output 725 ml   Net -625 ml       Physical Exam:     Physical Exam  Constitutional:       General: He is not in acute distress  Appearance: He is well-developed  He is not diaphoretic  HENT:      Head: Normocephalic and atraumatic  Nose: Nose normal       Mouth/Throat:      Pharynx: No oropharyngeal exudate  Eyes:      General: No scleral icterus  Conjunctiva/sclera: Conjunctivae normal    Cardiovascular:      Rate and Rhythm: Normal rate and regular rhythm  Heart sounds: Normal heart sounds  No murmur heard  No friction rub  No gallop  Pulmonary:      Effort: Pulmonary effort is normal  No respiratory distress  Breath sounds: Normal breath sounds  No wheezing or rales  Chest:      Chest wall: No tenderness  Abdominal:      General: Bowel sounds are normal  There is no distension  Palpations: Abdomen is soft  Tenderness: There is no abdominal tenderness  There is no guarding  Musculoskeletal:         General: No tenderness or deformity  Normal range of motion  Cervical back: Normal range of motion and neck supple  Skin:     General: Skin is warm and dry  Findings: No erythema  Neurological:      Mental Status: He is alert  Mental status is at baseline  Additional Data:     Labs:    Results from last 7 days   Lab Units 05/01/22  0639 04/28/22  0436 04/27/22  1845 04/26/22  1216 04/25/22  0717   WBC Thousand/uL 0 99*   < > 1 97*   < > 4 02*   HEMOGLOBIN g/dL 13 8   < > 9 9*   < > 11 5*   HEMATOCRIT % 42 1   < > 30 4*   < > 33 8*   PLATELETS Thousands/uL 67*   < > 67*   < > 140*   BANDS PCT %  --   --  1   < >  --    NEUTROS PCT %  --   --   --   --  65   LYMPHS PCT %  --   --   --   --  29   LYMPHO PCT % 37  --  21   < >  --    MONOS PCT %  --   --   --   --  4   MONO PCT % 14*  --  5   < >  --    EOS PCT % 1  --  1   < > 1    < > = values in this interval not displayed  Results from last 7 days   Lab Units 05/01/22  0639   SODIUM mmol/L 140   POTASSIUM mmol/L 3 0*   CHLORIDE mmol/L 108   CO2 mmol/L 26   BUN mg/dL 11   CREATININE mg/dL 1 10   ANION GAP mmol/L 6   CALCIUM mg/dL 8 2*   ALBUMIN g/dL 1 8*   TOTAL BILIRUBIN mg/dL 1 72*   ALK PHOS U/L 292*   ALT U/L 22   AST U/L 24   GLUCOSE RANDOM mg/dL 95                 Results from last 7 days   Lab Units 04/27/22  0524 04/26/22  1404 04/26/22  1216   LACTIC ACID mmol/L  --   --  1 8   PROCALCITONIN ng/ml 13 20* 6 56*  --            * I Have Reviewed All Lab Data Listed Above  * Additional Pertinent Lab Tests Reviewed: All Labs Within Last 24 Hours Reviewed    Imaging:    Imaging Reports Reviewed Today Include: na  Imaging Personally Reviewed by Myself Includes:  na    Recent Cultures (last 7 days):     Results from last 7 days   Lab Units 04/26/22  1408 04/26/22  1404   BLOOD CULTURE  No Growth After 4 Days  No Growth After 4 Days  Last 24 Hours Medication List:   Current Facility-Administered Medications   Medication Dose Route Frequency Provider Last Rate    acetaminophen  650 mg Oral Q6H PRN GRISELDA Shane      cefepime  2,000 mg Intravenous Q12H Mackenzie Shingles, DO 2,000 mg (05/01/22 0218)    dextrose 5 % and sodium chloride 0 9 %  100 mL/hr Intravenous Continuous Mackenzie Shingles,  mL/hr (05/01/22 0010)    heparin (porcine)  5,000 Units Subcutaneous [facility] Mackenzie Shingles, DO      HYDROmorphone  0 5 mg Intravenous Q4H PRN Leyla Mittie, CRNP      Or    HYDROmorphone  1 mg Intravenous Q4H PRN Leyla Mittie, CRNP      HYDROmorphone  0 5 mg Intravenous Q4H PRN Leyla Mittie, CRNP      metoprolol  2 5 mg Intravenous Q6H Mackenzie Alonsogles, DO      ondansetron  4 mg Intravenous Q4H PRN Mackenzie Alonsogles, DO      pantoprazole  40 mg Intravenous Q12H Main Hull MD          Today, Patient Was Seen By: Evonne Ernst MD    ** Please Note: Dictation voice to text software may have been used in the creation of this document   **

## 2022-05-01 NOTE — ASSESSMENT & PLAN NOTE
Patient follows with Medical Oncology at [facility] as well as [facility]  Patient show progression through 2 lines of chemotherapy  Recently finished first 2 week course of Lonsurf Last friday  Appreciate medical Oncology recommendations  Patient takes prednisone 10 mg daily as needed for appetite  SBO in setting of peritoneal carcinomatosis     Needs goals of care depending on plan for PEG with surgery

## 2022-05-01 NOTE — PROGRESS NOTES
Patient Name: Sanjay Avila  Patient MRN: [id number]  Date: 05/01/22  Service: [facility]    Subjective   Sanjay Avila is a 64 y o  male who was admitted with Small bowel obstruction ([address] )  He still has abdominal pain which can be severe at times  NG tube with bilious output  Requires pain medicine on a regular basis  Patient states that he had flatus on 5 separate occasions overnight  I also discussed this with the nurse who confirms that this is the case  No bowel movements  Seems to be tolerating NG tube without any discomfort    Long discussion about his current situation and also discuss the purpose of a feeding tube largely for decompression and palliation  Vitals  Blood pressure 102/56, pulse 105, temperature 100 2 °F (37 9 °C), temperature source Tympanic, resp  rate 18, height 5' 9" (1 753 m), weight 63 kg (139 lb), SpO2 93 %  Physical Exam  Patient is having some distress  He is grimacing but improves after he got pain medicine from the nurse during our conversation  Bowel sounds present  NG tube in right nostril with green liquid in canister  NG tube is to low intermittent suction  Abdomen is flat but somewhat firm and diffusely tender throughout the entire abdominal cavity      Laboratory Studies  Lab Results   Component Value Date    CREATININE 1 02 04/30/2022    BUN 13 04/30/2022    SODIUM 141 04/30/2022    K 3 1 (L) 04/30/2022     (H) 04/30/2022    CO2 26 04/30/2022    GLUCOSE 89 07/30/2015    CALCIUM 8 0 (L) 04/30/2022    PROT 6 8 07/30/2015    ALKPHOS 243 (H) 04/30/2022    BILITOT 0 59 07/30/2015    AST 21 04/30/2022    ALT 22 04/30/2022     Lab Results   Component Value Date    WBC 1 53 (LL) 04/30/2022    HGB 8 8 (L) 04/30/2022    HCT 27 2 (L) 04/30/2022    PLT 86 (L) 04/30/2022    MCV 97 04/30/2022     Lab Results   Component Value Date    PROTIME 13 1 12/09/2020    INR 1 01 12/09/2020     No results found for: CDIFF        Inhouse Medications Current Facility-Administered Medications:     acetaminophen (TYLENOL) tablet 650 mg, 650 mg, Oral, Q6H PRN, 650 mg at 05/01/22 0008    cefepime (MAXIPIME) 2,000 mg in dextrose 5 % 50 mL IVPB, 2,000 mg, Intravenous, Q12H, 2,000 mg at 05/01/22 0218    dextrose 5 % and sodium chloride 0 9 % infusion, 100 mL/hr, Intravenous, Continuous, 100 mL/hr at 05/01/22 0010    heparin (porcine) subcutaneous injection 5,000 Units, 5,000 Units, Subcutaneous, Q8H [address], 5,000 Units at 04/30/22 1400 **AND** [COMPLETED] Platelet count, , , Once    HYDROmorphone (DILAUDID) injection 0 5 mg, 0 5 mg, Intravenous, Q4H PRN, 0 5 mg at 04/30/22 0234 **OR** HYDROmorphone (DILAUDID) injection 1 mg, 1 mg, Intravenous, Q4H PRN, 1 mg at 05/01/22 0612    HYDROmorphone (DILAUDID) injection 0 5 mg, 0 5 mg, Intravenous, Q4H PRN    metoprolol (LOPRESSOR) injection 2 5 mg, 2 5 mg, Intravenous, Q6H, 2 5 mg at 05/01/22 0224    ondansetron (ZOFRAN) injection 4 mg, 4 mg, Intravenous, Q4H PRN, 4 mg at 04/29/22 1706    pantoprazole (PROTONIX) injection 40 mg, 40 mg, Intravenous, Q12H [address], 40 mg at 04/30/22 2037      Assessment/Plan:  Assessment:  1  This remains a clinical dilemma  Patient does have at least some outward symptoms that he is now passing flatus  Whether this will be effective enough to allow him to eat and stay hydrated is premature  Patient currently is not in favor of a feeding tube being placed  He states that if it is doing the same thing he would rather just keep the NG tube in place as it does not bother him  His main goal is to be pain free and to gain a little strength and be home  He is still optimistic that the new oral chemotherapeutic measures will have benefit  I did review with him that if his small intestine is nonfunctional, oral medications may be frutile  He remains cautiously optimistic  Plan:  Given our discussion I would not proceed with endoscopic feeding tube placement for decompression    Patient may benefit from TPN and we are hopeful that current therapy will allow small-bowel functionality to return  If he is to stay on oral chemotherapeutic measures, he could swallow pills and then clamp NG tube for several hours in an effort to allow medications to be absorbed effectively  Ultimately palliation seems to be high on his list as well as comfort  I am afraid to say that I believe his overall prognosis is quite poor  I believe that his prognostic situation would be best addressed by his hematologist who knows his situation best and may be able to help assist in making these difficult decisions      Charles Torres MD

## 2022-05-01 NOTE — ASSESSMENT & PLAN NOTE
Known history of metastatic small bowel lymphoma  CTAP: Interval distention of multiple segments of proximal and mid small bowel with a suspected transition point of an obstruction in the right midabdomen  Multiple small bowel segments are adherent to this location suspicious for an adhesion  unchanged omental/peritoneal implants    General surgery, GI, oncology and palliative care following   gastrografin challenge with serial KUBs to track passage of contrast as KUB yesterday again noted that the contrast remained in stomach and SB  C/w NGT and bowel rest   Cardiology clearance for surgery   Ongoing discussions regarding consideration for decompressive PEG possibly on Monday

## 2022-05-01 NOTE — PLAN OF CARE
Problem: PAIN - ADULT  Goal: Verbalizes/displays adequate comfort level or baseline comfort level  Description: Interventions:  - Encourage patient to monitor pain and request assistance  - Assess pain using appropriate pain scale  - Administer analgesics based on type and severity of pain and evaluate response  - Implement non-pharmacological measures as appropriate and evaluate response  - Consider cultural and social influences on pain and pain management  - Notify physician/advanced practitioner if interventions unsuccessful or patient reports new pain  Outcome: Progressing     Problem: GASTROINTESTINAL - ADULT  Goal: Maintains or returns to baseline bowel function  Description: INTERVENTIONS:  - Assess bowel function  - Encourage oral fluids to ensure adequate hydration  - Administer IV fluids if ordered to ensure adequate hydration  - Administer ordered medications as needed  - Encourage mobilization and activity  - Consider nutritional services referral to assist patient with adequate nutrition and appropriate food choices  Outcome: Progressing  Goal: Maintains adequate nutritional intake  Description: INTERVENTIONS:  - Monitor percentage of each meal consumed  - Identify factors contributing to decreased intake, treat as appropriate  - Assist with meals as needed  - Monitor I&O, weight, and lab values if indicated  - Obtain nutrition services referral as needed  Outcome: Progressing

## 2022-05-01 NOTE — PLAN OF CARE
Problem: Prexisting or High Potential for Compromised Skin Integrity  Goal: Skin integrity is maintained or improved  Description: INTERVENTIONS:  - Identify patients at risk for skin breakdown  - Assess and monitor skin integrity  - Assess and monitor nutrition and hydration status  - Monitor labs   - Assess for incontinence   - Turn and reposition patient  - Assist with mobility/ambulation  - Relieve pressure over bony prominences  - Avoid friction and shearing  - Provide appropriate hygiene as needed including keeping skin clean and dry  - Evaluate need for skin moisturizer/barrier cream  - Collaborate with interdisciplinary team   - Patient/family teaching  - Consider wound care consult   Outcome: Progressing     Problem: PAIN - ADULT  Goal: Verbalizes/displays adequate comfort level or baseline comfort level  Description: Interventions:  - Encourage patient to monitor pain and request assistance  - Assess pain using appropriate pain scale  - Administer analgesics based on type and severity of pain and evaluate response  - Implement non-pharmacological measures as appropriate and evaluate response  - Consider cultural and social influences on pain and pain management  - Notify physician/advanced practitioner if interventions unsuccessful or patient reports new pain  Outcome: Progressing     Problem: DISCHARGE PLANNING  Goal: Discharge to home or other facility with appropriate resources  Description: INTERVENTIONS:  - Identify barriers to discharge w/patient and caregiver  - Arrange for needed discharge resources and transportation as appropriate  - Identify discharge learning needs (meds, wound care, etc )  - Arrange for interpretive services to assist at discharge as needed  - Refer to Case Management Department for coordinating discharge planning if the patient needs post-hospital services based on physician/advanced practitioner order or complex needs related to functional status, cognitive ability, or social support system  Outcome: Progressing     Problem: GASTROINTESTINAL - ADULT  Goal: Maintains or returns to baseline bowel function  Description: INTERVENTIONS:  - Assess bowel function  - Encourage oral fluids to ensure adequate hydration  - Administer IV fluids if ordered to ensure adequate hydration  - Administer ordered medications as needed  - Encourage mobilization and activity  - Consider nutritional services referral to assist patient with adequate nutrition and appropriate food choices  Outcome: Progressing  Goal: Maintains adequate nutritional intake  Description: INTERVENTIONS:  - Monitor percentage of each meal consumed  - Identify factors contributing to decreased intake, treat as appropriate  - Assist with meals as needed  - Monitor I&O, weight, and lab values if indicated  - Obtain nutrition services referral as needed  Outcome: Progressing     Problem: HEMATOLOGIC - ADULT  Goal: Maintains hematologic stability  Description: INTERVENTIONS  - Assess for signs and symptoms of bleeding or hemorrhage  - Monitor labs  Problem: Nutrition/Hydration-ADULT  Goal: Nutrient/Hydration intake appropriate for improving, restoring or maintaining nutritional needs  Description: Monitor and assess patient's nutrition/hydration status for malnutrition  Collaborate with interdisciplinary team and initiate plan and interventions as ordered  Monitor patient's weight and dietary intake as ordered or per policy  Utilize nutrition screening tool and intervene as necessary  Determine patient's food preferences and provide high-protein, high-caloric foods as appropriate       INTERVENTIONS:  - Monitor oral intake, urinary output, labs, and treatment plans  - Assess nutrition and hydration status and recommend course of action  - Evaluate amount of meals eaten  - Assist patient with eating if necessary   - Allow adequate time for meals  - Recommend/ encourage appropriate diets, oral nutritional supplements, and vitamin/mineral supplements  - Order, calculate, and assess calorie counts as needed  - Recommend, monitor, and adjust tube feedings and TPN/PPN based on assessed needs  - Assess need for intravenous fluids  - Provide specific nutrition/hydration education as appropriate  - Include patient/family/caregiver in decisions related to nutrition  Outcome: Progressing     - Administer supportive blood products/factors as ordered and appropriate  Outcome: Progressing

## 2022-05-02 NOTE — PROGRESS NOTES
[facility]  Progress Note - Ninoska Shows 1945, 64 y o  male MRN: [id number]  Unit/Bed#: E2 -02 Encounter: [id number]  Primary Care Provider: June Tate DO   Date and time admitted to hospital: 2022 11:36 AM    * Small bowel obstruction [facility])  Assessment & Plan  Due to lymphoma  Appreciate palliative care, oncology, surgery and GI help  Planning on venting J tube so that NG tube can be removed  Patient is considering hospice  Continue to work on pain control    He is DNR    MALT (mucosa associated lymphoid tissue)- Gastric [facility])  Assessment & Plan  Patient follows with Medical Oncology at [facility] as well as Nisha Johnson  Patient show progression through 2 lines of chemotherapy  Recently finished first 2 week course of Lonsurf Last friday  Unfortunately he now has SBO in setting of peritoneal carcinomatosis  Subjective: Has moderate abdominal pain that comes and goes in "waves"  But it is tolerable  He really wants something to drink  He is tolerating the NG tube  Objective:     Vitals:   Temp (24hrs), Av °F (36 7 °C), Min:97 9 °F (36 6 °C), Max:98 2 °F (36 8 °C)    Temp:  [97 9 °F (36 6 °C)-98 2 °F (36 8 °C)] 98 2 °F (36 8 °C)  HR:  [] 101  Resp:  [18] 18  BP: (107-139)/(64-82) 134/69  SpO2:  [94 %-95 %] 94 %  Body mass index is 20 67 kg/m²  Input and Output Summary (last 24 hours): Intake/Output Summary (Last 24 hours) at 2022 1413  Last data filed at 2022 0801  Gross per 24 hour   Intake --   Output 1650 ml   Net -1650 ml       Physical Exam:     Physical Exam  Vitals and nursing note reviewed  HENT:      Head: Normocephalic and atraumatic  Eyes:      Pupils: Pupils are equal, round, and reactive to light  Cardiovascular:      Rate and Rhythm: Normal rate and regular rhythm  Heart sounds: No murmur heard  No friction rub  No gallop      Pulmonary:      Effort: Pulmonary effort is normal       Breath sounds: Normal breath sounds  No wheezing or rales  Abdominal:      Tenderness: There is abdominal tenderness  Comments: Hypoactive BS  Abdomen is mildly hard  No rebound nor guarding  Mostly tender in the RUQ and LUQ   Musculoskeletal:      Right lower leg: No edema  Left lower leg: No edema          Additional Data:     Labs:    Results from last 7 days   Lab Units 05/02/22  0838   WBC Thousand/uL 2 37*   HEMOGLOBIN g/dL 8 7*   HEMATOCRIT % 27 1*   PLATELETS Thousands/uL 136*   NEUTROS PCT % 52   LYMPHS PCT % 26   MONOS PCT % 20*   EOS PCT % 2     Results from last 7 days   Lab Units 05/02/22  0551 05/01/22  0639 05/01/22  0639   POTASSIUM mmol/L 3 2*   < > 3 0*   CHLORIDE mmol/L 110*   < > 108   CO2 mmol/L 27   < > 26   BUN mg/dL 14   < > 11   CREATININE mg/dL 1 12   < > 1 10   CALCIUM mg/dL 8 2*   < > 8 2*   ALK PHOS U/L  --   --  292*   ALT U/L  --   --  22   AST U/L  --   --  24    < > = values in this interval not displayed  * I Have Reviewed All Lab Data     Recent Cultures (last 7 days):     Results from last 7 days   Lab Units 04/26/22  1408 04/26/22  1404   BLOOD CULTURE  No Growth After 5 Days  No Growth After 5 Days           Last 24 Hours Medication List:   Current Facility-Administered Medications   Medication Dose Route Frequency Provider Last Rate    acetaminophen  650 mg Oral Q6H PRN GRISELDA Galindo      cefepime  2,000 mg Intravenous Q12H Debbie Pink, DO 2,000 mg (05/02/22 0257)    dextrose 5 % and sodium chloride 0 9 % with KCl 20 mEq/L  75 mL/hr Intravenous Continuous Danielito Thomson MD 75 mL/hr (05/02/22 0258)    heparin (porcine)  5,000 Units Subcutaneous [facility] Debbie Pink, DO      HYDROmorphone  0 5 mg Intravenous Q4H PRN GRISELDA Bolanos      Or    HYDROmorphone  1 mg Intravenous Q4H PRN PegGRISELDA Cisse      HYDROmorphone  0 5 mg Intravenous Q4H PRN GRISELDA Bolanos      metoprolol 2 5 mg Intravenous Q6H Chichi Richard DO      ondansetron  4 mg Intravenous Q4H PRN Chichi Richard DO      pantoprazole  40 mg Intravenous Q12H [address] Jamie Marcial MD           VTE Pharmacologic Prophylaxis:   Pharmacologic: Heparin      Current Length of Stay: 6 day(s)    Current Patient Status: Inpatient       Discharge Plan: needs J tube, then likely hospice    Code Status: Level 3 - DNAR and DNI           Today, Patient Was Seen By: Gabby Batres DO    ** Please Note: Dictation voice to text software may have been used in the creation of this document   **

## 2022-05-02 NOTE — ASSESSMENT & PLAN NOTE
Due to lymphoma  Appreciate palliative care, oncology, surgery and GI help  Planning on venting J tube so that NG tube can be removed  Patient is considering hospice      Continue to work on pain control    He is DNR

## 2022-05-02 NOTE — ASSESSMENT & PLAN NOTE
Patient follows with Medical Oncology at [facility] as well as [facility]  Patient show progression through 2 lines of chemotherapy  Recently finished first 2 week course of Lonsurf Last friday  Unfortunately he now has SBO in setting of peritoneal carcinomatosis

## 2022-05-02 NOTE — PLAN OF CARE
NPO x 6 days  PT remains with stomach pain, reported flatus but no bowel movement x 8 days  Currently has NG tube, plans for possible TPN  Reviewed labs: K 3 2, replete K before intiating TPN  Recommend initiating standard TPN first 2 days, monitor electrolytes, triglycerides, if electrolytes wnl can advance to goal TPN third day AA15 500mL, D30 1000mL, xbckte90% 225mL provides total of 1770cal 75g pro, 1725mL, glucose load 3 2mg/kg/min, lipid load   7g/kg/day  Problem: Nutrition/Hydration-ADULT  Goal: Nutrient/Hydration intake appropriate for improving, restoring or maintaining nutritional needs  Description: Monitor and assess patient's nutrition/hydration status for malnutrition  Collaborate with interdisciplinary team and initiate plan and interventions as ordered  Monitor patient's weight and dietary intake as ordered or per policy  Utilize nutrition screening tool and intervene as necessary  Determine patient's food preferences and provide high-protein, high-caloric foods as appropriate       INTERVENTIONS:  - Monitor oral intake, urinary output, labs, and treatment plans  - Assess nutrition and hydration status and recommend course of action  - Evaluate amount of meals eaten  - Assist patient with eating if necessary   - Allow adequate time for meals  - Recommend/ encourage appropriate diets, oral nutritional supplements, and vitamin/mineral supplements  - Order, calculate, and assess calorie counts as needed  - Recommend, monitor, and adjust tube feedings and TPN/PPN based on assessed needs  - Assess need for intravenous fluids  - Provide specific nutrition/hydration education as appropriate  - Include patient/family/caregiver in decisions related to nutrition  Outcome: Not Progressing

## 2022-05-02 NOTE — PLAN OF CARE
Problem: Prexisting or High Potential for Compromised Skin Integrity  Goal: Skin integrity is maintained or improved  Description: INTERVENTIONS:  - Identify patients at risk for skin breakdown  - Assess and monitor skin integrity  - Assess and monitor nutrition and hydration status  - Monitor labs   - Assess for incontinence   - Turn and reposition patient  - Assist with mobility/ambulation  - Relieve pressure over bony prominences  - Avoid friction and shearing  - Provide appropriate hygiene as needed including keeping skin clean and dry  - Evaluate need for skin moisturizer/barrier cream  - Collaborate with interdisciplinary team   - Patient/family teaching  - Consider wound care consult   Outcome: Progressing     Problem: PAIN - ADULT  Goal: Verbalizes/displays adequate comfort level or baseline comfort level  Description: Interventions:  - Encourage patient to monitor pain and request assistance  - Assess pain using appropriate pain scale  - Administer analgesics based on type and severity of pain and evaluate response  - Implement non-pharmacological measures as appropriate and evaluate response  - Consider cultural and social influences on pain and pain management  - Notify physician/advanced practitioner if interventions unsuccessful or patient reports new pain  Outcome: Progressing     Problem: DISCHARGE PLANNING  Goal: Discharge to home or other facility with appropriate resources  Description: INTERVENTIONS:  - Identify barriers to discharge w/patient and caregiver  - Arrange for needed discharge resources and transportation as appropriate  - Identify discharge learning needs (meds, wound care, etc )  - Arrange for interpretive services to assist at discharge as needed  - Refer to Case Management Department for coordinating discharge planning if the patient needs post-hospital services based on physician/advanced practitioner order or complex needs related to functional status, cognitive ability, or social support system  Outcome: Progressing     Problem: MOBILITY - ADULT  Goal: Maintain or return to baseline ADL function  Description: INTERVENTIONS:  -  Assess patient's ability to carry out ADLs; assess patient's baseline for ADL function and identify physical deficits which impact ability to perform ADLs (bathing, care of mouth/teeth, toileting, grooming, dressing, etc )  - Assess/evaluate cause of self-care deficits   - Assess range of motion  - Assess patient's mobility; develop plan if impaired  - Assess patient's need for assistive devices and provide as appropriate  - Encourage maximum independence but intervene and supervise when necessary  - Involve family in performance of ADLs  - Assess for home care needs following discharge   - Consider OT consult to assist with ADL evaluation and planning for discharge  - Provide patient education as appropriate  Outcome: Progressing  Goal: Maintains/Returns to pre admission functional level  Description: INTERVENTIONS:  - Perform BMAT or MOVE assessment daily    - Set and communicate daily mobility goal to care team and patient/family/caregiver  - Collaborate with rehabilitation services on mobility goals if consulted  - Perform Range of Motion 3 times a day  - Reposition patient every 2 hours    - Dangle patient 3 times a day  - Stand patient 3 times a day  - Ambulate patient 3 times a day  - Out of bed to chair 3 times a day   - Out of bed for meals 3 times a day  - Out of bed for toileting  - Record patient progress and toleration of activity level   Outcome: Progressing     Problem: Potential for Falls  Goal: Patient will remain free of falls  Description: INTERVENTIONS:  - Educate patient/family on patient safety including physical limitations  - Instruct patient to call for assistance with activity   - Consult OT/PT to assist with strengthening/mobility   - Keep Call bell within reach  - Keep bed low and locked with side rails adjusted as appropriate  - Keep care items and personal belongings within reach  - Initiate and maintain comfort rounds  - Make Fall Risk Sign visible to staff  - Offer Toileting every 2 Hours, in advance of need  - Initiate/Maintain bed alarm  - Obtain necessary fall risk management equipment  - Apply yellow socks and bracelet for high fall risk patients  - Consider moving patient to room near nurses station  Outcome: Progressing     Problem: INFECTION - ADULT  Goal: Absence or prevention of progression during hospitalization  Description: INTERVENTIONS:  - Assess and monitor for signs and symptoms of infection  - Monitor lab/diagnostic results  - Monitor all insertion sites, i e  indwelling lines, tubes, and drains  - Monitor endotracheal if appropriate and nasal secretions for changes in amount and color  - [place] appropriate cooling/warming therapies per order  - Administer medications as ordered  - Instruct and encourage patient and family to use good hand hygiene technique  - Identify and instruct in appropriate isolation precautions for identified infection/condition  Outcome: Progressing  Goal: Absence of fever/infection during neutropenic period  Description: INTERVENTIONS:  - Monitor WBC    Outcome: Progressing     Problem: GASTROINTESTINAL - ADULT  Goal: Maintains or returns to baseline bowel function  Description: INTERVENTIONS:  - Assess bowel function  - Encourage oral fluids to ensure adequate hydration  - Administer IV fluids if ordered to ensure adequate hydration  - Administer ordered medications as needed  - Encourage mobilization and activity  - Consider nutritional services referral to assist patient with adequate nutrition and appropriate food choices  Outcome: Progressing  Goal: Maintains adequate nutritional intake  Description: INTERVENTIONS:  - Monitor percentage of each meal consumed  - Identify factors contributing to decreased intake, treat as appropriate  - Assist with meals as needed  - Monitor I&O, weight, and lab values if indicated  - Obtain nutrition services referral as needed  Outcome: Progressing     Problem: HEMATOLOGIC - ADULT  Goal: Maintains hematologic stability  Description: INTERVENTIONS  - Assess for signs and symptoms of bleeding or hemorrhage  - Monitor labs  - Administer supportive blood products/factors as ordered and appropriate  Outcome: Progressing     Problem: Nutrition/Hydration-ADULT  Goal: Nutrient/Hydration intake appropriate for improving, restoring or maintaining nutritional needs  Description: Monitor and assess patient's nutrition/hydration status for malnutrition  Collaborate with interdisciplinary team and initiate plan and interventions as ordered  Monitor patient's weight and dietary intake as ordered or per policy  Utilize nutrition screening tool and intervene as necessary  Determine patient's food preferences and provide high-protein, high-caloric foods as appropriate       INTERVENTIONS:  - Monitor oral intake, urinary output, labs, and treatment plans  - Assess nutrition and hydration status and recommend course of action  - Evaluate amount of meals eaten  - Assist patient with eating if necessary   - Allow adequate time for meals  - Recommend/ encourage appropriate diets, oral nutritional supplements, and vitamin/mineral supplements  - Order, calculate, and assess calorie counts as needed  - Recommend, monitor, and adjust tube feedings and TPN/PPN based on assessed needs  - Assess need for intravenous fluids  - Provide specific nutrition/hydration education as appropriate  - Include patient/family/caregiver in decisions related to nutrition  Outcome: Progressing

## 2022-05-02 NOTE — PROGRESS NOTES
Patient Name: Neil Tucker  Patient MRN: [id number]  Date: 05/02/22  Service: [facility]    Subjective   Neil Tucker is a 64 y o  male who was admitted with Small bowel obstruction ([address] )  He continues to have trouble with obstructive issues  Modest amount of bilious fluid through NG tube suction  Denies any bowel movements or flatus  Discussed with surgical team this morning and also appreciate long and extensive multidisciplinary meeting earlier today  Discussed with patient who is agreeable for G-tube in effort to get NG tube removed  Patient is aware that placement of the G-tube is generally well tolerated however given his fairly exquisite abdominal pain I cannot guarantee that placing a G-tube may not cause some pain  Also, there is the possibility that placing G-tube endoscopically may be technically difficult given the abdominal process at hand  Vitals  Blood pressure 111/65, pulse (!) 108, temperature 98 6 °F (37 °C), temperature source Temporal, resp  rate 18, height 5' 9" (1 753 m), weight 63 5 kg (139 lb 15 9 oz), SpO2 94 %  Physical Exam  NG tube in right nostril  Bilious fluid seen in the container  Abdomen continues to be somewhat firm and exquisitely tender in multiple areas with even light palpation    Mental status, patient awake and alert    Laboratory Studies  Lab Results   Component Value Date    CREATININE 1 12 05/02/2022    BUN 14 05/02/2022    SODIUM 142 05/02/2022    K 3 2 (L) 05/02/2022     (H) 05/02/2022    CO2 27 05/02/2022    GLUCOSE 89 07/30/2015    CALCIUM 8 2 (L) 05/02/2022    PROT 6 8 07/30/2015    ALKPHOS 292 (H) 05/01/2022    BILITOT 0 59 07/30/2015    AST 24 05/01/2022    ALT 22 05/01/2022     Lab Results   Component Value Date    WBC 2 37 (L) 05/02/2022    HGB 8 7 (L) 05/02/2022    HCT 27 1 (L) 05/02/2022     (L) 05/02/2022    MCV 97 05/02/2022     Lab Results   Component Value Date    PROTIME 14 7 (H) 05/02/2022    INR 1 19 05/02/2022     No results found for: CDIFF        Inhouse Medications       Current Facility-Administered Medications:     acetaminophen (TYLENOL) tablet 650 mg, 650 mg, Oral, Q6H PRN, 650 mg at 05/01/22 0008    cefepime (MAXIPIME) 2,000 mg in dextrose 5 % 50 mL IVPB, 2,000 mg, Intravenous, Q12H, 2,000 mg at 05/02/22 1441    dextrose 5 % and sodium chloride 0 9 % with KCl 20 mEq/L infusion (premix), 75 mL/hr, Intravenous, Continuous, 75 mL/hr at 05/02/22 0258    heparin (porcine) subcutaneous injection 5,000 Units, 5,000 Units, Subcutaneous, Q8H [address], 5,000 Units at 05/02/22 1438 **AND** [COMPLETED] Platelet count, , , Once    HYDROmorphone (DILAUDID) injection 0 5 mg, 0 5 mg, Intravenous, Q4H PRN, 0 5 mg at 05/01/22 1317 **OR** HYDROmorphone (DILAUDID) injection 1 mg, 1 mg, Intravenous, Q4H PRN, 1 mg at 05/02/22 1610    HYDROmorphone (DILAUDID) injection 0 5 mg, 0 5 mg, Intravenous, Q4H PRN, 0 5 mg at 05/02/22 1441    metoprolol (LOPRESSOR) injection 2 5 mg, 2 5 mg, Intravenous, Q6H, 2 5 mg at 05/02/22 1437    ondansetron (ZOFRAN) injection 4 mg, 4 mg, Intravenous, Q4H PRN, 4 mg at 05/01/22 1949    pantoprazole (PROTONIX) injection 40 mg, 40 mg, Intravenous, Q12H [address], 40 mg at 05/02/22 0804      Assessment/Plan:  Assessment:  1  Bowel obstruction as above  2  Palliative measures being pursued given limited options G-tube placement will be attempted tomorrow  3  Poor prognosis    Plan:  1  Check INR to ensure he does not have a coagulopathy which could be from vitamin K deficiency given inability to eat  2  Patient already on cefepime does not need pre EGD antibiotics  3   EGD tomorrow afternoon with attempt at venting gastrostomy tube placement so long as protime is okay      Monica Hint, MD

## 2022-05-02 NOTE — PROGRESS NOTES
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, peritoneum, and lungs  3  Cancer related pain  4  Severe protein-calorie malnutrition  5  Generalized weakness, debility, deconditioning, ambulatory dysfunction  6  Goals of Care   -continue current medical care  -continue current pain regimen, will trial po pain meds after venting G-tube placed  -change code status to Level 3, DNAR/DNI   -consult hospice re home hospice upon discharge, after venting gastrostomy tube  -t/c trial of steroid/octreotide in addition to zofran for symptom relief of obstruction; d/w surgery-want to wait until venting G-tube placed  -PT/OT eval; patient hoping to be able to ambulate with walker safely from room to room  -d/w CM  -d/w primary  -will follow    Met with patient  Discussed current condition at length  Discussed option of pursuing IV nutrition with TPN/possible surgical intervention if offered versus transition to comfort approach of care with venting G-tube + home hospice  Discussed concern that there are no good surgical options to relieve obstruction due to extent of disease and poor nutritional status  Discussed that surgery may limit life sooner if complication occurs  Also discussed non-operative management with possible addition of steroids + octreotide + anti-nausea + pain control/eventual transition to po meds and possible venting G tube to return home sooner  Patient with increased weakness over weekend, with instability and assistance needed by nursing when standing at side of bed  Patient lives with wife in ranch home, no children, no family support  Some friends for support  Discussed hospice services at length  Patient was hoping to regain functional performance status to level prior to admission   Admits that extremely weakened, but would want to be stronger to return home so he is more independent  Discussed concern that may not get to the point of prior functioning  Discussed that patient currently qualifies for hospice if decides to forgo further cancer directed treatments, aggressive treatment measures  Explained qualifying for hospice means a patient has a terminal diagnosis with 6 months or less to live  Patient expressed appreciation of open and honest discussion as difficult as it was hearing this  Discussed code status at length  Patient states that he has an advance directive with his wife as POA  He sates that on his document he wishes to be resuscitated/placed on ventilator for 30 days and be removed only if no improvement after that time  Discussed this at length, and in context of his underlying stage IV adenocarcinoma + malignant sbo + poor nutritional status, and chances of surviving or having meaningful recovery would be minimal  Given extent of underlying stage IV cancer, recommend changing to DNAR/DNI, allowing for a peaceful/natural death  Wife called room at that time  Further discussed all of above with wife by phone  She stated, "knew the time would come, but not this soon"  Wife states she can be to patient's room by noon for further goals of care conversations with patient, surgery, oncology  *returned to room for FM at 12pm with patient, wife/[place], Oncology/Julia Bragg, and Surgery PGY IV/Dr Rue Crigler  Introductions to wife  Surgery summarized current medical condition, imaging findings, and poor nutrition precluding ability to safely and effectively perform operation to relieve bowel obstruction  Discussed concern that even with TPN, ongoing bowel rest, uncertain that patient would be able to tolerate/amenable to a surgery without complications that may limit life further  Patient states that he would not want to undergo surgery given risks   Discussed option of venting gastrostomy tube for symptom relief and ability to pleasure feed  Surgery discussed procedure if done by GI versus Surgery  Surgery to discuss with GI on whether able to attempt venting PEG  Patient agreeable to this procedure to help alleviate discomfort and prevent returning to hospital  Oncology discussed patient being on third line agent for palliative treatment of cancer, but that this medication will not be absorbed with current bowel obstruction  Oncology also addressed concerns of current performance status in context of being able to tolerate chemotherapy agent and potential side effects  Oncology explained that ongoing cancer directed treatment was contingent upon resolution of bowel obstruction surgically/medically  This has not happened in addition to patient's ongoing poor nutritional status and further deconditioning  Patient requests that Oncology stop delivery of next Lonsurf regimen from [facility]  Discussed home hospice in detail  Patient will need a hospital bed prior to discharge  Also need to have venting G-tube placed with trial of oral medication to prepare for home with hospice  Discussed that this may not happen for a few days  Wife/patient hoping that patient will regain some strength/ability to ambulate on own with walker to and from rooms in the house  Wife states that their pet cat/Consuelo has been lost without patient  Patient hoping to return home and not just be a "vegetable"  PT/OT ivett pending  Discussed code status again with patient and wife  He is currently listed as level 1, full code  Discussed minimal chance of meaningful recovery/survival if sustains a cardiopulmonary arrest given current condition + underlying stage IV malignancy  Discussed that putting through resuscitation will prolong suffering and death  Discussed continuing current treatments, but allowing a peaceful, natural death if cardiac arrest occurs  Oncology reiterated concerns with remaining full code   Patient/wife agreeable to changing code status to level 3, DNAR/DNI  Wife asked about prognosis and how much time is remaining  Offered that patient's prognosis may be days to weeks, not certain that this would be extended to weeks to months given current condition, nutritional status, ongoing bowel obstruction, and functional status  Discussed that patient is considered at end of life  Explained prognosis/time becomes more accurate as patient approaches death  Emotional support and validation provided  Code status: Level 1, full code    Subjective:  Patient seen and examined  Intermittent pain, comes in waves  +flatus yesterday per patient  No BM  Increasingly weak, needs assistance for oob to side of bed per nursing      Last BM: 4/25/22  NGT: 550mL bilious  Received:  Hydromorphone 6 5mgIV/last 24h = 130mg approx OME      Medications    Current Facility-Administered Medications:     acetaminophen (TYLENOL) tablet 650 mg, 650 mg, Oral, Q6H PRN, GRISELDA Osuna, 650 mg at 05/01/22 0008    cefepime (MAXIPIME) 2,000 mg in dextrose 5 % 50 mL IVPB, 2,000 mg, Intravenous, Q12H, Ulisses Pack DO, Last Rate: 100 mL/hr at 05/02/22 0257, 2,000 mg at 05/02/22 0257    dextrose 5 % and sodium chloride 0 9 % with KCl 20 mEq/L infusion (premix), 75 mL/hr, Intravenous, Continuous, Danielito Thomson MD, Last Rate: 75 mL/hr at 05/02/22 0258, 75 mL/hr at 05/02/22 0258    heparin (porcine) subcutaneous injection 5,000 Units, 5,000 Units, Subcutaneous, Q8H [facility], 5,000 Units at 05/02/22 0558 **AND** [COMPLETED] Platelet count, , , Once, Ulisses Pack DO    HYDROmorphone (DILAUDID) injection 0 5 mg, 0 5 mg, Intravenous, Q4H PRN, 0 5 mg at 05/01/22 1317 **OR** HYDROmorphone (DILAUDID) injection 1 mg, 1 mg, Intravenous, Q4H PRN, GRISELDA Frye, 1 mg at 05/02/22 0242    HYDROmorphone (DILAUDID) injection 0 5 mg, 0 5 mg, Intravenous, Q4H PRN, GRISELDA Frye, 0 5 mg at 05/02/22 0559    metoprolol (LOPRESSOR) injection 2 5 mg, 2 5 mg, Intravenous, Q6H, Thang Genta, DO, 2 5 mg at 05/02/22 0257    ondansetron [facility]) injection 4 mg, 4 mg, Intravenous, Q4H PRN, Thang Genta, DO, 4 mg at 05/01/22 1949    pantoprazole (PROTONIX) injection 40 mg, 40 mg, Intravenous, Q12H [address], Edith Delgadillo MD, 40 mg at 05/01/22 1957    Objective:  /69 (BP Location: Right arm)   Pulse 101   Temp 98 2 °F (36 8 °C) (Temporal)   Resp 18   Ht 5' 9" (1 753 m)   Wt 63 kg (139 lb)   SpO2 94%   BMI 20 53 kg/m²   Physical Exam:  General: chronically ill appearing, intermittent pain, NGT in place  Neurological: aaox3  Cardiovascular: tachy  Respiratory: nad, normal effort  Gastrointestinal: distended  Musculoskeletal: no edema  Skin: warm, dry  Psychiatric: flat affect    Lab Results:   I have personally reviewed pertinent labs  , CBC: No results found for: WBC, HGB, HCT, MCV, PLT, ADJUSTEDWBC, MCH, MCHC, RDW, MPV, NRBC, CMP:   Lab Results   Component Value Date    SODIUM 142 05/02/2022    K 3 2 (L) 05/02/2022     (H) 05/02/2022    CO2 27 05/02/2022    BUN 14 05/02/2022    CREATININE 1 12 05/02/2022    CALCIUM 8 2 (L) 05/02/2022    EGFR 62 05/02/2022   , PT/PTT:No results found for: PT, PTT  Albumin: 1 8    Counseling / Coordination of Care  Total floor / unit time spent today 120 minutes 8:30a-9:15a, 12-1pm   Greater than 50% of total time was spent with the patient and / or family counseling and / or coordinating of care  A description of the counseling / coordination of care: current condition, hospice discussion, code status, emotional support       Elvia Strong DO  Palliative & Supportive Care

## 2022-05-02 NOTE — PROGRESS NOTES
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-   -Family meeting with palliative and general surgery; discussed that unfortunately he would not be a candidate for surgical intervention for his GI obstruction/adhesions  He continues to be on NG-tube with significant discharge  Proposed that the best case scenario would possibly be to do a venting G-tube  Discussed that TPN would not likely be an option for him as well  We had a long discussion about quality of life and his wishes to be home with his wife  He has agreed to go home on hospice  Palliative team to assist with facilitating this process      -Patient and patient's wife understood that oral chemotherapy, in the form of Lonsurf (third-line treatment for his malignancy), would not be efficacious at this point  Informed NARINDER Sorenson, Dr Susan Jenkins team, that patient will no longer be actively seeking chemotherapy at this point      -Will get reach out to [address] [place] [address] [address], [phone] to stop delivery at this point  Oncology History:    -Was on FOLFIRI + Avastin, switiched to Folfox + Avastin  Evidence of progression; currently on third line of treatment with Lonsurf  Coordination of care with [facility]    -Patient wishes to continue with PO treatment  He is now on a scheduled two-week break from treatment  [address] on board to deliver oral chemotherapy before the start of next cycle  -ECOG 1-2  Prior to this admission, he was mowing the lawn and cleaning out his garage in preparation for a huge garInogen sale in the neighborhood  Worked at [employer] for 42 years  Outpatient follow up plan: Will cancel follow up appointment with Dr Ashleigh Price  I did review this patient with Dr Kathryn Verma and he is in agreement with this plan        [facility] Yanet Douglas, DNP, [address]  Hematology-Oncology      History of present illness:  Sanjay Avial is a 64 y o  male with a hx of stage IV adenocarcinoma of small bowel metastasis to peritoneum/omentum  PMH of hypertension, BPH; presented in the ED for abdominal pain  Patient reported that he had two stalks of celery when he suddenly felt a stabbing pain on the right upper quadrant area of his abdomen  He was taking Bentyl with no relief  Experienced nausea and nonbilious nonbloody vomiting  CT scan shows interval distension of multiple segments of proximal and mid small bowel with a suspected transition point of obstruction in the right mid abdomen;suspicious for adhesions  Was on FOLFIRI + Avastin, switiched to Folfox + Avastin  Unfortunately, there was evidence of progression; currently on third line of treatment with Lonsu  Coordination of care with [facility]  Patient initially verbalized his wish to continue with chemo PO treatment (now on a scheduled two-week break from treatment), but with the progression of his disease process, patient has decided to go home on hospice  [address] will be informed of this change      Oncology History:   Cancer Staging  No matching staging information was found for the patient    Oncology History   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[facility] [address] )   12/17/2020 Initial Diagnosis    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[facility] [address] )     1/11/2021 - 10/26/2021 Chemotherapy    fluorouracil (ADRUCIL), 765 mg, Intravenous, Once, 12 of 12 cycles  Dose modification: 300 mg/m2 (original dose 400 mg/m2, Cycle 5, Reason: Dose Not Tolerated)  Administration: 800 mg (1/11/2021), 800 mg (1/25/2021), 800 mg (2/8/2021), 800 mg (2/22/2021), 600 mg (3/15/2021), 600 mg (3/29/2021), 600 mg (4/19/2021), 600 mg (5/3/2021), 600 mg (5/17/2021), 600 mg (6/1/2021), 600 mg (6/14/2021), 600 mg (6/28/2021)  fluorouracil (ADRUCIL), 600 mg (original dose 400 mg/m2), Intravenous, Once, 6 of 11 cycles  Dose modification: 300 mg/m2 (original dose 400 mg/m2, Cycle 13, Reason: Dose Not Tolerated)  Administration: 600 mg (7/26/2021), 600 mg (8/9/2021), 600 mg (8/23/2021), 600 mg (9/20/2021), 600 mg (10/4/2021), 600 mg (10/18/2021)  pegfilgrastim (Roxanne Carlos), 6 mg, Subcutaneous, Once, 2 of 2 cycles  Administration: 6 mg (2/10/2021), 6 mg (2/24/2021)  bevacizumab (AVASTIN) IVPB, 5 mg/kg = 352 5 mg (100 % of original dose 5 mg/kg), Intravenous, Once, 14 of 19 cycles  Dose modification: 5 mg/kg (original dose 5 mg/kg, Cycle 5)  Administration: 352 5 mg (3/15/2021), 352 5 mg (3/29/2021), 352 5 mg (4/19/2021), 352 5 mg (5/3/2021), 352 5 mg (5/17/2021), 352 5 mg (6/1/2021), 352 5 mg (6/14/2021), 352 5 mg (6/28/2021), 352 5 mg (7/26/2021), 352 5 mg (8/9/2021), 352 5 mg (8/23/2021), 352 5 mg (9/20/2021), 352 5 mg (10/4/2021), 352 5 mg (10/18/2021)  leucovorin calcium IVPB, 764 mg, Intravenous, Once, 18 of 23 cycles  Dose modification: 300 mg/m2 (original dose 400 mg/m2, Cycle 5, Reason: Dose Not Tolerated)  Administration: 800 mg (1/11/2021), 800 mg (1/25/2021), 800 mg (2/8/2021), 800 mg (2/22/2021), 600 mg (3/15/2021), 600 mg (3/29/2021), 600 mg (4/19/2021), 600 mg (5/3/2021), 600 mg (5/17/2021), 600 mg (6/1/2021), 600 mg (6/14/2021), 600 mg (6/28/2021), 600 mg (7/26/2021), 600 mg (8/9/2021), 600 mg (8/23/2021), 600 mg (9/20/2021), 600 mg (10/4/2021), 600 mg (10/18/2021)  oxaliplatin (ELOXATIN) chemo infusion, 85 mg/m2 = 162 35 mg, Intravenous, Once, 12 of 12 cycles  Dose modification: 65 mg/m2 (original dose 85 mg/m2, Cycle 5, Reason: Dose Not Tolerated)  Administration: 162 35 mg (1/11/2021), 162 35 mg (1/25/2021), 162 35 mg (2/8/2021), 162 35 mg (2/22/2021), 124 15 mg (3/15/2021), 124 15 mg (3/29/2021), 124 15 mg (4/19/2021), 124 15 mg (5/3/2021), 124 15 mg (5/17/2021), 124 15 mg (6/1/2021), 124 15 mg (6/14/2021), 124 15 mg (6/28/2021)  fluorouracil (ADRUCIL) ambulatory infusion Soln, 1,200 mg/m2/day = 4,585 mg, Intravenous, Over 46 hours, 18 of 23 cycles  tbo-filgrastim (GRANIX), 300 mcg (100 % of original dose 300 mcg), Subcutaneous, Once, 12 of 17 cycles  Dose modification: 300 mcg (original dose 300 mcg, Cycle 7)  Administration: 300 mcg (5/10/2021), 300 mcg (5/24/2021), 300 mcg (6/8/2021), 300 mcg (6/21/2021), 300 mcg (7/6/2021), 300 mcg (8/3/2021), 300 mcg (8/17/2021), 300 mcg (8/31/2021), 300 mcg (9/29/2021), 300 mcg (10/12/2021), 300 mcg (10/26/2021)     11/1/2021 - 2/7/2022 Chemotherapy    fluorouracil (ADRUCIL), 300 mg/m2 = 545 mg (75 % of original dose 400 mg/m2), Intravenous, Once, 6 of 10 cycles  Dose modification: 300 mg/m2 (original dose 400 mg/m2, Cycle 1, Reason: Dose Not Tolerated)  Administration: 545 mg (11/1/2021), 545 mg (11/15/2021), 545 mg (12/8/2021), 545 mg (12/21/2021), 545 mg (1/17/2022), 500 mg (1/31/2022)  pegfilgrastim (NEULASTA ONPRO), 6 mg, Subcutaneous, Once, 2 of 2 cycles  Administration: 6 mg (11/3/2021), 6 mg (11/17/2021)  bevacizumab (AVASTIN) IVPB, 5 mg/kg = 330 mg, Intravenous, Once, 6 of 10 cycles  Administration: 330 mg (11/1/2021), 330 mg (11/15/2021), 330 mg (12/8/2021), 330 mg (12/21/2021), 330 mg (1/17/2022), 282 5 mg (1/31/2022)  irinotecan (CAMPTOSAR) chemo infusion, 326 mg, Intravenous, Once, 6 of 10 cycles  Administration: 340 mg (11/1/2021), 340 mg (11/15/2021), 340 mg (12/8/2021), 326 mg (12/21/2021), 320 mg (1/17/2022), 300 mg (1/31/2022)  leucovorin calcium IVPB, 300 mg/m2 = 543 mg (75 % of original dose 400 mg/m2), Intravenous, Once, 6 of 10 cycles  Dose modification: 300 mg/m2 (original dose 400 mg/m2, Cycle 1, Reason: Dose Not Tolerated)  Administration: 543 mg (11/1/2021), 543 mg (11/15/2021), 543 mg (12/8/2021), 543 mg (12/21/2021), 543 mg (1/17/2022), 500 mg (1/31/2022)  fluorouracil (ADRUCIL) ambulatory infusion Soln, 1,200 mg/m2/day = 4,345 mg, Intravenous, Over 46 hours, 6 of 10 cycles  tbo-filgrastim (GRANIX), 300 mcg (100 % of original dose 300 mcg), Subcutaneous, Once, 3 of 7 cycles  Dose modification: 6 mcg (original dose 300 mcg, Cycle 3), 300 mcg (original dose 300 mcg, Cycle 3), 300 mcg (original dose 300 mcg, Cycle 3)  Administration: 300 mcg (12/15/2021), 300 mcg (1/24/2022), 300 mcg (2/7/2022), 300 mcg (12/16/2021)     Malignant neoplasm metastatic to lung ([facility] [address] )   10/26/2021 Initial Diagnosis    [facility]     11/1/2021 - 2/7/2022 Chemotherapy    fluorouracil (ADRUCIL), 300 mg/m2 = 545 mg (75 % of original dose 400 mg/m2), Intravenous, Once, 6 of 10 cycles  Dose modification: 300 mg/m2 (original dose 400 mg/m2, Cycle 1, Reason: Dose Not Tolerated)  Administration: 545 mg (11/1/2021), 545 mg (11/15/2021), 545 mg (12/8/2021), 545 mg (12/21/2021), 545 mg (1/17/2022), 500 mg (1/31/2022)  pegfilgrastim (Anjali Daughters), 6 mg, Subcutaneous, Once, 2 of 2 cycles  Administration: 6 mg (11/3/2021), 6 mg (11/17/2021)  bevacizumab (AVASTIN) IVPB, 5 mg/kg = 330 mg, Intravenous, Once, 6 of 10 cycles  Administration: 330 mg (11/1/2021), 330 mg (11/15/2021), 330 mg (12/8/2021), 330 mg (12/21/2021), 330 mg (1/17/2022), 282 5 mg (1/31/2022)  irinotecan (CAMPTOSAR) chemo infusion, 326 mg, Intravenous, Once, 6 of 10 cycles  Administration: 340 mg (11/1/2021), 340 mg (11/15/2021), 340 mg (12/8/2021), 326 mg (12/21/2021), 320 mg (1/17/2022), 300 mg (1/31/2022)  leucovorin calcium IVPB, 300 mg/m2 = 543 mg (75 % of original dose 400 mg/m2), Intravenous, Once, 6 of 10 cycles  Dose modification: 300 mg/m2 (original dose 400 mg/m2, Cycle 1, Reason: Dose Not Tolerated)  Administration: 543 mg (11/1/2021), 543 mg (11/15/2021), 543 mg (12/8/2021), 543 mg (12/21/2021), 543 mg (1/17/2022), 500 mg (1/31/2022)  fluorouracil (ADRUCIL) ambulatory infusion Soln, 1,200 mg/m2/day = 4,345 mg, Intravenous, Over 46 hours, 6 of 10 cycles  tbo-filgrastim (GRANIX), 300 mcg (100 % of original dose 300 mcg), Subcutaneous, Once, 3 of 7 cycles  Dose modification: 6 mcg (original dose 300 mcg, Cycle 3), 300 mcg (original dose 300 mcg, Cycle 3), 300 mcg (original dose 300 mcg, Cycle 3)  Administration: 300 mcg (12/15/2021), 300 mcg (1/24/2022), 300 mcg (2/7/2022), 300 mcg (12/16/2021)     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[facility] [address] )   10/26/2021 Initial Diagnosis    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[facility] [address] )     11/1/2021 - 2/7/2022 Chemotherapy    fluorouracil (ADRUCIL), 300 mg/m2 = 545 mg (75 % of original dose 400 mg/m2), Intravenous, Once, 6 of 10 cycles  Dose modification: 300 mg/m2 (original dose 400 mg/m2, Cycle 1, Reason: Dose Not Tolerated)  Administration: 545 mg (11/1/2021), 545 mg (11/15/2021), 545 mg (12/8/2021), 545 mg (12/21/2021), 545 mg (1/17/2022), 500 mg (1/31/2022)  pegfilgrastim (Sherran Ada), 6 mg, Subcutaneous, Once, 2 of 2 cycles  Administration: 6 mg (11/3/2021), 6 mg (11/17/2021)  bevacizumab (AVASTIN) IVPB, 5 mg/kg = 330 mg, Intravenous, Once, 6 of 10 cycles  Administration: 330 mg (11/1/2021), 330 mg (11/15/2021), 330 mg (12/8/2021), 330 mg (12/21/2021), 330 mg (1/17/2022), 282 5 mg (1/31/2022)  irinotecan (CAMPTOSAR) chemo infusion, 326 mg, Intravenous, Once, 6 of 10 cycles  Administration: 340 mg (11/1/2021), 340 mg (11/15/2021), 340 mg (12/8/2021), 326 mg (12/21/2021), 320 mg (1/17/2022), 300 mg (1/31/2022)  leucovorin calcium IVPB, 300 mg/m2 = 543 mg (75 % of original dose 400 mg/m2), Intravenous, Once, 6 of 10 cycles  Dose modification: 300 mg/m2 (original dose 400 mg/m2, Cycle 1, Reason: Dose Not Tolerated)  Administration: 543 mg (11/1/2021), 543 mg (11/15/2021), 543 mg (12/8/2021), 543 mg (12/21/2021), 543 mg (1/17/2022), 500 mg (1/31/2022)  fluorouracil (ADRUCIL) ambulatory infusion Soln, 1,200 mg/m2/day = 4,345 mg, Intravenous, Over 46 hours, 6 of 10 cycles  tbo-filgrastim (GRANIX), 300 mcg (100 % of original dose 300 mcg), Subcutaneous, Once, 3 of 7 cycles  Dose modification: 6 mcg (original dose 300 mcg, Cycle 3), 300 mcg (original dose 300 mcg, Cycle 3), 300 mcg (original dose 300 mcg, Cycle 3)  Administration: 300 mcg (12/15/2021), 300 mcg (2022), 300 mcg (2022), 300 mcg (2021)         Past Medical History:   Past Medical History:   Diagnosis Date    Abnormal CT of liver     last assessed: 14    Anemia     iron def 2020 hgb 9 6    BPH (benign prostatic hypertrophy)     Dupuytren contracture     both hands    Erectile dysfunction of non-organic origin     last assessed: 12    Esophageal reflux     last assessed: 14    Esophagitis 2012    Familial hypercholesteremia     last assessed: 13    Hyperlipidemia     Hypertension     Paroxysmal atrial fibrillation ([facility] [address] )     last assessed: 17 post colonoscopy    Shortness of breath     exertional due to anemia       Past Surgical History:   Procedure Laterality Date    BACK SURGERY      Lower    COLONOSCOPY      CT NEEDLE BIOPSY RETROPERITONEUM  2020    HAND CONTRACTURE RELEASE Left 2017    Procedure: Left hand percutaneous fasciotomy of palm adductor space x 2; index finger PIP joint; ring finger PIP joint; small finger MCP joint and PIP joint  ; splint application;  Surgeon: Jaquelin Owens MD;  [facility]: [facility];  Service:     HAND SURGERY Bilateral     IR BIOPSY LUNG  2020    IR PORT PLACEMENT  2020       Family History   Problem Relation Age of Onset    No Known Problems Mother         natural causes    Heart disease Father     Heart attack Brother        Social History     Socioeconomic History    Marital status: /Civil Union     Spouse name: None    Number of children: None    Years of education: None    Highest education level: None   Occupational History    None   Tobacco Use    Smoking status: Former Smoker     Packs/day: 0 50     Years: 3 00     Pack years: 1 50     Types: Cigarettes     Start date:      Quit date:      Years since quittin 4    Smokeless tobacco: Never Used   Vaping Use    Vaping Use: Never used   Substance and Sexual Activity    Alcohol use: Never    Drug use: No    Sexual activity: None   Other Topics Concern    None   Social History Narrative     - grinding, nuclear work    Used [employer]     Social Determinants of Health     Financial Resource Strain: Not on file   Food Insecurity: Not on file   Transportation Needs: Not on file   Physical Activity: Not on file   Stress: Not on file   Social Connections: Not on file   Intimate Partner Violence: Not on file   Housing Stability: Not on file         Current Facility-Administered Medications:     acetaminophen (TYLENOL) tablet 650 mg, 650 mg, Oral, Q6H PRN, GRISELDA Ruelas, 650 mg at 05/01/22 0008    cefepime (MAXIPIME) 2,000 mg in dextrose 5 % 50 mL IVPB, 2,000 mg, Intravenous, Q12H, Jelena Hogan DO, Last Rate: 100 mL/hr at 05/02/22 0257, 2,000 mg at 05/02/22 0257    dextrose 5 % and sodium chloride 0 9 % with KCl 20 mEq/L infusion (premix), 75 mL/hr, Intravenous, Continuous, Danielito Thomson MD, Last Rate: 75 mL/hr at 05/02/22 0258, 75 mL/hr at 05/02/22 0258    heparin (porcine) subcutaneous injection 5,000 Units, 5,000 Units, Subcutaneous, Q8H [address], 5,000 Units at 05/02/22 0558 **AND** [COMPLETED] Platelet count, , , Once, Jelena Hogan DO    HYDROmorphone (DILAUDID) injection 0 5 mg, 0 5 mg, Intravenous, Q4H PRN, 0 5 mg at 05/01/22 1317 **OR** HYDROmorphone (DILAUDID) injection 1 mg, 1 mg, Intravenous, Q4H PRN, GRISELDA Frye, 1 mg at 05/02/22 1234    HYDROmorphone (DILAUDID) injection 0 5 mg, 0 5 mg, Intravenous, Q4H PRN, GRISELDA Frye, 0 5 mg at 05/02/22 0559    metoprolol (LOPRESSOR) injection 2 5 mg, 2 5 mg, Intravenous, Q6H, Jelena Hogan DO, 2 5 mg at 05/02/22 0802    ondansetron (ZOFRAN) injection 4 mg, 4 mg, Intravenous, Q4H PRN, Jelena Hogan DO, 4 mg at 05/01/22 1949    pantoprazole (PROTONIX) injection 40 mg, 40 mg, Intravenous, Q12H [address], Josué Shelton MD, 40 mg at 05/02/22 0804    Medications Prior to Admission   Medication    vitamin B-12 (VITAMIN B-12) 1,000 mcg tablet    atorvastatin (LIPITOR) 40 mg tablet    cholecalciferol (VITAMIN D3) 1,000 units tablet    dutasteride (AVODART) 0 5 mg capsule    Lonsurf 20-8  19 MG TABS    losartan (COZAAR) 25 mg tablet    metoprolol tartrate (LOPRESSOR) 25 mg tablet    Multiple Vitamin (MULTIVITAMIN) tablet    ondansetron (ZOFRAN) 4 mg tablet    predniSONE 10 mg tablet       Allergies   Allergen Reactions    Other Other (See Comments)     Liquid lidocaine - couldn't swallow, panicked       Physical Exam:     /69 (BP Location: Right arm)   Pulse 101   Temp 98 2 °F (36 8 °C) (Temporal)   Resp 18   Ht 5' 9" (1 753 m)   Wt 63 5 kg (139 lb 15 9 oz) Comment: obtained weight using standing scale  SpO2 94%   BMI 20 67 kg/m²     Physical Exam  HENT:      Head: Normocephalic  Nose:      Comments: NG tube     Mouth/Throat:      Mouth: Mucous membranes are dry  Pulmonary:      Effort: Pulmonary effort is normal       Comments: Decreased breath sounds BL LL  Abdominal:      Tenderness: There is abdominal tenderness  There is guarding  Skin:     General: Skin is warm and dry  Findings: Bruising present  Neurological:      General: No focal deficit present  Mental Status: He is alert and oriented to person, place, and time  Motor: Weakness present  Psychiatric:         Mood and Affect: Mood normal          Behavior: Behavior normal          Thought Content:  Thought content normal            Recent Results (from the past 48 hour(s))   Magnesium    Collection Time: 05/01/22  6:39 AM   Result Value Ref Range    Magnesium 1 6 1 6 - 2 6 mg/dL   Comprehensive metabolic panel    Collection Time: 05/01/22  6:39 AM   Result Value Ref Range    Sodium 140 136 - 145 mmol/L    Potassium 3 0 (L) 3 5 - 5 3 mmol/L    Chloride 108 100 - 108 mmol/L    CO2 26 21 - 32 mmol/L    ANION GAP 6 4 - 13 mmol/L    BUN 11 5 - 25 mg/dL    Creatinine 1 10 0 60 - 1 30 mg/dL    Glucose 95 65 - 140 mg/dL    Calcium 8 2 (L) 8 3 - 10 1 mg/dL    Corrected Calcium 10 0 8 3 - 10 1 mg/dL    AST 24 5 - 45 U/L    ALT 22 12 - 78 U/L    Alkaline Phosphatase 292 (H) 46 - 116 U/L    Total Protein 5 6 (L) 6 4 - 8 2 g/dL    Albumin 1 8 (L) 3 5 - 5 0 g/dL    Total Bilirubin 1 72 (H) 0 20 - 1 00 mg/dL    eGFR 63 ml/min/1 73sq m   CBC and differential    Collection Time: 05/01/22  6:39 AM   Result Value Ref Range    WBC 0 99 (LL) 4 31 - 10 16 Thousand/uL    RBC 4 47 3 88 - 5 62 Million/uL    Hemoglobin 13 8 12 0 - 17 0 g/dL    Hematocrit 42 1 36 5 - 49 3 %    MCV 94 82 - 98 fL    MCH 30 9 26 8 - 34 3 pg    MCHC 32 8 31 4 - 37 4 g/dL    RDW 15 2 (H) 11 6 - 15 1 %    MPV 10 8 8 9 - 12 7 fL    Platelets 67 (L) 253 - 390 Thousands/uL   Manual Differential(PHLEBS Do Not Order)    Collection Time: 05/01/22  6:39 AM   Result Value Ref Range    Segmented % 47 43 - 75 %    Lymphocytes % 37 14 - 44 %    Monocytes % 14 (H) 4 - 12 %    Eosinophils, % 1 0 - 6 %    Basophils % 1 0 - 1 %    Absolute Neutrophils 0 47 (L) 1 85 - 7 62 Thousand/uL    Lymphocytes Absolute 0 37 (L) 0 60 - 4 47 Thousand/uL    Monocytes Absolute 0 14 0 00 - 1 22 Thousand/uL    Eosinophils Absolute 0 01 0 00 - 0 40 Thousand/uL    Basophils Absolute 0 01 0 00 - 0 10 Thousand/uL    Total Counted      Anisocytosis Present     Poikilocytes Present     Platelet Estimate Decreased (A) Adequate   Basic metabolic panel    Collection Time: 05/02/22  5:51 AM   Result Value Ref Range    Sodium 142 136 - 145 mmol/L    Potassium 3 2 (L) 3 5 - 5 3 mmol/L    Chloride 110 (H) 100 - 108 mmol/L    CO2 27 21 - 32 mmol/L    ANION GAP 5 4 - 13 mmol/L    BUN 14 5 - 25 mg/dL    Creatinine 1 12 0 60 - 1 30 mg/dL    Glucose 107 65 - 140 mg/dL    Calcium 8 2 (L) 8 3 - 10 1 mg/dL    eGFR 62 ml/min/1 73sq m   CBC and differential    Collection Time: 05/02/22  8:38 AM   Result Value Ref Range    WBC 2 37 (L) 4 31 - 10 16 Thousand/uL    RBC 2 80 (L) 3 88 - 5 62 Million/uL    Hemoglobin 8 7 (L) 12 0 - 17 0 g/dL    Hematocrit 27 1 (L) 36 5 - 49 3 %    MCV 97 82 - 98 fL    MCH 31 1 26 8 - 34 3 pg    MCHC 32 1 31 4 - 37 4 g/dL    RDW 15 4 (H) 11 6 - 15 1 %    MPV 10 5 8 9 - 12 7 fL    Platelets 114 (L) 198 - 390 Thousands/uL    nRBC 0 /100 WBCs    Neutrophils Relative 52 43 - 75 %    Immat GRANS % 0 0 - 2 %    Lymphocytes Relative 26 14 - 44 %    Monocytes Relative 20 (H) 4 - 12 %    Eosinophils Relative 2 0 - 6 %    Basophils Relative 0 0 - 1 %    Neutrophils Absolute 1 23 (L) 1 85 - 7 62 Thousands/µL    Immature Grans Absolute 0 01 0 00 - 0 20 Thousand/uL    Lymphocytes Absolute 0 61 0 60 - 4 47 Thousands/µL    Monocytes Absolute 0 47 0 17 - 1 22 Thousand/µL    Eosinophils Absolute 0 04 0 00 - 0 61 Thousand/µL    Basophils Absolute 0 01 0 00 - 0 10 Thousands/µL   Prealbumin    Collection Time: 05/02/22  8:50 AM   Result Value Ref Range    Prealbumin 5 8 (L) 18 0 - 40 0 mg/dL       CT abdomen pelvis with contrast    Result Date: 4/26/2022  Narrative: CT ABDOMEN AND PELVIS WITH IV CONTRAST INDICATION:   LLQ abdominal pain acute abd pain  COMPARISON:  CT small bowel enterography 3/13/2022 CT chest abdomen pelvis 2/9/2022 TECHNIQUE:  CT examination of the abdomen and pelvis was performed  Axial, sagittal, and coronal 2D reformatted images were created from the source data and submitted for interpretation  Radiation dose length product (DLP) for this visit:  438 mGy-cm   This examination, like all CT scans performed in the [facility], was performed utilizing techniques to minimize radiation dose exposure, including the use of iterative reconstruction and automated exposure control  IV Contrast:  100 mL of iohexol (OMNIPAQUE) Enteric Contrast:  Enteric contrast was not administered  FINDINGS: ABDOMEN LOWER CHEST:  Unchanged bibasilar nodules measuring up to an 8 mm #2/4  Discoid atelectasis at the right lung base   LIVER/BILIARY TREE:  Unchanged hepatic cysts and hemangiomas  No suspicious hepatic lesions  GALLBLADDER:  There are gallstone(s) within the gallbladder, without pericholecystic inflammatory changes  SPLEEN:  Unremarkable  PANCREAS:  Unchanged 8 mm uncinate process cysts stable since 2014  #2/32 ADRENAL GLANDS:  Unremarkable  KIDNEYS/URETERS:  No hydronephrosis  Unchanged renal cysts  STOMACH AND BOWEL:  Distention of multiple segments of proximal and mid small bowel throughout the mid abdomen with mild mucosal hyperemia and areas of mild thickening  Possible transition point of an obstruction on #2/43 with multiple adjacent adherent  loops of small bowel possibly representing an adhesion  The terminal ileum is decompressed  Distal colonic diverticulosis without diverticulitis  APPENDIX:  No findings to suggest appendicitis  ABDOMINOPELVIC CAVITY:  No ascites or pneumoperitoneum  Unchanged 4 4 x 1 6 and 4 3 x 1 9 anterior omental lesions #2/38  Decreased size of the implant located inferior to the spleen now measuring 14 x 13 mm previously measured 2 5 x 1 1 cm  Unchanged partially calcified 14 mm right mesenteric nodule #240 is located adjacent to the area of suspected small bowel transition point  VESSELS:  Unremarkable for patient's age  PELVIS REPRODUCTIVE ORGANS:  Prostamegaly URINARY BLADDER:  Unremarkable  ABDOMINAL WALL/INGUINAL REGIONS:  Unchanged benign intramuscular hematoma anterior to the left femoral neck  OSSEOUS STRUCTURES:  No acute fracture or destructive osseous lesion  Impression: Interval distention of multiple segments of proximal and mid small bowel with a suspected transition point of an obstruction in the right midabdomen #2/42  Multiple small bowel segments are adherent to this location suspicious for an adhesion  Unchanged omental/peritoneal implants  Stable 8 cm pancreatic uncinate process cysts  The study was marked in [facility] for immediate notification   Workstation performed: [id number]       Labs and pertinent reports reviewed

## 2022-05-02 NOTE — MALNUTRITION/BMI
This medical record reflects one or more clinical indicators suggestive of malnutrition and/or morbid obesity  Malnutrition Findings:   Adult Malnutrition type: Chronic illness  Adult Degree of Malnutrition: Malnutrition of moderate degree  Malnutrition Characteristics: Fat loss,Muscle loss,Inadequate energy                  360 Statement: Acute on chronic  moderate pro, kaitlynn malnutrition d/t CA, current SBO, as evidence by NPO x 6 days, mild muscle and fat loss at temples, clavicles, shoulders, triceps, chest  Per luis "Maintain NPO/NGT, [email or web address], Continue IV Protonix, OOB/Ambulate, Recommend PICC/TPN " Reviewed labs: K 3 2, replete K before intiating TPN  Recommend initiating standard TPN first 2 days, monitor electrolytes, triglycerides, if electrolytes wnl can advance to goal TPN third day AA15 500mL, D30 1000mL, vxqsdb85% 225mL provides total of 1770cal 75g pro, 1725mL, glucose load 3 2mg/kg/min, lipid load   7g/kg/day  BMI Findings: Body mass index is 20 67 kg/m²  See Nutrition note dated 5/2/22 for additional details  Completed nutrition assessment is viewable in the nutrition documentation

## 2022-05-02 NOTE — PROGRESS NOTES
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, and RP lymphadenopathy, presents with a malignant SBO    Afebrile  VSS   NGT with 1 2L bilious drainage  + Flatus yesterday  No BMs     Plan:  Maintain NPO/NGT  [email or web address]  Continue IV Protonix  OOB/Ambulate  Recommend PICC/TPN   Will plan for multidisciplinary discussion today to discuss treatment plan going forward  Subjective/Objective     Subjective: No acute events overnight  He passed flatus yesterday  No BMs yet  He is still having colicky abdominal pain  No fevers in last 24 hrs  Objective:     Blood pressure 110/64, pulse 103, temperature 97 9 °F (36 6 °C), temperature source Temporal, resp  rate 18, height 5' 9" (1 753 m), weight 63 kg (139 lb), SpO2 94 %  ,Body mass index is 20 53 kg/m²  Intake/Output Summary (Last 24 hours) at 5/2/2022 2460  Last data filed at 5/2/2022 0601  Gross per 24 hour   Intake 960 ml   Output 1700 ml   Net -740 ml       Invasive Devices  Report    Central Venous Catheter Line            Port A Cath 12/31/20 Right Chest 486 days          Drain            NG/OG/Enteral Tube Nasogastric 16 Fr Right nare 5 days                Physical Exam:   NAD, alert and oriented x3  Normocephalic, atraumatic  MMM, NGT in place with bilious drainage   Norm resp effort on room air   Regular rate  Abd soft, mild distention, tenderness in epigastric region and L abdomen     No calf tenderness or peripheral edema  -rash/lesions          Lab, Imaging and other studies:  CBC:   Lab Results   Component Value Date    WBC 0 99 (LL) 05/01/2022    HGB 13 8 05/01/2022    HCT 42 1 05/01/2022    MCV 94 05/01/2022    PLT 67 (L) 05/01/2022    MCH 30 9 05/01/2022    MCHC 32 8 05/01/2022    RDW 15 2 (H) 05/01/2022    MPV 10 8 05/01/2022   , CMP:   Lab Results Component Value Date    SODIUM 140 05/01/2022    K 3 0 (L) 05/01/2022     05/01/2022    CO2 26 05/01/2022    BUN 11 05/01/2022    CREATININE 1 10 05/01/2022    CALCIUM 8 2 (L) 05/01/2022    AST 24 05/01/2022    ALT 22 05/01/2022    ALKPHOS 292 (H) 05/01/2022    EGFR 63 05/01/2022     VTE Pharmacologic Prophylaxis: Heparin  VTE Mechanical Prophylaxis: sequential compression device

## 2022-05-02 NOTE — Clinical Note
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

## 2022-05-03 NOTE — OCCUPATIONAL THERAPY NOTE
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)    Neutropenia (HCC)    Antineoplastic chemotherapy induced pancytopenia (CODE) (HCC)    Hypokalemia    Paroxysmal atrial fibrillation ([address] )    Past Medical History  Past Medical History:   Diagnosis Date    Abnormal CT of liver     last assessed: 07/21/14    Anemia     iron def 6/2020 hgb 9 6    BPH (benign prostatic hypertrophy)     Dupuytren contracture     both hands    Erectile dysfunction of non-organic origin     last assessed: 11/27/12    Esophageal reflux     last assessed: 11/11/14    Esophagitis 03/13/2012    Familial hypercholesteremia     last assessed: 06/11/13    Hyperlipidemia     Hypertension     Paroxysmal atrial fibrillation ([facility] )     last assessed: 04/06/17 post colonoscopy    Paroxysmal atrial fibrillation ([facility] )     last assessed: 04/06/17 post colonoscopy    Shortness of breath 2020    exertional due to anemia     Past Surgical History  Past Surgical History:   Procedure Laterality Date    BACK SURGERY      Lower    COLONOSCOPY      CT NEEDLE BIOPSY RETROPERITONEUM  12/31/2020    HAND CONTRACTURE RELEASE Left 5/23/2017    Procedure: Left hand percutaneous fasciotomy of palm adductor space x 2; index finger PIP joint; ring finger PIP joint; small finger MCP joint and PIP joint  ; splint application;  Surgeon: Forrest May MD;  [facility];  Service:     HAND SURGERY Bilateral     IR BIOPSY LUNG  12/9/2020    IR PORT PLACEMENT  12/31/2020 05/03/22 0958   OT Last Visit   OT Visit Date 05/03/22   Note Type   Note type Evaluation Restrictions/Precautions   Weight Bearing Precautions Per Order No   Braces or Orthoses   (Abdominal Binder)   Other Precautions Fall Risk;Multiple lines;Pain   Pain Assessment   Pain Assessment Tool 0-10   Pain Score 10 - Worst Possible Pain   Pain Location/Orientation Location: Abdomen   Home Living   Type of [address] [facility] level;Performs ADLs on one level; Able to live on main level with bedroom/bathroom;Stairs to enter with rails   Bathroom Shower/Tub Walk-in shower   Bathroom Toilet Standard   [address] Walker;Cane;Stair glide   Prior Function   Level of [place] Independent with ADLs and functional mobility   Lives With [place] Help From Family  (Wife, neighbors)   [address] [place] [address] in the last 6 months 0   Vocational Retired   Lifestyle   Autonomy PTA pt states independence with ADL status, transfers and functional mobility -w/o device  Pt is -home alone, spouse provides support; supportive neighbors  Pt is -falls, +driving, -working  Reciprocal Relationships Spouse   Service to Others Worked at [employer] Owns a cat    Psychosocial   Psychosocial (WDL) WDL   Subjective   Subjective "I need to lay back down"    ADL   Where Assessed Supine, bed  (HOB elevated )   Eating Assistance 6  Modified independent   Grooming Assistance 6  Modified Independent   UB Bathing Assistance 5  Supervision/Setup   LB Bathing Assistance 4  Minimal Assistance   [address] 5  Supervision/Setup   LB Dressing Assistance 4  [address] 5  Supervision   Additional items HOB elevated; Bedrails;Verbal cues; Increased time required   Supine to Sit 5  Supervision   Additional items HOB elevated; Increased time required;Verbal cues; Bedrails   Sit to Supine 5  Supervision   Additional items HOB elevated; Bedrails; Increased time required;Verbal cues   Additional Comments Pt states severe pain when sitting EOB; returned to supine 2* to pain  Pt demonstrated shifting himself upward in bed using b/l hand rails  Transfers   Sit to Stand Unable to assess   Additional Comments   (Unable to assess 2* pain)   Functional Mobility   Additional Comments   (Pt mobility lmited 2* to pain, )   Balance   Static Sitting Fair   Dynamic Sitting Fair -   Activity Tolerance   Activity Tolerance Patient limited by pain   Medical Staff Made Aware nsg PT   Nurse Made Aware Yes   RUE Assessment   RUE Assessment WFL   RUE Strength   RUE Overall Strength Within Functional Limits - able to perform ADL tasks with strength  (4/5 Throughout)   LUE Assessment   LUE Assessment WNL   LUE Strength   LUE Overall Strength Within Functional Limits - able to perform ADL tasks with strength  (4/5 Throughout)   Hand Function   Gross Motor Coordination Functional   Fine Motor Coordination Impaired  (impaired 2* B/L Dupuytren's)   Sensation   Light Touch No apparent deficits   Proprioception   Proprioception No apparent deficits   Vision-Basic Assessment   Current Vision Wears glasses all the time   Vision - Complex Assessment   Acuity Able to read clock/calendar on wall without difficulty   Perception   Inattention/Neglect Appears intact   Cognition   Overall Cognitive Status [facility]   Arousal/Participation Alert; Cooperative   Attention Within functional limits   Orientation Level Oriented X4   Memory Within functional limits   Following Commands Follows all commands and directions without difficulty   Assessment   Limitation Decreased ADL status; Decreased UE strength;Decreased Safe judgement during ADL;Decreased endurance;Decreased high-level ADLs   Prognosis Fair   Assessment Pt is a 65 Y/O Male admitted to the hospital 2* symptoms of nausea and nonbloody vomiting  Ct scan shows distension of multiple segments of bowel and suspected obstruction (4/26); Pt scheduled for EGD and Peg placement  Pt dx of SBO 2* lymphoma   Pt with PMH B/L Dupuytrens, HTN, AFIB, Lymphoma, Back and hand sx  PTA pt states independence with ADL status, transfers and functional mobility -w/o device  Pt is -home alone, spouse provides support; supportive neighbors  Pt is -falls, +driving, -working  During initial eval Pt demonstrated deficits with functional mobility, functional balance, B/L UE strength, ADL status, transfer safety, and activity tolerance(currently=fair), Due to these deficits, Pt would benefit from recieving OT treatment 2-3x a week for 1-2 weeks  Goals   Patient Goals To go home   STG Time Frame   (1-7 Days)   Short Term Goal #1 Pt will demonstrate mod I bed mobility to assist with transfer safety and ADL completion  Short Term Goal #2 Pt will demonstrate improved functional balance by one grade to assist with safety during ADL completion  Short Term Goal  Pt will verbalize 2-3 pain managment strategies to utilize at home    LTG Time Frame   (7-14 Days )   Long Term Goal #1 Pt will demonstrate improved activity tolerance(20-30 mins) to assist with functional mobility and ADL'S    Long Term Goal #2 Pt will improve UE B/L strength by one grade to assist with UE dressing  Long Term Goal Pt will demonstrate mod I with UE and LE Bathing/dressing    Plan   Treatment Interventions ADL retraining;UE strengthening/ROM; Endurance training;Patient/family training; Activityengagement; Compensatory technique education; Functional transfer training   Goal Expiration Date 05/17/22   OT Treatment Day 0   OT Frequency 2-3x/wk   Recommendation   OT Discharge Recommendation Home with home health rehabilitation   AM-PAC Daily Activity Inpatient   Lower Body Dressing 3   Bathing 3   Toileting 3   Upper Body Dressing 4   Grooming 4   Eating 4   Daily Activity Raw Score 21   Daily Activity Standardized Score (Calc for Raw Score >=11) 44 27   AM-PAC Applied Cognition Inpatient   Following a Speech/Presentation 4   Understanding Ordinary Conversation 4   Taking Medications 4   Remembering Where Things Are Placed or Put Away 4   Remembering List of 4-5 Errands 4   Taking Care of Complicated Tasks 4   Applied Cognition Raw Score 24   Applied Cognition Standardized Score 62 21   Fe Arriaga

## 2022-05-03 NOTE — PROGRESS NOTES
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, and RP lymphadenopathy, presents with a malignant SBO      Plan:  Maintain NPO/NGT  [email or web address]  Continue IV Protonix  OOB/Ambulate  Recommend PICC/TPN       GI planning for attempt at decompressive PEG placement    Subjective/Objective     Subjective: No acute events overnight  Still with discomfort and high volume out of NGT    Had multidisciplinary meeting yesterday which discussed venting G tube and transition to palliative  Patient and wife present along with hematology, palliative and surgical team        Objective:     Blood pressure 152/80, pulse (!) 108, temperature (!) 96 8 °F (36 °C), temperature source Temporal, resp  rate 18, height 5' 9" (1 753 m), weight 63 5 kg (139 lb 15 9 oz), SpO2 91 %  ,Body mass index is 20 67 kg/m²        Intake/Output Summary (Last 24 hours) at 5/3/2022 0530  Last data filed at 5/2/2022 1821  Gross per 24 hour   Intake --   Output 1100 ml   Net -1100 ml       Invasive Devices  Report    Central Venous Catheter Line            Port A Cath 12/31/20 Right Chest 487 days          Drain            NG/OG/Enteral Tube Nasogastric 16 Fr Right nare 6 days                Physical Exam: General: AAOx3  Head: normocephalic, atraumatic  Neck: supple, trachea midline  Respiratory: BS b/l  Abdomen: Soft, mild tenderness, distended but feeling ok  Heart: RRR, S1s2  Ext: Warm no cyanosis               Lab, Imaging and other studies:  CBC:   Lab Results   Component Value Date    WBC 2 37 (L) 05/02/2022    HGB 8 7 (L) 05/02/2022    HCT 27 1 (L) 05/02/2022    MCV 97 05/02/2022     (L) 05/02/2022    MCH 31 1 05/02/2022    MCHC 32 1 05/02/2022    RDW 15 4 (H) 05/02/2022    MPV 10 5 05/02/2022    NRBC 0 05/02/2022   , CMP:   Lab Results   Component Value Date    SODIUM 142 05/02/2022    K 3 2 (L) 05/02/2022     (H) 05/02/2022    CO2 27 05/02/2022    BUN 14 05/02/2022    CREATININE 1 12 05/02/2022    CALCIUM 8 2 (L) 05/02/2022    EGFR 62 05/02/2022     VTE Pharmacologic Prophylaxis: Heparin  VTE Mechanical Prophylaxis: sequential compression device None known

## 2022-05-03 NOTE — ANESTHESIA PREPROCEDURE EVALUATION
Procedure:  EGD    Relevant Problems   CARDIO   (+) Benign essential hypertension   (+) Hyperlipidemia   (+) Paroxysmal atrial fibrillation (HCC)      GI/HEPATIC   (+) Esophageal reflux   (+) Gastric ulcer   (+) 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HCC)   (+) Small bowel obstruction (HCC)      /RENAL   (+) Benign prostatic hyperplasia      HEMATOLOGY   (+) Anemia   (+) Antineoplastic chemotherapy induced pancytopenia (CODE) (HCC)   (+) 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)      PULMONARY   (+) Chronic obstructive pulmonary disease (HCC)      LUNGS:  Bilateral pulmonary metastases are present, and while many have remained stable in size, a few have increased in size, with an index lesion in the left upper lobe measuring 1 3 x 0 9 cm, previously 1 x 0 8 cm  An index right upper lobe   juxtapleural nodule measures 1 2 x 0 9 cm (3, 46) previously 1 1 x 0 9 cm        Interval distention of multiple segments of proximal and mid small bowel with a suspected transition point of an obstruction in the right midabdomen #2/42  Multiple small bowel segments are adherent to this location suspicious for an adhesion      Unchanged omental/peritoneal implants  Unchanged 4 4 x 1 6 and 4 3 x 1 9 anterior omental lesions #2/38  Decreased size of the implant located inferior to the spleen now measuring 14 x 13 mm previously measured 2 5 x 1 1 cm      Stable 8 cm pancreatic uncinate process cysts  Anesthesia Plan  ASA Score- 4     Anesthesia Type- general with ASA Monitors  Additional Monitors:   Airway Plan: ETT  Plan Factors-Exercise tolerance (METS): <4 METS  Chart reviewed  Imaging results reviewed  Existing labs reviewed  Patient summary reviewed  Patient is not a current smoker  Induction- intravenous  Postoperative Plan-     Informed Consent- Anesthetic plan and risks discussed with patient

## 2022-05-03 NOTE — ASSESSMENT & PLAN NOTE
Patient follows with Medical Oncology at [address] [place] [address] as well as [facility]  Patient show progression through 2 lines of chemotherapy  Recently finished first 2 week course of Lonsurf Last friday  Unfortunately he now has SBO in setting of peritoneal carcinomatosis

## 2022-05-03 NOTE — PROGRESS NOTES
[address] [facility]  Progress Note - Colon Holy 1945, 64 y o  male MRN: [id number]  Unit/Bed#: E2 -02 Encounter: [id number]  Primary Care Provider: Rocio Chaney DO   Date and time admitted to hospital: 2022 11:36 AM    * Small bowel obstruction [facility])  Assessment & Plan  Due to lymphoma  Appreciate palliative care, oncology, surgery and GI help  Unfortunately patient has failed chemotherapy and no surgery is felt to be helpful to relieve the obstruction  Planning on venting PEG tube for venting tomorrow, then we can get NG tube out  Patient is planning on home with hospice  Hospice liason will be out this afternoon to discuss services  I had an advanced care plan discussion with patient and wife at length today    MALT (mucosa associated lymphoid tissue)- Gastric [facility])  Assessment & Plan  Patient follows with Medical Oncology at [facility] as well as [facility]  Patient show progression through 2 lines of chemotherapy  Recently finished first 2 week course of Lonsurf Last friday  Unfortunately he now has SBO in setting of peritoneal carcinomatosis  Subjective:   Had a lot of abdominal pain when working with PT today    He also has nausea that comes and goes  No flatus and no BM      Objective:     Vitals:   Temp (24hrs), Av 1 °F (36 7 °C), Min:96 8 °F (36 °C), Max:98 8 °F (37 1 °C)    Temp:  [96 8 °F (36 °C)-98 8 °F (37 1 °C)] 98 8 °F (37 1 °C)  HR:  [100-108] 100  Resp:  [18] 18  BP: (111-152)/(65-89) 137/80  SpO2:  [91 %-94 %] 93 %  Body mass index is 20 67 kg/m²  Input and Output Summary (last 24 hours): Intake/Output Summary (Last 24 hours) at 5/3/2022 1130  Last data filed at 5/3/2022 0932  Gross per 24 hour   Intake 3032 5 ml   Output 750 ml   Net 2282 5 ml       Physical Exam:     Physical Exam  Vitals and nursing note reviewed     Constitutional:       Comments: Mild distress from pain and discomfort of NG tube HENT:      Head: Normocephalic and atraumatic  Eyes:      Pupils: Pupils are equal, round, and reactive to light  Cardiovascular:      Rate and Rhythm: Normal rate and regular rhythm  Heart sounds: No murmur heard  No friction rub  No gallop  Pulmonary:      Effort: Pulmonary effort is normal       Breath sounds: Normal breath sounds  No wheezing or rales  Abdominal:      General: Bowel sounds are normal       Palpations: Abdomen is soft  Tenderness: There is abdominal tenderness (mild tenderness all quadrants)  There is no guarding or rebound  Musculoskeletal:      Right lower leg: No edema  Left lower leg: No edema          Additional Data:     Labs:    Results from last 7 days   Lab Units 05/02/22  0838   WBC Thousand/uL 2 37*   HEMOGLOBIN g/dL 8 7*   HEMATOCRIT % 27 1*   PLATELETS Thousands/uL 136*   NEUTROS PCT % 52   LYMPHS PCT % 26   MONOS PCT % 20*   EOS PCT % 2     Results from last 7 days   Lab Units 05/03/22  0540 05/02/22  0551 05/01/22  0639   POTASSIUM mmol/L 3 1*   < > 3 0*   CHLORIDE mmol/L 111*   < > 108   CO2 mmol/L 26   < > 26   BUN mg/dL 12   < > 11   CREATININE mg/dL 1 09   < > 1 10   CALCIUM mg/dL 7 9*   < > 8 2*   ALK PHOS U/L  --   --  292*   ALT U/L  --   --  22   AST U/L  --   --  24    < > = values in this interval not displayed  Results from last 7 days   Lab Units 05/02/22  1609   INR  1 19                   * I Have Reviewed All Lab Data     Recent Cultures (last 7 days):     Results from last 7 days   Lab Units 04/26/22  1408 04/26/22  1404   BLOOD CULTURE  No Growth After 5 Days  No Growth After 5 Days           Last 24 Hours Medication List:   Current Facility-Administered Medications   Medication Dose Route Frequency Provider Last Rate    acetaminophen  650 mg Oral Q6H PRN GRISELDA Roblero      cefepime  2,000 mg Intravenous Q12H Ryland Berkowitz DO 2,000 mg (05/03/22 0243)    dextrose 5 % and sodium chloride 0 9 % with KCl 20 mEq/L 75 mL/hr Intravenous Continuous Danielito Thomson MD 75 mL/hr (05/03/22 0932)    heparin (porcine)  5,000 Units Subcutaneous [facility] Velia Spare, DO      HYDROmorphone  0 5 mg Intravenous Q4H PRN Emerson Seal, CRNP      Or    HYDROmorphone  1 mg Intravenous Q4H PRN Emerson Seal, CRNP      HYDROmorphone  0 5 mg Intravenous Q4H PRN Emerson Seal, CRNP      metoprolol  2 5 mg Intravenous Q6H Velia Spare, DO      ondansetron  4 mg Intravenous Q4H PRN Velia Spare, DO      pantoprazole  40 mg Intravenous Q12H [facility] Jada Diaz MD           VTE Pharmacologic Prophylaxis:   Pharmacologic: Heparin      Current Length of Stay: 7 day(s)    Current Patient Status: Inpatient       Discharge Plan: home with hospice hopefully later this week  Venting PEG tube planned for tomorrow    Code Status: Level 3 - DNAR and DNI           Today, Patient Was Seen By: William Paget, DO    ** Please Note: Dictation voice to text software may have been used in the creation of this document   **

## 2022-05-03 NOTE — ACP (ADVANCE CARE PLANNING)
Serious Illness Conversation    1  What is your understanding now of where you are with your illness? Prognostic Understanding: appropriate understanding of prognosis  We had a long discussion that unfortunately his cancer is spreading and now it is blocking his bowel  No treatment are felt to be helpful  Surgery is not felt to be an option due to extend of spread of cancer and risk of death with surgery would be high  Patient expressed understanding of all of this  He and his wife agree to home with hospice  I explained that the goal is to keep him as comfortable as possible  Hospice nurses will have plenty of medications to help keep him comfortable  2  How much information about what is likely to be ahead with your illness would you like to have? Information: patient wants to be fully informed     3  What did you (clinician) communicate to the patient? Prognostic Communication: Time - I wish we were not in this situation, but I am worried that time may be as short as weeks (express as a range, e g  days to weeks, weeks to months, months to a year)  4  If your health situation worsens, what are your most important goals? Goals: be at home     5  What are the biggest fears and worries about the future and your health? Fears/Worries: pain  He is in a significant amount of pain  I explained that we can always give him higher doses or other medications  He just needs to let us know how is feeling and what he needs  6  What abilities are so critical to your life that you cannot imagine living without them? Unacceptable Function: being in pain or very uncomfortable     7  What gives you strength as you think about the future with your illness? His wife is very caring and able to help him at home     8  If you become sicker, how much are you willing to go through for the possibility of gaining more time? Be on a ventilator: No Be uncomfortable: No      9   How much does your proxy and family know about your priorities and wishes? Discussion Discussion: extensive discussion with family about goals and wishes     Lady Wallace heard you say that pain is really important to you  Keeping that in mind, and what we know about your illness, I recommend that we discuss with the hospice liason all of the options we have for pain control  This will help us make sure that your treatment plans reflect whats important to you  How does this plan sound to you? I will do everything I can to help you through this  Patient verbalized understanding of the plan     I have spent   minutes speaking with my patient on advanced care planning today or during this visit     Advanced directives  Five Wishes: Patient does not have Five Wishes- would not like information

## 2022-05-03 NOTE — PALLIATIVE CARE CONFERENCE
Palliative MSW saw patient at the bedside today  MSW appreciates the opportunity to provider patient/family with inpatient emotional support and guidance while patient continues to receive medical attention from the medical team     Topics discussed: Pt was being taken to surgery for his decompressive PEG placement  This writer provided emotional support to his spouse Gladys Phillips  She expressed what a good life they had together  I encouraged her to self care and educated on the importance, especially since there are no children and no family  She verbalized agreement  She was meeting with the hospice liaison       Areas that need follow-up: None  Resources given: None  Others present:  Pts spouse Gladys Phillips    MSW will continue to follow as requested by the medical team, patient, or family

## 2022-05-03 NOTE — PLAN OF CARE
Problem: OCCUPATIONAL THERAPY ADULT  Goal: Performs self-care activities at highest level of function for planned discharge setting  See evaluation for individualized goals  Description: Treatment Interventions: ADL retraining,UE strengthening/ROM,Endurance training,Patient/family training,Activityengagement,Compensatory technique education,Functional transfer training        See flowsheet documentation for full assessment, interventions and recommendations  Note: Limitation: Decreased ADL status,Decreased UE strength,Decreased Safe judgement during ADL,Decreased endurance,Decreased high-level ADLs  Prognosis: Fair  Assessment: Pt is a 63 Y/O Male admitted to the hospital 2* symptoms of nausea and nonbloody vomiting  Ct scan shows distension of multiple segments of bowel and suspected obstruction (4/26; Pt scheduled for EGD and Peg placement  Pt dx of SBO 2* lymphoma  Pt with PMH B/L Dupuytrens, HTN, AFIB, Lymphoma, Back and hand sx  PTA pt states independence with ADL status, transfers and functional mobility -w/o device  Pt is -home alone, spouse provides support; supportive neighbors  Pt is -falls, +driving, -working  During initial eval Pt demonstrated deficits with functional mobility, functional balance, B/L UE strength, ADL status, transfer safety, and activity tolerance(currently=fair), Due to these deficits, Pt would benefit from recieving OT treatment 2-3x a week for 1-2 weeks  (Simultaneous filing   User may not have seen previous data )     OT Discharge Recommendation: Home with home health rehabilitation

## 2022-05-03 NOTE — QUICK NOTE
Spoke with Helder Cuba from [facility] ([phone],  will connect)  Informed them that patient will no longer be requiring deliveries for [place], oral chemotherapy

## 2022-05-03 NOTE — PLAN OF CARE
Problem: PHYSICAL THERAPY ADULT  Goal: Performs mobility at highest level of function for planned discharge setting  See evaluation for individualized goals  Description: Treatment/Interventions: LE strengthening/ROM,Therapeutic exercise,Endurance training,Patient/family training,Bed mobility,Spoke to nursing,OT          See flowsheet documentation for full assessment, interventions and recommendations  Note: Prognosis: Poor  Problem List: Decreased strength,Pain (Will evalute further impairments next session)  Assessment: Pt is a 65 y/o male who presented with symptoms of lower abdominal pain with nausea and dry heaving since 4/25/2022  Patient admitted on 4/26/2022 with stage IV adenocarcinoma of small bowel, 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  Pt currently s/p EGD on 5/3/2022 where b/l pumonary metastases were found, along with distention of the proximal and mid abdomen with adhered small bowel segments  Pt's PMH that may impact PT session include anemia, dupuytren contracture of both hands, HLD, HTN, paroxysmal A-fib, SOB, and previous chemotherapy  Pt referred to PT for mobility assessment and discharge planning  On eval, patient experienced 10/10 pain in his abdomen at baseline  Patient demonstrated S with rolling to R and supine<>sit; unable to complete further mobility assessment 2* to increased pain upon supine to sit  Pt required line management during bed mobility  The patient's AM-PAC Basic Mobility Inpatient Short Form Raw Score is 14  A Raw score of less than or equal to 16 suggests the patient may benefit from discharge to post-acute rehabilitation services  Please also refer to the recommendation of the Physical Therapist for safe discharge planning  From PT standpoint, D/C recommendation TBD pending OOB mobility assessment  Per chart, ? Home hospice  Will continue PT per POC  At end of session, patient stable with lines intact and needs within reach  Nsg notified    Barriers to Discharge: Inaccessible home environment        PT Discharge Recommendation:  (D/C TBD pending OOB mobility assessment)          See flowsheet documentation for full assessment

## 2022-05-03 NOTE — ASSESSMENT & PLAN NOTE
Due to lymphoma  Appreciate palliative care, oncology, surgery and GI help  Unfortunately patient has failed chemotherapy and no surgery is felt to be helpful to relieve the obstruction  Planning on venting PEG tube for venting tomorrow, then we can get NG tube out  Patient is planning on home with hospice  Hospice liason will be out this afternoon to discuss services      I had an advanced care plan discussion with patient and wife at length today

## 2022-05-03 NOTE — PHYSICAL THERAPY NOTE
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 ([facility] ) [C78 6]    Past Medical History:   Diagnosis Date    Abnormal CT of liver     last assessed: 07/21/14    Anemia     iron def 6/2020 hgb 9 6    BPH (benign prostatic hypertrophy)     Dupuytren contracture     both hands    Erectile dysfunction of non-organic origin     last assessed: 11/27/12    Esophageal reflux     last assessed: 11/11/14    Esophagitis 03/13/2012    Familial hypercholesteremia     last assessed: 06/11/13    Hyperlipidemia     Hypertension     Paroxysmal atrial fibrillation ([facility] )     last assessed: 04/06/17 post colonoscopy    Paroxysmal atrial fibrillation ([facility] )     last assessed: 04/06/17 post colonoscopy    Shortness of breath 2020    exertional due to anemia       Past Surgical History:   Procedure Laterality Date    BACK SURGERY      Lower    COLONOSCOPY      CT NEEDLE BIOPSY RETROPERITONEUM  12/31/2020    HAND CONTRACTURE RELEASE Left 5/23/2017    Procedure: Left hand percutaneous fasciotomy of palm adductor space x 2; index finger PIP joint; ring finger PIP joint; small finger MCP joint and PIP joint  ; splint application;  Surgeon: Rema Hylton MD;  [facility];  Service:     HAND SURGERY Bilateral     IR BIOPSY LUNG  12/9/2020    IR PORT PLACEMENT  12/31/2020       Imaging Studies:  XR abdomen 1 view kub   Final Result by Juan Ramon Kerr MD (04/29 5067)      Findings of persistent small bowel dilatation  Interval decompression of some small bowel loops in the upper abdomen likely secondary to NG tube           Workstation performed: [id number]         XR abdomen 1 vw portable   Final Result by Navdeep Jesus MD (04/29 7593)      Barium contrast again noted within the stomach and small bowel effectively unchanged from prior exams  Findings are in keeping with small bowel obstruction  Workstation performed: [id number]         CT abdomen pelvis with contrast   Final Result by Rosa Tadeo MD (04/26 0568)      Interval distention of multiple segments of proximal and mid small bowel with a suspected transition point of an obstruction in the right midabdomen #2/42  Multiple small bowel segments are adherent to this location suspicious for an adhesion  Unchanged omental/peritoneal implants  Stable 8 cm pancreatic uncinate process cysts  The study was marked in [facility] for immediate notification  Workstation performed: [id number]                05/03/22 1026   PT Last Visit   PT Visit Date 05/03/22   Note Type   Note type Evaluation   Pain Assessment   Pain Assessment Tool 0-10   Pain Score 10 - Worst Possible Pain   Pain Location/Orientation Location: Abdomen   Hospital Pain Intervention(s) Medication (See MAR); Repositioned; Ambulation/increased activity; Emotional support; Rest  (Increased activity - bed mobility)   Multiple Pain Sites No   Restrictions/Precautions   Weight Bearing Precautions Per Order No   Other Precautions Multiple lines; Fall Risk;Pain  (NG tube)   Home Living   Type of [address] [place] [address] One level;Performs ADLs on one level; Able to live on main level with bedroom/bathroom;Stairs to enter with rails  (1 OLAF through garage w/ 2 grab bars)   Bathroom Shower/Tub Walk-in shower   Bathroom Toilet Standard   Bathroom Equipment Built-in shower seat; Toilet raiser;Grab bars in shower   Home Equipment Walker;Cane;Stair glide  (Standard walker   Stair glide to basement)   Prior Function   Level of [place] Independent with ADLs and functional mobility   Lives With Spouse   Receives Help From Family  (Wife  [place] are not around often )   ADL Assistance Independent   IADLs Independent   Falls in the last 6 months 0   Vocational Retired   Comments PTA pt amb w/o AD and completed ADLs I  (-)  since admission   General   Additional Pertinent History Anemia, dupuytren contracture of both hands, HLD, HTN, paroxysmal A-fib, SOB, chemotherapy for MALT   Family/Caregiver Present Yes  (Wife)   Cognition   Overall Cognitive Status WFL   Arousal/Participation Alert   Attention Within functional limits   Orientation Level Oriented X4   Following Commands Follows all commands and directions without difficulty   Comments Pt pleasant and cooperative to PT session   Subjective   Subjective Pt states he is feeling okay  RUE Assessment   RUE Assessment   (Refer to OT)   LUE Assessment   LUE Assessment   (Refer to OT)   RLE Assessment   RLE Assessment WFL  (Overall 4+/5 except hip 4/5)   LLE Assessment   LLE Assessment WFL  (Overall 4+/5 except hip 4/5)   Bed Mobility   Rolling R 5  Supervision   Additional items HOB elevated; Bedrails; Increased time required;Verbal cues   Supine to Sit 5  Supervision   Additional items HOB elevated; Increased time required;Verbal cues; Bedrails   Sit to Supine 5  Supervision   Additional items HOB elevated; Bedrails; Increased time required;Verbal cues   Additional Comments Pt reported severe pain upon sitting at EOB; pt returned to supine follow supine to sit 2* to pain  Pt also tolerated shifting himself towards HOB w/ b/l hand rails  Required line management throughout     Transfers   Additional Comments Unable to assess 2* to abdominal pain; will assess next session   Ambulation/Elevation   Ambulation/Elevation Additional Comments Unable to assess 2* to abdominal pain; will assess next session   Balance   Static Sitting Fair   Dynamic Sitting Fair -   Static Standing Zero   Ambulatory Zero   Endurance Deficit   Endurance Deficit Description Unable to assess 2* to pain; sat at EOB for approximately 10s   Activity Tolerance   Activity Tolerance Patient limited by pain   Medical Staff Made Aware OT [address]   Nurse Made Aware MADHU Hernandez   Assessment Prognosis Poor   Problem List Decreased strength;Pain  (Will evalute further impairments next session)   Assessment Pt is a 65 y/o male who presented with symptoms of lower abdominal pain with nausea and dry heaving since 4/25/2022  Patient admitted on 4/26/2022 with stage IV adenocarcinoma of small bowel, 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  Pt currently s/p EGD on 5/3/2022 where b/l pumonary metastases were found, along with distention of the proximal and mid abdomen with adhered small bowel segments  Pt's PMH that may impact PT session include anemia, dupuytren contracture of both hands, HLD, HTN, paroxysmal A-fib, SOB, and previous chemotherapy  Pt referred to PT for mobility assessment and discharge planning  On eval, patient experienced 10/10 pain in his abdomen at baseline  Patient demonstrated S with rolling to R and supine<>sit; unable to complete further mobility assessment 2* to increased pain upon supine to sit  Pt required line management during bed mobility  The patient's AM-PAC Basic Mobility Inpatient Short Form Raw Score is 14  A Raw score of less than or equal to 16 suggests the patient may benefit from discharge to post-acute rehabilitation services  Please also refer to the recommendation of the Physical Therapist for safe discharge planning  From PT standpoint, D/C recommendation TBD pending OOB mobility assessment  Per chart, ? Home hospice  Will continue PT per POC  At end of session, patient stable with lines intact and needs within reach  Nsg notified  Barriers to Discharge Inaccessible home environment   Goals   Patient Goals To go home   STG Expiration Date 05/17/22   Short Term Goal #1 Goals to be met in 14 days: patient able to: 1  Increased MMT of 1/2 grade grossly to improve overall functional mobility; 2  Improved balance by 1/2 grade grossly to improve safety and independence; 3   Improved bed mobility to mod I to progress towards PLOF; 4  PT to complete OOB mobility assessment as appropriate; 5  Patient/caregiver education    PT Treatment Day 0   Plan   Treatment/Interventions LE strengthening/ROM; Therapeutic exercise; Endurance training;Patient/family training;Bed mobility;Spoke to nursing;OT   PT Frequency 3-5x/wk   Recommendation   PT Discharge Recommendation   (D/C TBD pending OOB mobility assessment)   AM-PAC Basic Mobility Inpatient   Turning in Bed Without Bedrails 3   Lying on Back to Sitting on Edge of Flat Bed 3   Moving Bed to Chair 2   Standing Up From Chair 2   Walk in Room 2   Climb 3-5 Stairs 2   Basic Mobility Inpatient Raw Score 14   Basic Mobility Standardized Score 35 55   Highest Level Of Mobility   -[facility] Goal 4: Move to chair/commode   -[facility] Highest Level of Mobility 3: Sit at edge of bed   -[facility] Goal Achieved No   End of Consult   Patient Position at End of Consult Supine; All needs within reach   End of Consult Comments Pt stable and supine following session with all needs in reach     Hx/personal factors: co-morbidities, inaccessible home, advanced age, mutliple lines, pain, fall risk and assist w/ ADL's  Examination: dec mobility, dec balance, dec amb, risk for falls, pain  Clinical: unpredictable (ongoing medical status, abnormal lab values, risk for falls and pain mgt)  Complexity: high     Akilchelsi Carrillo, SPT

## 2022-05-04 NOTE — PROGRESS NOTES
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)    Generalized abdominal pain    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[address] )    Lung metastasis ([address] )    Encounter for care related to vascular access port    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 ([address] )    Chemotherapy induced neutropenia ([address] )    Neoplastic malignant related fatigue    Thyroid disorder    Chemotherapy induced neutropenia (HCC)    Chronic obstructive pulmonary disease (HCC)    Malignant neoplasm metastatic to lung (HCC)    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HCC)    Moderate protein-calorie malnutrition (HCC)    Small bowel polyp    Small bowel obstruction (HCC)    Neutropenia (HCC)    Antineoplastic chemotherapy induced pancytopenia (CODE) (HCC)    Hypokalemia    Paroxysmal atrial fibrillation (HCC)       Active problems addressed:  · Metastatic small bowel adenocarcinoma  · Gastric MALT with peritoneal/omental implants  · Small bowel obstruction, malignant  · Abdominal pain  · Frailty  · Debility  · Generalized weakness  · Ambulatory dysfunction  · Goals of care    Consult is for goals    PLAN:    1   Symptom management:   Started octreotide 0 1mg sq q8H while inpatient, d/c on discharge   Started decadron IV 4mg BID, continue with hospice   Continue dilaudid IV 1mg, 2mg, 0 5mg IV Q4H prn for moderate, severe, and BT pain, respectively   Discussed with patient possible short stay at the [facility] for better symptom control but he refused and opted to go home  Discussed case with hospice liaison  They will order CADD for dilaudid and they will continue IV steroids, will not recommend octreotide   D/w SLIM    2  Goals:    Going home with hospice    Code status: Level 3 - DNAR and DNI   Decisional apparatus:  Patient does have capacity to make medical decisions on my exam today  If such capacity is lost, patient's substitute decision maker would default to wife by [place]  Advance Directive / Living Will / POLST:  None on file    3  Prognosis: less than 6 months    We appreciate the opportunity to participate in this patient's care  We will continue to follow  Please do not hesitate to contact our on-call provider through our clinic answering service at [phone] should you have acute symptom control concerns  INTERVAL HISTORY:  Chart reviewed  No acute events overnight  MAR reviewed  Patient reports significant pain control since venting G tube placement  He is very pleased with this  He kept pressing his belly to make his point clear  He seems resolved on going home with hospice  Review of Systems   Constitutional: Positive for activity change, appetite change and fatigue  HENT: Negative for trouble swallowing  Respiratory: Negative for shortness of breath  Cardiovascular: Negative for chest pain  Gastrointestinal: Negative for abdominal distention, abdominal pain, constipation, diarrhea, nausea and vomiting  Musculoskeletal: Negative for back pain  Neurological: Negative for weakness  Psychiatric/Behavioral: Negative for sleep disturbance  The patient is not nervous/anxious  All other systems reviewed and are negative        MEDICATIONS / ALLERGIES:  all current active meds have been reviewed    Allergies   Allergen Reactions    Other Other (See Comments)     Liquid lidocaine - couldn't swallow, panicked       OBJECTIVE:  /60 (BP Location: Left arm)   Pulse 97   Temp 100 °F (37 8 °C) (Temporal)   Resp 18   Ht 5' 9" (1 753 m)   Wt 63 5 kg (139 lb 15 9 oz) Comment: obtained weight using standing scale  SpO2 93%   BMI 20 67 kg/m²   Physical Exam:    Physical Exam  Constitutional:       General: He is not in acute distress  Appearance: He is ill-appearing  He is not toxic-appearing or diaphoretic  HENT:      Head: Normocephalic and atraumatic  Comments: Temporal mm wasting  Eyes:      General: No scleral icterus  Pulmonary:      Effort: Pulmonary effort is normal  No respiratory distress  Abdominal:      General: Abdomen is flat  There is no distension  Palpations: Abdomen is soft  Tenderness: There is no abdominal tenderness  Skin:     General: Skin is warm and dry  Coloration: Skin is pale  Skin is not jaundiced  Neurological:      Mental Status: He is alert and oriented to person, place, and time  Psychiatric:         Mood and Affect: Mood normal          Behavior: Behavior normal          Thought Content: Thought content normal          Judgment: Judgment normal        Lab Results:   I have personally reviewed pertinent labs  Imaging Studies: I have personally reviewed pertinent reports  EKG, Pathology, and Other Studies: I have personally reviewed pertinent reports  Counseling / Coordination of Care  Total floor / unit time spent today 30 minutes  Greater than 50% of total time was spent with the patient and / or family counseling and / or coordination of care  A description of the counseling / coordination of care: provided medical updates, discussed palliative care, determined competency, determined goals of care, determined POA, determined social/family support, discussed plans of care, discussed symptom management, provided psychosocial support      Allyson Pan MD  [address] and Supportive Care

## 2022-05-04 NOTE — QUICK NOTE
Patient is going on home hospice; he is scheduled to go home tomorrow  Primary oncology team aware that he will no longer pursue treatment at this point  Oncology service signing off  Please do not hesitate to reach out for any questions or concerns      Ashwini Albarran, [address], [address], [facility]  Hematology-Oncology

## 2022-05-04 NOTE — PLAN OF CARE
Problem: PAIN - ADULT  Goal: Verbalizes/displays adequate comfort level or baseline comfort level  Description: Interventions:  - Encourage patient to monitor pain and request assistance  - Assess pain using appropriate pain scale  - Administer analgesics based on type and severity of pain and evaluate response  - Implement non-pharmacological measures as appropriate and evaluate response  - Consider cultural and social influences on pain and pain management  - Notify physician/advanced practitioner if interventions unsuccessful or patient reports new pain  Outcome: Progressing     Problem: DISCHARGE PLANNING  Goal: Discharge to home or other facility with appropriate resources  Description: INTERVENTIONS:  - Identify barriers to discharge w/patient and caregiver  - Arrange for needed discharge resources and transportation as appropriate  - Identify discharge learning needs (meds, wound care, etc )  - Arrange for interpretive services to assist at discharge as needed  - Refer to Case Management Department for coordinating discharge planning if the patient needs post-hospital services based on physician/advanced practitioner order or complex needs related to functional status, cognitive ability, or social support system  Outcome: Progressing     Problem: GASTROINTESTINAL - ADULT  Goal: Maintains adequate nutritional intake  Description: INTERVENTIONS:  - Monitor percentage of each meal consumed  - Identify factors contributing to decreased intake, treat as appropriate  - Assist with meals as needed  - Monitor I&O, weight, and lab values if indicated  - Obtain nutrition services referral as needed  Outcome: Progressing     Problem: Prexisting or High Potential for Compromised Skin Integrity  Goal: Skin integrity is maintained or improved  Description: INTERVENTIONS:  - Identify patients at risk for skin breakdown  - Assess and monitor skin integrity  - Assess and monitor nutrition and hydration status  - Monitor labs   - Assess for incontinence   - Turn and reposition patient  - Assist with mobility/ambulation  - Relieve pressure over bony prominences  - Avoid friction and shearing  - Provide appropriate hygiene as needed including keeping skin clean and dry  - Evaluate need for skin moisturizer/barrier cream  - Collaborate with interdisciplinary team   - Patient/family teaching  - Consider wound care consult   Outcome: Progressing

## 2022-05-04 NOTE — ASSESSMENT & PLAN NOTE
Due to lymphoma  Appreciate palliative care, oncology, surgery and GI help  Unfortunately patient has failed chemotherapy and no surgery is felt to be helpful to relieve the obstruction  Has a venting G tube now and it is giving him significant relief from pain  Working on home with hospice needs      Should be able to go home tomorrow

## 2022-05-04 NOTE — PALLIATIVE CARE CONFERENCE
Palliative MSW saw patient at the bedside today  MSW appreciates the opportunity to provider patient/family with inpatient emotional support and guidance while patient continues to receive medical attention from the medical team     Topics discussed: We spoke about his recent procedure  He reports that his pain is much better since having the peg placed  He is going home on hospice, he expressed that he does not want to go to the [facility]  His spouse, Esthela Marquez states that they are bringing all of the DME to their home tomorrow  Pt states that his goal was to go home and do as much for himself as he is able  We validated that this is very important to him and we are glad he is able to return home  He doesn't want to have to rely on Esthela Marquez for everything all of the time, at least not right away       Areas that need follow-up: None  Resources given: None  Others present:  Dr Veronia Primrose and pts spouse    MSW will continue to follow as requested by the medical team, patient, or family

## 2022-05-04 NOTE — QUICK NOTE
Pt seen at bedside with wife after peg placemetn  Bile is seen in suction tubing;    abd is ok;      Ok to use tube  Viridiana Roxana for clear po for pleasure  Please call with any issues

## 2022-05-05 NOTE — PLAN OF CARE
Problem: Prexisting or High Potential for Compromised Skin Integrity  Goal: Skin integrity is maintained or improved  Description: INTERVENTIONS:  - Identify patients at risk for skin breakdown  - Assess and monitor skin integrity  - Assess and monitor nutrition and hydration status  - Monitor labs   - Assess for incontinence   - Turn and reposition patient  - Assist with mobility/ambulation  - Relieve pressure over bony prominences  - Avoid friction and shearing  - Provide appropriate hygiene as needed including keeping skin clean and dry  - Evaluate need for skin moisturizer/barrier cream  - Collaborate with interdisciplinary team   - Patient/family teaching  - Consider wound care consult   Outcome: Progressing     Problem: PAIN - ADULT  Goal: Verbalizes/displays adequate comfort level or baseline comfort level  Description: Interventions:  - Encourage patient to monitor pain and request assistance  - Assess pain using appropriate pain scale  - Administer analgesics based on type and severity of pain and evaluate response  - Implement non-pharmacological measures as appropriate and evaluate response  - Consider cultural and social influences on pain and pain management  - Notify physician/advanced practitioner if interventions unsuccessful or patient reports new pain  Outcome: Progressing     Problem: DISCHARGE PLANNING  Goal: Discharge to home or other facility with appropriate resources  Description: INTERVENTIONS:  - Identify barriers to discharge w/patient and caregiver  - Arrange for needed discharge resources and transportation as appropriate  - Identify discharge learning needs (meds, wound care, etc )  - Arrange for interpretive services to assist at discharge as needed  - Refer to Case Management Department for coordinating discharge planning if the patient needs post-hospital services based on physician/advanced practitioner order or complex needs related to functional status, cognitive ability, or social support system  Outcome: Progressing     Problem: MOBILITY - ADULT  Goal: Maintain or return to baseline ADL function  Description: INTERVENTIONS:  -  Assess patient's ability to carry out ADLs; assess patient's baseline for ADL function and identify physical deficits which impact ability to perform ADLs (bathing, care of mouth/teeth, toileting, grooming, dressing, etc )  - Assess/evaluate cause of self-care deficits   - Assess range of motion  - Assess patient's mobility; develop plan if impaired  - Assess patient's need for assistive devices and provide as appropriate  - Encourage maximum independence but intervene and supervise when necessary  - Involve family in performance of ADLs  - Assess for home care needs following discharge   - Consider OT consult to assist with ADL evaluation and planning for discharge  - Provide patient education as appropriate  Outcome: Progressing  Goal: Maintains/Returns to pre admission functional level  Description: INTERVENTIONS:  - Perform BMAT or MOVE assessment daily    - Set and communicate daily mobility goal to care team and patient/family/caregiver  - Collaborate with rehabilitation services on mobility goals if consulted  - Perform Range of Motion 3 times a day  - Reposition patient every 2 hours    - Dangle patient 3 times a day  - Stand patient 3 times a day  - Ambulate patient 3 times a day  - Out of bed to chair 3 times a day   - Out of bed for meals 3 times a day  - Out of bed for toileting  - Record patient progress and toleration of activity level   Outcome: Progressing     Problem: Potential for Falls  Goal: Patient will remain free of falls  Description: INTERVENTIONS:  - Educate patient/family on patient safety including physical limitations  - Instruct patient to call for assistance with activity   - Consult OT/PT to assist with strengthening/mobility   - Keep Call bell within reach  - Keep bed low and locked with side rails adjusted as appropriate  - Keep care items and personal belongings within reach  - Initiate and maintain comfort rounds  - Make Fall Risk Sign visible to staff  - Offer Toileting every 2 Hours, in advance of need  - Initiate/Maintain alarm  - Obtain necessary fall risk management equipment:   - Apply yellow socks and bracelet for high fall risk patients  - Consider moving patient to room near nurses station  Outcome: Progressing     Problem: GASTROINTESTINAL - ADULT  Goal: Maintains or returns to baseline bowel function  Description: INTERVENTIONS:  - Assess bowel function  - Encourage oral fluids to ensure adequate hydration  - Administer IV fluids if ordered to ensure adequate hydration  - Administer ordered medications as needed  - Encourage mobilization and activity  - Consider nutritional services referral to assist patient with adequate nutrition and appropriate food choices  Outcome: Progressing  Goal: Maintains adequate nutritional intake  Description: INTERVENTIONS:  - Monitor percentage of each meal consumed  - Identify factors contributing to decreased intake, treat as appropriate  - Assist with meals as needed  - Monitor I&O, weight, and lab values if indicated  - Obtain nutrition services referral as needed  Outcome: Progressing     Problem: Nutrition/Hydration-ADULT  Goal: Nutrient/Hydration intake appropriate for improving, restoring or maintaining nutritional needs  Description: Monitor and assess patient's nutrition/hydration status for malnutrition  Collaborate with interdisciplinary team and initiate plan and interventions as ordered  Monitor patient's weight and dietary intake as ordered or per policy  Utilize nutrition screening tool and intervene as necessary  Determine patient's food preferences and provide high-protein, high-caloric foods as appropriate       INTERVENTIONS:  - Monitor oral intake, urinary output, labs, and treatment plans  - Assess nutrition and hydration status and recommend course of action  - Evaluate amount of meals eaten  - Assist patient with eating if necessary   - Allow adequate time for meals  - Recommend/ encourage appropriate diets, oral nutritional supplements, and vitamin/mineral supplements  - Order, calculate, and assess calorie counts as needed  - Recommend, monitor, and adjust tube feedings and TPN/PPN based on assessed needs  - Assess need for intravenous fluids  - Provide specific nutrition/hydration education as appropriate  - Include patient/family/caregiver in decisions related to nutrition  Outcome: Progressing     Problem: HEMATOLOGIC - ADULT  Goal: Maintains hematologic stability  Description: INTERVENTIONS  - Assess for signs and symptoms of bleeding or hemorrhage  - Monitor labs  - Administer supportive blood products/factors as ordered and appropriate  Outcome: Progressing

## 2022-05-05 NOTE — CASE MANAGEMENT
Case Management Discharge Planning Note    Patient name Phoebe Martinez  Location East 2 /E2 -* MRN [id number]  : 1945 Date 2022       Current Admission Date: 2022  Current Admission Diagnosis:Small bowel obstruction [facility])   Patient Active Problem List    Diagnosis Date Noted    Paroxysmal atrial fibrillation ([address] )     Hypokalemia 2022    Antineoplastic chemotherapy induced pancytopenia (CODE) ([address] ) 2022    Small bowel obstruction ([address] ) 2022    Neutropenia ([address] ) 2022    Small bowel polyp 2022    Moderate protein-calorie malnutrition ([address] ) 2022    Malignant neoplasm metastatic to lung ([address] ) 10/26/2021    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/2021    Chronic obstructive pulmonary disease ([address] ) 2021    Chemotherapy induced neutropenia ([address] ) 2021    Neoplastic malignant related fatigue 2021    Thyroid disorder 2021    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 ([address] ) 2021    Chemotherapy induced neutropenia ([address] ) 2021    Encounter for care related to vascular access port 2021    Lung metastasis ([address] ) 2020    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    Generalized abdominal pain 2020    MALT (mucosa associated lymphoid tissue)- Gastric ([address] ) 2020    Tubular adenoma of colon 2020    Tubular adenoma of small intestine 2020    Prominent ampulla of Vater 2020    Gastric ulcer 2020    Esophageal reflux 2020    Chemotherapy-induced nausea 2020    Elevated alkaline phosphatase level 2020    Liver lesion 2020    Lung nodules 2020    Dupuytren's disease 2017    Hyperlipidemia 2013    Anemia 2012    Benign essential hypertension 2012    Benign prostatic hyperplasia 10/25/2012      LOS (days): 9  Geometric Mean LOS (GMLOS) (days): 4 70  Days to GMLOS:-4 2     OBJECTIVE:  Risk of Unplanned Readmission Score: 23      Current admission status: Inpatient   Preferred Pharmacy:   [address] [facility], [address] [facility] [address]  [facility]  [facility] [address] [facility] [address]  Phone: [phone] Fax: [phone]    Primary Care Provider: June Tate DO    Primary Insurance: MEDICARE  Secondary Insurance: [employer]    DISCHARGE DETAILS:    Transport at Discharge : [facility]  Dispatcher Contacted: Yes     Transported by [employer] and Unit #):  SLETS; [phone]  ETA of Transport (Date): 05/06/22  ETA of Transport (Time): 1100

## 2022-05-05 NOTE — PLAN OF CARE
Problem: Nutrition/Hydration-ADULT  Goal: Nutrient/Hydration intake appropriate for improving, restoring or maintaining nutritional needs  Description: Monitor and assess patient's nutrition/hydration status for malnutrition  Collaborate with interdisciplinary team and initiate plan and interventions as ordered  Monitor patient's weight and dietary intake as ordered or per policy  Utilize nutrition screening tool and intervene as necessary  Determine patient's food preferences and provide high-protein, high-caloric foods as appropriate  INTERVENTIONS:  - Monitor oral intake, urinary output, labs, and treatment plans  - Assess nutrition and hydration status and recommend course of action  - Evaluate amount of meals eaten  - Assist patient with eating if necessary   - Allow adequate time for meals  - Recommend/ encourage appropriate diets, oral nutritional supplements, and vitamin/mineral supplements  - Order, calculate, and assess calorie counts as needed  - Recommend, monitor, and adjust tube feedings and TPN/PPN based on assessed needs  - Assess need for intravenous fluids  - Provide specific nutrition/hydration education as appropriate  - Include patient/family/caregiver in decisions related to nutrition  Outcome: Not Progressing   Plans for home hospice, PEG placed for decompression, on clears for pleasure feed

## 2022-05-05 NOTE — PROGRESS NOTES
[address] [place] [address]  Progress Note - Normand Gosselin 1945, 64 y o  male MRN: [id number]  Unit/Bed#: E2 -01 Encounter: [id number]  Primary Care Provider: Jose Jamison DO   Date and time admitted to hospital: 2022 11:36 AM    * Small bowel obstruction [facility])  Assessment & Plan  Due to lymphoma  Appreciate palliative care, oncology, surgery and GI help  Unfortunately patient has failed chemotherapy and no surgery is felt to be helpful to relieve the obstruction  Has a venting G tube now and it is giving him significant relief from pain  Working on home with hospice needs  Should be able to go home tomorrow            Subjective:   Continues to feel better  Less abdominal pain with peg tube      Objective:     Vitals:   Temp (24hrs), Av 1 °F (36 7 °C), Min:98 1 °F (36 7 °C), Max:98 1 °F (36 7 °C)    Temp:  [98 1 °F (36 7 °C)] 98 1 °F (36 7 °C)  HR:  [] 88  Resp:  [18-20] 20  BP: (115-131)/(60-66) 115/66  SpO2:  [93 %-95 %] 93 %  Body mass index is 20 67 kg/m²  Input and Output Summary (last 24 hours): Intake/Output Summary (Last 24 hours) at 2022 1222  Last data filed at 2022 7178  Gross per 24 hour   Intake 2167 5 ml   Output 1450 ml   Net 717 5 ml       Physical Exam:     Physical Exam  Vitals and nursing note reviewed  HENT:      Head: Normocephalic and atraumatic  Eyes:      Pupils: Pupils are equal, round, and reactive to light  Cardiovascular:      Rate and Rhythm: Normal rate and regular rhythm  Heart sounds: No murmur heard  No friction rub  No gallop  Pulmonary:      Effort: Pulmonary effort is normal       Breath sounds: Normal breath sounds  No wheezing or rales  Abdominal:      General: Bowel sounds are normal       Palpations: Abdomen is soft  Tenderness: There is abdominal tenderness (much less abdominal pain to deep palpation  no rebound  no guarding)  Musculoskeletal:      Right lower leg: No edema  Left lower leg: No edema          Additional Data:     Labs:    Results from last 7 days   Lab Units 05/04/22  0611 05/02/22  0838 05/02/22  0838   WBC Thousand/uL 3 01*   < > 2 37*   HEMOGLOBIN g/dL 10 8*   < > 8 7*   HEMATOCRIT % 34 9*   < > 27 1*   PLATELETS Thousands/uL 212   < > 136*   NEUTROS PCT %  --   --  52   LYMPHS PCT %  --   --  26   MONOS PCT %  --   --  20*   EOS PCT %  --   --  2    < > = values in this interval not displayed  Results from last 7 days   Lab Units 05/04/22  0611 05/02/22  0551 05/01/22  0639   POTASSIUM mmol/L 3 0*   < > 3 0*   CHLORIDE mmol/L 113*   < > 108   CO2 mmol/L 24   < > 26   BUN mg/dL 11   < > 11   CREATININE mg/dL 1 15   < > 1 10   CALCIUM mg/dL 7 5*   < > 8 2*   ALK PHOS U/L  --   --  292*   ALT U/L  --   --  22   AST U/L  --   --  24    < > = values in this interval not displayed       Results from last 7 days   Lab Units 05/02/22  1609   INR  1 19                   * I Have Reviewed All Lab Data     Recent Cultures (last 7 days):             Last 24 Hours Medication List:   Current Facility-Administered Medications   Medication Dose Route Frequency Provider Last Rate    acetaminophen  650 mg Oral Q6H PRN GRISELDA Tsai      cefepime  2,000 mg Intravenous Q12H Ami Corolla, DO 2,000 mg (05/05/22 0150)    dexamethasone  4 mg Intravenous BID (AM & Afternoon) Patricia Maldonado MD      dextrose 5 % and sodium chloride 0 9 % with KCl 20 mEq/L  75 mL/hr Intravenous Continuous Danielito Thomson MD 75 mL/hr (05/05/22 6326)    heparin (porcine)  5,000 Units Subcutaneous [facility] Sushila Foreman, DO      HYDROmorphone  0 5 mg Intravenous Q4H PRN GRISELDA Garland      HYDROmorphone  1 mg Intravenous Q4H PRN Ashu Roe,       Or    HYDROmorphone  2 mg Intravenous Q4H PRN Ashu Roe, DO      metoprolol  2 5 mg Intravenous Q6H Sushila Foreman, DO      octreotide  100 mcg Subcutaneous [facility] MD Lorraine Acuña ondansetron  4 mg Intravenous Q4H PRN Thang Conklin DO      pantoprazole  40 mg Intravenous Q12H [facility] Edith Delgadillo MD           VTE Pharmacologic Prophylaxis:   Pharmacologic: Heparin      Current Length of Stay: 9 day(s)    Current Patient Status: Inpatient       Discharge Plan: home tomorrow with hospice    Code Status: Level 3 - DNAR and DNI           Today, Patient Was Seen By: Jeremias Jurado DO    ** Please Note: Dictation voice to text software may have been used in the creation of this document   **

## 2022-05-05 NOTE — CASE MANAGEMENT
Case Management Progress Note    Patient name Candelario Feldman  Location East 2 /E2 -* MRN [id number]  : 1945 Date 2022       LOS (days): 9  Geometric Mean LOS (GMLOS) (days): 4 70  Days to GMLOS:-4 2        OBJECTIVE:     Current admission status: Inpatient  Preferred Pharmacy:   [address] [place] [id number]  Phone: [phone] Fax: [phone]    Primary Care Provider: Isma España DO    Primary Insurance: MEDICARE  Secondary Insurance: [address] [place] [address],[facility]    PROGRESS NOTE:      CM notified by hospice liaison that all of the DME has been delivered to the home  Requesting a transport for tomorrow AM  CM met with pt at bedside to discuss transport  He would feel most comfortable in a BLS transport  CM made request  Will advise of time once confirmed

## 2022-05-06 NOTE — PLAN OF CARE
Problem: DISCHARGE PLANNING  Goal: Discharge to home or other facility with appropriate resources  Description: INTERVENTIONS:  - Identify barriers to discharge w/patient and caregiver  - Arrange for needed discharge resources and transportation as appropriate  - Identify discharge learning needs (meds, wound care, etc )  - Arrange for interpretive services to assist at discharge as needed  - Refer to Case Management Department for coordinating discharge planning if the patient needs post-hospital services based on physician/advanced practitioner order or complex needs related to functional status, cognitive ability, or social support system  Outcome: Progressing     Problem: PAIN - ADULT  Goal: Verbalizes/displays adequate comfort level or baseline comfort level  Description: Interventions:  - Encourage patient to monitor pain and request assistance  - Assess pain using appropriate pain scale  - Administer analgesics based on type and severity of pain and evaluate response  - Implement non-pharmacological measures as appropriate and evaluate response  - Consider cultural and social influences on pain and pain management  - Notify physician/advanced practitioner if interventions unsuccessful or patient reports new pain  Outcome: Progressing     Problem: Prexisting or High Potential for Compromised Skin Integrity  Goal: Skin integrity is maintained or improved  Description: INTERVENTIONS:  - Identify patients at risk for skin breakdown  - Assess and monitor skin integrity  - Assess and monitor nutrition and hydration status  - Monitor labs   - Assess for incontinence   - Turn and reposition patient  - Assist with mobility/ambulation  - Relieve pressure over bony prominences  - Avoid friction and shearing  - Provide appropriate hygiene as needed including keeping skin clean and dry  - Evaluate need for skin moisturizer/barrier cream  - Collaborate with interdisciplinary team   - Patient/family teaching  - Consider wound care consult   Outcome: Progressing     Problem: GASTROINTESTINAL - ADULT  Goal: Maintains or returns to baseline bowel function  Description: INTERVENTIONS:  - Assess bowel function  - Encourage oral fluids to ensure adequate hydration  - Administer IV fluids if ordered to ensure adequate hydration  - Administer ordered medications as needed  - Encourage mobilization and activity  - Consider nutritional services referral to assist patient with adequate nutrition and appropriate food choices  Outcome: Progressing  Goal: Maintains adequate nutritional intake  Description: INTERVENTIONS:  - Monitor percentage of each meal consumed  - Identify factors contributing to decreased intake, treat as appropriate  - Assist with meals as needed  - Monitor I&O, weight, and lab values if indicated  - Obtain nutrition services referral as needed  Outcome: Progressing     Problem: Nutrition/Hydration-ADULT  Goal: Nutrient/Hydration intake appropriate for improving, restoring or maintaining nutritional needs  Description: Monitor and assess patient's nutrition/hydration status for malnutrition  Collaborate with interdisciplinary team and initiate plan and interventions as ordered  Monitor patient's weight and dietary intake as ordered or per policy  Utilize nutrition screening tool and intervene as necessary  Determine patient's food preferences and provide high-protein, high-caloric foods as appropriate       INTERVENTIONS:  - Monitor oral intake, urinary output, labs, and treatment plans  - Assess nutrition and hydration status and recommend course of action  - Evaluate amount of meals eaten  - Assist patient with eating if necessary   - Allow adequate time for meals  - Recommend/ encourage appropriate diets, oral nutritional supplements, and vitamin/mineral supplements  - Order, calculate, and assess calorie counts as needed  - Recommend, monitor, and adjust tube feedings and TPN/PPN based on assessed needs  - Assess need for intravenous fluids  - Provide specific nutrition/hydration education as appropriate  - Include patient/family/caregiver in decisions related to nutrition  Outcome: Progressing

## 2022-05-06 NOTE — PROGRESS NOTES
I spoke with patient  He is going to sign up for hospice and  have suction via gastrostomy tube to decompress the small bowel  If obstruction opens up he will call and we can rediscuss the whole situation in relation to [address]

## 2022-05-06 NOTE — DISCHARGE SUMMARY
[address] [facility]  Discharge- Gigi Sullivan 1945, 64 y o  male MRN: [id number]  Unit/Bed#: E2 -01 Encounter: [id number]  Primary Care Provider: Jasen Mcdonald DO   Date and time admitted to hospital: 4/26/2022 11:36 AM    * Small bowel obstruction [facility])  Assessment & Plan  Due to lymphoma  Appreciate palliative care, oncology, surgery and GI help  Unfortunately patient has failed chemotherapy and no surgery is felt to be helpful to relieve the obstruction  Has a venting G tube now and it is giving him significant relief from pain  He is going home with hospice today    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 [facility])  Assessment & Plan  As above    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[facility])  Assessment & Plan  Unfortunately no chemotherapy nor surgery is thought to be helpful  Patient is going home with hospice    MALT (mucosa associated lymphoid tissue)- Gastric [facility])  Assessment & Plan  Patient follows with Medical Oncology at [facility] as well as [facility]  Patient show progression through 2 lines of chemotherapy  Recently finished first 2 week course of Lonsurf Last friday  Unfortunately he now has SBO in setting of peritoneal carcinomatosis               Discharging Physician / Practitioner: Maria Guadalupe Cedeño DO  PCP: Jasen Mcdonald DO  Admission Date:   Admission Orders (From admission, onward)     Ordered        04/26/22 [address]  Once                      Discharge Date: 05/06/22    Medical Problems             Resolved Problems  Date Reviewed: 5/1/2022          Resolved    Hypotension 4/28/2022     Resolved by  Kasi Restrepo Stay:  · Oncology  · General surgery  · GI  · Palliative care      Procedures Performed:     · CT abdomen and pelvis  · PEG tube (for venting of bowel obstruction)      Reason for Admission: abdominal pain      Hospital Course:     Gigi Sullivan is a 64 y o  male patient who originally presented to the hospital on 4/26/2022 due to abdominal pain  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  Unfortunately this cancer spread is causing a bowel obstruction  He was seen by oncology  No treatment unfortunately is though to be helpful  He was seen by surgery to see if they could do a palliative surgery to relieve the obstruction, but they felt that he would not survive the surgery  Therefore the patient decided on home with hospice  He initially had an NG tube to suction to decompress his stomach due to the bowel obstruction  He elected to go home with hospice  A PEG tube was placed so that we could remove the NG tube  He will go home with the PEG tube attached to low intermittent suction to relieve the pressure in his stomach  Please see above list of diagnoses and related plan for additional information  Condition at Discharge: stable       Discharge Day Visit / Exam:     Subjective:  Feels better with PEG tube  Much less abdominal pain      Vitals: Blood Pressure: 140/76 (05/06/22 0743)  Pulse: 101 (05/06/22 0743)  Temperature: (!) 96 °F (35 6 °C) (05/06/22 0743)  Temp Source: Tympanic (05/06/22 0743)  Respirations: 18 (05/06/22 0743)  Height: 5' 9" (175 3 cm) (04/27/22 0700)  Weight - Scale: 63 5 kg (139 lb 15 9 oz) (obtained weight using standing scale) (05/02/22 1206)  SpO2: 93 % (05/06/22 0743)    Exam:     Physical Exam  Vitals and nursing note reviewed  HENT:      Head: Normocephalic and atraumatic  Eyes:      Pupils: Pupils are equal, round, and reactive to light  Cardiovascular:      Rate and Rhythm: Normal rate and regular rhythm  Heart sounds: No murmur heard  No friction rub  No gallop  Pulmonary:      Effort: Pulmonary effort is normal       Breath sounds: Normal breath sounds  No wheezing or rales  Abdominal:      General: Bowel sounds are normal       Palpations: Abdomen is soft  Tenderness:  There is no abdominal tenderness  Musculoskeletal:      Right lower leg: No edema  Left lower leg: No edema            Discharge instructions/Information to patient and family:   See after visit summary for information provided to patient and family  Provisions for Follow-Up Care:  See after visit summary for information related to follow-up care and any pertinent home health orders  Disposition:     Home       Discharge Statement:  I spent 50 minutes discharging the patient  This time was spent on the day of discharge  I had direct contact with the patient on the day of discharge  Greater than 50% of the total time was spent examining patient, answering all patient questions, arranging and discussing plan of care with patient as well as directly providing post-discharge instructions  Additional time then spent on discharge activities  Discharge Medications:  See after visit summary for reconciled discharge medications provided to patient and family        ** Please Note: This note has been constructed using a voice recognition system **

## 2022-05-06 NOTE — ASSESSMENT & PLAN NOTE
Patient follows with Medical Oncology at [facility] as well as [facility]  Patient show progression through 2 lines of chemotherapy  Recently finished first 2 week course of Lonsurf Last friday  Unfortunately he now has SBO in setting of peritoneal carcinomatosis

## 2022-05-06 NOTE — ASSESSMENT & PLAN NOTE
Unfortunately no chemotherapy nor surgery is thought to be helpful  Patient is going home with hospice

## 2022-05-06 NOTE — TELEPHONE ENCOUNTER
CALL RETURN FORM   Reason for patient call? Patient calling in stating he has just been discharged and would like to speak to the Doctor regarding his medication that he was removed from while in the hospital      Patient's primary oncologist? Proothi    Name of person the patient was calling for? Proothi     Any additional information to add, if applicable? Best call back number [phone]   Informed patient that the message will be forwarded to the team and someone will get back to them as soon as possible    Did you relay this information to the patient?  Yes

## 2022-05-06 NOTE — TELEPHONE ENCOUNTER
Returned call to patient  He has questions and concerns about medications that have been discontinues since his hospital stay  Patients states he did transition to hospice  He is now concerned that he made the wrong decision and would like to speak with Dr Justin Jarvis    Patient states someone from hospice will be coming back to his home this afternoon and will ask them additional questions    I will have Dr Justin Jarvis call to speak with the patient

## 2022-05-06 NOTE — ASSESSMENT & PLAN NOTE
Due to lymphoma  Appreciate palliative care, oncology, surgery and GI help  Unfortunately patient has failed chemotherapy and no surgery is felt to be helpful to relieve the obstruction  Has a venting G tube now and it is giving him significant relief from pain      He is going home with hospice today

## 2022-05-08 NOTE — HOSPICE
dilaudid cadd pump has 95 5ml remaining    attempted 4 demand doses used 2 gave a demand dose prior to transporting to bedroom pain ad 0 at end of visit  resting in bed head of bed elevated

## 2022-05-08 NOTE — HOSPICE
assisted in patient sn visit  accessed [facility] good blood return noted   flushed with 10ml nss was conected to dilaudid cadd pump  dose double check was done with chi rn  gave patient instruction on same  administred a demand dose during sn visit   gave instruction on intermittant suction and conection to abd drain  patient is alert and oriented is able to understand instruction   had a cadd pump in the past   request follow up visit for tomorrow for assessment and further instruction

## 2022-05-08 NOTE — HOSPICE
More decadron will arrive on Tuesday  Talk with your nurse if you want to order the walker with wheels

## 2022-05-09 NOTE — HOSPICE
prefilled 6 syringes for lorazepam 0 25 ml SL Q 6 H ATC , PRN Q2HR     Given 3 PRN Dilaudid during visit,

## 2022-05-09 NOTE — TELEPHONE ENCOUNTER
Patient's wife Rafa Renteria called to let his doctor know that he will not be making any more doctor's appts because he is in [facility] with [address]

## 2022-05-16 ENCOUNTER — HOME CARE VISIT (OUTPATIENT)
Dept: HOME HOSPICE | Facility: HOSPICE | Age: 77
End: 2022-05-16
Payer: MEDICARE

## 2025-06-21 NOTE — PLAN OF CARE
Problem: Adult Inpatient Plan of Care  Goal: Plan of Care Review  Description: The Plan of Care Review/Shift note should be completed every shift.  The Outcome Evaluation is a brief statement about your assessment that the patient is improving, declining, or no change.  This information will be displayed automatically on your shift  note.  Flowsheets (Taken 6/21/2025 1342)  Outcome Evaluation: Discharge disposition pending care team recommendation.  Plan of Care Reviewed With: patient  Goal: Readiness for Transition of Care  Recent Flowsheet Documentation  Taken 6/21/2025 1300 by La Del Rosario RN  Transportation Anticipated: health plan transportation  Concerns to be Addressed: discharge planning  Intervention: Mutually Develop Transition Plan  Recent Flowsheet Documentation  Taken 6/21/2025 1300 by La Del Rosario RN  Readmission Within the Last 30 Days: previous discharge plan unsuccessful  Transportation Anticipated: health plan transportation  Transportation Concerns: none  Concerns to be Addressed: discharge planning  Patient/Family Anticipated Services at Transition: (TCU)   durable medical equipment   rehabilitation services   skilled nursing   other (see comments)  Patient/Family Anticipates Transition to: (TCU) other (comment)  Offered/Gave Vendor List: no  Equipment Currently Used at Home: wheelchair, manual   Goal Outcome Evaluation:      Plan of Care Reviewed With: patient          Outcome Evaluation: Discharge disposition pending care team recommendation.           Problem: Potential for Falls  Goal: Patient will remain free of falls  Description: INTERVENTIONS:  - Assess patient frequently for physical needs  -  Identify cognitive and physical deficits and behaviors that affect risk of falls    -  [place] fall precautions as indicated by assessment   - Educate patient/family on patient safety including physical limitations  - Instruct patient to call for assistance with activity based on assessment  - Modify environment to reduce risk of injury  - Consider OT/PT consult to assist with strengthening/mobility  Outcome: Progressing

## (undated) DEVICE — CUFF TOURNIQUET 18 X 5.5 IN QUICK CONNECT 2BLA

## (undated) DEVICE — PADDING CAST 4 IN  COTTON STRL

## (undated) DEVICE — ACE WRAP 4 IN STERILE

## (undated) DEVICE — CHLORAPREP HI-LITE 26ML ORANGE

## (undated) DEVICE — ALUMI-HAND POSITIONER LG

## (undated) DEVICE — GLOVE SRG BIOGEL 7.5

## (undated) DEVICE — ACE WRAP 4 IN UNSTERILE

## (undated) DEVICE — OCCLUSIVE GAUZE STRIP,3% BISMUTH TRIBROMOPHENATE IN PETROLATUM BLEND: Brand: XEROFORM

## (undated) DEVICE — SUT VICRYL 3-0 SH 27 IN J416H

## (undated) DEVICE — SUT MONOCRYL 4-0 PS-2 18 IN Y496G

## (undated) DEVICE — DISPOSABLE EQUIPMENT COVER: Brand: SMALL TOWEL DRAPE

## (undated) DEVICE — INTENDED FOR TISSUE SEPARATION, AND OTHER PROCEDURES THAT REQUIRE A SHARP SURGICAL BLADE TO PUNCTURE OR CUT.: Brand: BARD-PARKER SAFETY BLADES SIZE 15, STERILE

## (undated) DEVICE — SPONGE PVP SCRUB WING STERILE

## (undated) DEVICE — GLOVE INDICATOR PI UNDERGLOVE SZ 8 BLUE

## (undated) DEVICE — GAUZE SPONGES,16 PLY: Brand: CURITY

## (undated) DEVICE — PACK PLASTIC HAND PBDS

## (undated) DEVICE — ACE WRAP 3 IN STERILE

## (undated) DEVICE — NEEDLE 25G X 1 1/2